# Patient Record
Sex: MALE | Race: WHITE | NOT HISPANIC OR LATINO | Employment: OTHER | URBAN - METROPOLITAN AREA
[De-identification: names, ages, dates, MRNs, and addresses within clinical notes are randomized per-mention and may not be internally consistent; named-entity substitution may affect disease eponyms.]

---

## 2017-04-17 DIAGNOSIS — E55.9 VITAMIN D DEFICIENCY: Primary | ICD-10-CM

## 2017-04-17 DIAGNOSIS — E11.69 TYPE 2 DIABETES MELLITUS WITH OTHER SPECIFIED COMPLICATION (HCC): ICD-10-CM

## 2017-04-17 DIAGNOSIS — E11.9 CONTROLLED TYPE 2 DIABETES MELLITUS WITHOUT COMPLICATION, UNSPECIFIED LONG TERM INSULIN USE STATUS: Primary | ICD-10-CM

## 2017-04-17 DIAGNOSIS — E78.5 DYSLIPIDEMIA: ICD-10-CM

## 2017-04-17 DIAGNOSIS — E79.0 HYPERURICEMIA: ICD-10-CM

## 2017-04-17 DIAGNOSIS — R79.89 LOW TESTOSTERONE: ICD-10-CM

## 2017-04-19 ENCOUNTER — LAB (OUTPATIENT)
Dept: ENDOCRINOLOGY | Age: 72
End: 2017-04-19

## 2017-04-19 DIAGNOSIS — E11.69 TYPE 2 DIABETES MELLITUS WITH OTHER SPECIFIED COMPLICATION (HCC): ICD-10-CM

## 2017-04-19 DIAGNOSIS — E78.5 DYSLIPIDEMIA: ICD-10-CM

## 2017-04-19 DIAGNOSIS — E55.9 VITAMIN D DEFICIENCY: ICD-10-CM

## 2017-04-19 DIAGNOSIS — E79.0 HYPERURICEMIA: ICD-10-CM

## 2017-04-19 DIAGNOSIS — E11.9 CONTROLLED TYPE 2 DIABETES MELLITUS WITHOUT COMPLICATION, UNSPECIFIED LONG TERM INSULIN USE STATUS: ICD-10-CM

## 2017-04-19 DIAGNOSIS — R79.89 LOW TESTOSTERONE: ICD-10-CM

## 2017-04-19 DIAGNOSIS — E55.9 VITAMIN D DEFICIENCY: Primary | ICD-10-CM

## 2017-04-20 DIAGNOSIS — R79.89 LOW TESTOSTERONE: Primary | ICD-10-CM

## 2017-04-20 LAB — 25(OH)D3+25(OH)D2 SERPL-MCNC: 71.8 NG/ML (ref 30–100)

## 2017-04-21 LAB
ALBUMIN SERPL-MCNC: 4.3 G/DL (ref 3.5–5.2)
ALBUMIN/GLOB SERPL: 1.5 G/DL
ALP SERPL-CCNC: 44 U/L (ref 39–117)
ALT SERPL-CCNC: 22 U/L (ref 1–41)
AST SERPL-CCNC: 23 U/L (ref 1–40)
BILIRUB SERPL-MCNC: 0.4 MG/DL (ref 0.1–1.2)
BUN SERPL-MCNC: 17 MG/DL (ref 8–23)
BUN/CREAT SERPL: 15.7 (ref 7–25)
C PEPTIDE SERPL-MCNC: 5.3 NG/ML (ref 1.1–4.4)
CALCIUM SERPL-MCNC: 9.7 MG/DL (ref 8.6–10.5)
CHLORIDE SERPL-SCNC: 97 MMOL/L (ref 98–107)
CHOLEST SERPL-MCNC: 108 MG/DL (ref 0–200)
CO2 SERPL-SCNC: 26.9 MMOL/L (ref 22–29)
CONV COMMENT: ABNORMAL
CREAT SERPL-MCNC: 1.08 MG/DL (ref 0.76–1.27)
ERYTHROCYTE [DISTWIDTH] IN BLOOD BY AUTOMATED COUNT: 15 % (ref 11.5–14.5)
GLOBULIN SER CALC-MCNC: 2.8 GM/DL
GLUCOSE SERPL-MCNC: 104 MG/DL (ref 65–99)
HBA1C MFR BLD: 5.85 % (ref 4.8–5.6)
HCT VFR BLD AUTO: 43.2 % (ref 40.4–52.2)
HCT VFR BLD AUTO: NORMAL %
HDLC SERPL-MCNC: 55 MG/DL (ref 40–60)
HGB BLD-MCNC: 13.9 G/DL (ref 13.7–17.6)
HGB BLD-MCNC: NORMAL G/DL
LDLC SERPL CALC-MCNC: 35 MG/DL (ref 0–100)
MCH RBC QN AUTO: 30 PG (ref 27–32.7)
MCHC RBC AUTO-ENTMCNC: 32.2 G/DL (ref 32.6–36.4)
MCV RBC AUTO: 93.3 FL (ref 79.8–96.2)
PLATELET # BLD AUTO: 186 10*3/MM3 (ref 140–500)
PLATELET # BLD AUTO: NORMAL 10*3/UL
POTASSIUM SERPL-SCNC: 4.8 MMOL/L (ref 3.5–5.2)
PROT SERPL-MCNC: 7.1 G/DL (ref 6–8.5)
RBC # BLD AUTO: 4.63 10*6/MM3 (ref 4.6–6)
RBC # BLD AUTO: NORMAL 10*6/UL
REQUEST PROBLEM: NORMAL
SHBG SERPL-SCNC: 27.6 NMOL/L (ref 19.3–76.4)
SODIUM SERPL-SCNC: 144 MMOL/L (ref 136–145)
T4 SERPL-MCNC: 5.22 MCG/DL (ref 4.5–11.7)
TESTOST FREE SERPL-MCNC: 4.3 PG/ML (ref 6.6–18.1)
TESTOST SERPL-MCNC: 101 NG/DL (ref 348–1197)
TRIGL SERPL-MCNC: 92 MG/DL (ref 0–150)
TSH SERPL DL<=0.005 MIU/L-ACNC: 1.68 MIU/ML (ref 0.27–4.2)
URATE SERPL-MCNC: 6.9 MG/DL (ref 3.4–7)
VLDLC SERPL CALC-MCNC: 18.4 MG/DL (ref 5–40)
WBC # BLD AUTO: 7.51 10*3/MM3 (ref 4.5–10.7)
WBC # BLD AUTO: NORMAL 10*3/MM3

## 2017-04-26 ENCOUNTER — OFFICE VISIT (OUTPATIENT)
Dept: ENDOCRINOLOGY | Age: 72
End: 2017-04-26

## 2017-04-26 VITALS
BODY MASS INDEX: 40.82 KG/M2 | WEIGHT: 308 LBS | RESPIRATION RATE: 16 BRPM | DIASTOLIC BLOOD PRESSURE: 84 MMHG | SYSTOLIC BLOOD PRESSURE: 138 MMHG | HEIGHT: 73 IN

## 2017-04-26 DIAGNOSIS — N52.9 IMPOTENCE OF ORGANIC ORIGIN: ICD-10-CM

## 2017-04-26 DIAGNOSIS — M10.9 GOUT, UNSPECIFIED CAUSE, UNSPECIFIED CHRONICITY, UNSPECIFIED SITE: ICD-10-CM

## 2017-04-26 DIAGNOSIS — E55.9 VITAMIN D DEFICIENCY: ICD-10-CM

## 2017-04-26 DIAGNOSIS — E79.0 HYPERURICEMIA: ICD-10-CM

## 2017-04-26 DIAGNOSIS — R79.89 LOW TESTOSTERONE: ICD-10-CM

## 2017-04-26 DIAGNOSIS — I15.9 SECONDARY HYPERTENSION: ICD-10-CM

## 2017-04-26 DIAGNOSIS — L83 ACANTHOSIS NIGRICANS: ICD-10-CM

## 2017-04-26 DIAGNOSIS — E78.5 DYSLIPIDEMIA: ICD-10-CM

## 2017-04-26 DIAGNOSIS — E11.9 CONTROLLED TYPE 2 DIABETES MELLITUS WITHOUT COMPLICATION, UNSPECIFIED LONG TERM INSULIN USE STATUS: Primary | ICD-10-CM

## 2017-04-26 DIAGNOSIS — N40.0 BENIGN NON-NODULAR PROSTATIC HYPERPLASIA WITHOUT LOWER URINARY TRACT SYMPTOMS: ICD-10-CM

## 2017-04-26 PROCEDURE — 99214 OFFICE O/P EST MOD 30 MIN: CPT | Performed by: INTERNAL MEDICINE

## 2017-04-26 RX ORDER — ERGOCALCIFEROL 1.25 MG/1
CAPSULE ORAL
Qty: 26 CAPSULE | Refills: 3 | Status: SHIPPED | OUTPATIENT
Start: 2017-04-26 | End: 2017-11-02 | Stop reason: SDUPTHER

## 2017-04-26 RX ORDER — ROSUVASTATIN CALCIUM 10 MG/1
10 TABLET, COATED ORAL DAILY
Qty: 90 TABLET | Refills: 3 | Status: SHIPPED | OUTPATIENT
Start: 2017-04-26 | End: 2017-11-02 | Stop reason: SDUPTHER

## 2017-04-26 RX ORDER — AMLODIPINE BESYLATE 10 MG/1
10 TABLET ORAL DAILY
Qty: 90 TABLET | Refills: 3 | Status: SHIPPED | OUTPATIENT
Start: 2017-04-26 | End: 2017-11-02 | Stop reason: SDUPTHER

## 2017-04-26 RX ORDER — METOPROLOL SUCCINATE 50 MG/1
50 TABLET, EXTENDED RELEASE ORAL DAILY
Qty: 90 TABLET | Refills: 3 | Status: SHIPPED | OUTPATIENT
Start: 2017-04-26 | End: 2017-11-02 | Stop reason: SDUPTHER

## 2017-04-26 RX ORDER — BENAZEPRIL HYDROCHLORIDE AND HYDROCHLOROTHIAZIDE 20; 12.5 MG/1; MG/1
1 TABLET ORAL 2 TIMES DAILY
Qty: 180 TABLET | Refills: 3 | Status: SHIPPED | OUTPATIENT
Start: 2017-04-26 | End: 2017-11-02 | Stop reason: SDUPTHER

## 2017-04-26 RX ORDER — ALLOPURINOL 300 MG/1
300 TABLET ORAL DAILY
Qty: 90 TABLET | Refills: 3 | Status: SHIPPED | OUTPATIENT
Start: 2017-04-26 | End: 2017-11-02

## 2017-04-26 NOTE — PROGRESS NOTES
"Subjective   Agustín Willett is a 71 y.o. male seen for follow up for DM2, HTN, hypogonadism, dyslipidemia, ED, vit d deficiency, lab review. Patient is not checking his BG. He would like to discuss weight.   History of Present Illness this is a 71-year-old gentleman known patient with type II diabetes hypertension and dyslipidemia as well as erectile dysfunction with hyperuricemia and with low testosterone and vitamin D deficiency.  Over the course of last 6 months he has had no significant health problems for which to go to the emergency room or hospital.  However he has been without Axiron for the past 3 months due to having no refills.  /84  Resp 16  Ht 72.5\" (184.2 cm)  Wt (!) 308 lb (140 kg)  BMI 41.2 kg/m2  No Known Allergies    Current Outpatient Prescriptions:   •  allopurinol (ZYLOPRIM) 300 MG tablet, Take 1 tablet by mouth Daily., Disp: 90 tablet, Rfl: 2  •  amLODIPine (NORVASC) 10 MG tablet, Take 1 tablet by mouth Daily., Disp: 90 tablet, Rfl: 1  •  AXIRON 30 MG/ACT solution, Apply 120 mg daily as directed, Disp: 180 mL, Rfl: 0  •  benazepril-hydrochlorthiazide (LOTENSIN HCT) 20-12.5 MG per tablet, Take 1 tablet by mouth 2 (Two) Times a Day., Disp: 180 tablet, Rfl: 2  •  colchicine 0.6 MG tablet, 1 po bid, Disp: 180 tablet, Rfl: 1  •  ergocalciferol (ERGOCALCIFEROL) 60044 UNITS capsule, Take 1 capsule by mouth 1 (One) Time Per Week., Disp: 12 capsule, Rfl: 1  •  metFORMIN (GLUCOPHAGE) 1000 MG tablet, Take 1 tablet by mouth 2 (Two) Times a Day., Disp: 180 tablet, Rfl: 2  •  metoprolol succinate XL (TOPROL-XL) 50 MG 24 hr tablet, Take 1 tablet by mouth Daily., Disp: 90 tablet, Rfl: 3  •  propranolol (INDERAL) 10 MG tablet, Take 1 tablet by mouth Daily., Disp: 90 tablet, Rfl: 1  •  rosuvastatin (CRESTOR) 10 MG tablet, Take 1 tablet by mouth Daily., Disp: 90 tablet, Rfl: 1  •  vitamin D (ERGOCALCIFEROL) 18692 UNITS capsule capsule, Take 1 capsule twice weekly, Disp: 26 capsule, Rfl: 3    The " following portions of the patient's history were reviewed and updated as appropriate: allergies, current medications, past family history, past medical history, past social history, past surgical history and problem list.    Review of Systems   Constitutional: Positive for unexpected weight change.   HENT: Negative.    Eyes: Negative.    Respiratory: Negative.    Cardiovascular: Negative.    Gastrointestinal: Negative.    Endocrine: Negative.    Genitourinary: Negative.    Musculoskeletal: Negative.    Skin: Negative.    Allergic/Immunologic: Negative.    Neurological: Negative.    Hematological: Negative.    Psychiatric/Behavioral: Negative.        Objective   Physical Exam   Constitutional: He is oriented to person, place, and time. He appears well-developed and well-nourished. No distress.   HENT:   Head: Normocephalic and atraumatic.   Right Ear: External ear normal.   Left Ear: External ear normal.   Nose: Nose normal.   Mouth/Throat: Oropharynx is clear and moist. No oropharyngeal exudate.   Eyes: Conjunctivae and EOM are normal. Pupils are equal, round, and reactive to light. Right eye exhibits no discharge. Left eye exhibits no discharge. No scleral icterus.   Neck: Normal range of motion. Neck supple. No JVD present. No tracheal deviation present. No thyromegaly present.   Cardiovascular: Normal rate, regular rhythm, normal heart sounds and intact distal pulses.  Exam reveals no gallop and no friction rub.    No murmur heard.  Pulmonary/Chest: Effort normal and breath sounds normal. No stridor. No respiratory distress. He has no wheezes. He has no rales. He exhibits no tenderness.   Abdominal: Soft. Bowel sounds are normal. He exhibits no distension and no mass. There is no tenderness. There is no rebound and no guarding. No hernia.   Musculoskeletal: Normal range of motion. He exhibits no edema, tenderness or deformity.   Lymphadenopathy:     He has no cervical adenopathy.   Neurological: He is alert and  oriented to person, place, and time. He has normal reflexes. He displays normal reflexes. No cranial nerve deficit. He exhibits normal muscle tone. Coordination normal.   Skin: Skin is warm and dry. No rash noted. He is not diaphoretic. No erythema. No pallor.   Psychiatric: He has a normal mood and affect. His behavior is normal. Judgment and thought content normal.   Nursing note and vitals reviewed.    Results for orders placed or performed in visit on 04/20/17   CBC (No Diff)   Result Value Ref Range    WBC 7.51 4.50 - 10.70 10*3/mm3    RBC 4.63 4.60 - 6.00 10*6/mm3    Hemoglobin 13.9 13.7 - 17.6 g/dL    Hematocrit 43.2 40.4 - 52.2 %    MCV 93.3 79.8 - 96.2 fL    MCH 30.0 27.0 - 32.7 pg    MCHC 32.2 (L) 32.6 - 36.4 g/dL    RDW 15.0 (H) 11.5 - 14.5 %    Platelets 186 140 - 500 10*3/mm3     Lab Results   Component Value Date    BUN 17 04/19/2017    CREATININE 1.08 04/19/2017    EGFRIFNONA 68 04/19/2017    EGFRIFAFRI 82 04/19/2017    BCR 15.7 04/19/2017    K 4.8 04/19/2017    CO2 26.9 04/19/2017    CALCIUM 9.7 04/19/2017    PROTENTOTREF 7.1 04/19/2017    ALBUMIN 4.30 04/19/2017    LABIL2 1.5 04/19/2017    AST 23 04/19/2017    ALT 22 04/19/2017     No results found for: CHOL  Lab Results   Component Value Date    TRIG 92 04/19/2017    TRIG 106 10/20/2016    TRIG 109 04/21/2016     Lab Results   Component Value Date    HDL 55 04/19/2017    HDL 41 10/20/2016    HDL 54 04/21/2016     No results found for: LDLCALC  Lab Results   Component Value Date    LDL 35 04/19/2017    LDL 51 10/20/2016    LDL 43 04/21/2016     No results found for: HDLLDLRATIO  No components found for: CHOLHDL  Lab Results   Component Value Date    TSH 1.680 04/19/2017     Lab Results   Component Value Date    HGBA1C 5.85 (H) 04/19/2017           Assessment/Plan   Diagnoses and all orders for this visit:    Controlled type 2 diabetes mellitus without complication, unspecified long term insulin use status  -     T4 & TSH ([facility]); Future  -      TestT+TestF+SHBG; Future  -     Uric Acid; Future  -     Vitamin D 25 Hydroxy; Future  -     Comprehensive Metabolic Panel; Future  -     C-Peptide; Future  -     Hemoglobin A1c; Future  -     Lipid Panel; Future  -     PSA; Future  -     MicroAlbumin, Urine, Random; Future  -     Hemoglobin & Hematocrit, Blood; Future    Secondary hypertension  -     T4 & TSH ([facility]); Future  -     TestT+TestF+SHBG; Future  -     Uric Acid; Future  -     Vitamin D 25 Hydroxy; Future  -     Comprehensive Metabolic Panel; Future  -     C-Peptide; Future  -     Hemoglobin A1c; Future  -     Lipid Panel; Future  -     PSA; Future  -     MicroAlbumin, Urine, Random; Future  -     Hemoglobin & Hematocrit, Blood; Future    Vitamin D deficiency  -     T4 & TSH ([facility]); Future  -     TestT+TestF+SHBG; Future  -     Uric Acid; Future  -     Vitamin D 25 Hydroxy; Future  -     Comprehensive Metabolic Panel; Future  -     C-Peptide; Future  -     Hemoglobin A1c; Future  -     Lipid Panel; Future  -     PSA; Future  -     MicroAlbumin, Urine, Random; Future  -     Hemoglobin & Hematocrit, Blood; Future    Low testosterone  -     T4 & TSH ([facility]); Future  -     TestT+TestF+SHBG; Future  -     Uric Acid; Future  -     Vitamin D 25 Hydroxy; Future  -     Comprehensive Metabolic Panel; Future  -     C-Peptide; Future  -     Hemoglobin A1c; Future  -     Lipid Panel; Future  -     PSA; Future  -     MicroAlbumin, Urine, Random; Future  -     Hemoglobin & Hematocrit, Blood; Future    Acanthosis nigricans  -     T4 & TSH ([facility]); Future  -     TestT+TestF+SHBG; Future  -     Uric Acid; Future  -     Vitamin D 25 Hydroxy; Future  -     Comprehensive Metabolic Panel; Future  -     C-Peptide; Future  -     Hemoglobin A1c; Future  -     Lipid Panel; Future  -     PSA; Future  -     MicroAlbumin, Urine, Random; Future  -     Hemoglobin & Hematocrit, Blood; Future    Impotence of organic origin  -     T4 & TSH ([facility]); Future  -      TestT+TestF+SHBG; Future  -     Uric Acid; Future  -     Vitamin D 25 Hydroxy; Future  -     Comprehensive Metabolic Panel; Future  -     C-Peptide; Future  -     Hemoglobin A1c; Future  -     Lipid Panel; Future  -     PSA; Future  -     MicroAlbumin, Urine, Random; Future  -     Hemoglobin & Hematocrit, Blood; Future    Hyperuricemia  -     T4 & TSH ([facility]); Future  -     TestT+TestF+SHBG; Future  -     Uric Acid; Future  -     Vitamin D 25 Hydroxy; Future  -     Comprehensive Metabolic Panel; Future  -     C-Peptide; Future  -     Hemoglobin A1c; Future  -     Lipid Panel; Future  -     PSA; Future  -     MicroAlbumin, Urine, Random; Future  -     Hemoglobin & Hematocrit, Blood; Future    Dyslipidemia  -     T4 & TSH ([facility]); Future  -     TestT+TestF+SHBG; Future  -     Uric Acid; Future  -     Vitamin D 25 Hydroxy; Future  -     Comprehensive Metabolic Panel; Future  -     C-Peptide; Future  -     Hemoglobin A1c; Future  -     Lipid Panel; Future  -     PSA; Future  -     MicroAlbumin, Urine, Random; Future  -     Hemoglobin & Hematocrit, Blood; Future    Gout, unspecified cause, unspecified chronicity, unspecified site  -     T4 & TSH ([facility]); Future  -     TestT+TestF+SHBG; Future  -     Uric Acid; Future  -     Vitamin D 25 Hydroxy; Future  -     Comprehensive Metabolic Panel; Future  -     C-Peptide; Future  -     Hemoglobin A1c; Future  -     Lipid Panel; Future  -     PSA; Future  -     MicroAlbumin, Urine, Random; Future  -     Hemoglobin & Hematocrit, Blood; Future    Benign non-nodular prostatic hyperplasia without lower urinary tract symptoms  -     T4 & TSH ([facility]); Future  -     TestT+TestF+SHBG; Future  -     Uric Acid; Future  -     Vitamin D 25 Hydroxy; Future  -     Comprehensive Metabolic Panel; Future  -     C-Peptide; Future  -     Hemoglobin A1c; Future  -     Lipid Panel; Future  -     PSA; Future  -     MicroAlbumin, Urine, Random; Future  -     Hemoglobin & Hematocrit, Blood;  Future    Other orders  -     allopurinol (ZYLOPRIM) 300 MG tablet; Take 1 tablet by mouth Daily.  -     amLODIPine (NORVASC) 10 MG tablet; Take 1 tablet by mouth Daily.  -     AXIRON 30 MG/ACT solution; Apply 120 mg daily as directed  -     benazepril-hydrochlorthiazide (LOTENSIN HCT) 20-12.5 MG per tablet; Take 1 tablet by mouth 2 (Two) Times a Day.  -     vitamin D (ERGOCALCIFEROL) 73570 UNITS capsule capsule; Take 1 capsule twice weekly  -     metFORMIN (GLUCOPHAGE) 1000 MG tablet; Take 1 tablet by mouth 2 (Two) Times a Day.  -     metoprolol succinate XL (TOPROL-XL) 50 MG 24 hr tablet; Take 1 tablet by mouth Daily.  -     rosuvastatin (CRESTOR) 10 MG tablet; Take 1 tablet by mouth Daily.             his summary I saw and examined this 71-year-old gentleman for above-mentioned problems.  I reviewed his laboratory evaluation of 04/19/2017 and provided him with a hard copy of it.  Overall he is clinically and metabolically stable and therefore we will go ahead and continue all his current prescriptions.  He will see Ms. Nancy Leone in 6 months or sooner if needed with laboratory evaluation prior to each office visit.

## 2017-05-19 ENCOUNTER — RESULTS ENCOUNTER (OUTPATIENT)
Dept: ENDOCRINOLOGY | Age: 72
End: 2017-05-19

## 2017-05-19 DIAGNOSIS — R79.89 LOW TESTOSTERONE: ICD-10-CM

## 2017-09-28 DIAGNOSIS — E78.5 DYSLIPIDEMIA: ICD-10-CM

## 2017-09-28 DIAGNOSIS — E55.9 VITAMIN D DEFICIENCY: ICD-10-CM

## 2017-09-28 DIAGNOSIS — E29.1 HYPOGONADISM IN MALE: ICD-10-CM

## 2017-09-28 DIAGNOSIS — E11.9 CONTROLLED TYPE 2 DIABETES MELLITUS WITHOUT COMPLICATION, UNSPECIFIED LONG TERM INSULIN USE STATUS: Primary | ICD-10-CM

## 2017-09-28 DIAGNOSIS — N40.0 BENIGN NON-NODULAR PROSTATIC HYPERPLASIA WITHOUT LOWER URINARY TRACT SYMPTOMS: ICD-10-CM

## 2017-09-28 DIAGNOSIS — E79.0 HYPERURICEMIA: ICD-10-CM

## 2017-10-12 RX ORDER — PROPRANOLOL HYDROCHLORIDE 10 MG/1
10 TABLET ORAL DAILY
Qty: 90 TABLET | Refills: 0 | Status: SHIPPED | OUTPATIENT
Start: 2017-10-12 | End: 2017-11-02 | Stop reason: SDUPTHER

## 2017-10-19 ENCOUNTER — LAB (OUTPATIENT)
Dept: ENDOCRINOLOGY | Age: 72
End: 2017-10-19

## 2017-10-19 ENCOUNTER — RESULTS ENCOUNTER (OUTPATIENT)
Dept: ENDOCRINOLOGY | Age: 72
End: 2017-10-19

## 2017-10-19 DIAGNOSIS — R79.89 LOW TESTOSTERONE: ICD-10-CM

## 2017-10-19 DIAGNOSIS — E29.1 HYPOGONADISM IN MALE: ICD-10-CM

## 2017-10-19 DIAGNOSIS — N40.0 BENIGN NON-NODULAR PROSTATIC HYPERPLASIA WITHOUT LOWER URINARY TRACT SYMPTOMS: ICD-10-CM

## 2017-10-19 DIAGNOSIS — M10.9 GOUT, UNSPECIFIED CAUSE, UNSPECIFIED CHRONICITY, UNSPECIFIED SITE: ICD-10-CM

## 2017-10-19 DIAGNOSIS — E78.5 DYSLIPIDEMIA: ICD-10-CM

## 2017-10-19 DIAGNOSIS — E11.9 CONTROLLED TYPE 2 DIABETES MELLITUS WITHOUT COMPLICATION, UNSPECIFIED LONG TERM INSULIN USE STATUS: ICD-10-CM

## 2017-10-19 DIAGNOSIS — E79.0 HYPERURICEMIA: ICD-10-CM

## 2017-10-19 DIAGNOSIS — I15.9 SECONDARY HYPERTENSION: ICD-10-CM

## 2017-10-19 DIAGNOSIS — N52.9 IMPOTENCE OF ORGANIC ORIGIN: ICD-10-CM

## 2017-10-19 DIAGNOSIS — E55.9 VITAMIN D DEFICIENCY: ICD-10-CM

## 2017-10-19 DIAGNOSIS — L83 ACANTHOSIS NIGRICANS: ICD-10-CM

## 2017-10-21 LAB
25(OH)D3+25(OH)D2 SERPL-MCNC: 53.8 NG/ML (ref 30–100)
ALBUMIN SERPL-MCNC: 4.4 G/DL (ref 3.5–5.2)
ALBUMIN/GLOB SERPL: 1.8 G/DL
ALP SERPL-CCNC: 39 U/L (ref 39–117)
ALT SERPL-CCNC: 19 U/L (ref 1–41)
AST SERPL-CCNC: 17 U/L (ref 1–40)
BILIRUB SERPL-MCNC: 0.5 MG/DL (ref 0.1–1.2)
BUN SERPL-MCNC: 13 MG/DL (ref 8–23)
BUN/CREAT SERPL: 10.8 (ref 7–25)
CALCIUM SERPL-MCNC: 9.9 MG/DL (ref 8.6–10.5)
CHLORIDE SERPL-SCNC: 100 MMOL/L (ref 98–107)
CHOLEST SERPL-MCNC: 90 MG/DL (ref 0–200)
CO2 SERPL-SCNC: 30.2 MMOL/L (ref 22–29)
CREAT SERPL-MCNC: 1.2 MG/DL (ref 0.76–1.27)
GFR SERPLBLD CREATININE-BSD FMLA CKD-EPI: 60 ML/MIN/1.73
GFR SERPLBLD CREATININE-BSD FMLA CKD-EPI: 73 ML/MIN/1.73
GLOBULIN SER CALC-MCNC: 2.5 GM/DL
GLUCOSE SERPL-MCNC: 109 MG/DL (ref 65–99)
HBA1C MFR BLD: 5.02 % (ref 4.8–5.6)
HCT VFR BLD AUTO: 48.1 % (ref 40.4–52.2)
HDLC SERPL-MCNC: 50 MG/DL (ref 40–60)
HGB BLD-MCNC: 14.9 G/DL (ref 13.7–17.6)
LDLC SERPL CALC-MCNC: 29 MG/DL (ref 0–100)
MICROALBUMIN UR-MCNC: 6 UG/ML
POTASSIUM SERPL-SCNC: 4.7 MMOL/L (ref 3.5–5.2)
PROT SERPL-MCNC: 6.9 G/DL (ref 6–8.5)
PSA SERPL-MCNC: 0.96 NG/ML (ref 0–4)
SHBG SERPL-SCNC: 26.1 NMOL/L (ref 19.3–76.4)
SODIUM SERPL-SCNC: 144 MMOL/L (ref 136–145)
TESTOST FREE SERPL-MCNC: 37.6 PG/ML (ref 6.6–18.1)
TESTOST SERPL-MCNC: >1500 NG/DL (ref 264–916)
TRIGL SERPL-MCNC: 53 MG/DL (ref 0–150)
URATE SERPL-MCNC: 8.1 MG/DL (ref 3.4–7)
VLDLC SERPL CALC-MCNC: 10.6 MG/DL (ref 5–40)

## 2017-11-02 ENCOUNTER — OFFICE VISIT (OUTPATIENT)
Dept: ENDOCRINOLOGY | Age: 72
End: 2017-11-02

## 2017-11-02 VITALS
SYSTOLIC BLOOD PRESSURE: 120 MMHG | DIASTOLIC BLOOD PRESSURE: 72 MMHG | HEIGHT: 73 IN | WEIGHT: 279.8 LBS | BODY MASS INDEX: 37.08 KG/M2

## 2017-11-02 DIAGNOSIS — R79.89 LOW TESTOSTERONE: ICD-10-CM

## 2017-11-02 DIAGNOSIS — E29.1 HYPOGONADISM IN MALE: Primary | ICD-10-CM

## 2017-11-02 DIAGNOSIS — L83 ACANTHOSIS NIGRICANS: ICD-10-CM

## 2017-11-02 DIAGNOSIS — E55.9 VITAMIN D DEFICIENCY: ICD-10-CM

## 2017-11-02 DIAGNOSIS — E79.0 HYPERURICEMIA: ICD-10-CM

## 2017-11-02 DIAGNOSIS — I15.9 SECONDARY HYPERTENSION: ICD-10-CM

## 2017-11-02 DIAGNOSIS — E66.01 MORBID OBESITY (HCC): ICD-10-CM

## 2017-11-02 DIAGNOSIS — E11.9 CONTROLLED TYPE 2 DIABETES MELLITUS WITHOUT COMPLICATION, UNSPECIFIED LONG TERM INSULIN USE STATUS: ICD-10-CM

## 2017-11-02 DIAGNOSIS — E78.5 DYSLIPIDEMIA: ICD-10-CM

## 2017-11-02 DIAGNOSIS — M10.9 GOUT, UNSPECIFIED CAUSE, UNSPECIFIED CHRONICITY, UNSPECIFIED SITE: ICD-10-CM

## 2017-11-02 PROCEDURE — 99214 OFFICE O/P EST MOD 30 MIN: CPT | Performed by: NURSE PRACTITIONER

## 2017-11-02 RX ORDER — ERGOCALCIFEROL 1.25 MG/1
1 CAPSULE ORAL WEEKLY
Qty: 12 CAPSULE | Refills: 1 | Status: SHIPPED | OUTPATIENT
Start: 2017-11-02 | End: 2018-01-08 | Stop reason: SDUPTHER

## 2017-11-02 RX ORDER — PROPRANOLOL HYDROCHLORIDE 10 MG/1
10 TABLET ORAL DAILY
Qty: 90 TABLET | Refills: 0 | Status: SHIPPED | OUTPATIENT
Start: 2017-11-02 | End: 2018-04-08 | Stop reason: SDUPTHER

## 2017-11-02 RX ORDER — FEBUXOSTAT 80 MG/1
80 TABLET ORAL DAILY
Qty: 90 TABLET | Refills: 1 | Status: SHIPPED | OUTPATIENT
Start: 2017-11-02 | End: 2017-11-02 | Stop reason: SDUPTHER

## 2017-11-02 RX ORDER — FEBUXOSTAT 80 MG/1
80 TABLET ORAL DAILY
Qty: 90 TABLET | Refills: 1 | Status: SHIPPED | OUTPATIENT
Start: 2017-11-02 | End: 2018-04-27

## 2017-11-02 RX ORDER — THYROID 60 MG
1 TABLET ORAL DAILY
Refills: 1 | COMMUNITY
Start: 2017-10-18 | End: 2017-11-02 | Stop reason: SDUPTHER

## 2017-11-02 RX ORDER — THYROID 60 MG
60 TABLET ORAL DAILY
Qty: 90 TABLET | Refills: 1 | Status: SHIPPED | OUTPATIENT
Start: 2017-11-02 | End: 2018-04-27

## 2017-11-02 RX ORDER — ROSUVASTATIN CALCIUM 10 MG/1
10 TABLET, COATED ORAL DAILY
Qty: 90 TABLET | Refills: 1 | Status: SHIPPED | OUTPATIENT
Start: 2017-11-02 | End: 2018-10-04 | Stop reason: SDUPTHER

## 2017-11-02 RX ORDER — AMLODIPINE BESYLATE 10 MG/1
10 TABLET ORAL DAILY
Qty: 90 TABLET | Refills: 1 | Status: SHIPPED | OUTPATIENT
Start: 2017-11-02 | End: 2018-05-04

## 2017-11-02 RX ORDER — BENAZEPRIL HYDROCHLORIDE AND HYDROCHLOROTHIAZIDE 20; 12.5 MG/1; MG/1
1 TABLET ORAL 2 TIMES DAILY
Qty: 180 TABLET | Refills: 1 | Status: SHIPPED | OUTPATIENT
Start: 2017-11-02 | End: 2018-05-04

## 2017-11-02 RX ORDER — METOPROLOL SUCCINATE 50 MG/1
50 TABLET, EXTENDED RELEASE ORAL DAILY
Qty: 90 TABLET | Refills: 3 | Status: SHIPPED | OUTPATIENT
Start: 2017-11-02 | End: 2018-11-26

## 2017-11-02 NOTE — PROGRESS NOTES
"Andria Willett is a 72 y.o. male is here today for follow-up.  Chief Complaint   Patient presents with   • Diabetes     recent labs, pt does not check BG   • Hyperlipidemia     pt is no longer taking axiron, colchicine, victoza   • Hypertension   • Vitamin D Deficiency   • low testosterone   • hyperuricemia   • Gout     /72  Ht 73\" (185.4 cm)  Wt 279 lb 12.8 oz (127 kg)  BMI 36.92 kg/m2  Current Outpatient Prescriptions on File Prior to Visit   Medication Sig   • allopurinol (ZYLOPRIM) 300 MG tablet Take 1 tablet by mouth Daily.   • amLODIPine (NORVASC) 10 MG tablet Take 1 tablet by mouth Daily.   • benazepril-hydrochlorthiazide (LOTENSIN HCT) 20-12.5 MG per tablet Take 1 tablet by mouth 2 (Two) Times a Day.   • ergocalciferol (ERGOCALCIFEROL) 27261 UNITS capsule Take 1 capsule by mouth 1 (One) Time Per Week.   • metFORMIN (GLUCOPHAGE) 1000 MG tablet Take 1 tablet by mouth 2 (Two) Times a Day. (Patient taking differently: Take 500 mg by mouth Daily.)   • metoprolol succinate XL (TOPROL-XL) 50 MG 24 hr tablet Take 1 tablet by mouth Daily.   • propranolol (INDERAL) 10 MG tablet Take 1 tablet by mouth Daily.   • rosuvastatin (CRESTOR) 10 MG tablet Take 1 tablet by mouth Daily.   • colchicine 0.6 MG tablet 1 po bid   • Liraglutide (VICTOZA) 18 MG/3ML solution pen-injector Inject 1.8 mg under the skin Daily.   • [DISCONTINUED] AXIRON 30 MG/ACT solution Apply 120 mg daily as directed   • [DISCONTINUED] vitamin D (ERGOCALCIFEROL) 23694 UNITS capsule capsule Take 1 capsule twice weekly     No current facility-administered medications on file prior to visit.      Family History   Problem Relation Age of Onset   • ALS Mother    • Hypertension Father      Social History   Substance Use Topics   • Smoking status: Former Smoker   • Smokeless tobacco: None   • Alcohol use Yes     No Known Allergies      History of Present Illness  Encounter Diagnoses   Name Primary?   • Hypogonadism in male Yes   • " Controlled type 2 diabetes mellitus without complication, unspecified long term insulin use status    • Secondary hypertension    • Morbid obesity    • Vitamin D deficiency    • Low testosterone    • Acanthosis nigricans    • Dyslipidemia    72-year-old male patient here today for routine follow-up visit.  He is being seen for the above mentioned problems.  He has had recent labs which were reviewed and he was provided a copy.  He states he is on a strict high protein diet and also taking hCG injections.  He is going to 25 again and getting his testosterone supplement.  He feels good with the weight loss.  Energy level is fine.  He is taking allopurinol as prescribed.  He is needing refills on his other medications.  He has had no recent gout attack.  He is wanting to come off metformin if possible he has not been taking it as prescribed.  He was encouraged to continue his medications to assist with his continued weight loss.      The following portions of the patient's history were reviewed and updated as appropriate: allergies, current medications, past family history, past medical history, past social history, past surgical history and problem list.    Review of Systems   Constitutional: Negative for fatigue.   HENT: Negative for trouble swallowing.    Eyes: Negative for visual disturbance.   Respiratory: Negative for shortness of breath.    Cardiovascular: Negative for leg swelling.   Gastrointestinal: Positive for abdominal pain ( believes it is caused from constipation) and constipation.   Endocrine: Negative for polyuria.   Skin: Negative for wound.   Neurological: Negative for numbness.       Objective   Physical Exam   Constitutional: He is oriented to person, place, and time. He appears well-developed and well-nourished. No distress.   HENT:   Head: Normocephalic and atraumatic.   Right Ear: External ear normal.   Left Ear: External ear normal.   Nose: Nose normal.   Mouth/Throat: Oropharynx is clear and  moist. No oropharyngeal exudate.   Eyes: Conjunctivae and EOM are normal. Pupils are equal, round, and reactive to light. Right eye exhibits no discharge. Left eye exhibits no discharge. No scleral icterus.   Neck: Normal range of motion. Neck supple. No JVD present. No tracheal deviation present. No thyromegaly present.   Cardiovascular: Normal rate, regular rhythm, normal heart sounds and intact distal pulses.  Exam reveals no gallop and no friction rub.    No murmur heard.  Pulmonary/Chest: Effort normal and breath sounds normal. No stridor. No respiratory distress. He has no wheezes. He has no rales. He exhibits no tenderness.   Abdominal: Soft. Bowel sounds are normal. He exhibits no distension and no mass. There is no tenderness. There is no rebound and no guarding. No hernia.   Musculoskeletal: Normal range of motion. He exhibits no edema, tenderness or deformity.   Lymphadenopathy:     He has no cervical adenopathy.   Neurological: He is alert and oriented to person, place, and time. He has normal reflexes. He displays normal reflexes. No cranial nerve deficit. He exhibits normal muscle tone. Coordination normal.   Skin: Skin is warm and dry. No rash noted. He is not diaphoretic. No erythema. No pallor.   Psychiatric: He has a normal mood and affect. His behavior is normal. Judgment and thought content normal.   Nursing note and vitals reviewed.      Results for orders placed or performed in visit on 10/19/17   Comprehensive Metabolic Panel   Result Value Ref Range    Glucose 109 (H) 65 - 99 mg/dL    BUN 13 8 - 23 mg/dL    Creatinine 1.20 0.76 - 1.27 mg/dL    eGFR Non African Am 60 (L) >60 mL/min/1.73    eGFR African Am 73 >60 mL/min/1.73    BUN/Creatinine Ratio 10.8 7.0 - 25.0    Sodium 144 136 - 145 mmol/L    Potassium 4.7 3.5 - 5.2 mmol/L    Chloride 100 98 - 107 mmol/L    Total CO2 30.2 (H) 22.0 - 29.0 mmol/L    Calcium 9.9 8.6 - 10.5 mg/dL    Total Protein 6.9 6.0 - 8.5 g/dL    Albumin 4.40 3.50 - 5.20  g/dL    Globulin 2.5 gm/dL    A/G Ratio 1.8 g/dL    Total Bilirubin 0.5 0.1 - 1.2 mg/dL    Alkaline Phosphatase 39 39 - 117 U/L    AST (SGOT) 17 1 - 40 U/L    ALT (SGPT) 19 1 - 41 U/L   Lipid Panel   Result Value Ref Range    Total Cholesterol 90 0 - 200 mg/dL    Triglycerides 53 0 - 150 mg/dL    HDL Cholesterol 50 40 - 60 mg/dL    VLDL Cholesterol 10.6 5 - 40 mg/dL    LDL Cholesterol  29 0 - 100 mg/dL   Vitamin D 25 Hydroxy   Result Value Ref Range    25 Hydroxy, Vitamin D 53.8 30.0 - 100.0 ng/mL   Hemoglobin A1c   Result Value Ref Range    Hemoglobin A1C 5.02 4.80 - 5.60 %   PSA   Result Value Ref Range    PSA 0.965 0.000 - 4.000 ng/mL   Hemoglobin & Hematocrit, Blood   Result Value Ref Range    Hemoglobin 14.9 13.7 - 17.6 g/dL    Hematocrit 48.1 40.4 - 52.2 %   TestT+TestF+SHBG   Result Value Ref Range    Testosterone, Total >1500 (H) 264 - 916 ng/dL    Testosterone, Free 37.6 (H) 6.6 - 18.1 pg/mL    Sex Hormone Binding Globulin 26.1 19.3 - 76.4 nmol/L   Uric Acid   Result Value Ref Range    Uric Acid 8.1 (H) 3.4 - 7.0 mg/dL   MicroAlbumin, Urine, Random   Result Value Ref Range    Microalbumin, Urine 6.0 Not Estab. ug/mL       Assessment/Plan   Problems Addressed this Visit        Cardiovascular and Mediastinum    Hypertension       Digestive    Vitamin D deficiency    Morbid obesity       Endocrine    Diabetes mellitus type 2, controlled, without complications    Low testosterone    Hypogonadism in male - Primary       Musculoskeletal and Integument    Acanthosis nigricans       Other    Dyslipidemia          In summary, patient was seen and examined.  His uric acid level is elevated.  He has been instructed to stop allopurinol and instructed take uloric 80 milligrams once daily.  A prescription was sent to his pharmacy.  His testosterone level is in good range and is currently not being prescribed from this office.  He is going to a weight loss Center which has helped him to lose weight and is prescribing the  testosterone.  His thyroid hormones are an satisfactory range.  He will follow-up with Dr. Dunn in 6 months.  Blood pressure is stable.  Metabolically his stable.

## 2018-01-08 RX ORDER — ERGOCALCIFEROL 1.25 MG/1
1 CAPSULE ORAL WEEKLY
Qty: 12 CAPSULE | Refills: 0 | Status: SHIPPED | OUTPATIENT
Start: 2018-01-08 | End: 2018-04-02 | Stop reason: SDUPTHER

## 2018-04-04 RX ORDER — ERGOCALCIFEROL 1.25 MG/1
CAPSULE ORAL
Qty: 12 CAPSULE | Refills: 0 | Status: SHIPPED | OUTPATIENT
Start: 2018-04-04 | End: 2018-04-27

## 2018-04-09 RX ORDER — PROPRANOLOL HYDROCHLORIDE 10 MG/1
10 TABLET ORAL DAILY
Qty: 90 TABLET | Refills: 0 | Status: SHIPPED | OUTPATIENT
Start: 2018-04-09 | End: 2018-05-10 | Stop reason: HOSPADM

## 2018-04-27 ENCOUNTER — APPOINTMENT (OUTPATIENT)
Dept: CARDIOLOGY | Facility: HOSPITAL | Age: 73
End: 2018-04-27

## 2018-04-27 ENCOUNTER — APPOINTMENT (OUTPATIENT)
Dept: GENERAL RADIOLOGY | Facility: HOSPITAL | Age: 73
End: 2018-04-27

## 2018-04-27 ENCOUNTER — APPOINTMENT (OUTPATIENT)
Dept: CT IMAGING | Facility: HOSPITAL | Age: 73
End: 2018-04-27

## 2018-04-27 ENCOUNTER — HOSPITAL ENCOUNTER (EMERGENCY)
Facility: HOSPITAL | Age: 73
Discharge: HOME | End: 2018-04-27
Attending: EMERGENCY MEDICINE | Admitting: EMERGENCY MEDICINE

## 2018-04-27 ENCOUNTER — TRANSCRIBE ORDERS (OUTPATIENT)
Dept: ADMINISTRATIVE | Facility: HOSPITAL | Age: 73
End: 2018-04-27

## 2018-04-27 VITALS
DIASTOLIC BLOOD PRESSURE: 103 MMHG | TEMPERATURE: 98.2 F | HEART RATE: 96 BPM | BODY MASS INDEX: 38.6 KG/M2 | WEIGHT: 285 LBS | RESPIRATION RATE: 16 BRPM | OXYGEN SATURATION: 94 % | SYSTOLIC BLOOD PRESSURE: 140 MMHG | HEIGHT: 72 IN

## 2018-04-27 DIAGNOSIS — R60.0 PEDAL EDEMA: Primary | ICD-10-CM

## 2018-04-27 DIAGNOSIS — R60.9 EDEMA, UNSPECIFIED TYPE: Primary | ICD-10-CM

## 2018-04-27 DIAGNOSIS — I48.91 ATRIAL FIBRILLATION, UNSPECIFIED TYPE (HCC): ICD-10-CM

## 2018-04-27 LAB
ALBUMIN SERPL-MCNC: 4.1 G/DL (ref 3.5–5.2)
ALBUMIN/GLOB SERPL: 1.3 G/DL
ALP SERPL-CCNC: 37 U/L (ref 39–117)
ALT SERPL W P-5'-P-CCNC: 17 U/L (ref 1–41)
ANION GAP SERPL CALCULATED.3IONS-SCNC: 10.1 MMOL/L
AST SERPL-CCNC: 19 U/L (ref 1–40)
BASOPHILS # BLD AUTO: 0.03 10*3/MM3 (ref 0–0.2)
BASOPHILS NFR BLD AUTO: 0.4 % (ref 0–1.5)
BH CV LOWER VASCULAR LEFT COMMON FEMORAL AUGMENT: NORMAL
BH CV LOWER VASCULAR LEFT COMMON FEMORAL COMPETENT: NORMAL
BH CV LOWER VASCULAR LEFT COMMON FEMORAL COMPRESS: NORMAL
BH CV LOWER VASCULAR LEFT COMMON FEMORAL PHASIC: NORMAL
BH CV LOWER VASCULAR LEFT COMMON FEMORAL SPONT: NORMAL
BH CV LOWER VASCULAR LEFT DISTAL FEMORAL COMPRESS: NORMAL
BH CV LOWER VASCULAR LEFT GASTRONEMIUS COMPRESS: NORMAL
BH CV LOWER VASCULAR LEFT GREATER SAPH AK COMPRESS: NORMAL
BH CV LOWER VASCULAR LEFT GREATER SAPH BK COMPRESS: NORMAL
BH CV LOWER VASCULAR LEFT LESSER SAPH COMPRESS: NORMAL
BH CV LOWER VASCULAR LEFT MID FEMORAL AUGMENT: NORMAL
BH CV LOWER VASCULAR LEFT MID FEMORAL COMPETENT: NORMAL
BH CV LOWER VASCULAR LEFT MID FEMORAL COMPRESS: NORMAL
BH CV LOWER VASCULAR LEFT MID FEMORAL PHASIC: NORMAL
BH CV LOWER VASCULAR LEFT MID FEMORAL SPONT: NORMAL
BH CV LOWER VASCULAR LEFT PERONEAL COMPRESS: NORMAL
BH CV LOWER VASCULAR LEFT POPLITEAL AUGMENT: NORMAL
BH CV LOWER VASCULAR LEFT POPLITEAL COMPETENT: NORMAL
BH CV LOWER VASCULAR LEFT POPLITEAL COMPRESS: NORMAL
BH CV LOWER VASCULAR LEFT POPLITEAL PHASIC: NORMAL
BH CV LOWER VASCULAR LEFT POPLITEAL SPONT: NORMAL
BH CV LOWER VASCULAR LEFT POSTERIOR TIBIAL COMPRESS: NORMAL
BH CV LOWER VASCULAR LEFT PROXIMAL FEMORAL COMPRESS: NORMAL
BH CV LOWER VASCULAR LEFT SAPHENOFEMORAL JUNCTION AUGMENT: NORMAL
BH CV LOWER VASCULAR LEFT SAPHENOFEMORAL JUNCTION COMPETENT: NORMAL
BH CV LOWER VASCULAR LEFT SAPHENOFEMORAL JUNCTION COMPRESS: NORMAL
BH CV LOWER VASCULAR LEFT SAPHENOFEMORAL JUNCTION PHASIC: NORMAL
BH CV LOWER VASCULAR LEFT SAPHENOFEMORAL JUNCTION SPONT: NORMAL
BH CV LOWER VASCULAR RIGHT COMMON FEMORAL AUGMENT: NORMAL
BH CV LOWER VASCULAR RIGHT COMMON FEMORAL COMPETENT: NORMAL
BH CV LOWER VASCULAR RIGHT COMMON FEMORAL COMPRESS: NORMAL
BH CV LOWER VASCULAR RIGHT COMMON FEMORAL PHASIC: NORMAL
BH CV LOWER VASCULAR RIGHT COMMON FEMORAL SPONT: NORMAL
BH CV LOWER VASCULAR RIGHT DISTAL FEMORAL COMPRESS: NORMAL
BH CV LOWER VASCULAR RIGHT GASTRONEMIUS COMPRESS: NORMAL
BH CV LOWER VASCULAR RIGHT GREATER SAPH AK COMPRESS: NORMAL
BH CV LOWER VASCULAR RIGHT GREATER SAPH BK COMPRESS: NORMAL
BH CV LOWER VASCULAR RIGHT LESSER SAPH COMPRESS: NORMAL
BH CV LOWER VASCULAR RIGHT MID FEMORAL AUGMENT: NORMAL
BH CV LOWER VASCULAR RIGHT MID FEMORAL COMPETENT: NORMAL
BH CV LOWER VASCULAR RIGHT MID FEMORAL COMPRESS: NORMAL
BH CV LOWER VASCULAR RIGHT MID FEMORAL PHASIC: NORMAL
BH CV LOWER VASCULAR RIGHT MID FEMORAL SPONT: NORMAL
BH CV LOWER VASCULAR RIGHT PERONEAL COMPRESS: NORMAL
BH CV LOWER VASCULAR RIGHT POPLITEAL AUGMENT: NORMAL
BH CV LOWER VASCULAR RIGHT POPLITEAL COMPETENT: NORMAL
BH CV LOWER VASCULAR RIGHT POPLITEAL COMPRESS: NORMAL
BH CV LOWER VASCULAR RIGHT POPLITEAL PHASIC: NORMAL
BH CV LOWER VASCULAR RIGHT POPLITEAL SPONT: NORMAL
BH CV LOWER VASCULAR RIGHT POSTERIOR TIBIAL COMPRESS: NORMAL
BH CV LOWER VASCULAR RIGHT PROXIMAL FEMORAL COMPRESS: NORMAL
BH CV LOWER VASCULAR RIGHT SAPHENOFEMORAL JUNCTION AUGMENT: NORMAL
BH CV LOWER VASCULAR RIGHT SAPHENOFEMORAL JUNCTION COMPETENT: NORMAL
BH CV LOWER VASCULAR RIGHT SAPHENOFEMORAL JUNCTION COMPRESS: NORMAL
BH CV LOWER VASCULAR RIGHT SAPHENOFEMORAL JUNCTION PHASIC: NORMAL
BH CV LOWER VASCULAR RIGHT SAPHENOFEMORAL JUNCTION SPONT: NORMAL
BILIRUB SERPL-MCNC: 0.7 MG/DL (ref 0.1–1.2)
BILIRUB UR QL STRIP: NEGATIVE
BUN BLD-MCNC: 27 MG/DL (ref 8–23)
BUN/CREAT SERPL: 19.6 (ref 7–25)
CALCIUM SPEC-SCNC: 9.6 MG/DL (ref 8.6–10.5)
CHLORIDE SERPL-SCNC: 97 MMOL/L (ref 98–107)
CLARITY UR: CLEAR
CO2 SERPL-SCNC: 32.9 MMOL/L (ref 22–29)
COLOR UR: YELLOW
CREAT BLD-MCNC: 1.38 MG/DL (ref 0.76–1.27)
DEPRECATED RDW RBC AUTO: 52.8 FL (ref 37–54)
EOSINOPHIL # BLD AUTO: 0.49 10*3/MM3 (ref 0–0.7)
EOSINOPHIL NFR BLD AUTO: 7 % (ref 0.3–6.2)
ERYTHROCYTE [DISTWIDTH] IN BLOOD BY AUTOMATED COUNT: 16 % (ref 11.5–14.5)
GFR SERPL CREATININE-BSD FRML MDRD: 51 ML/MIN/1.73
GLOBULIN UR ELPH-MCNC: 3.2 GM/DL
GLUCOSE BLD-MCNC: 99 MG/DL (ref 65–99)
GLUCOSE UR STRIP-MCNC: NEGATIVE MG/DL
HCT VFR BLD AUTO: 43 % (ref 40.4–52.2)
HGB BLD-MCNC: 13.5 G/DL (ref 13.7–17.6)
HGB UR QL STRIP.AUTO: NEGATIVE
IMM GRANULOCYTES # BLD: 0.01 10*3/MM3 (ref 0–0.03)
IMM GRANULOCYTES NFR BLD: 0.1 % (ref 0–0.5)
KETONES UR QL STRIP: NEGATIVE
LEUKOCYTE ESTERASE UR QL STRIP.AUTO: NEGATIVE
LYMPHOCYTES # BLD AUTO: 0.87 10*3/MM3 (ref 0.9–4.8)
LYMPHOCYTES NFR BLD AUTO: 12.4 % (ref 19.6–45.3)
MCH RBC QN AUTO: 28.5 PG (ref 27–32.7)
MCHC RBC AUTO-ENTMCNC: 31.4 G/DL (ref 32.6–36.4)
MCV RBC AUTO: 90.7 FL (ref 79.8–96.2)
MONOCYTES # BLD AUTO: 1.25 10*3/MM3 (ref 0.2–1.2)
MONOCYTES NFR BLD AUTO: 17.8 % (ref 5–12)
NEUTROPHILS # BLD AUTO: 4.4 10*3/MM3 (ref 1.9–8.1)
NEUTROPHILS NFR BLD AUTO: 62.4 % (ref 42.7–76)
NITRITE UR QL STRIP: NEGATIVE
NT-PROBNP SERPL-MCNC: 925 PG/ML (ref 0–900)
PH UR STRIP.AUTO: 7.5 [PH] (ref 5–8)
PLATELET # BLD AUTO: 177 10*3/MM3 (ref 140–500)
PMV BLD AUTO: 10 FL (ref 6–12)
POTASSIUM BLD-SCNC: 4.2 MMOL/L (ref 3.5–5.2)
PROT SERPL-MCNC: 7.3 G/DL (ref 6–8.5)
PROT UR QL STRIP: NEGATIVE
RBC # BLD AUTO: 4.74 10*6/MM3 (ref 4.6–6)
SODIUM BLD-SCNC: 140 MMOL/L (ref 136–145)
SP GR UR STRIP: 1.01 (ref 1–1.03)
TROPONIN T SERPL-MCNC: <0.01 NG/ML (ref 0–0.03)
UROBILINOGEN UR QL STRIP: NORMAL
WBC NRBC COR # BLD: 7.04 10*3/MM3 (ref 4.5–10.7)

## 2018-04-27 PROCEDURE — 80053 COMPREHEN METABOLIC PANEL: CPT | Performed by: PHYSICIAN ASSISTANT

## 2018-04-27 PROCEDURE — 74176 CT ABD & PELVIS W/O CONTRAST: CPT

## 2018-04-27 PROCEDURE — 93010 ELECTROCARDIOGRAM REPORT: CPT | Performed by: INTERNAL MEDICINE

## 2018-04-27 PROCEDURE — 81003 URINALYSIS AUTO W/O SCOPE: CPT | Performed by: PHYSICIAN ASSISTANT

## 2018-04-27 PROCEDURE — 93970 EXTREMITY STUDY: CPT

## 2018-04-27 PROCEDURE — 93005 ELECTROCARDIOGRAM TRACING: CPT | Performed by: PHYSICIAN ASSISTANT

## 2018-04-27 PROCEDURE — 99284 EMERGENCY DEPT VISIT MOD MDM: CPT

## 2018-04-27 PROCEDURE — 85025 COMPLETE CBC W/AUTO DIFF WBC: CPT | Performed by: PHYSICIAN ASSISTANT

## 2018-04-27 PROCEDURE — 71046 X-RAY EXAM CHEST 2 VIEWS: CPT

## 2018-04-27 PROCEDURE — 83880 ASSAY OF NATRIURETIC PEPTIDE: CPT | Performed by: PHYSICIAN ASSISTANT

## 2018-04-27 PROCEDURE — 84484 ASSAY OF TROPONIN QUANT: CPT | Performed by: PHYSICIAN ASSISTANT

## 2018-04-27 RX ORDER — FUROSEMIDE 40 MG/1
40 TABLET ORAL DAILY
Qty: 5 TABLET | Refills: 0 | Status: SHIPPED | OUTPATIENT
Start: 2018-04-27 | End: 2018-05-04 | Stop reason: SDUPTHER

## 2018-04-27 RX ORDER — SODIUM CHLORIDE 0.9 % (FLUSH) 0.9 %
10 SYRINGE (ML) INJECTION AS NEEDED
Status: DISCONTINUED | OUTPATIENT
Start: 2018-04-27 | End: 2018-04-27 | Stop reason: HOSPADM

## 2018-04-27 RX ADMIN — APIXABAN 5 MG: 5 TABLET, FILM COATED ORAL at 13:11

## 2018-05-03 ENCOUNTER — LAB (OUTPATIENT)
Dept: ENDOCRINOLOGY | Age: 73
End: 2018-05-03

## 2018-05-03 DIAGNOSIS — E79.0 HYPERURICEMIA: ICD-10-CM

## 2018-05-03 DIAGNOSIS — I15.9 SECONDARY HYPERTENSION: ICD-10-CM

## 2018-05-03 DIAGNOSIS — N52.9 IMPOTENCE OF ORGANIC ORIGIN: ICD-10-CM

## 2018-05-03 DIAGNOSIS — M10.9 GOUT, UNSPECIFIED CAUSE, UNSPECIFIED CHRONICITY, UNSPECIFIED SITE: ICD-10-CM

## 2018-05-03 DIAGNOSIS — L83 ACANTHOSIS NIGRICANS: ICD-10-CM

## 2018-05-03 DIAGNOSIS — E78.5 DYSLIPIDEMIA: ICD-10-CM

## 2018-05-03 DIAGNOSIS — N40.0 BENIGN NON-NODULAR PROSTATIC HYPERPLASIA WITHOUT LOWER URINARY TRACT SYMPTOMS: ICD-10-CM

## 2018-05-03 DIAGNOSIS — E11.9 CONTROLLED TYPE 2 DIABETES MELLITUS WITHOUT COMPLICATION, UNSPECIFIED LONG TERM INSULIN USE STATUS: ICD-10-CM

## 2018-05-03 DIAGNOSIS — E55.9 VITAMIN D DEFICIENCY: ICD-10-CM

## 2018-05-03 DIAGNOSIS — R79.89 LOW TESTOSTERONE: ICD-10-CM

## 2018-05-04 RX ORDER — FUROSEMIDE 40 MG/1
40 TABLET ORAL DAILY
Qty: 30 TABLET | Refills: 2 | Status: SHIPPED | OUTPATIENT
Start: 2018-05-04 | End: 2018-05-10 | Stop reason: HOSPADM

## 2018-05-04 RX ORDER — AZILSARTAN KAMEDOXOMIL 80 MG/1
80 TABLET ORAL DAILY
Qty: 30 TABLET | Refills: 5 | Status: SHIPPED | OUTPATIENT
Start: 2018-05-04 | End: 2018-05-10 | Stop reason: HOSPADM

## 2018-05-04 RX ORDER — CHLORTHALIDONE 25 MG/1
25 TABLET ORAL DAILY
Qty: 30 TABLET | Refills: 5 | Status: SHIPPED | OUTPATIENT
Start: 2018-05-04 | End: 2018-07-26 | Stop reason: ALTCHOICE

## 2018-05-05 LAB
25(OH)D3+25(OH)D2 SERPL-MCNC: 84.2 NG/ML (ref 30–100)
ALBUMIN SERPL-MCNC: 4 G/DL (ref 3.5–5.2)
ALBUMIN/GLOB SERPL: 1.4 G/DL
ALP SERPL-CCNC: 45 U/L (ref 39–117)
ALT SERPL-CCNC: 13 U/L (ref 1–41)
AST SERPL-CCNC: 15 U/L (ref 1–40)
BILIRUB SERPL-MCNC: 0.4 MG/DL (ref 0.1–1.2)
BUN SERPL-MCNC: 38 MG/DL (ref 8–23)
BUN/CREAT SERPL: 23.2 (ref 7–25)
C PEPTIDE SERPL-MCNC: 5.7 NG/ML (ref 1.1–4.4)
CALCIUM SERPL-MCNC: 8.9 MG/DL (ref 8.6–10.5)
CHLORIDE SERPL-SCNC: 98 MMOL/L (ref 98–107)
CHOLEST SERPL-MCNC: 90 MG/DL (ref 0–200)
CO2 SERPL-SCNC: 32 MMOL/L (ref 22–29)
CREAT SERPL-MCNC: 1.64 MG/DL (ref 0.76–1.27)
GFR SERPLBLD CREATININE-BSD FMLA CKD-EPI: 42 ML/MIN/1.73
GFR SERPLBLD CREATININE-BSD FMLA CKD-EPI: 51 ML/MIN/1.73
GLOBULIN SER CALC-MCNC: 2.9 GM/DL
GLUCOSE SERPL-MCNC: 90 MG/DL (ref 65–99)
HBA1C MFR BLD: 5.5 % (ref 4.8–5.6)
HCT VFR BLD AUTO: 44.1 % (ref 40.4–52.2)
HDLC SERPL-MCNC: 45 MG/DL (ref 40–60)
HGB BLD-MCNC: 13.4 G/DL (ref 13.7–17.6)
INTERPRETATION: NORMAL
LDLC SERPL CALC-MCNC: 32 MG/DL (ref 0–100)
Lab: NORMAL
MICROALBUMIN UR-MCNC: 37.6 UG/ML
POTASSIUM SERPL-SCNC: 4.6 MMOL/L (ref 3.5–5.2)
PROT SERPL-MCNC: 6.9 G/DL (ref 6–8.5)
PSA SERPL-MCNC: 0.97 NG/ML (ref 0–4)
SHBG SERPL-SCNC: 31.3 NMOL/L (ref 19.3–76.4)
SODIUM SERPL-SCNC: 143 MMOL/L (ref 136–145)
T4 SERPL-MCNC: 4.09 MCG/DL (ref 4.5–11.7)
TESTOST FREE SERPL-MCNC: 28.4 PG/ML (ref 6.6–18.1)
TESTOST SERPL-MCNC: 1478 NG/DL (ref 264–916)
TRIGL SERPL-MCNC: 63 MG/DL (ref 0–150)
TSH SERPL DL<=0.005 MIU/L-ACNC: 0.56 MIU/ML (ref 0.27–4.2)
URATE SERPL-MCNC: 7.9 MG/DL (ref 3.4–7)
VLDLC SERPL CALC-MCNC: 12.6 MG/DL (ref 5–40)

## 2018-05-08 ENCOUNTER — APPOINTMENT (OUTPATIENT)
Dept: CARDIOLOGY | Facility: HOSPITAL | Age: 73
End: 2018-05-08

## 2018-05-08 ENCOUNTER — HOSPITAL ENCOUNTER (INPATIENT)
Facility: HOSPITAL | Age: 73
LOS: 2 days | Discharge: HOME | End: 2018-05-10
Attending: EMERGENCY MEDICINE | Admitting: HOSPITALIST

## 2018-05-08 ENCOUNTER — APPOINTMENT (OUTPATIENT)
Dept: CT IMAGING | Facility: HOSPITAL | Age: 73
End: 2018-05-08

## 2018-05-08 DIAGNOSIS — K57.32 DIVERTICULITIS OF LARGE INTESTINE WITHOUT PERFORATION OR ABSCESS WITHOUT BLEEDING: Primary | ICD-10-CM

## 2018-05-08 PROBLEM — R10.9 ABDOMINAL PAIN: Status: ACTIVE | Noted: 2018-05-08

## 2018-05-08 PROBLEM — I48.91 A-FIB (HCC): Status: ACTIVE | Noted: 2018-05-08

## 2018-05-08 LAB
ALBUMIN SERPL-MCNC: 3.8 G/DL (ref 3.5–5.2)
ALBUMIN/GLOB SERPL: 1.1 G/DL
ALP SERPL-CCNC: 40 U/L (ref 39–117)
ALT SERPL W P-5'-P-CCNC: 10 U/L (ref 1–41)
ANION GAP SERPL CALCULATED.3IONS-SCNC: 13.5 MMOL/L
AORTIC DIMENSIONLESS INDEX: 0.3 (DI)
AST SERPL-CCNC: 13 U/L (ref 1–40)
BACTERIA UR QL AUTO: NORMAL /HPF
BASOPHILS # BLD AUTO: 0.03 10*3/MM3 (ref 0–0.2)
BASOPHILS NFR BLD AUTO: 0.3 % (ref 0–1.5)
BH CV ECHO MEAS - ACS: 1 CM
BH CV ECHO MEAS - AO MAX PG (FULL): 29.9 MMHG
BH CV ECHO MEAS - AO MAX PG: 33.6 MMHG
BH CV ECHO MEAS - AO MEAN PG (FULL): 17 MMHG
BH CV ECHO MEAS - AO MEAN PG: 19 MMHG
BH CV ECHO MEAS - AO ROOT AREA (BSA CORRECTED): 1.5
BH CV ECHO MEAS - AO ROOT AREA: 11.3 CM^2
BH CV ECHO MEAS - AO ROOT DIAM: 3.8 CM
BH CV ECHO MEAS - AO V2 MAX: 290 CM/SEC
BH CV ECHO MEAS - AO V2 MEAN: 202 CM/SEC
BH CV ECHO MEAS - AO V2 VTI: 57.3 CM
BH CV ECHO MEAS - AVA(I,A): 1.1 CM^2
BH CV ECHO MEAS - AVA(I,D): 1.1 CM^2
BH CV ECHO MEAS - AVA(V,A): 1.3 CM^2
BH CV ECHO MEAS - AVA(V,D): 1.3 CM^2
BH CV ECHO MEAS - BSA(HAYCOCK): 2.6 M^2
BH CV ECHO MEAS - BSA: 2.5 M^2
BH CV ECHO MEAS - BZI_BMI: 39.2 KILOGRAMS/M^2
BH CV ECHO MEAS - BZI_METRIC_HEIGHT: 182.9 CM
BH CV ECHO MEAS - BZI_METRIC_WEIGHT: 131.1 KG
BH CV ECHO MEAS - CONTRAST EF (2CH): 50.8 ML/M^2
BH CV ECHO MEAS - CONTRAST EF 4CH: 52 ML/M^2
BH CV ECHO MEAS - EDV(CUBED): 97.3 ML
BH CV ECHO MEAS - EDV(MOD-SP2): 183 ML
BH CV ECHO MEAS - EDV(MOD-SP4): 125 ML
BH CV ECHO MEAS - EDV(TEICH): 97.3 ML
BH CV ECHO MEAS - EF(CUBED): 52.1 %
BH CV ECHO MEAS - EF(MOD-BP): 51 %
BH CV ECHO MEAS - EF(MOD-SP2): 50.8 %
BH CV ECHO MEAS - EF(MOD-SP4): 52 %
BH CV ECHO MEAS - EF(TEICH): 44.1 %
BH CV ECHO MEAS - ESV(CUBED): 46.7 ML
BH CV ECHO MEAS - ESV(MOD-SP2): 90 ML
BH CV ECHO MEAS - ESV(MOD-SP4): 60 ML
BH CV ECHO MEAS - ESV(TEICH): 54.4 ML
BH CV ECHO MEAS - FS: 21.7 %
BH CV ECHO MEAS - IVS/LVPW: 1
BH CV ECHO MEAS - IVSD: 1.1 CM
BH CV ECHO MEAS - LAT PEAK E' VEL: 12 CM/SEC
BH CV ECHO MEAS - LV DIASTOLIC VOL/BSA (35-75): 50.2 ML/M^2
BH CV ECHO MEAS - LV MASS(C)D: 181.2 GRAMS
BH CV ECHO MEAS - LV MASS(C)DI: 72.7 GRAMS/M^2
BH CV ECHO MEAS - LV MAX PG: 3.8 MMHG
BH CV ECHO MEAS - LV MEAN PG: 2 MMHG
BH CV ECHO MEAS - LV SYSTOLIC VOL/BSA (12-30): 24.1 ML/M^2
BH CV ECHO MEAS - LV V1 MAX: 97 CM/SEC
BH CV ECHO MEAS - LV V1 MEAN: 61 CM/SEC
BH CV ECHO MEAS - LV V1 VTI: 17.3 CM
BH CV ECHO MEAS - LVIDD: 4.6 CM
BH CV ECHO MEAS - LVIDS: 3.6 CM
BH CV ECHO MEAS - LVLD AP2: 9.8 CM
BH CV ECHO MEAS - LVLD AP4: 9.8 CM
BH CV ECHO MEAS - LVLS AP2: 8.9 CM
BH CV ECHO MEAS - LVLS AP4: 8.6 CM
BH CV ECHO MEAS - LVOT AREA (M): 3.8 CM^2
BH CV ECHO MEAS - LVOT AREA: 3.8 CM^2
BH CV ECHO MEAS - LVOT DIAM: 2.2 CM
BH CV ECHO MEAS - LVPWD: 1.1 CM
BH CV ECHO MEAS - MED PEAK E' VEL: 11 CM/SEC
BH CV ECHO MEAS - MR MAX PG: 118.8 MMHG
BH CV ECHO MEAS - MR MAX VEL: 545 CM/SEC
BH CV ECHO MEAS - MR MEAN PG: 74 MMHG
BH CV ECHO MEAS - MR MEAN VEL: 410 CM/SEC
BH CV ECHO MEAS - MR VTI: 143 CM
BH CV ECHO MEAS - MV DEC SLOPE: 733 CM/SEC^2
BH CV ECHO MEAS - MV DEC TIME: 0.23 SEC
BH CV ECHO MEAS - MV E MAX VEL: 139 CM/SEC
BH CV ECHO MEAS - MV MAX PG: 11.2 MMHG
BH CV ECHO MEAS - MV MEAN PG: 3 MMHG
BH CV ECHO MEAS - MV P1/2T MAX VEL: 165 CM/SEC
BH CV ECHO MEAS - MV P1/2T: 65.9 MSEC
BH CV ECHO MEAS - MV V2 MAX: 167 CM/SEC
BH CV ECHO MEAS - MV V2 MEAN: 75.3 CM/SEC
BH CV ECHO MEAS - MV V2 VTI: 34.9 CM
BH CV ECHO MEAS - MVA P1/2T LCG: 1.3 CM^2
BH CV ECHO MEAS - MVA(P1/2T): 3.3 CM^2
BH CV ECHO MEAS - MVA(VTI): 1.9 CM^2
BH CV ECHO MEAS - PA ACC TIME: 0.1 SEC
BH CV ECHO MEAS - PA MAX PG (FULL): 2.9 MMHG
BH CV ECHO MEAS - PA MAX PG: 4.4 MMHG
BH CV ECHO MEAS - PA PR(ACCEL): 33.1 MMHG
BH CV ECHO MEAS - PA V2 MAX: 105 CM/SEC
BH CV ECHO MEAS - PULM DIAS VEL: 67.5 CM/SEC
BH CV ECHO MEAS - PULM S/D: 0.51
BH CV ECHO MEAS - PULM SYS VEL: 34.4 CM/SEC
BH CV ECHO MEAS - PVA(V,A): 3.4 CM^2
BH CV ECHO MEAS - PVA(V,D): 3.4 CM^2
BH CV ECHO MEAS - QP/QS: 0.94
BH CV ECHO MEAS - RAP SYSTOLE: 8 MMHG
BH CV ECHO MEAS - RV MAX PG: 1.5 MMHG
BH CV ECHO MEAS - RV MEAN PG: 1 MMHG
BH CV ECHO MEAS - RV V1 MAX: 62.1 CM/SEC
BH CV ECHO MEAS - RV V1 MEAN: 41.8 CM/SEC
BH CV ECHO MEAS - RV V1 VTI: 10.8 CM
BH CV ECHO MEAS - RVOT AREA: 5.7 CM^2
BH CV ECHO MEAS - RVOT DIAM: 2.7 CM
BH CV ECHO MEAS - RVSP: 44.2 MMHG
BH CV ECHO MEAS - SI(AO): 260.9 ML/M^2
BH CV ECHO MEAS - SI(CUBED): 20.3 ML/M^2
BH CV ECHO MEAS - SI(LVOT): 26.4 ML/M^2
BH CV ECHO MEAS - SI(MOD-SP2): 37.3 ML/M^2
BH CV ECHO MEAS - SI(MOD-SP4): 26.1 ML/M^2
BH CV ECHO MEAS - SI(TEICH): 17.2 ML/M^2
BH CV ECHO MEAS - SV(AO): 649.8 ML
BH CV ECHO MEAS - SV(CUBED): 50.7 ML
BH CV ECHO MEAS - SV(LVOT): 65.8 ML
BH CV ECHO MEAS - SV(MOD-SP2): 93 ML
BH CV ECHO MEAS - SV(MOD-SP4): 65 ML
BH CV ECHO MEAS - SV(RVOT): 61.8 ML
BH CV ECHO MEAS - SV(TEICH): 42.9 ML
BH CV ECHO MEAS - TAPSE (>1.6): 1.7 CM2
BH CV ECHO MEAS - TR MAX VEL: 301 CM/SEC
BH CV ECHO MEASUREMENTS AVERAGE E/E' RATIO: 12.09
BH CV VAS BP RIGHT ARM: NORMAL MMHG
BH CV XLRA - RV BASE: 3.9 CM
BH CV XLRA - TDI S': 12 CM/SEC
BILIRUB SERPL-MCNC: 0.4 MG/DL (ref 0.1–1.2)
BILIRUB UR QL STRIP: NEGATIVE
BUN BLD-MCNC: 33 MG/DL (ref 8–23)
BUN/CREAT SERPL: 21.4 (ref 7–25)
CALCIUM SPEC-SCNC: 9.3 MG/DL (ref 8.6–10.5)
CHLORIDE SERPL-SCNC: 95 MMOL/L (ref 98–107)
CLARITY UR: CLEAR
CO2 SERPL-SCNC: 30.5 MMOL/L (ref 22–29)
COLOR UR: YELLOW
CREAT BLD-MCNC: 1.54 MG/DL (ref 0.76–1.27)
DEPRECATED RDW RBC AUTO: 46 FL (ref 37–54)
EOSINOPHIL # BLD AUTO: 0.27 10*3/MM3 (ref 0–0.7)
EOSINOPHIL NFR BLD AUTO: 2.4 % (ref 0.3–6.2)
ERYTHROCYTE [DISTWIDTH] IN BLOOD BY AUTOMATED COUNT: 14.5 % (ref 11.5–14.5)
GFR SERPL CREATININE-BSD FRML MDRD: 45 ML/MIN/1.73
GLOBULIN UR ELPH-MCNC: 3.5 GM/DL
GLUCOSE BLD-MCNC: 98 MG/DL (ref 65–99)
GLUCOSE UR STRIP-MCNC: NEGATIVE MG/DL
HCT VFR BLD AUTO: 42 % (ref 40.4–52.2)
HGB BLD-MCNC: 13.5 G/DL (ref 13.7–17.6)
HGB UR QL STRIP.AUTO: NEGATIVE
HYALINE CASTS UR QL AUTO: NORMAL /LPF
IMM GRANULOCYTES # BLD: 0.02 10*3/MM3 (ref 0–0.03)
IMM GRANULOCYTES NFR BLD: 0.2 % (ref 0–0.5)
KETONES UR QL STRIP: NEGATIVE
LEFT ATRIUM VOLUME INDEX: 37 ML/M2
LEFT ATRIUM VOLUME: 83 CM3
LEUKOCYTE ESTERASE UR QL STRIP.AUTO: ABNORMAL
LIPASE SERPL-CCNC: 21 U/L (ref 13–60)
LYMPHOCYTES # BLD AUTO: 1.93 10*3/MM3 (ref 0.9–4.8)
LYMPHOCYTES NFR BLD AUTO: 17 % (ref 19.6–45.3)
MAXIMAL PREDICTED HEART RATE: 150 BPM
MCH RBC QN AUTO: 28.2 PG (ref 27–32.7)
MCHC RBC AUTO-ENTMCNC: 32.1 G/DL (ref 32.6–36.4)
MCV RBC AUTO: 87.7 FL (ref 79.8–96.2)
MONOCYTES # BLD AUTO: 0.56 10*3/MM3 (ref 0.2–1.2)
MONOCYTES NFR BLD AUTO: 4.9 % (ref 5–12)
NEUTROPHILS # BLD AUTO: 8.56 10*3/MM3 (ref 1.9–8.1)
NEUTROPHILS NFR BLD AUTO: 75.2 % (ref 42.7–76)
NITRITE UR QL STRIP: NEGATIVE
PH UR STRIP.AUTO: 6.5 [PH] (ref 5–8)
PLATELET # BLD AUTO: 196 10*3/MM3 (ref 140–500)
PMV BLD AUTO: 10 FL (ref 6–12)
POTASSIUM BLD-SCNC: 3.9 MMOL/L (ref 3.5–5.2)
PROT SERPL-MCNC: 7.3 G/DL (ref 6–8.5)
PROT UR QL STRIP: NEGATIVE
RBC # BLD AUTO: 4.79 10*6/MM3 (ref 4.6–6)
RBC # UR: NORMAL /HPF
REF LAB TEST METHOD: NORMAL
SODIUM BLD-SCNC: 139 MMOL/L (ref 136–145)
SP GR UR STRIP: 1.01 (ref 1–1.03)
SQUAMOUS #/AREA URNS HPF: NORMAL /HPF
STRESS TARGET HR: 128 BPM
TROPONIN T SERPL-MCNC: <0.01 NG/ML (ref 0–0.03)
TROPONIN T SERPL-MCNC: <0.01 NG/ML (ref 0–0.03)
UROBILINOGEN UR QL STRIP: ABNORMAL
WBC NRBC COR # BLD: 11.37 10*3/MM3 (ref 4.5–10.7)
WBC UR QL AUTO: NORMAL /HPF

## 2018-05-08 PROCEDURE — 99284 EMERGENCY DEPT VISIT MOD MDM: CPT

## 2018-05-08 PROCEDURE — 74176 CT ABD & PELVIS W/O CONTRAST: CPT

## 2018-05-08 PROCEDURE — 85025 COMPLETE CBC W/AUTO DIFF WBC: CPT | Performed by: EMERGENCY MEDICINE

## 2018-05-08 PROCEDURE — 84484 ASSAY OF TROPONIN QUANT: CPT | Performed by: NURSE PRACTITIONER

## 2018-05-08 PROCEDURE — 25010000002 FUROSEMIDE PER 20 MG: Performed by: NURSE PRACTITIONER

## 2018-05-08 PROCEDURE — 80053 COMPREHEN METABOLIC PANEL: CPT | Performed by: EMERGENCY MEDICINE

## 2018-05-08 PROCEDURE — 93010 ELECTROCARDIOGRAM REPORT: CPT | Performed by: INTERNAL MEDICINE

## 2018-05-08 PROCEDURE — 81001 URINALYSIS AUTO W/SCOPE: CPT | Performed by: EMERGENCY MEDICINE

## 2018-05-08 PROCEDURE — 93306 TTE W/DOPPLER COMPLETE: CPT

## 2018-05-08 PROCEDURE — 93306 TTE W/DOPPLER COMPLETE: CPT | Performed by: INTERNAL MEDICINE

## 2018-05-08 PROCEDURE — 83690 ASSAY OF LIPASE: CPT | Performed by: EMERGENCY MEDICINE

## 2018-05-08 PROCEDURE — 25010000002 PIPERACILLIN SOD-TAZOBACTAM PER 1 G: Performed by: EMERGENCY MEDICINE

## 2018-05-08 PROCEDURE — 25010000002 PIPERACILLIN SOD-TAZOBACTAM PER 1 G: Performed by: HOSPITALIST

## 2018-05-08 PROCEDURE — 93005 ELECTROCARDIOGRAM TRACING: CPT | Performed by: NURSE PRACTITIONER

## 2018-05-08 RX ORDER — POTASSIUM CHLORIDE 750 MG/1
40 CAPSULE, EXTENDED RELEASE ORAL AS NEEDED
Status: DISCONTINUED | OUTPATIENT
Start: 2018-05-08 | End: 2018-05-11 | Stop reason: HOSPADM

## 2018-05-08 RX ORDER — FUROSEMIDE 40 MG/1
40 TABLET ORAL DAILY
Status: DISCONTINUED | OUTPATIENT
Start: 2018-05-08 | End: 2018-05-08

## 2018-05-08 RX ORDER — FUROSEMIDE 10 MG/ML
40 INJECTION INTRAMUSCULAR; INTRAVENOUS EVERY 12 HOURS
Status: COMPLETED | OUTPATIENT
Start: 2018-05-08 | End: 2018-05-09

## 2018-05-08 RX ORDER — METOPROLOL SUCCINATE 50 MG/1
50 TABLET, EXTENDED RELEASE ORAL DAILY
Status: DISCONTINUED | OUTPATIENT
Start: 2018-05-08 | End: 2018-05-11 | Stop reason: HOSPADM

## 2018-05-08 RX ORDER — POTASSIUM CHLORIDE 7.45 MG/ML
10 INJECTION INTRAVENOUS
Status: DISCONTINUED | OUTPATIENT
Start: 2018-05-08 | End: 2018-05-11 | Stop reason: HOSPADM

## 2018-05-08 RX ORDER — ONDANSETRON 4 MG/1
4 TABLET, ORALLY DISINTEGRATING ORAL EVERY 6 HOURS PRN
Status: DISCONTINUED | OUTPATIENT
Start: 2018-05-08 | End: 2018-05-11 | Stop reason: HOSPADM

## 2018-05-08 RX ORDER — ASPIRIN 325 MG
325 TABLET ORAL DAILY
COMMUNITY
End: 2018-05-25

## 2018-05-08 RX ORDER — ROSUVASTATIN CALCIUM 10 MG/1
10 TABLET, COATED ORAL DAILY
Status: DISCONTINUED | OUTPATIENT
Start: 2018-05-08 | End: 2018-05-11 | Stop reason: HOSPADM

## 2018-05-08 RX ORDER — FUROSEMIDE 10 MG/ML
40 INJECTION INTRAMUSCULAR; INTRAVENOUS EVERY 12 HOURS
Status: DISCONTINUED | OUTPATIENT
Start: 2018-05-08 | End: 2018-05-08

## 2018-05-08 RX ORDER — METRONIDAZOLE 500 MG/1
500 TABLET ORAL 3 TIMES DAILY
COMMUNITY
End: 2018-07-26 | Stop reason: ALTCHOICE

## 2018-05-08 RX ORDER — TEMAZEPAM 15 MG/1
15 CAPSULE ORAL NIGHTLY PRN
Status: DISCONTINUED | OUTPATIENT
Start: 2018-05-08 | End: 2018-05-11 | Stop reason: HOSPADM

## 2018-05-08 RX ORDER — COLCHICINE 0.6 MG/1
0.6 TABLET ORAL DAILY
Status: DISCONTINUED | OUTPATIENT
Start: 2018-05-08 | End: 2018-05-11 | Stop reason: HOSPADM

## 2018-05-08 RX ORDER — ONDANSETRON 2 MG/ML
4 INJECTION INTRAMUSCULAR; INTRAVENOUS EVERY 6 HOURS PRN
Status: DISCONTINUED | OUTPATIENT
Start: 2018-05-08 | End: 2018-05-11 | Stop reason: HOSPADM

## 2018-05-08 RX ORDER — CHLORTHALIDONE 25 MG/1
25 TABLET ORAL DAILY
Status: DISCONTINUED | OUTPATIENT
Start: 2018-05-08 | End: 2018-05-08

## 2018-05-08 RX ORDER — SODIUM CHLORIDE 0.9 % (FLUSH) 0.9 %
10 SYRINGE (ML) INJECTION AS NEEDED
Status: DISCONTINUED | OUTPATIENT
Start: 2018-05-08 | End: 2018-05-11 | Stop reason: HOSPADM

## 2018-05-08 RX ORDER — SODIUM CHLORIDE 9 MG/ML
100 INJECTION, SOLUTION INTRAVENOUS CONTINUOUS
Status: DISCONTINUED | OUTPATIENT
Start: 2018-05-08 | End: 2018-05-09

## 2018-05-08 RX ORDER — SODIUM CHLORIDE 0.9 % (FLUSH) 0.9 %
1-10 SYRINGE (ML) INJECTION AS NEEDED
Status: DISCONTINUED | OUTPATIENT
Start: 2018-05-08 | End: 2018-05-11 | Stop reason: HOSPADM

## 2018-05-08 RX ORDER — ONDANSETRON 4 MG/1
4 TABLET, FILM COATED ORAL EVERY 6 HOURS PRN
Status: DISCONTINUED | OUTPATIENT
Start: 2018-05-08 | End: 2018-05-11 | Stop reason: HOSPADM

## 2018-05-08 RX ORDER — POTASSIUM CHLORIDE 1.5 G/1.77G
40 POWDER, FOR SOLUTION ORAL AS NEEDED
Status: DISCONTINUED | OUTPATIENT
Start: 2018-05-08 | End: 2018-05-11 | Stop reason: HOSPADM

## 2018-05-08 RX ORDER — ACETAMINOPHEN 325 MG/1
650 TABLET ORAL EVERY 4 HOURS PRN
Status: DISCONTINUED | OUTPATIENT
Start: 2018-05-08 | End: 2018-05-11 | Stop reason: HOSPADM

## 2018-05-08 RX ORDER — HYDROCODONE BITARTRATE AND ACETAMINOPHEN 7.5; 325 MG/1; MG/1
1 TABLET ORAL EVERY 6 HOURS PRN
Status: DISCONTINUED | OUTPATIENT
Start: 2018-05-08 | End: 2018-05-11 | Stop reason: HOSPADM

## 2018-05-08 RX ORDER — CIPROFLOXACIN 500 MG/1
500 TABLET, FILM COATED ORAL 2 TIMES DAILY
COMMUNITY
End: 2018-05-10 | Stop reason: HOSPADM

## 2018-05-08 RX ADMIN — APIXABAN 5 MG: 5 TABLET, FILM COATED ORAL at 21:14

## 2018-05-08 RX ADMIN — TAZOBACTAM SODIUM AND PIPERACILLIN SODIUM 3.38 G: 375; 3 INJECTION, SOLUTION INTRAVENOUS at 18:56

## 2018-05-08 RX ADMIN — SODIUM CHLORIDE 100 ML/HR: 9 INJECTION, SOLUTION INTRAVENOUS at 16:25

## 2018-05-08 RX ADMIN — TAZOBACTAM SODIUM AND PIPERACILLIN SODIUM 4.5 G: 500; 4 INJECTION, SOLUTION INTRAVENOUS at 11:28

## 2018-05-08 RX ADMIN — FUROSEMIDE 40 MG: 10 INJECTION, SOLUTION INTRAMUSCULAR; INTRAVENOUS at 18:59

## 2018-05-08 RX ADMIN — METFORMIN HYDROCHLORIDE 1000 MG: 1000 TABLET ORAL at 19:04

## 2018-05-08 NOTE — ED PROVIDER NOTES
" EMERGENCY DEPARTMENT ENCOUNTER    Room Number:  18/18  Date seen:  5/8/2018  Time seen: 8:29 AM  PCP: Dominic Dunn MD  Historian: Patient, Family      HPI:  Chief Complaint: Abdominal Pain  Context: Agustín Willett is a 72 y.o. male who presents to the ED c/o intermittent episodes of lower abdominal pain onset about a week ago. Pt reports that he was seen for this on 05/06/18 at the [facility]; while there, pt was suspected to have diverticulitis for which pt was prescribed with rx for Cipro and Flagyl. Pt states that he did not initiate the Cipro and Flagyl on 05/06/18. However, last night, pt's abdominal pain worsened due to which pt started taking the Cipro and Flagyl. Pt states that upon taking the Cipro and Flagyl, pt's abdominal pain transiently improved. Pt reports that after taking the Flagyl, pt drank a small amount of ETOH after which pt's abdominal pain worsened again. Pt reports that he has also had nausea, but denies vomiting, diarrhea, chest pain, dyspnea, and dysuria. Pt reports that he has h/o A-Fib for which pt is currently taking Eliquis. There are no other complaints at this time.     Pain Location: Lower abdomen   Radiation: None  Quality: \"aching\"  Intensity/Severity: Moderate  Duration: Onset about a week ago  Onset quality: Gradual   Timing: Intermittent  Progression: Waxing and waning   Aggravating Factors: Possibly drinking ETOH after taking Flagyl   Alleviating Factors: Cipro and Flagyl  Previous Episodes: None  Treatment before arrival: Cipro, Flagyl  Associated Symptoms: Nausea         MEDICAL RECORD REVIEW    Pt was seen at the [facility] on 05/06/18 secondary to abdominal pain. Pt's abdominal pain was felt to be due to diverticuitis for which pt was prescribed with rx for Cipro and Flagyl.             PAST MEDICAL HISTORY  Active Ambulatory Problems     Diagnosis Date Noted   • Benign non-nodular prostatic hyperplasia without lower urinary tract symptoms " 04/21/2016   • Diabetes mellitus type 2, controlled, without complications 04/21/2016   • Vitamin D deficiency 04/21/2016   • Hyperuricemia 04/21/2016   • Low testosterone 04/21/2016   • Dyslipidemia 04/21/2016   • Hypertension 04/21/2016   • Acanthosis nigricans 10/20/2016   • Gout 10/20/2016   • Diabetes mellitus 10/20/2016   • Impotence of organic origin 10/20/2016   • Hypogonadism in male 10/20/2016   • Morbid obesity 10/20/2016     Resolved Ambulatory Problems     Diagnosis Date Noted   • No Resolved Ambulatory Problems     Past Medical History:   Diagnosis Date   • Dyslipidemia    • Erectile dysfunction    • Hypertension    • Hypogonadism male    • ARIANA (obstructive sleep apnea)    • Type 2 diabetes mellitus    • Vitamin D deficiency          PAST SURGICAL HISTORY  Past Surgical History:   Procedure Laterality Date   • ADENOIDECTOMY     • TONSILLECTOMY     • TOTAL KNEE ARTHROPLASTY Left    • VASECTOMY           FAMILY HISTORY  Family History   Problem Relation Age of Onset   • ALS Mother    • Hypertension Father          SOCIAL HISTORY  Social History     Social History   • Marital status:      Spouse name: N/A   • Number of children: N/A   • Years of education: N/A     Occupational History   • Not on file.     Social History Main Topics   • Smoking status: Former Smoker   • Smokeless tobacco: Not on file   • Alcohol use Yes   • Drug use: Unknown   • Sexual activity: Not on file     Other Topics Concern   • Not on file     Social History Narrative   • No narrative on file         ALLERGIES  Review of patient's allergies indicates no known allergies.        REVIEW OF SYSTEMS  Review of Systems   Constitutional: Negative for chills.   HENT: Negative for congestion, rhinorrhea and sore throat.    Eyes: Negative for pain.   Respiratory: Negative for cough and shortness of breath.    Cardiovascular: Negative for chest pain and palpitations.   Gastrointestinal: Positive for abdominal pain and nausea. Negative  for diarrhea and vomiting.   Genitourinary: Negative for difficulty urinating, dysuria, flank pain and frequency.   Musculoskeletal: Negative for myalgias, neck pain and neck stiffness.   Skin: Negative for rash.   Neurological: Negative for dizziness, speech difficulty, weakness, light-headedness, numbness and headaches.   Psychiatric/Behavioral: Negative.    All other systems reviewed and are negative.           PHYSICAL EXAM  ED Triage Vitals   Temp Heart Rate Resp BP SpO2   05/08/18 0823 05/08/18 0823 05/08/18 0823 05/08/18 0834 05/08/18 0823   98.2 °F (36.8 °C) 102 18 105/89 91 %      Temp src Heart Rate Source Patient Position BP Location FiO2 (%)   05/08/18 0823 -- 05/08/18 0834 05/08/18 0834 --   Tympanic  Lying Left arm          Physical Exam   Constitutional: He is oriented to person, place, and time. No distress.   HENT:   Head: Normocephalic.   Mouth/Throat: Mucous membranes are normal.   Eyes: EOM are normal. Pupils are equal, round, and reactive to light.   Neck: Normal range of motion. Neck supple.   Cardiovascular: Normal heart sounds.  An irregular rhythm present. Tachycardia present.    Pulmonary/Chest: Effort normal and breath sounds normal. No respiratory distress. He has no decreased breath sounds. He has no wheezes. He has no rhonchi. He has no rales.   Abdominal: Soft. There is tenderness in the left lower quadrant. There is no rebound and no guarding.   Musculoskeletal: Normal range of motion. He exhibits edema (3+ BLE edema).   Neurological: He is alert and oriented to person, place, and time. He has normal sensation.   Skin: Skin is warm and dry.   Psychiatric: Mood and affect normal.   Nursing note and vitals reviewed.          LAB RESULTS  Recent Results (from the past 24 hour(s))   Comprehensive Metabolic Panel    Collection Time: 05/08/18  8:37 AM   Result Value Ref Range    Glucose 98 65 - 99 mg/dL    BUN 33 (H) 8 - 23 mg/dL    Creatinine 1.54 (H) 0.76 - 1.27 mg/dL    Sodium 139 136 -  145 mmol/L    Potassium 3.9 3.5 - 5.2 mmol/L    Chloride 95 (L) 98 - 107 mmol/L    CO2 30.5 (H) 22.0 - 29.0 mmol/L    Calcium 9.3 8.6 - 10.5 mg/dL    Total Protein 7.3 6.0 - 8.5 g/dL    Albumin 3.80 3.50 - 5.20 g/dL    ALT (SGPT) 10 1 - 41 U/L    AST (SGOT) 13 1 - 40 U/L    Alkaline Phosphatase 40 39 - 117 U/L    Total Bilirubin 0.4 0.1 - 1.2 mg/dL    eGFR Non African Amer 45 (L) >60 mL/min/1.73    Globulin 3.5 gm/dL    A/G Ratio 1.1 g/dL    BUN/Creatinine Ratio 21.4 7.0 - 25.0    Anion Gap 13.5 mmol/L   Lipase    Collection Time: 05/08/18  8:37 AM   Result Value Ref Range    Lipase 21 13 - 60 U/L   CBC Auto Differential    Collection Time: 05/08/18  8:37 AM   Result Value Ref Range    WBC 11.37 (H) 4.50 - 10.70 10*3/mm3    RBC 4.79 4.60 - 6.00 10*6/mm3    Hemoglobin 13.5 (L) 13.7 - 17.6 g/dL    Hematocrit 42.0 40.4 - 52.2 %    MCV 87.7 79.8 - 96.2 fL    MCH 28.2 27.0 - 32.7 pg    MCHC 32.1 (L) 32.6 - 36.4 g/dL    RDW 14.5 11.5 - 14.5 %    RDW-SD 46.0 37.0 - 54.0 fl    MPV 10.0 6.0 - 12.0 fL    Platelets 196 140 - 500 10*3/mm3    Neutrophil % 75.2 42.7 - 76.0 %    Lymphocyte % 17.0 (L) 19.6 - 45.3 %    Monocyte % 4.9 (L) 5.0 - 12.0 %    Eosinophil % 2.4 0.3 - 6.2 %    Basophil % 0.3 0.0 - 1.5 %    Immature Grans % 0.2 0.0 - 0.5 %    Neutrophils, Absolute 8.56 (H) 1.90 - 8.10 10*3/mm3    Lymphocytes, Absolute 1.93 0.90 - 4.80 10*3/mm3    Monocytes, Absolute 0.56 0.20 - 1.20 10*3/mm3    Eosinophils, Absolute 0.27 0.00 - 0.70 10*3/mm3    Basophils, Absolute 0.03 0.00 - 0.20 10*3/mm3    Immature Grans, Absolute 0.02 0.00 - 0.03 10*3/mm3   Urinalysis With / Microscopic If Indicated - Urine, Clean Catch    Collection Time: 05/08/18  8:38 AM   Result Value Ref Range    Color, UA Yellow Yellow, Straw    Appearance, UA Clear Clear    pH, UA 6.5 5.0 - 8.0    Specific Gravity, UA 1.011 1.005 - 1.030    Glucose, UA Negative Negative    Ketones, UA Negative Negative    Bilirubin, UA Negative Negative    Blood, UA Negative  Negative    Protein, UA Negative Negative    Leuk Esterase, UA Trace (A) Negative    Nitrite, UA Negative Negative    Urobilinogen, UA 0.2 E.U./dL 0.2 - 1.0 E.U./dL   Urinalysis, Microscopic Only - Urine, Clean Catch    Collection Time: 05/08/18  8:38 AM   Result Value Ref Range    RBC, UA 0-2 None Seen, 0-2 /HPF    WBC, UA 0-2 None Seen, 0-2 /HPF    Bacteria, UA None Seen None Seen /HPF    Squamous Epithelial Cells, UA 0-2 None Seen, 0-2 /HPF    Hyaline Casts, UA 0-2 None Seen /LPF    Methodology Automated Microscopy        Ordered the above labs and reviewed the results.        RADIOLOGY  CT Abdomen Pelvis Without Contrast   Preliminary Result   The findings on CT suggest distal left colon diverticulitis.   There is a highly inflamed diverticulum or small phlegmon adjacent to   the distal left colon. No extraluminal air or drainable fluid collection   is seen.       These findings were discussed with Dr. Reveles by telephone.       Radiation dose reduction techniques were utilized, including automated   exposure control and exposure modulation based on body size.                     Ordered the above noted radiological studies. Reviewed by me in PACS.  Spoke with Dr. Lee (radiologist) regarding CT Abd scan results.          PROCEDURES  Procedures          PROGRESS AND CONSULTS  ED Course   Comment By Time   11:19 AM  Patient with diverticulitis that has been on outpatient antibiotics.  May have developing phlegmon.  Will give zosyn.  Discussed with Dr. Sanchez who will admit. Albaro Reveles MD 05/08 2954     8:38 AM:  Blood work and UA ordered for further evaluation.     9:29 AM:  CT Abd ordered for further evaluation.     10:10 AM:  Rechecked pt. Informed pt that his CT Abd shows distal left colon diverticulitis. There is also an inflamed diverticulum or small phlegmon adjacent to the distal left colon. As pt has failed outpatient antibiotic treatment, need for pt's admission to the hospital for further  evaluation and management. Pt agrees with plan. Decision time to admit: Now.     10:13 AM:  Placed call to [facility] for admission.     11:07 AM:  Zosyn and Flagyl ordered to treat for diverticulitis.     11:12 AM:  Discussed case with Dr. Sanchez, hospitalist. He will admit pt to a med/surg bed. He would like the Flagyl held at this time.             MEDICAL DECISION MAKING      MDM  Number of Diagnoses or Management Options     Amount and/or Complexity of Data Reviewed  Clinical lab tests: ordered and reviewed (WBC is 11.37. )  Tests in the radiology section of CPT®: ordered and reviewed (CT Abd:  The findings on CT suggest distal left colon diverticulitis.  There is a highly inflamed diverticulum or small phlegmon adjacent to  the distal left colon. No extraluminal air or drainable fluid collection  is seen.)  Discussion of test results with the performing providers: yes (CT Abd results d/w radiologist.   )  Decide to obtain previous medical records or to obtain history from someone other than the patient: yes  Discuss the patient with other providers: yes (Case d/w Dr. Sanchez, hospitalist.)    Patient Progress  Patient progress: stable             DIAGNOSIS  Final diagnoses:   Diverticulitis of large intestine without perforation or abscess without bleeding         DISPOSITION  Pt admitted to med/surg.      ADMISSION    Discussed treatment plan and reason for admission with pt/family and admitting physician.  Pt/family voiced understanding of the plan for admission for further testing/treatment as needed.                 Latest Documented Vital Signs:  As of 11:23 AM  BP- 104/83 HR- 101 Temp- 98.2 °F (36.8 °C) (Tympanic) O2 sat- 94%        --  Documentation assistance provided by castro Moeller for Dr. Abdirizak MD.  Information recorded by the castro was done at my direction and has been verified and validated by me.               Janae Moeller  05/08/18 [id number]       Albaro Reveles MD  05/08/18 [id number]

## 2018-05-08 NOTE — PLAN OF CARE
Problem: Patient Care Overview  Goal: Plan of Care Review  Outcome: Ongoing (interventions implemented as appropriate)   05/08/18 8436   Coping/Psychosocial   Plan of Care Reviewed With patient;family   Plan of Care Review   Progress no change   OTHER   Outcome Summary Pt admitted from ER this shift. IVF initiated. VSS. Pt pain is tolerable, rated at a 2. Will continue to monitor per protocol.      Goal: Individualization and Mutuality  Outcome: Ongoing (interventions implemented as appropriate)    Goal: Discharge Needs Assessment  Outcome: Ongoing (interventions implemented as appropriate)    Goal: Interprofessional Rounds/Family Conf  Outcome: Ongoing (interventions implemented as appropriate)      Problem: Pain, Acute (Adult)  Goal: Identify Related Risk Factors and Signs and Symptoms  Outcome: Ongoing (interventions implemented as appropriate)    Goal: Acceptable Pain Control/Comfort Level  Outcome: Ongoing (interventions implemented as appropriate)

## 2018-05-08 NOTE — PROGRESS NOTES
"Pharmacokinetic Evaluation - Zosyn    Agustín Willett is a 72 y.o. male on Zosyn pharmacy to dose.  MRN: [id number]  : 1945    Day of Zosyn therapy: 1  Indication: Intra-abdominal infection  Consulted by: Dr. Sanchez    Current dose: s/p 4.5 gm x1 in ED at 1158  Other antimicrobials: none    Blood pressure 136/78, pulse 104, temperature 99.8 °F (37.7 °C), temperature source Oral, resp. rate 18, height 182.9 cm (72\"), weight 131 kg (288 lb 12.8 oz), SpO2 93 %.    Results from last 7 days  Lab Units 18  0837 18  1329   CREATININE mg/dL 1.54* 1.64*     Estimated Creatinine Clearance: 62.5 mL/min (by C-G formula based on SCr of 1.54 mg/dL (H)).    Results from last 7 days  Lab Units 18  0837 18  1329   WBC 10*3/mm3 11.37*  --    HEMOGLOBIN g/dL 13.5* 13.4*   HEMATOCRIT % 42.0 44.1   PLATELETS 10*3/mm3 196  --      Other relevant labs/chart info: Pt admitted with abdominal pain; previously prescribed Cipro and Flagyl for diverticulitis ~2 days PTA.     Radiology:   CT Abdomen : The findings on CT suggest distal left colon diverticulitis. There is a highly inflamed diverticulum or small phlegmon adjacent to the distal left colon. No extraluminal air or drainable fluid collection  is seen.    Cultures:   None    Assessment:  Pt with slightly elevated Scr, however CrCl still >20 mL/min. Will begin patient on 3.375 q8 Zosyn extended interval.     Plan:  1) Begin Zosyn 3.375 q8 extended interval; first dose to start at 1800.    Edith Cooper, Pharm.D., [facility]  Oncology Pharmacy Specialist  [phone]      "

## 2018-05-08 NOTE — CONSULTS
"[facility]  Cardiology Consult Note    Patient Identification:  Name: Agustín Willett  Age: 72 y.o.  Sex: male  :  1945  MRN: [id number]             Requesting Physician: Dr Sanchez    Reason for Consultation / Chief Complaint: atrial fib    History of Present Illness:     This patient is a 72-year-old white male new to our service.    Denies history of MI, previous ischemic workup, arrhythmia or congestive heart failure. He is followed by endocrinology for diabetes, hypertension and dyslipidemia. Pulmonology treats him for COPD and ARIANA for which he is on BiPAP with nocturnal O2.  Spouse reports history of \"TIA\" in the past      He presented to the emergency room on 2018 reporting a one month history of lower extremity edema, weight gain despite decreased appetite and abdominal distention. EKG (see below) showed atrial fibrillation of unknown duration with a ventricular rate of 100..  Venous Doppler ultrasound of lower extremities were negative for DVT.  He was given a prescription for Eliquis and advised to follow-up with cardiology in the next few weeks.  He states that he has been compliant with the Eliquis however really hasn't noticed any improvement in the swelling despite use of Lasix.    He presented back to the emergency room this morning from an [facility] care center with persistent lower abdominal pain and radiographic evidence of diverticulitis.  He is symptomatically improved with Cipro and Flagyl.  We have been asked to follow the patient for atrial fibrillation.    He denies any chest pain, tightness, pressure palpitations, dizziness, lightheadedness, near syncope/syncope, increase in his baseline shortness of breath/dyspnea on exertion or occasional nonproductive cough.  He denies any PND or orthopnea.    Admission EKG is pending.  Initial troponin negative at <0.010.  .  TSH normal, T4 decreased at 4.09.      Comorbid cardiac risk factors: Hypertension, diabetes, " dyslipidemia    Past Medical History:  Past Medical History:   Diagnosis Date   • Dyslipidemia    • Erectile dysfunction    • Hypertension    • Hypogonadism male    • ARIANA (obstructive sleep apnea)    • Type 2 diabetes mellitus    • Vitamin D deficiency      Past Surgical History:  Past Surgical History:   Procedure Laterality Date   • ADENOIDECTOMY     • TONSILLECTOMY     • TOTAL KNEE ARTHROPLASTY Left    • VASECTOMY        Allergies:  No Known Allergies  Home Meds:  Prescriptions Prior to Admission   Medication Sig Dispense Refill Last Dose   • apixaban (ELIQUIS) 5 MG tablet tablet Take 1 tablet by mouth 2 (Two) Times a Day. 60 tablet 0 5/7/2018 at Unknown time   • aspirin 325 MG tablet Take 325 mg by mouth Daily.   5/8/2018 at Unknown time   • chlorthalidone (HYGROTON) 25 MG tablet Take 1 tablet by mouth Daily. 30 tablet 5 5/8/2018 at Unknown time   • Cholecalciferol (VITAMIN D3) 5000 units capsule capsule Take 50,000 Units by mouth 2 (Two) Times a Week.   5/6/2018   • ciprofloxacin (CIPRO) 500 MG tablet Take 500 mg by mouth 2 (Two) Times a Day.   5/8/2018 at Unknown time   • colchicine 0.6 MG tablet 1 po bid 180 tablet 1 5/8/2018 at Unknown time   • furosemide (LASIX) 40 MG tablet Take 1 tablet by mouth Daily. 30 tablet 2 5/8/2018 at Unknown time   • metFORMIN (GLUCOPHAGE) 1000 MG tablet Take 1 tablet by mouth 2 (Two) Times a Day. 180 tablet 1 5/8/2018 at Unknown time   • metoprolol succinate XL (TOPROL-XL) 50 MG 24 hr tablet Take 1 tablet by mouth Daily. 90 tablet 3 5/8/2018 at Unknown time   • metroNIDAZOLE (FLAGYL) 500 MG tablet Take 500 mg by mouth 3 (Three) Times a Day.   5/8/2018 at Unknown time   • propranolol (INDERAL) 10 MG tablet TAKE 1 TABLET BY MOUTH DAILY. 90 tablet 0 5/8/2018 at Unknown time   • rosuvastatin (CRESTOR) 10 MG tablet Take 1 tablet by mouth Daily. 90 tablet 1 5/8/2018 at Unknown time   • EDARBI 80 MG tablet tablet Take 1 tablet by mouth Daily. 30 tablet 5      Current Meds:    [unfilled]  Social History:   Social History   Substance Use Topics   • Smoking status: Former Smoker   • Smokeless tobacco: Not on file   • Alcohol use Yes      Comment: patient states that he drinks 8-12 ounces a day of buorbon      Family History:  Family History   Problem Relation Age of Onset   • ALS Mother    • Hypertension Father         Review of Systems    Constitutional: No fever, rigors, chills   Eyes: No vision changes, eye pain   ENT/oropharynx: No difficulty swallowing, sore throat, epistaxis, changes in hearing   Cardiovascular: No chest pain, chest tightness, palpitations, paroxysmal nocturnal dyspnea, orthopnea, diaphoresis, dizziness / syncopal episode   Respiratory: No change in baselineshortness of breath, dyspnea on exertion, non productive cough. No hemoptysis   Gastrointestinal: +abdominal pain, nausea. No vomiting, diarrhea, bloody stools   Genitourinary: No hematuria, dysuria   Neurological: No headache, tremors, one-sided weakness    Musculoskeletal: No myalgias,  joint pain   Integument: No rash, + LE edema           Constitutional:  Temp:  [98.2 °F (36.8 °C)-99.8 °F (37.7 °C)] 99.8 °F (37.7 °C)  Heart Rate:  [] 104  Resp:  [18] 18  BP: ()/(66-89) 136/78    Physical Exam   General:  Well-developed, well-nourished white male resting quietly.  Appears in no acute distress  Eyes: PERTL,  HEENT:  No JVD sitting up in a chair. Thyroid not visibly enlarged. No mucosal pallor or cyanosis  Respiratory: Respirations regular and unlabored at rest. BBS with good air entry in all fields. No crackles, rubs or wheezes auscultated  Cardiovascular: S1S2 irregular rate and rhythm. No murmur, rub or gallop auscultated. No carotid bruits. PT pulses 2   2+ R>L L pretibial and pedal edema  Gastrointestinal: Abdomen soft, nondistended, non tender to palpation without guarding. Bowel sounds present. No ascites  Musculoskeletal: SHRESTHA x4. No abnormal movements  Extremities: No digital clubbing or  cyanosis  Skin: Color pink. Skin warm and dry to touch.   Neuro: AAO x3 CN II-XII grossly intact        Cardiographics  Admission ECG: pending    Comparison EKG 4-:      Telemetry: Nonmonitored bed      Imaging  Abdomen and pelvis CT IMPRESSION:  The findings on CT suggest distal left colon diverticulitis.  There is a highly inflamed diverticulum or small phlegmon adjacent to  the distal left colon. No extraluminal air or drainable fluid collection  is seen.    Lab Review     Results from last 7 days  Lab Units 05/08/18  0837   SODIUM mmol/L 139   POTASSIUM mmol/L 3.9   BUN mg/dL 33*   CREATININE mg/dL 1.54*   CALCIUM mg/dL 9.3       Results from last 7 days  Lab Units 05/08/18  0837 05/03/18  1329   WBC 10*3/mm3 11.37*  --    HEMOGLOBIN g/dL 13.5* 13.4*   HEMATOCRIT % 42.0 44.1   PLATELETS 10*3/mm3 196  --          Assessment:  - atrial fib ?duration.   - elevated [id number] - (Eliquis started 1 week ago)  - HTN  - Diverticulitis  - Abdominal pain   - acute on CKD  - DM  - Dyslipidemia  - ARIANA  - reported h/o TIA    Recommendations / Plan:   In summary, this is a 72-year-old white male with no known history of CAD but multiple cardiac risk factors currently being treated for diverticulitis.  He was diagnosed with atrial fibrillation one week ago at which time he was placed on Eliquis.  His ventricular rate has been running in the  range on Toprol 50 mg.  Will give him some IV Lasix this evening, check additional cardiac enzymes and 2-D echo.  Check BNP, BMP in a.m.  Recommend ischemic workup once medically stable, continued regular use of BiPAP, alcohol cessation, and adequate course of anticoagulation with Eliquis before cardioversion attempted.  [id number] = 5 and HAS BLED = 4. Further recommendations to treatment plan pending.  Thank you for allowing us to participate in his care    Labs/tests ordered: CE, medication adjustment,BNP,BMP and EKG in am      Rosemarie Murray MD  5/9/2018, 1:25  JOSEPH Padilla/Transcription:   Dictated utilizing Dragon dictation

## 2018-05-08 NOTE — H&P
HISTORY AND PHYSICAL   [facility]        Patient Identification:  Name: Agustín Willett  Age: 72 y.o.  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  He came to the emergency department the other day for some abdominal pain and was prescribed Cipro and Flagyl and sent home as he was treated for diverticulitis.  He came back as he notice increasing amounts of abdominal pain last night.  He states he got down about 2 days worth of antibiotics and did not have any issues with nausea or vomiting.  He states he has not been eating as much secondary to his abdominal discomfort.  Unfortunately he drank small amount on [place] last night which immediately made his abdominal pain worsened.  Currently he's not really complaining of abdominal pain and states that he feels much better at this juncture.  He was placed on IV Zosyn in the ER and was conveyed to me the patient was in mild RVR in the ER.  When I asked the patient if he has any known past history of atrial fibrillation he tells me he had no idea despite the fact that he is also taking Eliquis.  He seemed completely unaware of this diagnosis and states he's never seen a cardiologist in the past so consultation will be placed.  Patient denies any fever chills or night sweats bloody stool nausea vomiting or chest pain.  He does consume alcohol on a fairly regular basis but states he's had no problems with removal of the agent in regards to detox in the past and seems quite confident that he will not have issues.    Past Medical History:  Past Medical History:   Diagnosis Date   • Dyslipidemia    • Erectile dysfunction    • Hypertension    • Hypogonadism male    • ARIANA (obstructive sleep apnea)    • Type 2 diabetes mellitus     • Vitamin D deficiency      Past Surgical History:  Past Surgical History:   Procedure Laterality Date   • ADENOIDECTOMY     • TONSILLECTOMY     • TOTAL KNEE ARTHROPLASTY Left    • VASECTOMY        Home Meds:  Prescriptions Prior to Admission   Medication Sig Dispense Refill Last Dose   • apixaban (ELIQUIS) 5 MG tablet tablet Take 1 tablet by mouth 2 (Two) Times a Day. 60 tablet 0 5/7/2018 at Unknown time   • aspirin 325 MG tablet Take 325 mg by mouth Daily.   5/8/2018 at Unknown time   • chlorthalidone (HYGROTON) 25 MG tablet Take 1 tablet by mouth Daily. 30 tablet 5 5/8/2018 at Unknown time   • Cholecalciferol (VITAMIN D3) 5000 units capsule capsule Take 50,000 Units by mouth 2 (Two) Times a Week.   5/6/2018   • ciprofloxacin (CIPRO) 500 MG tablet Take 500 mg by mouth 2 (Two) Times a Day.   5/8/2018 at Unknown time   • colchicine 0.6 MG tablet 1 po bid 180 tablet 1 5/8/2018 at Unknown time   • furosemide (LASIX) 40 MG tablet Take 1 tablet by mouth Daily. 30 tablet 2 5/8/2018 at Unknown time   • metFORMIN (GLUCOPHAGE) 1000 MG tablet Take 1 tablet by mouth 2 (Two) Times a Day. 180 tablet 1 5/8/2018 at Unknown time   • metoprolol succinate XL (TOPROL-XL) 50 MG 24 hr tablet Take 1 tablet by mouth Daily. 90 tablet 3 5/8/2018 at Unknown time   • metroNIDAZOLE (FLAGYL) 500 MG tablet Take 500 mg by mouth 3 (Three) Times a Day.   5/8/2018 at Unknown time   • propranolol (INDERAL) 10 MG tablet TAKE 1 TABLET BY MOUTH DAILY. 90 tablet 0 5/8/2018 at Unknown time   • rosuvastatin (CRESTOR) 10 MG tablet Take 1 tablet by mouth Daily. 90 tablet 1 5/8/2018 at Unknown time   • EDARBI 80 MG tablet tablet Take 1 tablet by mouth Daily. 30 tablet 5        Allergies:  No Known Allergies  Immunizations:    There is no immunization history on file for this patient.  Social History:   Social History     Social History Narrative   • No narrative on file     Social History     Social History   • Marital status:      Spouse name:  "N/A   • Number of children: N/A   • Years of education: N/A     Occupational History   • Not on file.     Social History Main Topics   • Smoking status: Former Smoker   • Smokeless tobacco: Not on file   • Alcohol use Yes      Comment: patient states that he drinks 8-12 ounces a day of buorbon   • Drug use: Unknown   • Sexual activity: Not on file     Other Topics Concern   • Not on file     Social History Narrative   • No narrative on file       Family History:  Family History   Problem Relation Age of Onset   • ALS Mother    • Hypertension Father         Review of Systems  See history of present illness and past medical history.  Patient denies fever chills night sweats denies any headache dizziness or loss of consciousness.  Denies any changes to vision smell taste or sound.  Denies nausea vomiting chest pain palpitations cough shortness of breath.  Admits to abdominal pain made worse last night though currently is not feeling too bad.  Denies any bloody stool bruising bleeding or focal loss of function.  Remainder of ROS is negative.    Objective:  tMax 24 hrs: Temp (24hrs), Av °F (37.2 °C), Min:98.2 °F (36.8 °C), Max:99.8 °F (37.7 °C)    Vitals Ranges:   Temp:  [98.2 °F (36.8 °C)-99.8 °F (37.7 °C)] 99.8 °F (37.7 °C)  Heart Rate:  [] 104  Resp:  [18] 18  BP: ()/(66-89) 136/78      Exam:  /78 (BP Location: Right arm, Patient Position: Sitting)   Pulse 104   Temp 99.8 °F (37.7 °C) (Oral)   Resp 18   Ht 182.9 cm (72\")   Wt 131 kg (288 lb 12.8 oz)   SpO2 93%   BMI 39.17 kg/m²     General Appearance:    Alert, cooperative, no distress, AOx3, sitting in chair, not toxic, no fm    Head:    Normocephalic, without obvious abnormality, atraumatic   Eyes:    PERRL, conjunctiva/corneas clear, EOM's intact, both eyes   Ears:    Normal external ear canals, both ears   Nose:   Nares normal, septum midline, mucosa normal, no drainage    or sinus tenderness   Throat:   Lips, mucosa, and tongue normal " though dry    Neck:   Supple, no JVD       Lungs:     Clear to auscultation bilaterally, respirations unlabored   Chest Wall:    No tenderness or deformity    Heart:    Irregularly irregular rate and rhythm, S1 and S2 normal   Abdomen:     Soft, Very mild lower quadrant tenderness palpation without rebound or guarding, bowel sounds active all four quadrants,     no masses, no hepatomegaly, no splenomegaly   Extremities:   No cyanosis or edema   Pulses:   2+ and symmetric all extremities           Neurologic:   CNII-XII intact, normal strength, no focal deficit       .    Data Review:  Labs in chart were reviewed.      Results from last 7 days  Lab Units 05/03/18  1329   TSH mIU/mL 0.559       Results from last 7 days  Lab Units 05/03/18  1329   HEMOGLOBIN A1C % 5.50      Imaging Results (all)     Procedure Component Value Units Date/Time    CT Abdomen Pelvis Without Contrast [[id number]] Collected:  05/08/18 1013     Updated:  05/08/18 1013    Narrative:       CT ABDOMEN AND PELVIS WITHOUT CONTRAST     HISTORY: Left flank and lower abdominal pain.     TECHNIQUE: Axial CT images of the abdomen and pelvis were obtained  without administration of intravenous contrast. The patient was not  given oral contrast. Coronal and sagittal reformats were obtained.     COMPARISON: 04/27/2018.     FINDINGS: Diffuse circumferential wall thickening is seen involving the  distal left colon with extensive surrounding stranding. There is either  an inflamed diverticulum or small phlegmon seen just posterior to the  most distal left colon measuring approximately 2.7 cm. No drainable  fluid collection is seen, no extraluminal air is present. No evidence of  bowel obstruction.     Mild patchy infiltrate is again seen at the right lung base.     The liver, spleen, pancreas and the gallbladder is normal. Bilateral  adrenal glands are normal. Both kidneys are normal in size and  attenuation. There is a small cyst in the lower pole of the left  kidney.  The urinary bladder is partly distended and normal. Moderate  atherosclerotic calcified plaque is seen within the abdominal aorta and  its branches. Dilatation of the bilateral common iliac arteries  unchanged. Degenerative change is seen within the visualized  thoracolumbar spine.       Impression:       The findings on CT suggest distal left colon diverticulitis.  There is a highly inflamed diverticulum or small phlegmon adjacent to  the distal left colon. No extraluminal air or drainable fluid collection  is seen.     These findings were discussed with Dr. Reveles by telephone.     Radiation dose reduction techniques were utilized, including automated  exposure control and exposure modulation based on body size.                  Assessment:  Principal Problem:    Diverticulitis of large intestine without perforation or abscess without bleeding  Active Problems:    Hypertension    Diabetes mellitus    Abdominal pain    A-fib      Plan:  Tics - bowel rest and IV Zosyn    -med mgnt if continues to improve - not recurrent phx - surgical consult if declines    AP - prn pain meds     DM2 - PO and add ssi     Mild dehydration - gentle IVF overnight and will DC chlorthalidone in a.m. And hold Lasix    AFib though patient states he never knew despite Eliquis on med rec and claims never seen Cards so will place consult as very mild RVR    HTN - meds - controlled - holding chlorthalidone    Further recommendations to follow as clinical course unfolds    Anibal Sanchez MD  5/8/2018  3:03 PM

## 2018-05-08 NOTE — CONSULTS
[facility]  Cardiology Consult Note    Patient Identification:  Name: Agustín Willett  Age: 72 y.o.  Sex: male  :  1945  MRN: [id number]             Requesting Physician: Dr Sanchez    Reason for Consultation / Chief Complaint: atrial fib    History of Present Illness:     This patient is a 72-year-old white male new to our service.    Denies history of MI, previous ischemic workup, arrhythmia or congestive heart failure. He is followed by endocrinology for diabetes, hypertension and dyslipidemia. Pulmonology treats him for COPD and ARIANA for which he is on BiPAP with nocturnal O2.  He presented to the emergency room on 2018 reporting a one month history of lower extremity edema, weight gain despite decreased appetite and abdominal distention..  EKG (see below) showed atrial fibrillation of unknown duration with a ventricular rate of 100..  Venous Doppler ultrasound of lower extremities were negative for DVT.  He was given a prescription for Eliquis and advised to follow-up with cardiology in the next few weeks.  He states that he has been compliant with the Eliquis however really hasn't noticed any improvement in the swelling despite use of Lasix.    He presented back to the emergency room this morning from an [facility] center with persistent lower abdominal pain and radiographic evidence of diverticulitis.  He is symptomatically improved with Cipro and Flagyl.  We have been asked to follow the patient for atrial fibrillation.    He denies any chest pain, tightness, pressure palpitations, dizziness, lightheadedness, near syncope/syncope, increase in his baseline shortness of breath/dyspnea on exertion or occasional nonproductive cough.  He denies any PND or orthopnea.    Admission EKG is pending.  Initial troponin negative at <0.010.  .  TSH normal, T4 decreased at 4.09.      Comorbid cardiac risk factors: Hypertension, diabetes, dyslipidemia    Past Medical History:  Past  Medical History:   Diagnosis Date   • Dyslipidemia    • Erectile dysfunction    • Hypertension    • Hypogonadism male    • ARIANA (obstructive sleep apnea)    • Type 2 diabetes mellitus    • Vitamin D deficiency      Past Surgical History:  Past Surgical History:   Procedure Laterality Date   • ADENOIDECTOMY     • TONSILLECTOMY     • TOTAL KNEE ARTHROPLASTY Left    • VASECTOMY        Allergies:  No Known Allergies  Home Meds:  Prescriptions Prior to Admission   Medication Sig Dispense Refill Last Dose   • apixaban (ELIQUIS) 5 MG tablet tablet Take 1 tablet by mouth 2 (Two) Times a Day. 60 tablet 0 5/7/2018 at Unknown time   • aspirin 325 MG tablet Take 325 mg by mouth Daily.   5/8/2018 at Unknown time   • chlorthalidone (HYGROTON) 25 MG tablet Take 1 tablet by mouth Daily. 30 tablet 5 5/8/2018 at Unknown time   • Cholecalciferol (VITAMIN D3) 5000 units capsule capsule Take 50,000 Units by mouth 2 (Two) Times a Week.   5/6/2018   • ciprofloxacin (CIPRO) 500 MG tablet Take 500 mg by mouth 2 (Two) Times a Day.   5/8/2018 at Unknown time   • colchicine 0.6 MG tablet 1 po bid 180 tablet 1 5/8/2018 at Unknown time   • furosemide (LASIX) 40 MG tablet Take 1 tablet by mouth Daily. 30 tablet 2 5/8/2018 at Unknown time   • metFORMIN (GLUCOPHAGE) 1000 MG tablet Take 1 tablet by mouth 2 (Two) Times a Day. 180 tablet 1 5/8/2018 at Unknown time   • metoprolol succinate XL (TOPROL-XL) 50 MG 24 hr tablet Take 1 tablet by mouth Daily. 90 tablet 3 5/8/2018 at Unknown time   • metroNIDAZOLE (FLAGYL) 500 MG tablet Take 500 mg by mouth 3 (Three) Times a Day.   5/8/2018 at Unknown time   • propranolol (INDERAL) 10 MG tablet TAKE 1 TABLET BY MOUTH DAILY. 90 tablet 0 5/8/2018 at Unknown time   • rosuvastatin (CRESTOR) 10 MG tablet Take 1 tablet by mouth Daily. 90 tablet 1 5/8/2018 at Unknown time   • EDARBI 80 MG tablet tablet Take 1 tablet by mouth Daily. 30 tablet 5      Current Meds:   [unfilled]  Social History:   Social History   Substance  Use Topics   • Smoking status: Former Smoker   • Smokeless tobacco: Not on file   • Alcohol use Yes      Comment: patient states that he drinks 8-12 ounces a day of buorbon      Family History:  Family History   Problem Relation Age of Onset   • ALS Mother    • Hypertension Father         Review of Systems    Constitutional: No fever, rigors, chills   Eyes: No vision changes, eye pain   ENT/oropharynx: No difficulty swallowing, sore throat, epistaxis, changes in hearing   Cardiovascular: No chest pain, chest tightness, palpitations, paroxysmal nocturnal dyspnea, orthopnea, diaphoresis, dizziness / syncopal episode   Respiratory: No change in baselineshortness of breath, dyspnea on exertion, non productive cough. No hemoptysis   Gastrointestinal: +abdominal pain, nausea. No vomiting, diarrhea, bloody stools   Genitourinary: No hematuria, dysuria   Neurological: No headache, tremors, one-sided weakness    Musculoskeletal: No myalgias,  joint pain   Integument: No rash, + LE edema           Constitutional:  Temp:  [98.2 °F (36.8 °C)-99.8 °F (37.7 °C)] 99.8 °F (37.7 °C)  Heart Rate:  [] 104  Resp:  [18] 18  BP: ()/(66-89) 136/78    Physical Exam   General:  Well-developed, well-nourished white male resting quietly.  Appears in no acute distress  Eyes: PERTL,  HEENT:  No JVD sitting up in a chair. Thyroid not visibly enlarged. No mucosal pallor or cyanosis  Respiratory: Respirations regular and unlabored at rest. BBS with good air entry in all fields. No crackles, rubs or wheezes auscultated  Cardiovascular: S1S2 irregular rate and rhythm. No murmur, rub or gallop auscultated. No carotid bruits. PT pulses 2   2+ R>L L pretibial and pedal edema  Gastrointestinal: Abdomen soft, nondistended, non tender to palpation without guarding. Bowel sounds present. No ascites  Musculoskeletal: SHRESTHA x4. No abnormal movements  Extremities: No digital clubbing or cyanosis  Skin: Color pink. Skin warm and dry to touch.      Neuro: AAO x3 CN II-XII grossly intact        Cardiographics  Admission ECG: pending    Comparison EKG 4-:      Telemetry: Nonmonitored bed      Imaging  Abdomen and pelvis CT IMPRESSION:  The findings on CT suggest distal left colon diverticulitis.  There is a highly inflamed diverticulum or small phlegmon adjacent to  the distal left colon. No extraluminal air or drainable fluid collection  is seen.    Lab Review   Results from last 7 days  Lab Units 05/08/18  0837   SODIUM mmol/L 139   POTASSIUM mmol/L 3.9   BUN mg/dL 33*   CREATININE mg/dL 1.54*   CALCIUM mg/dL 9.3     Results from last 7 days  Lab Units 05/08/18  0837 05/03/18  1329   WBC 10*3/mm3 11.37*  --    HEMOGLOBIN g/dL 13.5* 13.4*   HEMATOCRIT % 42.0 44.1   PLATELETS 10*3/mm3 196  --          Assessment:  - atrial fib ?duration.   - elevated [id number] - (Eliquis started 1 week ago)  - HTN  - Diverticulitis  - Abdominal pain  - DM  - Dyslipidemia  - ARIANA    Recommendations / Plan:   In summary, this is a 72-year-old white male with no known history of CAD but multiple cardiac risk factors currently being treated for diverticulitis.  He was diagnosed with atrial fibrillation one week ago at which time he was placed on Eliquis.  His ventricular rate has been running in the  range on Toprol 50 mg.  Will give him some IV Lasix this evening, check additional cardiac enzymes and 2-D echo.  Check BNP, BMP in a.m.  Recommend ischemic workup once medically stable, continued regular use of BiPAP and adequate course of anticoagulation with Eliquis before cardioversion attempted.  Further recommendations to treatment plan pending.  Thank you for allowing us to participate in his care    Labs/tests ordered: CE, medication adjustment,BNP,BMP and EKG in am      GLEN Guardado  5/8/2018, 3:39 PM      EMR Dragon/Transcription:   Dictated utilizing Dragon dictation

## 2018-05-08 NOTE — ED NOTES
Pt walked to exam room 18. Patient asked to provide a urine specimen, and undress from the waist up into a hospital gown. Report given to Katerina Rodgers  05/08/18 5025

## 2018-05-09 LAB
ANION GAP SERPL CALCULATED.3IONS-SCNC: 10.6 MMOL/L
BUN BLD-MCNC: 28 MG/DL (ref 8–23)
BUN/CREAT SERPL: 17.6 (ref 7–25)
CALCIUM SPEC-SCNC: 9.2 MG/DL (ref 8.6–10.5)
CHLORIDE SERPL-SCNC: 96 MMOL/L (ref 98–107)
CO2 SERPL-SCNC: 34.4 MMOL/L (ref 22–29)
CREAT BLD-MCNC: 1.59 MG/DL (ref 0.76–1.27)
DEPRECATED RDW RBC AUTO: 47.6 FL (ref 37–54)
ERYTHROCYTE [DISTWIDTH] IN BLOOD BY AUTOMATED COUNT: 14.5 % (ref 11.5–14.5)
GFR SERPL CREATININE-BSD FRML MDRD: 43 ML/MIN/1.73
GLUCOSE BLD-MCNC: 95 MG/DL (ref 65–99)
HCT VFR BLD AUTO: 43.1 % (ref 40.4–52.2)
HGB BLD-MCNC: 13.3 G/DL (ref 13.7–17.6)
MAGNESIUM SERPL-MCNC: 1.9 MG/DL (ref 1.6–2.4)
MCH RBC QN AUTO: 27.6 PG (ref 27–32.7)
MCHC RBC AUTO-ENTMCNC: 30.9 G/DL (ref 32.6–36.4)
MCV RBC AUTO: 89.4 FL (ref 79.8–96.2)
NT-PROBNP SERPL-MCNC: 703.7 PG/ML (ref 5–900)
PLATELET # BLD AUTO: 211 10*3/MM3 (ref 140–500)
PMV BLD AUTO: 10.3 FL (ref 6–12)
POTASSIUM BLD-SCNC: 3.9 MMOL/L (ref 3.5–5.2)
RBC # BLD AUTO: 4.82 10*6/MM3 (ref 4.6–6)
SODIUM BLD-SCNC: 141 MMOL/L (ref 136–145)
WBC NRBC COR # BLD: 7.1 10*3/MM3 (ref 4.5–10.7)

## 2018-05-09 PROCEDURE — 93005 ELECTROCARDIOGRAM TRACING: CPT | Performed by: NURSE PRACTITIONER

## 2018-05-09 PROCEDURE — 93010 ELECTROCARDIOGRAM REPORT: CPT | Performed by: INTERNAL MEDICINE

## 2018-05-09 PROCEDURE — 83735 ASSAY OF MAGNESIUM: CPT | Performed by: HOSPITALIST

## 2018-05-09 PROCEDURE — 80048 BASIC METABOLIC PNL TOTAL CA: CPT | Performed by: HOSPITALIST

## 2018-05-09 PROCEDURE — 85027 COMPLETE CBC AUTOMATED: CPT | Performed by: HOSPITALIST

## 2018-05-09 PROCEDURE — 83880 ASSAY OF NATRIURETIC PEPTIDE: CPT | Performed by: NURSE PRACTITIONER

## 2018-05-09 PROCEDURE — 25010000002 FUROSEMIDE PER 20 MG: Performed by: NURSE PRACTITIONER

## 2018-05-09 PROCEDURE — 25010000002 PIPERACILLIN SOD-TAZOBACTAM PER 1 G: Performed by: HOSPITALIST

## 2018-05-09 PROCEDURE — 99221 1ST HOSP IP/OBS SF/LOW 40: CPT | Performed by: INTERNAL MEDICINE

## 2018-05-09 RX ADMIN — METFORMIN HYDROCHLORIDE 1000 MG: 1000 TABLET ORAL at 09:40

## 2018-05-09 RX ADMIN — COLCHICINE 0.6 MG: 0.6 TABLET, FILM COATED ORAL at 09:41

## 2018-05-09 RX ADMIN — ROSUVASTATIN CALCIUM 10 MG: 10 TABLET, FILM COATED ORAL at 09:41

## 2018-05-09 RX ADMIN — TAZOBACTAM SODIUM AND PIPERACILLIN SODIUM 3.38 G: 375; 3 INJECTION, SOLUTION INTRAVENOUS at 09:41

## 2018-05-09 RX ADMIN — TEMAZEPAM 15 MG: 15 CAPSULE ORAL at 21:08

## 2018-05-09 RX ADMIN — METOPROLOL SUCCINATE 50 MG: 50 TABLET, FILM COATED, EXTENDED RELEASE ORAL at 09:41

## 2018-05-09 RX ADMIN — APIXABAN 5 MG: 5 TABLET, FILM COATED ORAL at 21:04

## 2018-05-09 RX ADMIN — APIXABAN 5 MG: 5 TABLET, FILM COATED ORAL at 09:41

## 2018-05-09 RX ADMIN — TAZOBACTAM SODIUM AND PIPERACILLIN SODIUM 3.38 G: 375; 3 INJECTION, SOLUTION INTRAVENOUS at 02:47

## 2018-05-09 RX ADMIN — TAZOBACTAM SODIUM AND PIPERACILLIN SODIUM 3.38 G: 375; 3 INJECTION, SOLUTION INTRAVENOUS at 18:06

## 2018-05-09 RX ADMIN — SODIUM CHLORIDE 100 ML/HR: 9 INJECTION, SOLUTION INTRAVENOUS at 02:47

## 2018-05-09 RX ADMIN — METFORMIN HYDROCHLORIDE 1000 MG: 1000 TABLET ORAL at 18:06

## 2018-05-09 RX ADMIN — FUROSEMIDE 40 MG: 10 INJECTION, SOLUTION INTRAMUSCULAR; INTRAVENOUS at 06:04

## 2018-05-09 NOTE — PLAN OF CARE
Problem: Patient Care Overview  Goal: Plan of Care Review  Outcome: Ongoing (interventions implemented as appropriate)  Pt doing well denies pain or nausea. Tolerating IV antibiotics currently on clear liquid diet which he is also tolerating. Up ad shana in room, will continue to monitor and report any changes as needed.  Goal: Interprofessional Rounds/Family Conf  Outcome: Ongoing (interventions implemented as appropriate)      Problem: Pain, Acute (Adult)  Goal: Identify Related Risk Factors and Signs and Symptoms  Outcome: Ongoing (interventions implemented as appropriate)    Goal: Acceptable Pain Control/Comfort Level  Outcome: Ongoing (interventions implemented as appropriate)

## 2018-05-09 NOTE — PLAN OF CARE
Problem: Patient Care Overview  Goal: Plan of Care Review  Outcome: Ongoing (interventions implemented as appropriate)   05/09/18 4429   Coping/Psychosocial   Plan of Care Reviewed With patient   Plan of Care Review   Progress improving   OTHER   Outcome Summary Pt has denied pain today. EKG results pending. CT results suggest left colon diverticulitis. Pt is to undergo stress test in AM. Pt informed that he will be NPO after midnight and no caffeine after 2000 tonight. IVF d/c'd. Pt has tolerated clear and full liquid diets. No nausea. Up ad shana and encouraged to ambulate in faria. IV zoysn continued. Will continue to monitor.     Goal: Individualization and Mutuality  Outcome: Ongoing (interventions implemented as appropriate)    Goal: Discharge Needs Assessment  Outcome: Ongoing (interventions implemented as appropriate)      Problem: Pain, Acute (Adult)  Goal: Identify Related Risk Factors and Signs and Symptoms  Outcome: Ongoing (interventions implemented as appropriate)    Goal: Acceptable Pain Control/Comfort Level  Outcome: Ongoing (interventions implemented as appropriate)      Problem: Infection, Risk/Actual (Adult)  Goal: Identify Related Risk Factors and Signs and Symptoms  Outcome: Ongoing (interventions implemented as appropriate)    Goal: Infection Prevention/Resolution  Outcome: Ongoing (interventions implemented as appropriate)      Problem: Cardiac Output Decreased (Adult)  Goal: Identify Related Risk Factors and Signs and Symptoms  Outcome: Ongoing (interventions implemented as appropriate)    Goal: Effective Tissue Perfusion  Outcome: Ongoing (interventions implemented as appropriate)

## 2018-05-09 NOTE — PROGRESS NOTES
"[facility]  Cardiology Progress Note    Patient Identification:  Name: Agustín Willett  Age: 72 y.o.  Sex: male  :  1945  MRN: [id number]                 Follow Up / Chief Complaint: afib    Interval History:72-year-old white male new to our service with no prior history of MI, previous ischemic workup, arrhythmia or congestive heart failure. Hx of  diabetes, hypertension and dyslipidemia treated by Endo. . Pulmonology treats him for COPD and ARIANA for which he is on BiPAP with nocturnal O2.  Spouse reports history of \"TIA\" in the past.  Newly dx with afib 18 started on eliquis at that time. .  Neg Venous doppler for DVT.  HE was admitted for diverticulosis with improvement clinically with cipro and flagyl.       Subjective:      Objective:    Temp:  [97 °F (36.1 °C)-99 °F (37.2 °C)] 98.9 °F (37.2 °C)  Heart Rate:  [] 94  Resp:  [16-18] 16  BP: (118-131)/(77-81) 118/77  Afebrile  sp2 92-93%      Past Medical History:  Past Medical History:   Diagnosis Date   • Dyslipidemia    • Erectile dysfunction    • Hypertension    • Hypogonadism male    • ARIANA (obstructive sleep apnea)    • Type 2 diabetes mellitus    • Vitamin D deficiency      Past Surgical History:  Past Surgical History:   Procedure Laterality Date   • ADENOIDECTOMY     • TONSILLECTOMY     • TOTAL KNEE ARTHROPLASTY Left    • VASECTOMY          Social History:   Social History   Substance Use Topics   • Smoking status: Former Smoker   • Smokeless tobacco: Not on file   • Alcohol use Yes      Comment: patient states that he drinks 8-12 ounces a day of buorbon      Family History:  Family History   Problem Relation Age of Onset   • ALS Mother    • Hypertension Father           Allergies:  No Known Allergies     Scheduled Meds:    apixaban 5 mg BID   colchicine 0.6 mg Daily   metFORMIN 1,000 mg BID With Meals   metoprolol succinate XL 50 mg Daily   piperacillin-tazobactam 3.375 g Q8H   rosuvastatin 10 mg Daily       INTAKE AND " OUTPUT:    Intake/Output Summary (Last 24 hours) at 05/09/18 1336  Last data filed at 05/09/18 1215   Gross per 24 hour   Intake          3293.75 ml   Output                0 ml   Net          3293.75 ml       Constitutional:  Temp:  [97 °F (36.1 °C)-99 °F (37.2 °C)] 98.9 °F (37.2 °C)  Heart Rate:  [] 94  Resp:  [16-18] 16  BP: (118-131)/(77-81) 118/77    Physical Exam   General:  Well-developed, well-nourished white male resting quietly.  Appears in no acute distress  Eyes: PERTL,  HEENT:  No JVD sitting up in a chair. Thyroid not visibly enlarged. No mucosal pallor or cyanosis  Respiratory: Respirations regular and unlabored at rest. BBS with good air entry in all fields. No crackles, rubs or wheezes auscultated  Cardiovascular: S1S2 irregular rate and rhythm. No murmur, rub or gallop auscultated. No carotid bruits. PT pulses 2   2+ R>L L pretibial and pedal edema  Gastrointestinal: Abdomen soft, nondistended, non tender to palpation without guarding. Bowel sounds present. No ascites  Musculoskeletal: SHRESTHA x4. No abnormal movements  Extremities: No digital clubbing or cyanosis  Skin: Color pink. Skin warm and dry to touch.   Neuro: AAO x3 CN II-XII grossly intact         Cardiographics    Telemetry: Nonmonitored bed                     Comparison EKG 4-:       Telemetry: Nonmonitored bed        Imaging  Abdomen and pelvis CT IMPRESSION:  The findings on CT suggest distal left colon diverticulitis.  There is a highly inflamed diverticulum or small phlegmon adjacent to  the distal left colon. No extraluminal air or drainable fluid collection  is seen.    Echocardiogram:     Interpretation Summary 5/8/18    · Mild mitral valve regurgitation is present  · Left ventricular wall thickness is consistent with mild concentric hypertrophy.  · There is calcification of the aortic valve.  · Mild to moderate aortic valve stenosis is present.  · Mild to moderate tricuspid valve regurgitation is present.  · Left  atrial cavity size is mild-to-moderately dilated.  · Calculated EF = 51%.  · There is no evidence of pericardial effusion.         Lab Review     Results from last 7 days  Lab Units 05/08/18  1842 05/08/18  1706   TROPONIN T ng/mL <0.010 <0.010       Results from last 7 days  Lab Units 05/09/18  0649   MAGNESIUM mg/dL 1.9       Results from last 7 days  Lab Units 05/09/18  0649   SODIUM mmol/L 141   POTASSIUM mmol/L 3.9   BUN mg/dL 28*   CREATININE mg/dL 1.59*   CALCIUM mg/dL 9.2         Results from last 7 days  Lab Units 05/09/18  0649 05/08/18  0837 05/03/18  1329   WBC 10*3/mm3 7.10 11.37*  --    HEMOGLOBIN g/dL 13.3* 13.5* 13.4*   HEMATOCRIT % 43.1 42.0 44.1   PLATELETS 10*3/mm3 211 196  --              Intake/Output Summary (Last 24 hours) at 05/09/18 1342  Last data filed at 05/09/18 1215   Gross per 24 hour   Intake          3293.75 ml   Output                0 ml   Net          3293.75 ml           Assessment:  - atrial fib ?duration.   - elevated [id number] - (Eliquis started 1 week ago)  - HTN  - Diverticulitis  - Abdominal pain   - acute on CKD  - DM  - Dyslipidemia  - ARIANA  - reported h/o TIA  - EF 51% by echo 5/8.    - Mild AI 5/8  - Mild to Mod TR 5/8   - LVH mild concentric 5/8    Interpretation Summary     · Mild mitral valve regurgitation is present  · Left ventricular wall thickness is consistent with mild concentric hypertrophy.  · There is calcification of the aortic valve.  · Mild to moderate aortic valve stenosis is present.  · Mild to moderate tricuspid valve regurgitation is present.  · Left atrial cavity size is mild-to-moderately dilated.  · Calculated EF = 51%.  · There is no evidence of pericardial effusion.         Plan:  In summary, this is a 72-year-old white male with no known history of CAD but multiple cardiac risk factors currently being treated for diverticulitis.  He was diagnosed with atrial fibrillation one week ago at which time he was placed on Eliquis.  His ventricular rate  "has been running in the  range on Toprol 50 mg.  He was given IV Lasix this last night  With around 3000 out. Trops neg x 2.    .  continued regular use of BiPAP, alcohol cessation, and adequate course of anticoagulation with Eliquis before cardioversion attempted.  [id number] = 5 and HAS BLED = 4.     Echo reviewed with showing EF 51 % with mild AI, mild to mod TR and mild Concentric LVH.     We'll proceed with a stress test in the morning    Rosemarie Murray MD [facility] Randy/Transcription:   \"Dictated utilizing Dragon dictation\".   "

## 2018-05-09 NOTE — PROGRESS NOTES
"    DAILY PROGRESS NOTE  [facility]    Patient Identification:  Name: Agustín Willett  Age: 72 y.o.  Sex: male  :  1945  MRN: [id number]         Primary Care Physician: Dominic Dunn MD    Subjective:  Interval History: feeling much better w/out further need for pain meds for breakthrough - denies n/v/melena/bloody stools/abd pains    Objective:fm x1 at bs     Scheduled Meds:  apixaban 5 mg Oral BID   colchicine 0.6 mg Oral Daily   metFORMIN 1,000 mg Oral BID With Meals   metoprolol succinate XL 50 mg Oral Daily   piperacillin-tazobactam 3.375 g Intravenous Q8H   rosuvastatin 10 mg Oral Daily     Continuous Infusions:  Pharmacy to Dose Zosyn     sodium chloride 100 mL/hr Last Rate: 100 mL/hr (18 0247)       Vital signs in last 24 hours:  Temp:  [97 °F (36.1 °C)-99.8 °F (37.7 °C)] 98.9 °F (37.2 °C)  Heart Rate:  [] 94  Resp:  [16-18] 16  BP: (111-136)/(77-82) 118/77    Intake/Output:    Intake/Output Summary (Last 24 hours) at 18 1051  Last data filed at 18 0935   Gross per 24 hour   Intake             2347 ml   Output                0 ml   Net             2347 ml       Exam:  /77 (BP Location: Right arm, Patient Position: Lying)   Pulse 94   Temp 98.9 °F (37.2 °C) (Oral)   Resp 16   Ht 182.9 cm (72\")   Wt 131 kg (288 lb 12.8 oz)   SpO2 92%   BMI 39.17 kg/m²     General Appearance:    Alert, cooperative, no distress, AAOx3, clinically better                         Throat:   Lips, tongue, gums normal; oral mucosa pink and moist                           Neck:   Supple, symmetrical, trachea midline, no JVD                         Lungs:    Clear to auscultation bilaterally, respirations unlabored                          Heart:    Regular rate and rhythm, S1 and S2 normal                  Abdomen:     Soft, non-tender even w/ deep, bowel sounds active, no masses                  Extremities:   1+ edema                            Data Review:  Labs in chart " were reviewed.    Assessment:  Active Hospital Problems (** Indicates Principal Problem)    Diagnosis Date Noted   • **Diverticulitis of large intestine without perforation or abscess without bleeding [K57.32] 05/08/2018   • Abdominal pain [R10.9] 05/08/2018   • A-fib [I48.91] 05/08/2018   • Diabetes mellitus [E11.9] 10/20/2016   • Hypertension [I10] 04/21/2016      Resolved Hospital Problems    Diagnosis Date Noted Date Resolved   No resolved problems to display.       Plan:  Tics - bowel rest and IV Zosyn - amazing how quick has clinically improved               -med mgnt as continues to improve w/ nothing surgical about his abdomen    -Mild leukocytosis resolved   -advance to FLD     AP - has not required prn pain meds      DM2 - PO and add ssi      SLIVF - lasix IV per Cards but would hold further diuresis today given worsening metabolic alkalosis      AFib - RVR resolved - Cards consulted    -agree w/ need for alcohol cessation      HTN - meds - controlled - DC chlorthalidone since on Lasix     Dispo - next day or two     Anibal Sanchez MD  5/9/2018  10:51 AM

## 2018-05-09 NOTE — NURSING NOTE
Pt reports appetite was decreased at home prior to admission.  Pt states stomach was cramping, especially after he ate or had something to drink.  Pt states cramping decreased and appetite returned, however PTA he had a small amount of bourbon and caused abdominal cramping and pain again.  Pt came to hospital and appetite was once again decreased.  Pt has tolerated clear liquids today.  Advanced to full liquid this afternoon.

## 2018-05-10 ENCOUNTER — APPOINTMENT (OUTPATIENT)
Dept: NUCLEAR MEDICINE | Facility: HOSPITAL | Age: 73
End: 2018-05-10

## 2018-05-10 VITALS
TEMPERATURE: 97.6 F | BODY MASS INDEX: 39.12 KG/M2 | SYSTOLIC BLOOD PRESSURE: 129 MMHG | OXYGEN SATURATION: 94 % | WEIGHT: 288.8 LBS | DIASTOLIC BLOOD PRESSURE: 80 MMHG | RESPIRATION RATE: 16 BRPM | HEART RATE: 98 BPM | HEIGHT: 72 IN

## 2018-05-10 LAB
ANION GAP SERPL CALCULATED.3IONS-SCNC: 11.9 MMOL/L
BH CV STRESS BP STAGE 1: NORMAL
BH CV STRESS DURATION MIN STAGE 1: 3
BH CV STRESS DURATION SEC STAGE 1: 0
BH CV STRESS GRADE STAGE 1: 10
BH CV STRESS HR STAGE 1: 155
BH CV STRESS METS STAGE 1: 5
BH CV STRESS PROTOCOL 1: NORMAL
BH CV STRESS RECOVERY BP: NORMAL MMHG
BH CV STRESS RECOVERY HR: 123 BPM
BH CV STRESS SPEED STAGE 1: 1.7
BH CV STRESS STAGE 1: 1
BUN BLD-MCNC: 19 MG/DL (ref 8–23)
BUN/CREAT SERPL: 12.8 (ref 7–25)
CALCIUM SPEC-SCNC: 9.1 MG/DL (ref 8.6–10.5)
CHLORIDE SERPL-SCNC: 98 MMOL/L (ref 98–107)
CO2 SERPL-SCNC: 31.1 MMOL/L (ref 22–29)
CREAT BLD-MCNC: 1.49 MG/DL (ref 0.76–1.27)
GFR SERPL CREATININE-BSD FRML MDRD: 47 ML/MIN/1.73
GLUCOSE BLD-MCNC: 93 MG/DL (ref 65–99)
LV EF NUC BP: 54 %
MAXIMAL PREDICTED HEART RATE: 150 BPM
PERCENT MAX PREDICTED HR: 106 %
POTASSIUM BLD-SCNC: 3.7 MMOL/L (ref 3.5–5.2)
SODIUM BLD-SCNC: 141 MMOL/L (ref 136–145)
STRESS BASELINE BP: NORMAL MMHG
STRESS BASELINE HR: 106 BPM
STRESS PERCENT HR: 125 %
STRESS POST ESTIMATED WORKLOAD: 4.7 METS
STRESS POST EXERCISE DUR MIN: 3 MIN
STRESS POST EXERCISE DUR SEC: 0 SEC
STRESS POST PEAK BP: NORMAL MMHG
STRESS POST PEAK HR: 159 BPM
STRESS TARGET HR: 128 BPM

## 2018-05-10 PROCEDURE — 78452 HT MUSCLE IMAGE SPECT MULT: CPT | Performed by: INTERNAL MEDICINE

## 2018-05-10 PROCEDURE — A9500 TC99M SESTAMIBI: HCPCS | Performed by: INTERNAL MEDICINE

## 2018-05-10 PROCEDURE — 25010000002 PIPERACILLIN SOD-TAZOBACTAM PER 1 G: Performed by: HOSPITALIST

## 2018-05-10 PROCEDURE — 93017 CV STRESS TEST TRACING ONLY: CPT

## 2018-05-10 PROCEDURE — 93018 CV STRESS TEST I&R ONLY: CPT | Performed by: INTERNAL MEDICINE

## 2018-05-10 PROCEDURE — 99232 SBSQ HOSP IP/OBS MODERATE 35: CPT | Performed by: INTERNAL MEDICINE

## 2018-05-10 PROCEDURE — 78452 HT MUSCLE IMAGE SPECT MULT: CPT

## 2018-05-10 PROCEDURE — 0 TECHNETIUM SESTAMIBI: Performed by: INTERNAL MEDICINE

## 2018-05-10 PROCEDURE — 80048 BASIC METABOLIC PNL TOTAL CA: CPT | Performed by: HOSPITALIST

## 2018-05-10 RX ORDER — AMOXICILLIN AND CLAVULANATE POTASSIUM 875; 125 MG/1; MG/1
1 TABLET, FILM COATED ORAL EVERY 12 HOURS SCHEDULED
Qty: 28 TABLET | Refills: 0 | Status: SHIPPED | OUTPATIENT
Start: 2018-05-10 | End: 2018-05-25

## 2018-05-10 RX ADMIN — METFORMIN HYDROCHLORIDE 1000 MG: 1000 TABLET ORAL at 17:51

## 2018-05-10 RX ADMIN — TAZOBACTAM SODIUM AND PIPERACILLIN SODIUM 3.38 G: 375; 3 INJECTION, SOLUTION INTRAVENOUS at 10:49

## 2018-05-10 RX ADMIN — COLCHICINE 0.6 MG: 0.6 TABLET, FILM COATED ORAL at 08:04

## 2018-05-10 RX ADMIN — ROSUVASTATIN CALCIUM 10 MG: 10 TABLET, FILM COATED ORAL at 08:04

## 2018-05-10 RX ADMIN — METFORMIN HYDROCHLORIDE 1000 MG: 1000 TABLET ORAL at 08:04

## 2018-05-10 RX ADMIN — TECHNETIUM TC 99M SESTAMIBI 1 DOSE: 1 INJECTION INTRAVENOUS at 06:35

## 2018-05-10 RX ADMIN — APIXABAN 5 MG: 5 TABLET, FILM COATED ORAL at 08:04

## 2018-05-10 RX ADMIN — TECHNETIUM TC 99M SESTAMIBI 1 DOSE: 1 INJECTION INTRAVENOUS at 08:50

## 2018-05-10 RX ADMIN — TAZOBACTAM SODIUM AND PIPERACILLIN SODIUM 3.38 G: 375; 3 INJECTION, SOLUTION INTRAVENOUS at 17:51

## 2018-05-10 RX ADMIN — APIXABAN 5 MG: 5 TABLET, FILM COATED ORAL at 20:09

## 2018-05-10 RX ADMIN — METOPROLOL SUCCINATE 50 MG: 50 TABLET, FILM COATED, EXTENDED RELEASE ORAL at 08:04

## 2018-05-10 RX ADMIN — TAZOBACTAM SODIUM AND PIPERACILLIN SODIUM 3.38 G: 375; 3 INJECTION, SOLUTION INTRAVENOUS at 02:43

## 2018-05-10 NOTE — PROGRESS NOTES
Discharge Planning Assessment   [facility]     Patient Name: Agustín Willett  MRN: [id number]  Today's Date: 5/10/2018    Admit Date: 5/8/2018          Discharge Needs Assessment     Row Name 05/10/18 1558       Living Environment    Lives With alone    Current Living Arrangements home/apartment/condo       Discharge Needs Assessment    Equipment Currently Used at Home bipap/cpap;oxygen            Discharge Plan     Row Name 05/10/18 1558       Plan    Plan Home, daughter to transport. he anticipates no needs    Plan Comments IMM 5-9. Face sheet, pharmacy and PCP verified. Patient plans to go home today. Daughter to  transport. No HH. No rehab. He uses a c-pap, b-pap and oxygen from [employer]. he anticipates no needs. Follow up appointment noted with Dr. Dunn on 5-14        Destination     No service coordination in this encounter.      Durable Medical Equipment     No service coordination in this encounter.      Dialysis/Infusion     No service coordination in this encounter.      Home Medical Care     No service coordination in this encounter.      Social Care     No service coordination in this encounter.        Expected Discharge Date and Time     Expected Discharge Date Expected Discharge Time    May 10, 2018               Demographic Summary     Row Name 05/10/18 1557       General Information    Admission Type inpatient    Arrived From home    Required Notices Provided Important Message from Medicare    Reason for Consult discharge planning            Functional Status     Row Name 05/10/18 1558       Functional Status, IADL    Medications independent    Meal Preparation independent    Housekeeping independent    Laundry independent    Shopping independent            Psychosocial    No documentation.           Abuse/Neglect    No documentation.           Legal    No documentation.           Substance Abuse    No documentation.           Patient Forms    No documentation.         MATT Nixon

## 2018-05-10 NOTE — PROGRESS NOTES
"[facility]  Cardiology Progress Note    Patient Identification:  Name: Agustín Willett  Age: 72 y.o.  Sex: male  :  1945  MRN: [id number]                 Follow Up / Chief Complaint: afib    Interval History:72-year-old white male new to our service with no prior history of MI, previous ischemic workup, arrhythmia or congestive heart failure. Hx of  diabetes, hypertension and dyslipidemia treated by Endo. . Pulmonology treats him for COPD and ARIANA for which he is on BiPAP with nocturnal O2.  Spouse reports history of \"TIA\" in the past.  Newly dx with afib 18 started on eliquis at that time. .  Neg Venous doppler for DVT.  HE was admitted for diverticulosis with improvement clinically with cipro and flagyl.       Subjective:      Objective:    Pending nuc stress test from this am.     Temp:  [97.4 °F (36.3 °C)-98.2 °F (36.8 °C)] 97.4 °F (36.3 °C)  Heart Rate:  [] 92  Resp:  [16-18] 16  BP: (108-125)/(66-78) 125/76    5/10 Creat 1.49 (1.59)    Past Medical History:  Past Medical History:   Diagnosis Date   • Dyslipidemia    • Erectile dysfunction    • Hypertension    • Hypogonadism male    • ARIANA (obstructive sleep apnea)    • Type 2 diabetes mellitus    • Vitamin D deficiency      Past Surgical History:  Past Surgical History:   Procedure Laterality Date   • ADENOIDECTOMY     • TONSILLECTOMY     • TOTAL KNEE ARTHROPLASTY Left    • VASECTOMY          Social History:   Social History   Substance Use Topics   • Smoking status: Former Smoker   • Smokeless tobacco: Not on file   • Alcohol use Yes      Comment: patient states that he drinks 8-12 ounces a day of buorbon      Family History:  Family History   Problem Relation Age of Onset   • ALS Mother    • Hypertension Father           Allergies:  No Known Allergies     Scheduled Meds:    apixaban 5 mg BID   colchicine 0.6 mg Daily   metFORMIN 1,000 mg BID With Meals   metoprolol succinate XL 50 mg Daily   piperacillin-tazobactam 3.375 g Q8H "   rosuvastatin 10 mg Daily       INTAKE AND OUTPUT:    Intake/Output Summary (Last 24 hours) at 05/10/18 1342  Last data filed at 05/09/18 2050   Gross per 24 hour   Intake              560 ml   Output                0 ml   Net              560 ml       Constitutional:  Temp:  [97.4 °F (36.3 °C)-98.2 °F (36.8 °C)] 97.4 °F (36.3 °C)  Heart Rate:  [] 92  Resp:  [16-18] 16  BP: (108-125)/(66-78) 125/76    Physical Exam   General:  Well-developed, well-nourished white male resting quietly.  Appears in no acute distress  Eyes: PERTL,  HEENT:  No JVD sitting up in a chair. Thyroid not visibly enlarged. No mucosal pallor or cyanosis  Respiratory: Respirations regular and unlabored at rest. BBS with good air entry in all fields. No crackles, rubs or wheezes auscultated  Cardiovascular: S1S2 irregular rate and rhythm. No murmur, rub or gallop auscultated. No carotid bruits. PT pulses 2   2+ R>L L pretibial and pedal edema  Gastrointestinal: Abdomen soft, nondistended, non tender to palpation without guarding. Bowel sounds present. No ascites  Musculoskeletal: SHRESTHA x4. No abnormal movements  Extremities: No digital clubbing or cyanosis  Skin: Color pink. Skin warm and dry to touch.   Neuro: AAO x3 CN II-XII grossly intact         Cardiographics    Telemetry: Nonmonitored bed                     Comparison EKG 4-:       Telemetry: Nonmonitored bed        Imaging  Abdomen and pelvis CT IMPRESSION:  The findings on CT suggest distal left colon diverticulitis.  There is a highly inflamed diverticulum or small phlegmon adjacent to  the distal left colon. No extraluminal air or drainable fluid collection  is seen.    Echocardiogram:     Interpretation Summary 5/8/18    · Mild mitral valve regurgitation is present  · Left ventricular wall thickness is consistent with mild concentric hypertrophy.  · There is calcification of the aortic valve.  · Mild to moderate aortic valve stenosis is present.  · Mild to moderate  tricuspid valve regurgitation is present.  · Left atrial cavity size is mild-to-moderately dilated.  · Calculated EF = 51%.  · There is no evidence of pericardial effusion.         Lab Review     Results from last 7 days  Lab Units 05/08/18  1842 05/08/18  1706   TROPONIN T ng/mL <0.010 <0.010       Results from last 7 days  Lab Units 05/09/18  0649   MAGNESIUM mg/dL 1.9       Results from last 7 days  Lab Units 05/10/18  0544   SODIUM mmol/L 141   POTASSIUM mmol/L 3.7   BUN mg/dL 19   CREATININE mg/dL 1.49*   CALCIUM mg/dL 9.1         Results from last 7 days  Lab Units 05/09/18  0649 05/08/18  0837   WBC 10*3/mm3 7.10 11.37*   HEMOGLOBIN g/dL 13.3* 13.5*   HEMATOCRIT % 43.1 42.0   PLATELETS 10*3/mm3 211 196             Intake/Output Summary (Last 24 hours) at 05/10/18 1342  Last data filed at 05/09/18 2050   Gross per 24 hour   Intake              560 ml   Output                0 ml   Net              560 ml           Assessment:  - atrial fib ?duration.   - elevated [id number] - (Eliquis started 1 week ago)  - HTN  - Diverticulitis  - Abdominal pain   - acute on CKD  - DM  - Dyslipidemia - is on statin  - ARIANA  - reported h/o TIA  - EF 51% by echo 5/8.    - Mild AI 5/8  - Mild to Mod TR 5/8   - LVH mild concentric 5/8    Interpretation Summary     · Mild mitral valve regurgitation is present  · Left ventricular wall thickness is consistent with mild concentric hypertrophy.  · There is calcification of the aortic valve.  · Mild to moderate aortic valve stenosis is present.  · Mild to moderate tricuspid valve regurgitation is present.  · Left atrial cavity size is mild-to-moderately dilated.  · Calculated EF = 51%.  · There is no evidence of pericardial effusion.         Plan:  In summary, this is a 72-year-old white male with no known history of CAD but multiple cardiac risk factors currently being treated for diverticulitis.  He was diagnosed with atrial fibrillation one week ago at which time he was placed on  "Eliquis.  His ventricular rate has been running in the  range on Toprol 50 mg.  He was given IV Lasix 5/8. With good outpt.  Creat 5/10 1.49.  Trops neg x 2.    .  continued regular use of BiPAP, alcohol cessation, and adequate course of anticoagulation with Eliquis before cardioversion attempted.  [id number] = 5 and HAS BLED = 4.     Echo reviewed with showing EF 51 % with mild AI, mild to mod TR and mild Concentric LVH.     STRESS TEST NEG    WILL CONSIDER CV AFTER 1 MONTH OF ANTICOAGULATION      EMR Dragon/Transcription:   \"Dictated utilizing Dragon dictation\".   "

## 2018-05-10 NOTE — PLAN OF CARE
"Problem: Patient Care Overview  Goal: Plan of Care Review  Pt denies pain and nausea, treated this am with contrast for stress test, he is currently up in chair and comfortable-he states he is relieved to be \"getting this over with \". Will continue to monitor and report any changes as needed.  Goal: Individualization and Mutuality  Outcome: Ongoing (interventions implemented as appropriate)    Goal: Interprofessional Rounds/Family Conf  Outcome: Ongoing (interventions implemented as appropriate)      Problem: Pain, Acute (Adult)  Goal: Identify Related Risk Factors and Signs and Symptoms  Outcome: Ongoing (interventions implemented as appropriate)    Goal: Acceptable Pain Control/Comfort Level  Outcome: Ongoing (interventions implemented as appropriate)      Problem: Infection, Risk/Actual (Adult)  Goal: Identify Related Risk Factors and Signs and Symptoms  Outcome: Ongoing (interventions implemented as appropriate)    Goal: Infection Prevention/Resolution  Outcome: Ongoing (interventions implemented as appropriate)      Problem: Cardiac Output Decreased (Adult)  Goal: Identify Related Risk Factors and Signs and Symptoms  Outcome: Ongoing (interventions implemented as appropriate)    Goal: Effective Tissue Perfusion  Outcome: Ongoing (interventions implemented as appropriate)        "

## 2018-05-10 NOTE — DISCHARGE SUMMARY
[facility]    Date of Discharge:  5/10/2018    PCP: Dominic Dunn MD    Discharge Diagnosis:   Active Hospital Problems (** Indicates Principal Problem)    Diagnosis Date Noted   • **Diverticulitis of large intestine without perforation or abscess without bleeding [K57.32] 05/08/2018   • Abdominal pain [R10.9] 05/08/2018   • A-fib [I48.91] 05/08/2018   • Diabetes mellitus [E11.9] 10/20/2016   • Hypertension [I10] 04/21/2016      Resolved Hospital Problems    Diagnosis Date Noted Date Resolved   No resolved problems to display.     Procedures Performed       Consults     Date and Time Order Name Status Description    5/8/2018 1514 Inpatient Cardiology Consult Completed     5/8/2018 1014 [facility] (on-call MD unless specified) Completed     4/27/2018 1231 Cardiology (on-call MD unless specified) Completed         Hospital Course  Please see history and physical for details. Patient is a 72 y.o. male initially admitted for complaints of abdominal pain.  He had just been diagnosed with diverticulitis and placed on appropriate medications involving Cipro and Flagyl.  Unfortunately that evening patient elected to take a shot of bourbon which provoked abdominal pain compounded by some of his antibiotic regimen.  He presented with worsening pain that night and came to the ER requiring pain control.  We transitioned antibiotics over to IV Zosyn and his clinical improvement was dramatic.  So much so he has not required any additional medications for breakthrough pain.  At discharge until the patient is safe to resume his Flagyl though I will transition Cipro over to Augmentin.  His mild leukocytosis has resolved and has remained otherwise afebrile.  He has tolerated advancement of diet and have counseled patient in regards to slow progression of diet at this time.  At follow-up with his PCP who he states is Dr. Dunn he should then start to consider increasing high-fiber  diet intake at that juncture.  I do think some of his alcohol use could be compounding some of his comorbidities especially atrial fibrillation as he had some mild RVR presentation.  This apparently was a recent diagnosis and I consulted cards while here.  He has an echocardiogram and you can see the report for further clarification but he has mild to moderate valvular disease as well as dilatation of the left atrium with a low normal ejection fraction.  He still has some very mild lower extremity edematous changes and I think he can resume his chlorthalidone post discharge but I would avoid using both chlorthalidone and Lasix.  We did give him a couple doses of IV diuretic while here but I cautioned against further diuresis due to worsening metabolic alkalosis.  He can resume his metformin post disposition for his diabetes as we did keep on sliding scale here since he did receive IV contrast.  At this juncture and for the last 24-48 hours his abdominal exam has been completely benign even to deep palpation.  Vital signs are stable as well as afebrile for the patient is more than medically stable for disposition as long stress test ordered by cardiology is within reason and they are otherwise okay with his disposition.  All other questions answered to patient as well as daughter present at bedside.        Condition on Discharge: Improved.     Temp:  [97.4 °F (36.3 °C)-98.2 °F (36.8 °C)] 97.4 °F (36.3 °C)  Heart Rate:  [] 92  Resp:  [16-18] 16  BP: (108-125)/(66-78) 125/76  Body mass index is 39.17 kg/m².    Physical Exam   Constitutional: He is oriented to person, place, and time. He appears well-developed and well-nourished.   HENT:   Rhinophyma of nose    Neck: No JVD present.   Cardiovascular: Normal rate and regular rhythm.    Pulmonary/Chest: Effort normal and breath sounds normal. No respiratory distress.   Abdominal: Soft. Bowel sounds are normal. He exhibits no distension. There is no tenderness. There  is no rebound and no guarding.   Musculoskeletal:   Trace edema to lower extremity   Neurological: He is alert and oriented to person, place, and time. No cranial nerve deficit.   Psychiatric: He has a normal mood and affect. His behavior is normal. Judgment and thought content normal.     Discharge Medications   Agustín Willett   Home Medication Instructions IRWIN:[id number]    Printed on:05/10/18 8876   Medication Information                      amoxicillin-clavulanate (AUGMENTIN) 875-125 MG per tablet  Take 1 tablet by mouth Every 12 (Twelve) Hours.             apixaban (ELIQUIS) 5 MG tablet tablet  Take 1 tablet by mouth 2 (Two) Times a Day.             aspirin 325 MG tablet  Take 325 mg by mouth Daily.             chlorthalidone (HYGROTON) 25 MG tablet  Take 1 tablet by mouth Daily.             Cholecalciferol (VITAMIN D3) 5000 units capsule capsule  Take 50,000 Units by mouth 2 (Two) Times a Week.             colchicine 0.6 MG tablet  1 po bid             metFORMIN (GLUCOPHAGE) 1000 MG tablet  Take 1 tablet by mouth 2 (Two) Times a Day.             metoprolol succinate XL (TOPROL-XL) 50 MG 24 hr tablet  Take 1 tablet by mouth Daily.             metroNIDAZOLE (FLAGYL) 500 MG tablet  Take 500 mg by mouth 3 (Three) Times a Day.             rosuvastatin (CRESTOR) 10 MG tablet  Take 1 tablet by mouth Daily.                  Additional Instructions for the Follow-ups that You Need to Schedule     Discharge Follow-up with PCP    As directed      Follow Up Details:  pcp dr loaiza 2 weeks - cards per their recs           Follow-up Information     Dominic Loaiza MD .    Specialty:  Endocrinology  Why:  pcp dr loaiza 2 weeks - cards per their recs  Contact information:  [address] [employer] [address] [address] [phone]             Nancy Leone, APRN .    Specialty:  Endocrinology  Why:  pcp dr loaiza 2 weeks - cards per their recs  Contact information:  [address] [employer]  [address]  [phone]                 Test Results Pending at Discharge     Anibal Sanchez MD  05/10/18  2:14 PM    Discharge time spent greater than 30 minutes.

## 2018-05-10 NOTE — NURSING NOTE
Cardiologist paged to inform that d/c clearance is needed.  Discharge summary and AVS completed besides cardiology recommendations.  Pt updated on situation.

## 2018-05-11 NOTE — PROGRESS NOTES
Case Management Discharge Note    Final Note: Discharged to home on 5/10/18 by his daughter/auto. COMFORT Flores RN, CCP    Destination     No service coordination in this encounter.      Durable Medical Equipment     No service coordination in this encounter.      Dialysis/Infusion     No service coordination in this encounter.      Home Medical Care     No service coordination in this encounter.      Social Care     No service coordination in this encounter.        Other: Other (private auto)    Final Discharge Disposition Code: 01 - home or self-care

## 2018-05-17 ENCOUNTER — OFFICE VISIT (OUTPATIENT)
Dept: GASTROENTEROLOGY | Facility: CLINIC | Age: 73
End: 2018-05-17

## 2018-05-17 VITALS
TEMPERATURE: 98.3 F | SYSTOLIC BLOOD PRESSURE: 112 MMHG | BODY MASS INDEX: 36.73 KG/M2 | HEIGHT: 72 IN | WEIGHT: 271.2 LBS | DIASTOLIC BLOOD PRESSURE: 78 MMHG

## 2018-05-17 DIAGNOSIS — K57.32 DIVERTICULITIS OF LARGE INTESTINE WITHOUT PERFORATION OR ABSCESS WITHOUT BLEEDING: Primary | ICD-10-CM

## 2018-05-17 PROCEDURE — 99203 OFFICE O/P NEW LOW 30 MIN: CPT | Performed by: INTERNAL MEDICINE

## 2018-05-17 NOTE — PROGRESS NOTES
Chief Complaint   Patient presents with   • diverticulitis   • Abdominal Pain     Agustín Willett is a 72 y.o. male who presents with a History of acute diverticulitis   HPI     9-year-old male with history of hyperlipidemia, hypertension, obstructive sleep apnea and diabetes who presented to the hospital with acute diverticulitis.  Patient seen as an outpatient given Cipro and Flagyl but unfortunately drank some bourbon and caused an Antabuse reaction with severe abdominal pain.  Patient's meds were change in the hospital to Zosyn and did much better.  Patient discharged home instructed to follow-up.  Patient does have a history of colonoscopy but in 2014.  Patient now for further recommendations.    Past Medical History:   Diagnosis Date   • Dyslipidemia    • Erectile dysfunction    • Hypertension    • Hypogonadism male    • ARIANA (obstructive sleep apnea)    • Type 2 diabetes mellitus    • Vitamin D deficiency        Current Outpatient Prescriptions:   •  amoxicillin-clavulanate (AUGMENTIN) 875-125 MG per tablet, Take 1 tablet by mouth Every 12 (Twelve) Hours., Disp: 28 tablet, Rfl: 0  •  apixaban (ELIQUIS) 5 MG tablet tablet, Take 1 tablet by mouth 2 (Two) Times a Day., Disp: 60 tablet, Rfl: 0  •  aspirin 325 MG tablet, Take 325 mg by mouth Daily., Disp: , Rfl:   •  chlorthalidone (HYGROTON) 25 MG tablet, Take 1 tablet by mouth Daily., Disp: 30 tablet, Rfl: 5  •  Cholecalciferol (VITAMIN D3) 5000 units capsule capsule, Take 50,000 Units by mouth 2 (Two) Times a Week., Disp: , Rfl:   •  colchicine 0.6 MG tablet, 1 po bid, Disp: 180 tablet, Rfl: 1  •  metFORMIN (GLUCOPHAGE) 1000 MG tablet, Take 1 tablet by mouth 2 (Two) Times a Day., Disp: 180 tablet, Rfl: 1  •  metoprolol succinate XL (TOPROL-XL) 50 MG 24 hr tablet, Take 1 tablet by mouth Daily., Disp: 90 tablet, Rfl: 3  •  metroNIDAZOLE (FLAGYL) 500 MG tablet, Take 500 mg by mouth 3 (Three) Times a Day., Disp: , Rfl:   •  rosuvastatin (CRESTOR) 10 MG tablet, Take  1 tablet by mouth Daily., Disp: 90 tablet, Rfl: 1  No Known Allergies  Social History     Social History   • Marital status:      Spouse name: N/A   • Number of children: N/A   • Years of education: N/A     Occupational History   • Not on file.     Social History Main Topics   • Smoking status: Former Smoker   • Smokeless tobacco: Not on file   • Alcohol use Yes      Comment: patient states that he drinks 8-12 ounces a day of buorbon   • Drug use: Unknown   • Sexual activity: Not on file     Other Topics Concern   • Not on file     Social History Narrative   • No narrative on file     Family History   Problem Relation Age of Onset   • ALS Mother    • Hypertension Father      Review of Systems   Constitutional: Negative.    HENT: Negative.    Eyes: Negative.    Respiratory: Negative.    Cardiovascular: Negative.    Gastrointestinal: Positive for abdominal pain. Negative for abdominal distention, anal bleeding, blood in stool, constipation, diarrhea, nausea, rectal pain and vomiting.   Endocrine: Negative.    Musculoskeletal: Negative.    Allergic/Immunologic: Negative.    Hematological: Negative.      Vitals:    05/17/18 1424   BP: 112/78   Temp: 98.3 °F (36.8 °C)     Physical Exam   Constitutional: He is oriented to person, place, and time. He appears well-developed and well-nourished.   HENT:   Head: Normocephalic and atraumatic.   Eyes: Conjunctivae and EOM are normal. No scleral icterus.   Neck: Normal range of motion. No tracheal deviation present.   Cardiovascular: Normal rate and regular rhythm.  Exam reveals no gallop and no friction rub.    No murmur heard.  Pulmonary/Chest: Effort normal and breath sounds normal. No respiratory distress. He has no wheezes.   Abdominal: Soft. Bowel sounds are normal. He exhibits no distension and no mass. There is no tenderness. There is no rebound and no guarding.   Musculoskeletal: Normal range of motion. He exhibits edema.   Neurological: He is alert and oriented  to person, place, and time. No cranial nerve deficit.   Skin: Skin is warm and dry. No rash noted.   Psychiatric: He has a normal mood and affect. His behavior is normal.   Nursing note and vitals reviewed.    Diagnoses and all orders for this visit:    Diverticulitis of large intestine without perforation or abscess without bleeding  -     Case Request; Standing  -     Case Request    Other orders  -     Implement Anesthesia orders day of procedure.; Standing  -     Obtain informed consent; Standing    Patient 72-year-old male with history of obstructive sleep apnea, diabetes, hyperlipidemia, hypertension presenting with acute diverticulitis.  Patient in the hospital for several days between 5 8/5/10 for this.  Patient had been given Cipro and Flagyl as an outpatient but unfortunately was not informed that mixing Flagyl and bourbon can cause an Antabuse-like reaction and presented with acute abdominal pain.  Patient changed over to Zosyn with marked improvement and patient was discharged.  Patient was noted in A. fib and cardiology and endocrine consults were obtained but no GI or surgery evaluation was done in house.  Patient currently reports feeling better is staying away from alcohol.  Patient is now back on Augmentin with the Flagyl and instructed to avoid alcohol until at least the Flagyl is complete.  At this point would recommend patient have repeat colonoscopy in 8-12 weeks.  We'll follow-up clinically based on findings.

## 2018-05-25 ENCOUNTER — OFFICE VISIT (OUTPATIENT)
Dept: ENDOCRINOLOGY | Age: 73
End: 2018-05-25

## 2018-05-25 VITALS
DIASTOLIC BLOOD PRESSURE: 64 MMHG | HEIGHT: 72 IN | WEIGHT: 263.8 LBS | SYSTOLIC BLOOD PRESSURE: 92 MMHG | BODY MASS INDEX: 35.73 KG/M2

## 2018-05-25 DIAGNOSIS — N40.0 BENIGN NON-NODULAR PROSTATIC HYPERPLASIA WITHOUT LOWER URINARY TRACT SYMPTOMS: ICD-10-CM

## 2018-05-25 DIAGNOSIS — E79.0 HYPERURICEMIA: ICD-10-CM

## 2018-05-25 DIAGNOSIS — E55.9 VITAMIN D DEFICIENCY: ICD-10-CM

## 2018-05-25 DIAGNOSIS — E29.1 HYPOGONADISM IN MALE: ICD-10-CM

## 2018-05-25 DIAGNOSIS — E78.5 DYSLIPIDEMIA: ICD-10-CM

## 2018-05-25 DIAGNOSIS — I15.9 SECONDARY HYPERTENSION: ICD-10-CM

## 2018-05-25 DIAGNOSIS — E11.9 CONTROLLED TYPE 2 DIABETES MELLITUS WITHOUT COMPLICATION, UNSPECIFIED LONG TERM INSULIN USE STATUS: Primary | ICD-10-CM

## 2018-05-25 PROCEDURE — 99214 OFFICE O/P EST MOD 30 MIN: CPT | Performed by: INTERNAL MEDICINE

## 2018-05-25 RX ORDER — FUROSEMIDE 40 MG/1
40 TABLET ORAL DAILY
COMMUNITY
Start: 2018-05-04 | End: 2018-05-25

## 2018-05-25 RX ORDER — ASPIRIN 81 MG/1
81 TABLET ORAL DAILY
COMMUNITY
End: 2018-11-26

## 2018-05-25 NOTE — PROGRESS NOTES
"Andria Willett is a 72 y.o. male is here today for a follow up on DM2, hypogonadism, HTN, HLD, and hyperuricemia. Lab review. Pt was recently in the hospital for a lower GI infection and is concerned about his diet.   BP 92/64   Ht 182.9 cm (72\")   Wt 120 kg (263 lb 12.8 oz)   BMI 35.78 kg/m²   Blood Pressures during visit    05/25/18 1335   BP: 92/64       Current Outpatient Prescriptions:   •  apixaban (ELIQUIS) 5 MG tablet tablet, Take 1 tablet by mouth 2 (Two) Times a Day., Disp: 60 tablet, Rfl: 0  •  aspirin 81 MG EC tablet, Take 81 mg by mouth Daily., Disp: , Rfl:   •  chlorthalidone (HYGROTON) 25 MG tablet, Take 1 tablet by mouth Daily., Disp: 30 tablet, Rfl: 5  •  Cholecalciferol (VITAMIN D3) 5000 units capsule capsule, Take 50,000 Units by mouth 2 (Two) Times a Week., Disp: , Rfl:   •  colchicine 0.6 MG tablet, 1 po bid, Disp: 180 tablet, Rfl: 1  •  metFORMIN (GLUCOPHAGE) 1000 MG tablet, Take 1 tablet by mouth 2 (Two) Times a Day., Disp: 180 tablet, Rfl: 1  •  metoprolol succinate XL (TOPROL-XL) 50 MG 24 hr tablet, Take 1 tablet by mouth Daily., Disp: 90 tablet, Rfl: 3  •  metroNIDAZOLE (FLAGYL) 500 MG tablet, Take 500 mg by mouth 3 (Three) Times a Day., Disp: , Rfl:   •  rosuvastatin (CRESTOR) 10 MG tablet, Take 1 tablet by mouth Daily., Disp: 90 tablet, Rfl: 1      History of Present Illness this is a 72-year-old gentleman known patient with type II diabetes hypogonadism as well as hypertension and dyslipidemia and vitamin D deficiency as well as hyperuricemia.  In the past couple of weeks he was hospitalized for new onset atrial fibrillation as well as diverticulitis.    The following portions of the patient's history were reviewed and updated as appropriate: allergies, current medications, past family history, past medical history, past social history, past surgical history and problem list.    Review of Systems   Constitutional: Negative for fatigue.   HENT: Negative for trouble " swallowing.    Eyes: Negative for visual disturbance.   Respiratory: Negative for shortness of breath.    Cardiovascular: Negative for leg swelling.   Endocrine: Negative for polyuria.   Skin: Negative for wound.   Neurological: Negative for numbness.       Objective   Physical Exam   Constitutional: He is oriented to person, place, and time. He appears well-developed and well-nourished. No distress.   HENT:   Head: Normocephalic and atraumatic.   Right Ear: External ear normal.   Left Ear: External ear normal.   Nose: Nose normal.   Mouth/Throat: Oropharynx is clear and moist. No oropharyngeal exudate.   Eyes: Conjunctivae and EOM are normal. Pupils are equal, round, and reactive to light. Right eye exhibits no discharge. Left eye exhibits no discharge. No scleral icterus.   Neck: Normal range of motion. Neck supple. No JVD present. No tracheal deviation present. No thyromegaly present.   Cardiovascular: Normal rate, regular rhythm, normal heart sounds and intact distal pulses.  Exam reveals no gallop and no friction rub.    No murmur heard.  Pulmonary/Chest: Effort normal and breath sounds normal. No stridor. No respiratory distress. He has no wheezes. He has no rales. He exhibits no tenderness.   Abdominal: Soft. Bowel sounds are normal. He exhibits no distension and no mass. There is no tenderness. There is no rebound and no guarding. No hernia.   Musculoskeletal: Normal range of motion. He exhibits no edema, tenderness or deformity.   Lymphadenopathy:     He has no cervical adenopathy.   Neurological: He is alert and oriented to person, place, and time. He has normal reflexes. He displays normal reflexes. No cranial nerve deficit or sensory deficit. He exhibits normal muscle tone. Coordination normal.   Skin: Skin is warm and dry. No rash noted. He is not diaphoretic. No erythema. No pallor.   Psychiatric: He has a normal mood and affect. His behavior is normal. Judgment and thought content normal.   Nursing  note and vitals reviewed.        Assessment/Plan   Diagnoses and all orders for this visit:    Controlled type 2 diabetes mellitus without complication, unspecified long term insulin use status  -     T4 & TSH ([facility]); Future  -     TestT+TestF+SHBG; Future  -     Uric Acid; Future  -     Vitamin D 25 Hydroxy; Future  -     Comprehensive Metabolic Panel; Future  -     C-Peptide; Future  -     Hemoglobin A1c; Future  -     Lipid Panel; Future  -     MicroAlbumin, Urine, Random - Urine, Clean Catch; Future  -     PSA DIAGNOSTIC; Future  -     Hemoglobin & Hematocrit, Blood; Future  -     Ambulatory Referral to Diabetic Education    Hypogonadism in male  -     T4 & TSH ([facility]); Future  -     TestT+TestF+SHBG; Future  -     Uric Acid; Future  -     Vitamin D 25 Hydroxy; Future  -     Comprehensive Metabolic Panel; Future  -     C-Peptide; Future  -     Hemoglobin A1c; Future  -     Lipid Panel; Future  -     MicroAlbumin, Urine, Random - Urine, Clean Catch; Future  -     PSA DIAGNOSTIC; Future  -     Hemoglobin & Hematocrit, Blood; Future    Vitamin D deficiency  -     T4 & TSH ([facility]); Future  -     TestT+TestF+SHBG; Future  -     Uric Acid; Future  -     Vitamin D 25 Hydroxy; Future  -     Comprehensive Metabolic Panel; Future  -     C-Peptide; Future  -     Hemoglobin A1c; Future  -     Lipid Panel; Future  -     MicroAlbumin, Urine, Random - Urine, Clean Catch; Future  -     PSA DIAGNOSTIC; Future  -     Hemoglobin & Hematocrit, Blood; Future    Secondary hypertension  -     T4 & TSH ([facility]); Future  -     TestT+TestF+SHBG; Future  -     Uric Acid; Future  -     Vitamin D 25 Hydroxy; Future  -     Comprehensive Metabolic Panel; Future  -     C-Peptide; Future  -     Hemoglobin A1c; Future  -     Lipid Panel; Future  -     MicroAlbumin, Urine, Random - Urine, Clean Catch; Future  -     PSA DIAGNOSTIC; Future  -     Hemoglobin & Hematocrit, Blood; Future    Benign non-nodular prostatic hyperplasia without  lower urinary tract symptoms  -     T4 & TSH ([facility]); Future  -     TestT+TestF+SHBG; Future  -     Uric Acid; Future  -     Vitamin D 25 Hydroxy; Future  -     Comprehensive Metabolic Panel; Future  -     C-Peptide; Future  -     Hemoglobin A1c; Future  -     Lipid Panel; Future  -     MicroAlbumin, Urine, Random - Urine, Clean Catch; Future  -     PSA DIAGNOSTIC; Future  -     Hemoglobin & Hematocrit, Blood; Future    Hyperuricemia  -     T4 & TSH ([facility]); Future  -     TestT+TestF+SHBG; Future  -     Uric Acid; Future  -     Vitamin D 25 Hydroxy; Future  -     Comprehensive Metabolic Panel; Future  -     C-Peptide; Future  -     Hemoglobin A1c; Future  -     Lipid Panel; Future  -     MicroAlbumin, Urine, Random - Urine, Clean Catch; Future  -     PSA DIAGNOSTIC; Future  -     Hemoglobin & Hematocrit, Blood; Future    Dyslipidemia  -     T4 & TSH ([facility]); Future  -     TestT+TestF+SHBG; Future  -     Uric Acid; Future  -     Vitamin D 25 Hydroxy; Future  -     Comprehensive Metabolic Panel; Future  -     C-Peptide; Future  -     Hemoglobin A1c; Future  -     Lipid Panel; Future  -     MicroAlbumin, Urine, Random - Urine, Clean Catch; Future  -     PSA DIAGNOSTIC; Future  -     Hemoglobin & Hematocrit, Blood; Future               His summary I saw and examined this 72-year-old gentleman for above-mentioned problems.  I reviewed his laboratory evaluation of 05/03/2018 and provided him with a hard copy of it.  Overall he is clinically and metabolically stable and therefore we will go ahead and continue all his current prescriptions.  He will see Ms. Nancy Leone in 6 months or sooner if needed with laboratory evaluation prior to each office visit.

## 2018-05-31 ENCOUNTER — OFFICE VISIT (OUTPATIENT)
Dept: CARDIOLOGY | Facility: CLINIC | Age: 73
End: 2018-05-31

## 2018-05-31 VITALS
BODY MASS INDEX: 35.49 KG/M2 | DIASTOLIC BLOOD PRESSURE: 64 MMHG | HEART RATE: 79 BPM | SYSTOLIC BLOOD PRESSURE: 98 MMHG | HEIGHT: 72 IN | WEIGHT: 262 LBS

## 2018-05-31 DIAGNOSIS — I35.0 NONRHEUMATIC AORTIC VALVE STENOSIS: Primary | ICD-10-CM

## 2018-05-31 DIAGNOSIS — Z79.01 ANTICOAGULATED: ICD-10-CM

## 2018-05-31 DIAGNOSIS — I34.0 NON-RHEUMATIC MITRAL REGURGITATION: ICD-10-CM

## 2018-05-31 DIAGNOSIS — I48.20 CHRONIC ATRIAL FIBRILLATION (HCC): ICD-10-CM

## 2018-05-31 PROCEDURE — 93000 ELECTROCARDIOGRAM COMPLETE: CPT | Performed by: INTERNAL MEDICINE

## 2018-05-31 PROCEDURE — 99213 OFFICE O/P EST LOW 20 MIN: CPT | Performed by: INTERNAL MEDICINE

## 2018-05-31 NOTE — PROGRESS NOTES
Subjective:       Agustín Willett is a 72 y.o. male who here for follow up    CC  [facility] follow up for afib  HPI    72-year-old male here for the follow-up after the recent hospitalization with following diagnosis    Problem)     Diagnosis Date Noted   • **Diverticulitis of large intestine without perforation or abscess without bleeding [K57.32] 05/08/2018   • Abdominal pain [R10.9] 05/08/2018   • A-fib [I48.91] 05/08/2018   • Diabetes mellitus [E11.9] 10/20/2016   • Hypertension [I10]            Problem List Items Addressed This Visit     None        .Interpretation Summary     · Findings consistent with a normal ECG stress test.  · Left ventricular ejection fraction is normal (Calculated EF = 54%).  · Myocardial perfusion imaging indicates a normal myocardial perfusion study with no evidence of ischemia.  · Impressions are consistent with a low risk study.        Interpretation Summary     · Mild mitral valve regurgitation is present  · Left ventricular wall thickness is consistent with mild concentric hypertrophy.  · There is calcification of the aortic valve.  · Mild to moderate aortic valve stenosis is present.  · Mild to moderate tricuspid valve regurgitation is present.  · Left atrial cavity size is mild-to-moderately dilated.  · Calculated EF = 51%.  · There is no evidence of pericardial effusion.         The following portions of the patient's history were reviewed and updated as appropriate: allergies, current medications, past family history, past medical history, past social history, past surgical history and problem list.    Past Medical History:   Diagnosis Date   • Dyslipidemia    • Erectile dysfunction    • Hypertension    • Hypogonadism male    • ARIANA (obstructive sleep apnea)    • Type 2 diabetes mellitus    • Vitamin D deficiency     reports that he has quit smoking. He does not have any smokeless tobacco history on file. He reports that he drinks alcohol.  Family History   Problem Relation Age of  "Onset   • ALS Mother    • Hypertension Father        Review of Systems  Constitutional: No wt loss, fever, fatigue  Gastrointestinal: No nausea, abdominal pain  Behavioral/Psych: No insomnia or anxiety   Cardiovascular No chest pains or tightness in chest  Objective:       Physical Exam           Physical Exam  BP 98/64 (BP Location: Left arm, Patient Position: Sitting)   Pulse 79   Ht 182.9 cm (72\")   Wt 119 kg (262 lb)   BMI 35.53 kg/m²     General appearance: NAD, conversant   Eyes: anicteric sclerae, moist conjunctivae; no lid-lag; PERRLA   HENT: Atraumatic; oropharynx clear with moist mucous membranes and no mucosal ulcerations;  normal hard and soft palate   Neck: Trachea midline; FROM, supple, no thyromegaly or lymphadenopathy   Lungs: CTA, with normal respiratory effort and no intercostal retractions   CV: S1-S2 regular, no murmurs, no rub, no gallop   Abdomen: Soft, non-tender; no masses or HSM   Extremities: No peripheral edema or extremity lymphadenopathy  Skin: Normal temperature, turgor and texture; no rash, ulcers or subcutaneous nodules   Psych: Appropriate affect, alert and oriented to person, place and time           Cardiographics  @  ECG 12 Lead  Date/Time: 5/31/2018 10:11 AM  Performed by: ROGER COX  Authorized by: ROGER COX   Comparison: compared with previous ECG   Similar to previous ECG  Rhythm: atrial fibrillation  ST Flattening: all  Clinical impression: abnormal ECG            Echocardiogram:        Current Outpatient Prescriptions:   •  apixaban (ELIQUIS) 5 MG tablet tablet, Take 1 tablet by mouth 2 (Two) Times a Day., Disp: 60 tablet, Rfl: 0  •  aspirin 81 MG EC tablet, Take 81 mg by mouth Daily., Disp: , Rfl:   •  chlorthalidone (HYGROTON) 25 MG tablet, Take 1 tablet by mouth Daily., Disp: 30 tablet, Rfl: 5  •  Cholecalciferol (VITAMIN D3) 5000 units capsule capsule, Take 50,000 Units by mouth 2 (Two) Times a Week., Disp: , Rfl:   •  colchicine 0.6 MG tablet, " 1 po bid, Disp: 180 tablet, Rfl: 1  •  metFORMIN (GLUCOPHAGE) 1000 MG tablet, Take 1 tablet by mouth 2 (Two) Times a Day., Disp: 180 tablet, Rfl: 1  •  metoprolol succinate XL (TOPROL-XL) 50 MG 24 hr tablet, Take 1 tablet by mouth Daily., Disp: 90 tablet, Rfl: 3  •  metroNIDAZOLE (FLAGYL) 500 MG tablet, Take 500 mg by mouth 3 (Three) Times a Day., Disp: , Rfl:   •  rosuvastatin (CRESTOR) 10 MG tablet, Take 1 tablet by mouth Daily., Disp: 90 tablet, Rfl: 1   Assessment:        Patient Active Problem List   Diagnosis   • Benign non-nodular prostatic hyperplasia without lower urinary tract symptoms   • Diabetes mellitus type 2, controlled, without complications   • Vitamin D deficiency   • Hyperuricemia   • Low testosterone   • Dyslipidemia   • Hypertension   • Acanthosis nigricans   • Gout   • Diabetes mellitus   • Impotence of organic origin   • Hypogonadism in male   • Morbid obesity   • Diverticulitis of large intestine without perforation or abscess without bleeding   • Abdominal pain   • A-fib               Plan:            ICD-10-CM ICD-9-CM   1. Nonrheumatic aortic valve stenosis I35.0 424.1   2. Non-rheumatic mitral regurgitation I34.0 424.0   3. Anticoagulated Z79.01 V58.61   4. Chronic atrial fibrillation I48.2 427.31     1. Nonrheumatic aortic valve stenosis  Being treated conservatively  - ECG 12 Lead    2. Non-rheumatic mitral regurgitation  Moderate    3. Anticoagulated  Pros and cons as well as indication of the anticoagulation has been explained to the patient in detail    There are no obvious complications at this stage    Risk of  the bleedings has been explained    Need for the regular blood workup and adjust the dose has been explained    Need for proper follow-up on anticoagulation also has been explained    4.  Atrial fIB   Controlled rate    See in 2 months to decide anticoagulations, pt wants to be off the anticoagulation    Patient will be  suggested for the watchman  procedure  COUNSELING:    Agustín Restrepo was given to patient for the following topics: diagnostic results, risk factor reductions, impressions, risks and benefits of treatment options and importance of treatment compliance .       SMOKING COUNSELING:    Counseling given: Yes      EMR Dragon/Transcription disclaimer:   Much of this encounter note is an electronic transcription/translation of spoken language to printed text. The electronic translation of spoken language may permit erroneous, or at times, nonsensical words or phrases to be inadvertently transcribed; Although I have reviewed the note for such errors, some may still exist.

## 2018-06-05 RX ORDER — ERGOCALCIFEROL 1.25 MG/1
CAPSULE ORAL
Qty: 12 CAPSULE | Refills: 0 | Status: SHIPPED | OUTPATIENT
Start: 2018-06-05 | End: 2018-08-30 | Stop reason: SDUPTHER

## 2018-07-02 ENCOUNTER — ANESTHESIA (OUTPATIENT)
Dept: GASTROENTEROLOGY | Facility: HOSPITAL | Age: 73
End: 2018-07-02

## 2018-07-02 ENCOUNTER — ANESTHESIA EVENT (OUTPATIENT)
Dept: GASTROENTEROLOGY | Facility: HOSPITAL | Age: 73
End: 2018-07-02

## 2018-07-02 ENCOUNTER — HOSPITAL ENCOUNTER (OUTPATIENT)
Facility: HOSPITAL | Age: 73
Setting detail: HOSPITAL OUTPATIENT SURGERY
Discharge: HOME | End: 2018-07-02
Attending: INTERNAL MEDICINE | Admitting: INTERNAL MEDICINE

## 2018-07-02 VITALS
RESPIRATION RATE: 16 BRPM | DIASTOLIC BLOOD PRESSURE: 81 MMHG | BODY MASS INDEX: 35.03 KG/M2 | HEART RATE: 94 BPM | OXYGEN SATURATION: 93 % | WEIGHT: 258.6 LBS | HEIGHT: 72 IN | TEMPERATURE: 97.9 F | SYSTOLIC BLOOD PRESSURE: 96 MMHG

## 2018-07-02 DIAGNOSIS — K57.32 DIVERTICULITIS OF LARGE INTESTINE WITHOUT PERFORATION OR ABSCESS WITHOUT BLEEDING: ICD-10-CM

## 2018-07-02 LAB — GLUCOSE BLDC GLUCOMTR-MCNC: 80 MG/DL (ref 70–130)

## 2018-07-02 PROCEDURE — 88305 TISSUE EXAM BY PATHOLOGIST: CPT | Performed by: INTERNAL MEDICINE

## 2018-07-02 PROCEDURE — 25010000002 PHENYLEPHRINE PER 1 ML: Performed by: NURSE ANESTHETIST, CERTIFIED REGISTERED

## 2018-07-02 PROCEDURE — 82962 GLUCOSE BLOOD TEST: CPT

## 2018-07-02 PROCEDURE — 45380 COLONOSCOPY AND BIOPSY: CPT | Performed by: INTERNAL MEDICINE

## 2018-07-02 PROCEDURE — 25010000002 PROPOFOL 10 MG/ML EMULSION: Performed by: NURSE ANESTHETIST, CERTIFIED REGISTERED

## 2018-07-02 PROCEDURE — S0260 H&P FOR SURGERY: HCPCS | Performed by: INTERNAL MEDICINE

## 2018-07-02 RX ORDER — LIDOCAINE HYDROCHLORIDE 20 MG/ML
INJECTION, SOLUTION INFILTRATION; PERINEURAL AS NEEDED
Status: DISCONTINUED | OUTPATIENT
Start: 2018-07-02 | End: 2018-07-02 | Stop reason: SURG

## 2018-07-02 RX ORDER — PROPOFOL 10 MG/ML
VIAL (ML) INTRAVENOUS CONTINUOUS PRN
Status: DISCONTINUED | OUTPATIENT
Start: 2018-07-02 | End: 2018-07-02 | Stop reason: SURG

## 2018-07-02 RX ORDER — PROPOFOL 10 MG/ML
VIAL (ML) INTRAVENOUS AS NEEDED
Status: DISCONTINUED | OUTPATIENT
Start: 2018-07-02 | End: 2018-07-02 | Stop reason: SURG

## 2018-07-02 RX ORDER — SODIUM CHLORIDE, SODIUM LACTATE, POTASSIUM CHLORIDE, CALCIUM CHLORIDE 600; 310; 30; 20 MG/100ML; MG/100ML; MG/100ML; MG/100ML
1000 INJECTION, SOLUTION INTRAVENOUS CONTINUOUS
Status: DISCONTINUED | OUTPATIENT
Start: 2018-07-02 | End: 2018-07-02 | Stop reason: HOSPADM

## 2018-07-02 RX ADMIN — SODIUM CHLORIDE, POTASSIUM CHLORIDE, SODIUM LACTATE AND CALCIUM CHLORIDE 1000 ML: 600; 310; 30; 20 INJECTION, SOLUTION INTRAVENOUS at 07:35

## 2018-07-02 RX ADMIN — PHENYLEPHRINE HYDROCHLORIDE 100 MCG: 10 INJECTION INTRAVENOUS at 08:08

## 2018-07-02 RX ADMIN — PROPOFOL 180 MCG/KG/MIN: 10 INJECTION, EMULSION INTRAVENOUS at 07:58

## 2018-07-02 RX ADMIN — LIDOCAINE HYDROCHLORIDE 60 MG: 20 INJECTION, SOLUTION INFILTRATION; PERINEURAL at 07:58

## 2018-07-02 RX ADMIN — PROPOFOL 100 MG: 10 INJECTION, EMULSION INTRAVENOUS at 07:58

## 2018-07-02 NOTE — BRIEF OP NOTE
COLONOSCOPY  Progress Note    Agustín Willett  7/2/2018    Pre-op Diagnosis:   Diverticulitis of large intestine without perforation or abscess without bleeding [K57.32]       Post-Op Diagnosis Codes:     * Diverticulitis of large intestine without perforation or abscess without bleeding [K57.32]     * Internal hemorrhoids without complication [K64.8]     * Colon polyp [K63.5]    Procedure/CPT® Codes:      Procedure(s):  COLONOSCOPY TO CECUM WITH COLD BIOPSY POLYPECTOMY    Surgeon(s):  Dave Elizabeth MD    Anesthesia: Monitor Anesthesia Care    Staff:   Endo Technician: Nancy Eubanks  Endo Nurse: Blanche Navarro RN    Estimated Blood Loss: minimal    Urine Voided: * No values recorded between 7/2/2018  7:55 AM and 7/2/2018  8:24 AM *    Specimens:                ID Type Source Tests Collected by Time   A : TRANSVERSE COLON POLYP Polyp Large Intestine, Transverse Colon TISSUE PATHOLOGY EXAM Dave Elizabeth MD 7/2/2018 0815         Drains:      Findings: Transverse polyp removed cold biopsy  Sigmoid diverticulosis with healing diverticulitis  Internal hemorrhoids    Complications: None      Dave Elizabeth MD     Date: 7/2/2018  Time: 8:35 AM

## 2018-07-02 NOTE — ANESTHESIA PREPROCEDURE EVALUATION
Anesthesia Evaluation     history of anesthetic complications: PONV               Airway   Mallampati: III  Dental      Pulmonary - normal exam   (+) a smoker Former, sleep apnea,   Cardiovascular - normal exam    (+) hypertension, valvular problems/murmurs MR, dysrhythmias Atrial Fib, GOMEZ, hyperlipidemia,       Neuro/Psych  GI/Hepatic/Renal/Endo    (+) obesity,   renal disease CRI, diabetes mellitus type 2,     Musculoskeletal     Abdominal    Substance History      OB/GYN          Other                        Anesthesia Plan    ASA 3     MAC     Anesthetic plan and risks discussed with patient.

## 2018-07-02 NOTE — H&P
[facility]  Pre Procedure History & Physical    Chief Complaint:   History of diverticulitis    Subjective     HPI:   Patient 73-year-old male with history of recent onset A. fib, dyslipidemia, hypertension and obstructive sleep apnea as well as type 2 diabetes presenting post admission for diverticulitis.  Patient has had colonoscopy in 2014 that was reportedly negative.  Patient presented in early May with acute abdominal pain, diverticulitis now here for colonoscopy.    Past Medical History:   Past Medical History:   Diagnosis Date   • Atrial fibrillation    • Dyslipidemia    • Erectile dysfunction    • Hyperlipidemia    • Hypertension    • Hypogonadism male    • ARINAA (obstructive sleep apnea)    • PONV (postoperative nausea and vomiting)    • Type 2 diabetes mellitus    • Vitamin D deficiency        Past Surgical History:  Past Surgical History:   Procedure Laterality Date   • ADENOIDECTOMY     • COLONOSCOPY  2014    normal per pt   • TONSILLECTOMY     • TOTAL KNEE ARTHROPLASTY Left    • VASECTOMY         Family History:  Family History   Problem Relation Age of Onset   • ALS Mother    • Hypertension Father        Social History:   reports that he quit smoking about 48 years ago. His smoking use included Cigarettes. He has a 30.00 pack-year smoking history. He has never used smokeless tobacco. He reports that he drinks alcohol. He reports that he does not use drugs.    Medications:   Prescriptions Prior to Admission   Medication Sig Dispense Refill Last Dose   • AMLODIPINE BESYLATE PO Take 1 tablet by mouth Daily.   7/1/2018 at Unknown time   • metoprolol succinate XL (TOPROL-XL) 50 MG 24 hr tablet Take 1 tablet by mouth Daily. 90 tablet 3 7/2/2018 at Unknown time   • rosuvastatin (CRESTOR) 10 MG tablet Take 1 tablet by mouth Daily. 90 tablet 1 7/1/2018 at Unknown time   • vitamin D (ERGOCALCIFEROL) 83096 units capsule capsule TAKE ONE CAPSULE BY MOUTH ONCE WEEKLY 12 capsule 0 7/1/2018 at  "Unknown time   • apixaban (ELIQUIS) 5 MG tablet tablet Take 1 tablet by mouth 2 (Two) Times a Day. 180 tablet 3 6/29/2018 at 1600   • aspirin 81 MG EC tablet Take 81 mg by mouth Daily.   6/29/2018   • chlorthalidone (HYGROTON) 25 MG tablet Take 1 tablet by mouth Daily. 30 tablet 5 Unknown at Unknown time   • colchicine 0.6 MG tablet 1 po bid 180 tablet 1 Taking   • metFORMIN (GLUCOPHAGE) 1000 MG tablet Take 1 tablet by mouth 2 (Two) Times a Day. 180 tablet 1 6/30/2018   • metroNIDAZOLE (FLAGYL) 500 MG tablet Take 500 mg by mouth 3 (Three) Times a Day.   Taking       Allergies:  Patient has no known allergies.    ROS:    Pertinent items are noted in HPI     Objective     Blood pressure 92/73, pulse 96, temperature 98.3 °F (36.8 °C), temperature source Oral, resp. rate 16, height 182.9 cm (72\"), weight 117 kg (258 lb 9.6 oz), SpO2 94 %.    Physical Exam   Constitutional: Pt is oriented to person, place, and time and well-developed, well-nourished, and in no distress.   Mouth/Throat: Oropharynx is clear and moist.   Neck: Normal range of motion.   Cardiovascular: Normal rate, regular rhythm and normal heart sounds.    Pulmonary/Chest: Effort normal and breath sounds normal.   Abdominal: Soft. Nontender  Skin: Skin is warm and dry.   Psychiatric: Mood, memory, affect and judgment normal.     Assessment/Plan     Diagnosis:  History of diverticulitis    Anticipated Surgical Procedure:  Colonoscopy    The risks, benefits, and alternatives of this procedure have been discussed with the patient or the responsible party- the patient understands and agrees to proceed.                                                          "

## 2018-07-02 NOTE — ANESTHESIA POSTPROCEDURE EVALUATION
"Patient: Agustín Willett    Procedure Summary     Date:  07/02/18 Room / Location:   [facility]    Anesthesia Start:  0754 Anesthesia Stop:  0830    Procedure:  COLONOSCOPY TO CECUM WITH COLD BIOPSY POLYPECTOMY (N/A ) Diagnosis:       Diverticulitis of large intestine without perforation or abscess without bleeding      Internal hemorrhoids without complication      Colon polyp      (Diverticulitis of large intestine without perforation or abscess without bleeding [K57.32])    Surgeon:  Dave Elizabeth MD Provider:  Addison Mann MD    Anesthesia Type:  MAC ASA Status:  3          Anesthesia Type: MAC  Last vitals  BP   99/78 (07/02/18 0836)   Temp   36.6 °C (97.9 °F) (07/02/18 0836)   Pulse   75 (07/02/18 0836)   Resp   16 (07/02/18 0715)     SpO2   93 % (07/02/18 0836)     Post Anesthesia Care and Evaluation    Patient location during evaluation: bedside  Patient participation: complete - patient participated  Level of consciousness: awake  Pain management: adequate  Airway patency: patent  Anesthetic complications: No anesthetic complications    Cardiovascular status: acceptable  Respiratory status: acceptable  Hydration status: acceptable    Comments: BP 99/78 (BP Location: Left arm, Patient Position: Lying)   Pulse 75   Temp 36.6 °C (97.9 °F) (Oral)   Resp 16   Ht 182.9 cm (72\")   Wt 117 kg (258 lb 9.6 oz)   SpO2 93%   BMI 35.07 kg/m²         "

## 2018-07-03 LAB
CYTO UR: NORMAL
LAB AP CASE REPORT: NORMAL
PATH REPORT.FINAL DX SPEC: NORMAL
PATH REPORT.GROSS SPEC: NORMAL

## 2018-07-09 RX ORDER — ALLOPURINOL 300 MG/1
TABLET ORAL
Qty: 90 TABLET | Refills: 0 | Status: SHIPPED | OUTPATIENT
Start: 2018-07-09 | End: 2018-10-03 | Stop reason: SDUPTHER

## 2018-07-10 RX ORDER — SODIUM, POTASSIUM,MAG SULFATES 17.5-3.13G
SOLUTION, RECONSTITUTED, ORAL ORAL
Qty: 354 ML | Refills: 0 | OUTPATIENT
Start: 2018-07-10

## 2018-07-24 RX ORDER — PROPRANOLOL HYDROCHLORIDE 10 MG/1
10 TABLET ORAL DAILY
Qty: 90 TABLET | Refills: 0 | OUTPATIENT
Start: 2018-07-24

## 2018-07-26 ENCOUNTER — OFFICE VISIT (OUTPATIENT)
Dept: CARDIOLOGY | Facility: CLINIC | Age: 73
End: 2018-07-26

## 2018-07-26 VITALS
DIASTOLIC BLOOD PRESSURE: 69 MMHG | SYSTOLIC BLOOD PRESSURE: 103 MMHG | HEIGHT: 72 IN | WEIGHT: 271 LBS | BODY MASS INDEX: 36.7 KG/M2 | HEART RATE: 88 BPM

## 2018-07-26 DIAGNOSIS — I10 ESSENTIAL HYPERTENSION: Primary | ICD-10-CM

## 2018-07-26 DIAGNOSIS — Z79.01 ANTICOAGULATED: ICD-10-CM

## 2018-07-26 DIAGNOSIS — I35.0 NONRHEUMATIC AORTIC VALVE STENOSIS: ICD-10-CM

## 2018-07-26 DIAGNOSIS — I34.0 NON-RHEUMATIC MITRAL REGURGITATION: ICD-10-CM

## 2018-07-26 PROCEDURE — 99214 OFFICE O/P EST MOD 30 MIN: CPT | Performed by: INTERNAL MEDICINE

## 2018-07-26 PROCEDURE — 93000 ELECTROCARDIOGRAM COMPLETE: CPT | Performed by: INTERNAL MEDICINE

## 2018-07-26 RX ORDER — AMLODIPINE BESYLATE 10 MG/1
10 TABLET ORAL DAILY
COMMUNITY
Start: 2018-07-17 | End: 2018-08-29 | Stop reason: SDUPTHER

## 2018-07-26 RX ORDER — BENAZEPRIL HYDROCHLORIDE AND HYDROCHLOROTHIAZIDE 20; 12.5 MG/1; MG/1
1 TABLET ORAL DAILY
COMMUNITY
Start: 2018-07-13 | End: 2018-08-29 | Stop reason: SDUPTHER

## 2018-07-26 NOTE — PROGRESS NOTES
Subjective:       Agustín Willett is a 73 y.o. male who here for follow up    CC  The follow-up of the aortic stenosis mitral regurgitation and hypertension  HPI  73-year-old male with known history of the benign essential arterial hypertension, aortic stenosis and severe mitral regurgitation here for the follow-up has a blood pressure running low     Problem List Items Addressed This Visit        Cardiovascular and Mediastinum    Hypertension - Primary    Relevant Medications    benazepril-hydrochlorthiazide (LOTENSIN HCT) 20-12.5 MG per tablet    amLODIPine (NORVASC) 10 MG tablet    Other Relevant Orders    ECG 12 Lead    Nonrheumatic aortic valve stenosis    Relevant Medications    amLODIPine (NORVASC) 10 MG tablet    Non-rheumatic mitral regurgitation    Relevant Medications    amLODIPine (NORVASC) 10 MG tablet       Hematopoietic and Hemostatic    Anticoagulated        .    The following portions of the patient's history were reviewed and updated as appropriate: allergies, current medications, past family history, past medical history, past social history, past surgical history and problem list.    Past Medical History:   Diagnosis Date   • Atrial fibrillation (CMS/HCC)    • Dyslipidemia    • Erectile dysfunction    • Hyperlipidemia    • Hypertension    • Hypogonadism male    • ARIANA (obstructive sleep apnea)    • PONV (postoperative nausea and vomiting)    • Type 2 diabetes mellitus (CMS/HCC)    • Vitamin D deficiency     reports that he quit smoking about 48 years ago. His smoking use included Cigarettes. He has a 30.00 pack-year smoking history. He has never used smokeless tobacco. He reports that he drinks alcohol. He reports that he does not use drugs.  Family History   Problem Relation Age of Onset   • ALS Mother    • Hypertension Father        Review of Systems  Constitutional: No wt loss, fever, fatigue  Gastrointestinal: No nausea, abdominal pain  Behavioral/Psych: No insomnia or anxiety    "Cardiovascular No chest pains or tightness in chest  Objective:                 Physical Exam  /69 (BP Location: Left arm, Patient Position: Sitting)   Pulse 88   Ht 182.9 cm (72\")   Wt 123 kg (271 lb)   BMI 36.75 kg/m²     General appearance: NAD, conversant   Eyes: anicteric sclerae, moist conjunctivae; no lid-lag; PERRLA   HENT: Atraumatic; oropharynx clear with moist mucous membranes and no mucosal ulcerations;  normal hard and soft palate   Neck: Trachea midline; FROM, supple, no thyromegaly or lymphadenopathy   Lungs: CTA, with normal respiratory effort and no intercostal retractions   CV: S1-S2 regular, no murmurs, no rub, no gallop   Abdomen: Soft, non-tender; no masses or HSM   Extremities: No peripheral edema or extremity lymphadenopathy  Skin: Normal temperature, turgor and texture; no rash, ulcers or subcutaneous nodules   Psych: Appropriate affect, alert and oriented to person, place and time           Cardiographics  @  ECG 12 Lead  Date/Time: 7/26/2018 10:37 AM  Performed by: ROGER COX  Authorized by: ROGER COX   Rhythm: atrial fibrillation  ST Flattening: all  Clinical impression: abnormal ECG            Echocardiogram:        Current Outpatient Prescriptions:   •  allopurinol (ZYLOPRIM) 300 MG tablet, TAKE 1 TABLET EVERY DAY, Disp: 90 tablet, Rfl: 0  •  amLODIPine (NORVASC) 10 MG tablet, , Disp: , Rfl:   •  apixaban (ELIQUIS) 5 MG tablet tablet, Take 1 tablet by mouth 2 (Two) Times a Day., Disp: 180 tablet, Rfl: 3  •  aspirin 81 MG EC tablet, Take 81 mg by mouth Daily., Disp: , Rfl:   •  benazepril-hydrochlorthiazide (LOTENSIN HCT) 20-12.5 MG per tablet, , Disp: , Rfl:   •  metFORMIN (GLUCOPHAGE) 1000 MG tablet, Take 1 tablet by mouth 2 (Two) Times a Day., Disp: 180 tablet, Rfl: 1  •  metoprolol succinate XL (TOPROL-XL) 50 MG 24 hr tablet, Take 1 tablet by mouth Daily., Disp: 90 tablet, Rfl: 3  •  rosuvastatin (CRESTOR) 10 MG tablet, Take 1 tablet by mouth Daily., " Disp: 90 tablet, Rfl: 1  •  vitamin D (ERGOCALCIFEROL) 41094 units capsule capsule, TAKE ONE CAPSULE BY MOUTH ONCE WEEKLY, Disp: 12 capsule, Rfl: 0   Assessment:        Patient Active Problem List   Diagnosis   • Benign non-nodular prostatic hyperplasia without lower urinary tract symptoms   • Diabetes mellitus type 2, controlled, without complications (CMS/[facility])   • Vitamin D deficiency   • Hyperuricemia   • Low testosterone   • Dyslipidemia   • Hypertension   • Acanthosis nigricans   • Gout   • Diabetes mellitus (CMS/[facility])   • Impotence of organic origin   • Hypogonadism in male   • Morbid obesity (CMS/[facility])   • Diverticulitis of large intestine without perforation or abscess without bleeding   • Abdominal pain   • A-fib (CMS/[facility])   • Nonrheumatic aortic valve stenosis   • Non-rheumatic mitral regurgitation   • Anticoagulated               Plan:            ICD-10-CM ICD-9-CM   1. Essential hypertension I10 401.9   2. Nonrheumatic aortic valve stenosis I35.0 424.1   3. Non-rheumatic mitral regurgitation I34.0 424.0   4. Anticoagulated Z79.01 V58.61     1. Essential hypertension  Blood pressure running low    We'll reduce the medicine  - ECG 12 Lead    2. Nonrheumatic aortic valve stenosis  Being treated conservatively    3. Non-rheumatic mitral regurgitation  Being treated conservatively    4. Anticoagulated  Pros and cons as well as indication of the anticoagulation has been explained to the patient in detail    There are no obvious complications at this stage    Risk of  the bleedings has been explained    Need for the regular blood workup and adjust the dose has been explained    Need for proper follow-up on anticoagulation also has been explained         Reduce NORVASC to 5mg due to low bp  Pros and cons of this new medication / change medication has been explained to  the patient    Possible side effects has been explained    Associated need of the blood  Work has been explained    Need for the compliance of the  medication has been explained      See in 6 months  COUNSELING:    Agustín Restrepo was given to patient for the following topics: diagnostic results, risk factor reductions, impressions, risks and benefits of treatment options and importance of treatment compliance .       SMOKING COUNSELING:    Counseling given: Yes      EMR Dragon/Transcription disclaimer:   Much of this encounter note is an electronic transcription/translation of spoken language to printed text. The electronic translation of spoken language may permit erroneous, or at times, nonsensical words or phrases to be inadvertently transcribed; Although I have reviewed the note for such errors, some may still exist.

## 2018-08-09 ENCOUNTER — TELEPHONE (OUTPATIENT)
Dept: GASTROENTEROLOGY | Facility: CLINIC | Age: 73
End: 2018-08-09

## 2018-08-09 NOTE — TELEPHONE ENCOUNTER
Patient called, advised as per Dr. Elizabeth's note. He verb understanding and is agreeable to the plan. Patient's health maintenance record has been updated to reflect the need to repeat colonoscopy in 5 years.

## 2018-08-09 NOTE — TELEPHONE ENCOUNTER
----- Message from Dave Elizabeth MD sent at 7/11/2018  9:42 AM EDT -----  Benign polyp, repeat colonoscopy in 5-6 years as discussed

## 2018-08-29 ENCOUNTER — OFFICE VISIT (OUTPATIENT)
Dept: FAMILY MEDICINE CLINIC | Facility: CLINIC | Age: 73
End: 2018-08-29

## 2018-08-29 VITALS
TEMPERATURE: 98 F | HEIGHT: 72 IN | SYSTOLIC BLOOD PRESSURE: 122 MMHG | HEART RATE: 92 BPM | RESPIRATION RATE: 18 BRPM | WEIGHT: 274 LBS | BODY MASS INDEX: 37.11 KG/M2 | DIASTOLIC BLOOD PRESSURE: 73 MMHG

## 2018-08-29 DIAGNOSIS — Z00.00 MEDICARE ANNUAL WELLNESS VISIT, SUBSEQUENT: Primary | ICD-10-CM

## 2018-08-29 DIAGNOSIS — I10 ESSENTIAL HYPERTENSION: ICD-10-CM

## 2018-08-29 PROCEDURE — G0439 PPPS, SUBSEQ VISIT: HCPCS | Performed by: FAMILY MEDICINE

## 2018-08-29 RX ORDER — AMLODIPINE BESYLATE 10 MG/1
10 TABLET ORAL DAILY
Qty: 90 TABLET | Refills: 3 | Status: SHIPPED | OUTPATIENT
Start: 2018-08-29 | End: 2019-11-07 | Stop reason: SDUPTHER

## 2018-08-29 RX ORDER — CHLORTHALIDONE 25 MG/1
25 TABLET ORAL DAILY
COMMUNITY
End: 2018-10-29 | Stop reason: SDUPTHER

## 2018-08-29 RX ORDER — CHLORTHALIDONE 25 MG/1
TABLET ORAL
COMMUNITY
Start: 2018-08-07 | End: 2018-08-29

## 2018-08-29 RX ORDER — BENAZEPRIL HYDROCHLORIDE AND HYDROCHLOROTHIAZIDE 20; 12.5 MG/1; MG/1
1 TABLET ORAL 2 TIMES DAILY
Qty: 180 TABLET | Refills: 3 | Status: SHIPPED | OUTPATIENT
Start: 2018-08-29 | End: 2019-11-07

## 2018-08-29 NOTE — PATIENT INSTRUCTIONS
Medicare Wellness  Personal Prevention Plan of Service     Date of Office Visit:  2018  Encounter Provider:  Stuart Shah MD  Place of Service:  [facility]  Patient Name: Agustín Willett  :  1945    As part of the Medicare Wellness portion of your visit today, we are providing you with this personalized preventive plan of services (PPPS). This plan is based upon recommendations of the [place] Preventive Services Task Force (USPSTF) and the Advisory Committee on Immunization Practices (ACIP).    This lists the preventive care services that should be considered, and provides dates of when you are due. Items listed as completed are up-to-date and do not require any further intervention.    Health Maintenance   Topic Date Due   • INFLUENZA VACCINE  2018   • ZOSTER VACCINE (1 of 2) 2018 (Originally 1997)   • DIABETIC EYE EXAM  2019 (Originally 10/18/2017)   • PNEUMOCOCCAL VACCINES (65+ LOW/MEDIUM RISK) (2 of 2 - PPSV23) 2019 (Originally 2016)   • TDAP/TD VACCINES (1 - Tdap) 2019 (Originally 1966)   • HEMOGLOBIN A1C  2018   • LIPID PANEL  2019   • URINE MICROALBUMIN  2019   • MEDICARE ANNUAL WELLNESS  2019   • DIABETIC FOOT EXAM  2019   • COLONOSCOPY  2023   • AAA SCREEN (ONE-TIME)  Completed   • HEPATITIS C SCREENING  Excluded       No orders of the defined types were placed in this encounter.      Return in about 1 year (around 2019) for Annual physical.

## 2018-08-29 NOTE — PROGRESS NOTES
QUICK REFERENCE INFORMATION:  The ABCs of the Annual Wellness Visit    Subsequent Medicare Wellness Visit    HEALTH RISK ASSESSMENT    1945    Recent Hospitalizations:  No hospitalization(s) within the last year..        Current Medical Providers:  Patient Care Team:  Stuart Shah MD as PCP - General (Family Medicine)  Dave Elizabeth MD as PCP - Claims Attributed  Nancy Leone APRN as Nurse Practitioner (Endocrinology)  Merlyn Hernández MD as Consulting Physician (Gastroenterology)  Rosemarie Murray MD as Consulting Physician (Cardiology)        Smoking Status:  History   Smoking Status   • Former Smoker   • Packs/day: 2.00   • Years: 15.00   • Types: Cigarettes   • Quit date: 1970   Smokeless Tobacco   • Never Used       Alcohol Consumption:  History   Alcohol Use   • Yes     Comment: 1 drink weekly       Depression Screen:   PHQ-2/PHQ-9 Depression Screening 8/29/2018   Little interest or pleasure in doing things 0   Feeling down, depressed, or hopeless 0   Total Score 0       Health Habits and Functional and Cognitive Screening:  Functional & Cognitive Status 8/29/2018   Do you have difficulty preparing food and eating? No   Do you have difficulty bathing yourself, getting dressed or grooming yourself? No   Do you have difficulty using the toilet? No   Do you have difficulty moving around from place to place? No   Do you have trouble with steps or getting out of a bed or a chair? No   In the past year have you fallen or experienced a near fall? No   Current Diet Well Balanced Diet   Dental Exam Up to date   Eye Exam Not up to date   Exercise (times per week) 0 times per week   Current Exercise Activities Include None   Do you need help using the phone?  No   Are you deaf or do you have serious difficulty hearing?  No   Do you need help with transportation? No   Do you need help shopping? No   Do you need help preparing meals?  No   Do you need help with housework?  No   Do you need help  with laundry? No   Do you need help taking your medications? No   Do you need help managing money? No   Do you ever drive or ride in a car without wearing a seat belt? No   Have you felt unusual stress, anger or loneliness in the last month? No   Who do you live with? Alone   If you need help, do you have trouble finding someone available to you? Yes   Have you been bothered in the last four weeks by sexual problems? No   Do you have difficulty concentrating, remembering or making decisions? No           Does the patient have evidence of cognitive impairment? No    Aspirin use counseling: Taking ASA appropriately as indicated      Recent Lab Results:  CMP:  Lab Results   Component Value Date    GLU 90 05/03/2018    BUN 19 05/10/2018    CREATININE 1.49 (H) 05/10/2018    EGFRIFNONA 47 (L) 05/10/2018    EGFRIFAFRI 51 (L) 05/03/2018    BCR 12.8 05/10/2018     05/10/2018    K 3.7 05/10/2018    CO2 31.1 (H) 05/10/2018    CALCIUM 9.1 05/10/2018    PROTENTOTREF 6.9 05/03/2018    ALBUMIN 3.80 05/08/2018    LABGLOBREF 2.9 05/03/2018    LABIL2 1.4 05/03/2018    BILITOT 0.4 05/08/2018    ALKPHOS 40 05/08/2018    AST 13 05/08/2018    ALT 10 05/08/2018     Lipid Panel:  Lab Results   Component Value Date    TRIG 63 05/03/2018    HDL 45 05/03/2018    VLDL 12.6 05/03/2018     HbA1c:  Lab Results   Component Value Date    HGBA1C 5.50 05/03/2018       Visual Acuity:  No exam data present    Age-appropriate Screening Schedule:  Refer to the list below for future screening recommendations based on patient's age, sex and/or medical conditions. Orders for these recommended tests are listed in the plan section. The patient has been provided with a written plan.    Health Maintenance   Topic Date Due   • INFLUENZA VACCINE  08/01/2018   • ZOSTER VACCINE (1 of 2) 12/13/2018 (Originally 6/9/1997)   • DIABETIC EYE EXAM  02/20/2019 (Originally 10/18/2017)   • PNEUMOCOCCAL VACCINES (65+ LOW/MEDIUM RISK) (2 of 2 - PPSV23) 02/27/2019  (Originally 9/22/2016)   • TDAP/TD VACCINES (1 - Tdap) 05/01/2019 (Originally 6/9/1966)   • HEMOGLOBIN A1C  11/08/2018   • LIPID PANEL  05/03/2019   • URINE MICROALBUMIN  05/03/2019   • DIABETIC FOOT EXAM  08/29/2019   • COLONOSCOPY  07/02/2023        Subjective   History of Present Illness    Agustín Willett is a 73 y.o. male who presents for an Subsequent Wellness Visit.    The following portions of the patient's history were reviewed and updated as appropriate: allergies, current medications, past family history, past medical history, past social history, past surgical history and problem list.    Outpatient Medications Prior to Visit   Medication Sig Dispense Refill   • allopurinol (ZYLOPRIM) 300 MG tablet TAKE 1 TABLET EVERY DAY 90 tablet 0   • apixaban (ELIQUIS) 5 MG tablet tablet Take 1 tablet by mouth 2 (Two) Times a Day. 180 tablet 3   • aspirin 81 MG EC tablet Take 81 mg by mouth Daily.     • metFORMIN (GLUCOPHAGE) 1000 MG tablet Take 1 tablet by mouth 2 (Two) Times a Day. 180 tablet 1   • metoprolol succinate XL (TOPROL-XL) 50 MG 24 hr tablet Take 1 tablet by mouth Daily. 90 tablet 3   • rosuvastatin (CRESTOR) 10 MG tablet Take 1 tablet by mouth Daily. 90 tablet 1   • vitamin D (ERGOCALCIFEROL) 33342 units capsule capsule TAKE ONE CAPSULE BY MOUTH ONCE WEEKLY 12 capsule 0   • amLODIPine (NORVASC) 10 MG tablet Take 10 mg by mouth Daily.     • benazepril-hydrochlorthiazide (LOTENSIN HCT) 20-12.5 MG per tablet Take 1 tablet by mouth Daily.       No facility-administered medications prior to visit.        Patient Active Problem List   Diagnosis   • Benign non-nodular prostatic hyperplasia without lower urinary tract symptoms   • Diabetes mellitus type 2, controlled, without complications (CMS/HCC)   • Vitamin D deficiency   • Hyperuricemia   • Low testosterone   • Dyslipidemia   • Hypertension   • Acanthosis nigricans   • Gout   • Diabetes mellitus (CMS/HCC)   • Impotence of organic origin   • Hypogonadism in  "male   • Morbid obesity (CMS/HCC)   • Diverticulitis of large intestine without perforation or abscess without bleeding   • Abdominal pain   • A-fib (CMS/HCC)   • Nonrheumatic aortic valve stenosis   • Non-rheumatic mitral regurgitation   • Anticoagulated       Advance Care Planning:  has an advance directive - a copy HAS NOT been provided    Identification of Risk Factors:  Risk factors include: cardiovascular risk.    Review of Systems    Compared to one year ago, the patient feels his physical health is the same.  Compared to one year ago, the patient feels his mental health is the same.    Objective     Physical Exam    Vitals:    08/29/18 0940   BP: 122/73   Pulse: 92   Resp: 18   Temp: 98 °F (36.7 °C)   TempSrc: Oral   Weight: 124 kg (274 lb)   Height: 182.9 cm (72\")   PainSc: 0-No pain       Patient's Body mass index is 37.16 kg/m². BMI is above normal parameters. Recommendations include: exercise counseling and nutrition counseling.      Assessment/Plan   Patient Self-Management and Personalized Health Advice  The patient has been provided with information about: diet, exercise and weight management and preventive services including:   · Exercise counseling provided, Fall Risk assessment done, Nutrition counseling provided.    Visit Diagnoses:    ICD-10-CM ICD-9-CM   1. Medicare annual wellness visit, subsequent Z00.00 V70.0   2. Essential hypertension I10 401.9       No orders of the defined types were placed in this encounter.      Outpatient Encounter Prescriptions as of 8/29/2018   Medication Sig Dispense Refill   • allopurinol (ZYLOPRIM) 300 MG tablet TAKE 1 TABLET EVERY DAY 90 tablet 0   • amLODIPine (NORVASC) 10 MG tablet Take 1 tablet by mouth Daily. 90 tablet 3   • apixaban (ELIQUIS) 5 MG tablet tablet Take 1 tablet by mouth 2 (Two) Times a Day. 180 tablet 3   • aspirin 81 MG EC tablet Take 81 mg by mouth Daily.     • benazepril-hydrochlorthiazide (LOTENSIN HCT) 20-12.5 MG per tablet Take 1 tablet " by mouth 2 (Two) Times a Day. 180 tablet 3   • chlorthalidone (HYGROTON) 25 MG tablet Take 25 mg by mouth Daily.     • metFORMIN (GLUCOPHAGE) 1000 MG tablet Take 1 tablet by mouth 2 (Two) Times a Day. 180 tablet 1   • metoprolol succinate XL (TOPROL-XL) 50 MG 24 hr tablet Take 1 tablet by mouth Daily. 90 tablet 3   • rosuvastatin (CRESTOR) 10 MG tablet Take 1 tablet by mouth Daily. 90 tablet 1   • vitamin D (ERGOCALCIFEROL) 62367 units capsule capsule TAKE ONE CAPSULE BY MOUTH ONCE WEEKLY 12 capsule 0   • [DISCONTINUED] amLODIPine (NORVASC) 10 MG tablet Take 10 mg by mouth Daily.     • [DISCONTINUED] benazepril-hydrochlorthiazide (LOTENSIN HCT) 20-12.5 MG per tablet Take 1 tablet by mouth Daily.     • [DISCONTINUED] chlorthalidone (HYGROTON) 25 MG tablet        No facility-administered encounter medications on file as of 8/29/2018.        Reviewed use of high risk medication in the elderly: not applicable  Reviewed for potential of harmful drug interactions in the elderly: not applicable    Follow Up:  Return in about 1 year (around 8/29/2019) for Annual physical.     An After Visit Summary and PPPS with all of these plans were given to the patient.

## 2018-08-30 RX ORDER — ERGOCALCIFEROL 1.25 MG/1
CAPSULE ORAL
Qty: 12 CAPSULE | Refills: 0 | Status: SHIPPED | OUTPATIENT
Start: 2018-08-30 | End: 2018-11-26 | Stop reason: ALTCHOICE

## 2018-09-17 RX ORDER — PROPRANOLOL HYDROCHLORIDE 10 MG/1
10 TABLET ORAL DAILY
Qty: 90 TABLET | Refills: 0 | Status: SHIPPED | OUTPATIENT
Start: 2018-09-17 | End: 2018-11-27 | Stop reason: SDUPTHER

## 2018-10-03 RX ORDER — ALLOPURINOL 300 MG/1
TABLET ORAL
Qty: 90 TABLET | Refills: 0 | Status: SHIPPED | OUTPATIENT
Start: 2018-10-03 | End: 2018-11-27 | Stop reason: SDUPTHER

## 2018-10-05 RX ORDER — ROSUVASTATIN CALCIUM 10 MG/1
10 TABLET, COATED ORAL DAILY
Qty: 90 TABLET | Refills: 1 | Status: SHIPPED | OUTPATIENT
Start: 2018-10-05 | End: 2018-11-27 | Stop reason: SDUPTHER

## 2018-10-15 RX ORDER — BENAZEPRIL HYDROCHLORIDE AND HYDROCHLOROTHIAZIDE 20; 12.5 MG/1; MG/1
TABLET ORAL
Qty: 180 TABLET | Refills: 1 | Status: SHIPPED | OUTPATIENT
Start: 2018-10-15 | End: 2018-11-26 | Stop reason: SDUPTHER

## 2018-10-29 RX ORDER — CHLORTHALIDONE 25 MG/1
25 TABLET ORAL DAILY
Qty: 90 TABLET | Refills: 0 | Status: SHIPPED | OUTPATIENT
Start: 2018-10-29 | End: 2018-11-27 | Stop reason: SDUPTHER

## 2018-11-09 DIAGNOSIS — E55.9 VITAMIN D DEFICIENCY: Primary | ICD-10-CM

## 2018-11-09 DIAGNOSIS — N40.0 BPH WITHOUT OBSTRUCTION/LOWER URINARY TRACT SYMPTOMS: ICD-10-CM

## 2018-11-09 DIAGNOSIS — E11.9 CONTROLLED TYPE 2 DIABETES MELLITUS WITHOUT COMPLICATION, UNSPECIFIED WHETHER LONG TERM INSULIN USE (HCC): ICD-10-CM

## 2018-11-09 DIAGNOSIS — E79.0 HYPERURICEMIA: ICD-10-CM

## 2018-11-09 DIAGNOSIS — E29.1 HYPOGONADISM IN MALE: ICD-10-CM

## 2018-11-12 ENCOUNTER — LAB (OUTPATIENT)
Dept: ENDOCRINOLOGY | Age: 73
End: 2018-11-12

## 2018-11-12 DIAGNOSIS — E29.1 HYPOGONADISM IN MALE: ICD-10-CM

## 2018-11-12 DIAGNOSIS — N40.0 BPH WITHOUT OBSTRUCTION/LOWER URINARY TRACT SYMPTOMS: ICD-10-CM

## 2018-11-12 DIAGNOSIS — E55.9 VITAMIN D DEFICIENCY: ICD-10-CM

## 2018-11-12 DIAGNOSIS — E11.9 CONTROLLED TYPE 2 DIABETES MELLITUS WITHOUT COMPLICATION, UNSPECIFIED WHETHER LONG TERM INSULIN USE (HCC): ICD-10-CM

## 2018-11-12 DIAGNOSIS — E79.0 HYPERURICEMIA: ICD-10-CM

## 2018-11-14 LAB
25(OH)D3+25(OH)D2 SERPL-MCNC: 48.1 NG/ML (ref 30–100)
ALBUMIN SERPL-MCNC: 4.4 G/DL (ref 3.5–5.2)
ALBUMIN/GLOB SERPL: 1.5 G/DL
ALP SERPL-CCNC: 50 U/L (ref 39–117)
ALT SERPL-CCNC: 18 U/L (ref 1–41)
AST SERPL-CCNC: 15 U/L (ref 1–40)
BILIRUB SERPL-MCNC: 0.5 MG/DL (ref 0.1–1.2)
BUN SERPL-MCNC: 29 MG/DL (ref 8–23)
BUN/CREAT SERPL: 21.5 (ref 7–25)
C PEPTIDE SERPL-MCNC: 2.2 NG/ML (ref 1.1–4.4)
CALCIUM SERPL-MCNC: 9.8 MG/DL (ref 8.6–10.5)
CHLORIDE SERPL-SCNC: 105 MMOL/L (ref 98–107)
CHOLEST SERPL-MCNC: 118 MG/DL (ref 0–200)
CO2 SERPL-SCNC: 27.6 MMOL/L (ref 22–29)
CREAT SERPL-MCNC: 1.35 MG/DL (ref 0.76–1.27)
GLOBULIN SER CALC-MCNC: 2.9 GM/DL
GLUCOSE SERPL-MCNC: 90 MG/DL (ref 65–99)
HBA1C MFR BLD: 5.7 % (ref 4.8–5.6)
HCT VFR BLD AUTO: 43.3 % (ref 40.4–52.2)
HDLC SERPL-MCNC: 50 MG/DL (ref 40–60)
HGB BLD-MCNC: 13.1 G/DL (ref 13.7–17.6)
INTERPRETATION: NORMAL
LDLC SERPL CALC-MCNC: 53 MG/DL (ref 0–100)
Lab: NORMAL
MICROALBUMIN UR-MCNC: 15 UG/ML
POTASSIUM SERPL-SCNC: 5 MMOL/L (ref 3.5–5.2)
PROT SERPL-MCNC: 7.3 G/DL (ref 6–8.5)
PSA SERPL-MCNC: 0.8 NG/ML (ref 0–4)
SHBG SERPL-SCNC: 35.2 NMOL/L (ref 19.3–76.4)
SODIUM SERPL-SCNC: 144 MMOL/L (ref 136–145)
TESTOST FREE SERPL-MCNC: 5 PG/ML (ref 6.6–18.1)
TESTOST SERPL-MCNC: 162 NG/DL (ref 264–916)
TRIGL SERPL-MCNC: 73 MG/DL (ref 0–150)
URATE SERPL-MCNC: 6.6 MG/DL (ref 3.4–7)
VLDLC SERPL CALC-MCNC: 14.6 MG/DL (ref 5–40)

## 2018-11-16 ENCOUNTER — RESULTS ENCOUNTER (OUTPATIENT)
Dept: ENDOCRINOLOGY | Age: 73
End: 2018-11-16

## 2018-11-16 DIAGNOSIS — E29.1 HYPOGONADISM IN MALE: ICD-10-CM

## 2018-11-16 DIAGNOSIS — E55.9 VITAMIN D DEFICIENCY: ICD-10-CM

## 2018-11-16 DIAGNOSIS — I15.9 SECONDARY HYPERTENSION: ICD-10-CM

## 2018-11-16 DIAGNOSIS — E11.9 CONTROLLED TYPE 2 DIABETES MELLITUS WITHOUT COMPLICATION (HCC): ICD-10-CM

## 2018-11-16 DIAGNOSIS — N40.0 BENIGN NON-NODULAR PROSTATIC HYPERPLASIA WITHOUT LOWER URINARY TRACT SYMPTOMS: ICD-10-CM

## 2018-11-16 DIAGNOSIS — E78.5 DYSLIPIDEMIA: ICD-10-CM

## 2018-11-16 DIAGNOSIS — E79.0 HYPERURICEMIA: ICD-10-CM

## 2018-11-26 ENCOUNTER — OFFICE VISIT (OUTPATIENT)
Dept: ENDOCRINOLOGY | Age: 73
End: 2018-11-26

## 2018-11-26 VITALS
SYSTOLIC BLOOD PRESSURE: 126 MMHG | WEIGHT: 291 LBS | DIASTOLIC BLOOD PRESSURE: 74 MMHG | BODY MASS INDEX: 39.42 KG/M2 | HEIGHT: 72 IN

## 2018-11-26 DIAGNOSIS — R79.89 LOW TESTOSTERONE: ICD-10-CM

## 2018-11-26 DIAGNOSIS — E29.1 HYPOGONADISM IN MALE: ICD-10-CM

## 2018-11-26 DIAGNOSIS — E11.9 CONTROLLED TYPE 2 DIABETES MELLITUS WITHOUT COMPLICATION, UNSPECIFIED WHETHER LONG TERM INSULIN USE (HCC): Primary | ICD-10-CM

## 2018-11-26 DIAGNOSIS — I10 ESSENTIAL HYPERTENSION: ICD-10-CM

## 2018-11-26 DIAGNOSIS — E78.5 DYSLIPIDEMIA: ICD-10-CM

## 2018-11-26 DIAGNOSIS — E66.01 MORBID OBESITY (HCC): ICD-10-CM

## 2018-11-26 DIAGNOSIS — E55.9 VITAMIN D DEFICIENCY: ICD-10-CM

## 2018-11-26 DIAGNOSIS — E79.0 HYPERURICEMIA: ICD-10-CM

## 2018-11-26 PROCEDURE — 99214 OFFICE O/P EST MOD 30 MIN: CPT | Performed by: NURSE PRACTITIONER

## 2018-11-26 NOTE — PROGRESS NOTES
"Andria Willett is a 73 y.o. male is here today for follow-up.  Chief Complaint   Patient presents with   • Diabetes     Recent labs, pt doesnt test BG    • Hyperlipidemia   • Hypertension   • Vitamin D Deficiency   • Hypogonadism     /74   Ht 182.9 cm (72\")   Wt 132 kg (291 lb)   BMI 39.47 kg/m²   Current Outpatient Medications on File Prior to Visit   Medication Sig   • allopurinol (ZYLOPRIM) 300 MG tablet TAKE 1 TABLET BY MOUTH EVERY DAY   • amLODIPine (NORVASC) 10 MG tablet Take 1 tablet by mouth Daily.   • apixaban (ELIQUIS) 5 MG tablet tablet Take 1 tablet by mouth 2 (Two) Times a Day.   • benazepril-hydrochlorthiazide (LOTENSIN HCT) 20-12.5 MG per tablet Take 1 tablet by mouth 2 (Two) Times a Day.   • chlorthalidone (HYGROTON) 25 MG tablet Take 1 tablet by mouth Daily.   • Cholecalciferol (VITAMIN D PO) Take 1 tablet by mouth Daily.   • metFORMIN (GLUCOPHAGE) 1000 MG tablet Take 1 tablet by mouth 2 (Two) Times a Day.   • metoprolol succinate XL (TOPROL-XL) 50 MG 24 hr tablet Take 1 tablet by mouth Daily.   • propranolol (INDERAL) 10 MG tablet TAKE 1 TABLET BY MOUTH DAILY.   • rosuvastatin (CRESTOR) 10 MG tablet TAKE 1 TABLET BY MOUTH DAILY.   • [DISCONTINUED] aspirin 81 MG EC tablet Take 81 mg by mouth Daily.   • [DISCONTINUED] benazepril-hydrochlorthiazide (LOTENSIN HCT) 20-12.5 MG per tablet TAKE 1 TABLET BY MOUTH TWICE A DAY   • [DISCONTINUED] vitamin D (ERGOCALCIFEROL) 77434 units capsule capsule TAKE ONE CAPSULE BY MOUTH ONCE WEEKLY     No current facility-administered medications on file prior to visit.          History of Present Illness   Encounter Diagnoses   Name Primary?   • Essential hypertension    • Morbid obesity (CMS/HCC)    • Vitamin D deficiency    • Controlled type 2 diabetes mellitus without complication, unspecified whether long term insulin use (CMS/HCC) Yes   • Hypogonadism in male    • Hyperuricemia    • Low testosterone    • Dyslipidemia      73-year-old male " patient here today for routine follow-up visit.  He is being seen for the above-mentioned problems.  He is taking his medications as prescribed with the exception of testosterone.  He states he doesn't really want to take it.  He had labs done which were reviewed and he was provided a copy.  He has no complaints at today's visit.  His medication list was reviewed and updated.  He is requesting 90 day refills on his medications    The following portions of the patient's history were reviewed and updated as appropriate: allergies, current medications, past family history, past medical history, past social history, past surgical history and problem list.    Review of Systems   Constitutional: Negative for fatigue.   HENT: Negative for trouble swallowing.    Eyes: Negative for visual disturbance.   Respiratory: Negative for shortness of breath.    Cardiovascular: Negative for leg swelling.   Endocrine: Negative for polyuria.   Skin: Negative for wound.   Neurological: Negative for numbness.       Objective   Physical Exam   Constitutional: He is oriented to person, place, and time. He appears well-developed and well-nourished. No distress.   HENT:   Head: Normocephalic and atraumatic.   Right Ear: External ear normal.   Left Ear: External ear normal.   Nose: Nose normal.   Mouth/Throat: Oropharynx is clear and moist. No oropharyngeal exudate.   Eyes: Conjunctivae and EOM are normal. Pupils are equal, round, and reactive to light. Right eye exhibits no discharge. Left eye exhibits no discharge. No scleral icterus.   Neck: Normal range of motion. Neck supple. No JVD present. No tracheal deviation present. No thyromegaly present.   Cardiovascular: Normal rate, regular rhythm, normal heart sounds and intact distal pulses. Exam reveals no gallop and no friction rub.   No murmur heard.  Pulmonary/Chest: Effort normal and breath sounds normal. No stridor. No respiratory distress. He has no wheezes. He has no rales. He exhibits  no tenderness.   Abdominal: Soft. Bowel sounds are normal. He exhibits no distension and no mass. There is no tenderness. There is no rebound and no guarding. No hernia.   Musculoskeletal: Normal range of motion. He exhibits no edema, tenderness or deformity.   Lymphadenopathy:     He has no cervical adenopathy.   Neurological: He is alert and oriented to person, place, and time. He has normal reflexes. He displays normal reflexes. No cranial nerve deficit. He exhibits normal muscle tone. Coordination normal.   Skin: Skin is warm and dry. No rash noted. He is not diaphoretic. No erythema. No pallor.   Psychiatric: He has a normal mood and affect. His behavior is normal. Judgment and thought content normal.   Nursing note and vitals reviewed.    Results for orders placed or performed in visit on 11/12/18   Uric Acid   Result Value Ref Range    Uric Acid 6.6 3.4 - 7.0 mg/dL   TestT+TestF+SHBG   Result Value Ref Range    Testosterone, Total 162 (L) 264 - 916 ng/dL    Testosterone, Free 5.0 (L) 6.6 - 18.1 pg/mL    Sex Hormone Binding Globulin 35.2 19.3 - 76.4 nmol/L   Comprehensive Metabolic Panel   Result Value Ref Range    Glucose 90 65 - 99 mg/dL    BUN 29 (H) 8 - 23 mg/dL    Creatinine 1.35 (H) 0.76 - 1.27 mg/dL    eGFR Non African Am 52 (L) >60 mL/min/1.73    eGFR African Am 63 >60 mL/min/1.73    BUN/Creatinine Ratio 21.5 7.0 - 25.0    Sodium 144 136 - 145 mmol/L    Potassium 5.0 3.5 - 5.2 mmol/L    Chloride 105 98 - 107 mmol/L    Total CO2 27.6 22.0 - 29.0 mmol/L    Calcium 9.8 8.6 - 10.5 mg/dL    Total Protein 7.3 6.0 - 8.5 g/dL    Albumin 4.40 3.50 - 5.20 g/dL    Globulin 2.9 gm/dL    A/G Ratio 1.5 g/dL    Total Bilirubin 0.5 0.1 - 1.2 mg/dL    Alkaline Phosphatase 50 39 - 117 U/L    AST (SGOT) 15 1 - 40 U/L    ALT (SGPT) 18 1 - 41 U/L   Lipid Panel   Result Value Ref Range    Total Cholesterol 118 0 - 200 mg/dL    Triglycerides 73 0 - 150 mg/dL    HDL Cholesterol 50 40 - 60 mg/dL    VLDL Cholesterol 14.6 5 - 40  mg/dL    LDL Cholesterol  53 0 - 100 mg/dL   Hemoglobin & Hematocrit, Blood   Result Value Ref Range    Hemoglobin 13.1 (L) 13.7 - 17.6 g/dL    Hematocrit 43.3 40.4 - 52.2 %   Hemoglobin A1c   Result Value Ref Range    Hemoglobin A1C 5.70 (H) 4.80 - 5.60 %   C-Peptide   Result Value Ref Range    C-Peptide 2.2 1.1 - 4.4 ng/mL   PSA DIAGNOSTIC   Result Value Ref Range    PSA 0.800 0.000 - 4.000 ng/mL   Vitamin D 25 Hydroxy   Result Value Ref Range    25 Hydroxy, Vitamin D 48.1 30.0 - 100.0 ng/ml   MicroAlbumin, Urine, Random -   Result Value Ref Range    Microalbumin, Urine 15.0 Not Estab. ug/mL   Cardiovascular Risk Assessment   Result Value Ref Range    Interpretation Note    Diabetes Patient Education   Result Value Ref Range    PDF Image Not applicable          Assessment/Plan   Problems Addressed this Visit        Cardiovascular and Mediastinum    Hypertension       Digestive    Vitamin D deficiency    Morbid obesity (CMS/[facility])       Endocrine    Diabetes mellitus type 2, controlled, without complications (CMS/[facility]) - Primary    Hypogonadism in male       Other    Hyperuricemia    Low testosterone    Dyslipidemia        Summary, patient was seen and examined.  Metabolically he is stable.  His hemoglobin A1c reflects good glycemic control.  He does have low testosterone however he chooses not to treat his hypogonadism.  His cholesterol is well-controlled.  He does have renal insufficiency and he has been advised to make sure he is drinking enough liquids.  His creatinine is actually improved since last visit.  He currently does not check blood sugars.  He will follow-up with Dr. Dunn in 6 months.  I've encouraged him to call the office should any questions or concerns prior to then

## 2018-11-27 RX ORDER — ROSUVASTATIN CALCIUM 10 MG/1
10 TABLET, COATED ORAL DAILY
Qty: 90 TABLET | Refills: 3 | Status: SHIPPED | OUTPATIENT
Start: 2018-11-27 | End: 2019-01-02

## 2018-11-27 RX ORDER — CHLORTHALIDONE 25 MG/1
25 TABLET ORAL DAILY
Qty: 90 TABLET | Refills: 3 | Status: SHIPPED | OUTPATIENT
Start: 2018-11-27 | End: 2019-11-07 | Stop reason: SDUPTHER

## 2018-11-27 RX ORDER — ALLOPURINOL 300 MG/1
300 TABLET ORAL DAILY
Qty: 90 TABLET | Refills: 3 | Status: SHIPPED | OUTPATIENT
Start: 2018-11-27 | End: 2019-11-07 | Stop reason: SDUPTHER

## 2018-11-27 RX ORDER — PROPRANOLOL HYDROCHLORIDE 10 MG/1
10 TABLET ORAL DAILY
Qty: 90 TABLET | Refills: 3 | Status: SHIPPED | OUTPATIENT
Start: 2018-11-27 | End: 2019-01-03 | Stop reason: ALTCHOICE

## 2018-12-18 RX ORDER — METOPROLOL SUCCINATE 50 MG/1
TABLET, EXTENDED RELEASE ORAL
Qty: 90 TABLET | Refills: 1 | Status: SHIPPED | OUTPATIENT
Start: 2018-12-18 | End: 2019-11-07 | Stop reason: SDUPTHER

## 2019-01-02 RX ORDER — ATORVASTATIN CALCIUM 20 MG/1
20 TABLET, FILM COATED ORAL DAILY
Qty: 90 TABLET | Refills: 3 | Status: SHIPPED | OUTPATIENT
Start: 2019-01-02 | End: 2019-01-03 | Stop reason: ALTCHOICE

## 2019-01-03 ENCOUNTER — OFFICE VISIT (OUTPATIENT)
Dept: CARDIOLOGY | Facility: CLINIC | Age: 74
End: 2019-01-03

## 2019-01-03 VITALS
HEART RATE: 102 BPM | SYSTOLIC BLOOD PRESSURE: 109 MMHG | BODY MASS INDEX: 41.04 KG/M2 | WEIGHT: 303 LBS | HEIGHT: 72 IN | DIASTOLIC BLOOD PRESSURE: 68 MMHG

## 2019-01-03 DIAGNOSIS — Z79.01 ANTICOAGULATED: ICD-10-CM

## 2019-01-03 DIAGNOSIS — F10.10 ALCOHOL ABUSE: ICD-10-CM

## 2019-01-03 DIAGNOSIS — I34.0 NON-RHEUMATIC MITRAL REGURGITATION: Primary | ICD-10-CM

## 2019-01-03 DIAGNOSIS — E66.09 CLASS 1 OBESITY DUE TO EXCESS CALORIES WITHOUT SERIOUS COMORBIDITY WITH BODY MASS INDEX (BMI) OF 30.0 TO 30.9 IN ADULT: ICD-10-CM

## 2019-01-03 DIAGNOSIS — I10 ESSENTIAL HYPERTENSION: ICD-10-CM

## 2019-01-03 DIAGNOSIS — I35.0 NONRHEUMATIC AORTIC VALVE STENOSIS: ICD-10-CM

## 2019-01-03 DIAGNOSIS — I48.20 CHRONIC ATRIAL FIBRILLATION (HCC): ICD-10-CM

## 2019-01-03 PROCEDURE — 93000 ELECTROCARDIOGRAM COMPLETE: CPT | Performed by: INTERNAL MEDICINE

## 2019-01-03 PROCEDURE — 99213 OFFICE O/P EST LOW 20 MIN: CPT | Performed by: INTERNAL MEDICINE

## 2019-01-03 RX ORDER — ROSUVASTATIN CALCIUM 10 MG/1
10 TABLET, COATED ORAL DAILY
COMMUNITY
End: 2019-11-07

## 2019-01-03 NOTE — PROGRESS NOTES
" Subjective:        Agustín Willett is a 73 y.o. male who here for follow up    CC  The follow-up for the hypertension atrial fibrillation aortic stenosis  HPI  73-year-old male with known history of the paroxysmal atrial fibrillation, aortic stenosis, mitral regurgitation    Patient denies any chest pains or tightness in chest no heaviness with a pressure sensation     Problem List Items Addressed This Visit        Cardiovascular and Mediastinum    Hypertension    A-fib (CMS/HCC) - Primary    Nonrheumatic aortic valve stenosis    Non-rheumatic mitral regurgitation       Other    Anticoagulated            The following portions of the patient's history were reviewed and updated as appropriate: allergies, current medications, past family history, past medical history, past social history, past surgical history and problem list.    Past Medical History:   Diagnosis Date   • Atrial fibrillation (CMS/HCC)    • Dyslipidemia    • Erectile dysfunction    • Hx of complete eye exam 2016    UNKNOWN PER PT    • Hyperlipidemia    • Hypertension    • Hypogonadism male    • ARIANA (obstructive sleep apnea)    • PONV (postoperative nausea and vomiting)    • Type 2 diabetes mellitus (CMS/HCC)    • Vitamin D deficiency      reports that he quit smoking about 49 years ago. His smoking use included cigarettes. He has a 30.00 pack-year smoking history. he has never used smokeless tobacco. He reports that he drinks alcohol. He reports that he does not use drugs.   Family History   Problem Relation Age of Onset   • ALS Mother    • Hypertension Father        Review of Systems  Constitutional: No wt loss, fever, fatigue  Gastrointestinal: No nausea, abdominal pain  Behavioral/Psych: No insomnia or anxiety   Cardiovascular no chest pains or tightness in chest  Objective:       Physical Exam           Physical Exam  /68   Pulse 102   Ht 182.9 cm (72\")   Wt (!) 137 kg (303 lb)   BMI 41.09 kg/m²     General appearance: NAD, conversant "   Eyes: anicteric sclerae, moist conjunctivae; no lid-lag; PERRLA   HENT: Atraumatic; oropharynx clear with moist mucous membranes and no mucosal ulcerations;  normal hard and soft palate   Neck: Trachea midline; FROM, supple, no thyromegaly or lymphadenopathy   Lungs: CTA, with normal respiratory effort and no intercostal retractions   CV: S1-S2 regular, sys murmurs, no rub, no gallop   Abdomen: Soft, non-tender; no masses or HSM   Extremities: No peripheral edema or extremity lymphadenopathy  Skin: Normal temperature, turgor and texture; no rash, ulcers or subcutaneous nodules   Psych: Appropriate affect, alert and oriented to person, place and time             ECG 12 Lead  Date/Time: 1/3/2019 9:38 AM  Performed by: Rosemarie Murray MD  Authorized by: Rosemarie Murray MD   Comparison: compared with previous ECG   Similar to previous ECG  Rhythm: atrial fibrillation  ST Flattening: all  Clinical impression: abnormal ECG              Echocardiogram:        Current Outpatient Medications:   •  allopurinol (ZYLOPRIM) 300 MG tablet, Take 1 tablet by mouth Daily., Disp: 90 tablet, Rfl: 3  •  amLODIPine (NORVASC) 10 MG tablet, Take 1 tablet by mouth Daily., Disp: 90 tablet, Rfl: 3  •  apixaban (ELIQUIS) 5 MG tablet tablet, Take 1 tablet by mouth 2 (Two) Times a Day., Disp: 180 tablet, Rfl: 3  •  benazepril-hydrochlorthiazide (LOTENSIN HCT) 20-12.5 MG per tablet, Take 1 tablet by mouth 2 (Two) Times a Day., Disp: 180 tablet, Rfl: 3  •  chlorthalidone (HYGROTON) 25 MG tablet, Take 1 tablet by mouth Daily., Disp: 90 tablet, Rfl: 3  •  Cholecalciferol (VITAMIN D PO), Take 1 tablet by mouth Daily., Disp: , Rfl:   •  metFORMIN (GLUCOPHAGE) 1000 MG tablet, Take 1 tablet by mouth 2 (Two) Times a Day., Disp: 180 tablet, Rfl: 3  •  metoprolol succinate XL (TOPROL-XL) 50 MG 24 hr tablet, TAKE 1 TABLET BY MOUTH EVERY DAY, Disp: 90 tablet, Rfl: 1  •  rosuvastatin (CRESTOR) 10 MG tablet, Take 10 mg by mouth Daily., Disp: ,  Rfl:    Assessment:        Patient Active Problem List   Diagnosis   • Benign non-nodular prostatic hyperplasia without lower urinary tract symptoms   • Diabetes mellitus type 2, controlled, without complications (CMS/HCC)   • Vitamin D deficiency   • Hyperuricemia   • Low testosterone   • Dyslipidemia   • Hypertension   • Acanthosis nigricans   • Gout   • Diabetes mellitus (CMS/HCC)   • Impotence of organic origin   • Hypogonadism in male   • Morbid obesity (CMS/HCC)   • Diverticulitis of large intestine without perforation or abscess without bleeding   • Abdominal pain   • A-fib (CMS/[facility])   • Nonrheumatic aortic valve stenosis   • Non-rheumatic mitral regurgitation   • Anticoagulated               Plan:            ICD-10-CM ICD-9-CM   1. Chronic atrial fibrillation (CMS/HCC) I48.2 427.31   2. Essential hypertension I10 401.9   3. Nonrheumatic aortic valve stenosis I35.0 424.1   4. Non-rheumatic mitral regurgitation I34.0 424.0   5. Anticoagulated Z79.01 V58.61   6. Class 1 obesity due to excess calories without serious comorbidity with body mass index (BMI) of 30.0 to 30.9 in adult E66.09 278.00    Z68.30 V85.30   7. Alcohol abuse F10.10 305.00     1. Chronic atrial fibrillation (CMS/[facility])  Atrial fibrillation rate controlled    2. Essential hypertension  Blood pressure controlled    3. Nonrheumatic aortic valve stenosis  Moderate to be treated medically    4. Non-rheumatic mitral regurgitation  Moderate to be treated medically    5. Anticoagulated  Counseling has been done    6. Class 1 obesity due to excess calories without serious comorbidity with body mass index (BMI) of 30.0 to 30.9 in adult  No chest pain    7. Alcohol abuse      Total Cholesterol 0 - 200 mg/dL 118    Triglycerides 0 - 150 mg/dL 73    HDL Cholesterol 40 - 60 mg/dL 50    VLDL Cholesterol 5 - 40 mg/dL 14.6    LDL Cholesterol  0 - 100 mg/dL 53      See in 1 yr  COUNSELING:    Agustín Restrepo was given to patient for the following  topics: diagnostic results, risk factor reductions, impressions, risks and benefits of treatment options and importance of treatment compliance .       SMOKING COUNSELING:    Counseling given: Not Answered      Dictated using Dragon dictation

## 2019-07-02 RX ORDER — APIXABAN 5 MG/1
TABLET, FILM COATED ORAL
Qty: 180 TABLET | Refills: 3 | Status: SHIPPED | OUTPATIENT
Start: 2019-07-02 | End: 2019-11-28

## 2019-10-01 DIAGNOSIS — I10 ESSENTIAL HYPERTENSION: ICD-10-CM

## 2019-10-01 RX ORDER — AMLODIPINE BESYLATE 10 MG/1
TABLET ORAL
Qty: 90 TABLET | Refills: 3 | OUTPATIENT
Start: 2019-10-01

## 2019-10-17 DIAGNOSIS — I10 ESSENTIAL HYPERTENSION: ICD-10-CM

## 2019-10-17 RX ORDER — AMLODIPINE BESYLATE 10 MG/1
TABLET ORAL
Qty: 90 TABLET | Refills: 3 | OUTPATIENT
Start: 2019-10-17

## 2019-11-07 ENCOUNTER — OFFICE VISIT (OUTPATIENT)
Dept: ENDOCRINOLOGY | Age: 74
End: 2019-11-07

## 2019-11-07 VITALS
BODY MASS INDEX: 38.74 KG/M2 | DIASTOLIC BLOOD PRESSURE: 70 MMHG | HEIGHT: 72 IN | WEIGHT: 286 LBS | SYSTOLIC BLOOD PRESSURE: 122 MMHG

## 2019-11-07 DIAGNOSIS — R06.2 WHEEZES: ICD-10-CM

## 2019-11-07 DIAGNOSIS — R79.89 LOW TESTOSTERONE: ICD-10-CM

## 2019-11-07 DIAGNOSIS — E79.0 HYPERURICEMIA: ICD-10-CM

## 2019-11-07 DIAGNOSIS — I10 ESSENTIAL HYPERTENSION: ICD-10-CM

## 2019-11-07 DIAGNOSIS — E29.1 HYPOGONADISM IN MALE: ICD-10-CM

## 2019-11-07 DIAGNOSIS — E55.9 VITAMIN D DEFICIENCY: ICD-10-CM

## 2019-11-07 DIAGNOSIS — T78.3XXA ANGIOEDEMA, INITIAL ENCOUNTER: ICD-10-CM

## 2019-11-07 DIAGNOSIS — E11.9 CONTROLLED TYPE 2 DIABETES MELLITUS WITHOUT COMPLICATION, UNSPECIFIED WHETHER LONG TERM INSULIN USE (HCC): Primary | ICD-10-CM

## 2019-11-07 DIAGNOSIS — E78.5 DYSLIPIDEMIA: ICD-10-CM

## 2019-11-07 PROCEDURE — 99214 OFFICE O/P EST MOD 30 MIN: CPT | Performed by: NURSE PRACTITIONER

## 2019-11-07 RX ORDER — AMLODIPINE BESYLATE 10 MG/1
10 TABLET ORAL DAILY
Qty: 90 TABLET | Refills: 1 | Status: SHIPPED | OUTPATIENT
Start: 2019-11-07 | End: 2020-05-07 | Stop reason: ALTCHOICE

## 2019-11-07 RX ORDER — ALBUTEROL SULFATE 90 UG/1
2 AEROSOL, METERED RESPIRATORY (INHALATION) EVERY 4 HOURS PRN
Qty: 8 G | Refills: 1 | Status: SHIPPED | OUTPATIENT
Start: 2019-11-07 | End: 2019-12-23

## 2019-11-07 RX ORDER — ATORVASTATIN CALCIUM 20 MG/1
20 TABLET, FILM COATED ORAL DAILY
Qty: 90 TABLET | Refills: 1 | Status: SHIPPED | OUTPATIENT
Start: 2019-11-07 | End: 2020-01-06 | Stop reason: SDUPTHER

## 2019-11-07 RX ORDER — ATORVASTATIN CALCIUM 20 MG/1
20 TABLET, FILM COATED ORAL DAILY
COMMUNITY
End: 2019-11-07 | Stop reason: SDUPTHER

## 2019-11-07 RX ORDER — CHLORTHALIDONE 25 MG/1
25 TABLET ORAL DAILY
Qty: 90 TABLET | Refills: 1 | Status: SHIPPED | OUTPATIENT
Start: 2019-11-07 | End: 2019-12-23

## 2019-11-07 RX ORDER — METHYLPREDNISOLONE 4 MG/1
TABLET ORAL
Qty: 21 TABLET | Refills: 0 | Status: SHIPPED | OUTPATIENT
Start: 2019-11-07 | End: 2019-11-28

## 2019-11-07 RX ORDER — PROPRANOLOL HYDROCHLORIDE 10 MG/1
10 TABLET ORAL 2 TIMES DAILY
COMMUNITY
End: 2019-11-07

## 2019-11-07 RX ORDER — METOPROLOL SUCCINATE 50 MG/1
50 TABLET, EXTENDED RELEASE ORAL DAILY
Qty: 90 TABLET | Refills: 1 | Status: ON HOLD | OUTPATIENT
Start: 2019-11-07 | End: 2019-11-28

## 2019-11-07 RX ORDER — ALLOPURINOL 300 MG/1
300 TABLET ORAL DAILY
Qty: 90 TABLET | Refills: 3 | Status: SHIPPED | OUTPATIENT
Start: 2019-11-07 | End: 2020-01-06 | Stop reason: SDUPTHER

## 2019-11-07 NOTE — PATIENT INSTRUCTIONS
Stop benazepril due to lip swelling  Stop propranolol as this is duplicate therapy  Take steroid tabs as directed  proventil inhaler 2 puffs every 4-6 hours for wheezes  mucinex otc   Monitor bp  Sign up for mychart

## 2019-11-07 NOTE — PROGRESS NOTES
"Andria Willett is a 74 y.o. male is here today for follow-up.  Chief Complaint   Patient presents with   • Diabetes     no recent labs, pt is not testing   • Hyperlipidemia     swollen lips, itching rash on ankles    • Hypertension   • Hypogonadism   • Vitamin D Deficiency     /70   Ht 182.9 cm (72.01\")   Wt 130 kg (286 lb)   BMI 38.78 kg/m²   Current Outpatient Medications on File Prior to Visit   Medication Sig   • Cholecalciferol (VITAMIN D PO) Take 1 tablet by mouth Daily.   • ELIQUIS 5 MG tablet tablet TAKE 1 TABLET BY MOUTH TWICE A DAY   • [DISCONTINUED] allopurinol (ZYLOPRIM) 300 MG tablet Take 1 tablet by mouth Daily.   • [DISCONTINUED] amLODIPine (NORVASC) 10 MG tablet Take 1 tablet by mouth Daily.   • [DISCONTINUED] atorvastatin (LIPITOR) 20 MG tablet Take 20 mg by mouth Daily.   • [DISCONTINUED] benazepril-hydrochlorthiazide (LOTENSIN HCT) 20-12.5 MG per tablet Take 1 tablet by mouth 2 (Two) Times a Day.   • [DISCONTINUED] chlorthalidone (HYGROTON) 25 MG tablet Take 1 tablet by mouth Daily.   • [DISCONTINUED] metFORMIN (GLUCOPHAGE) 1000 MG tablet Take 1 tablet by mouth 2 (Two) Times a Day.   • [DISCONTINUED] metoprolol succinate XL (TOPROL-XL) 50 MG 24 hr tablet TAKE 1 TABLET BY MOUTH EVERY DAY   • [DISCONTINUED] propranolol (INDERAL) 10 MG tablet Take 10 mg by mouth 2 (Two) Times a Day.   • [DISCONTINUED] rosuvastatin (CRESTOR) 10 MG tablet Take 10 mg by mouth Daily.     No current facility-administered medications on file prior to visit.      Family History   Problem Relation Age of Onset   • ALS Mother    • Hypertension Father      Social History     Tobacco Use   • Smoking status: Former Smoker     Packs/day: 2.00     Years: 15.00     Pack years: 30.00     Types: Cigarettes     Last attempt to quit: 1970     Years since quittin.8   • Smokeless tobacco: Never Used   Substance Use Topics   • Alcohol use: Yes     Comment: 1 drink weekly   • Drug use: No     Allergies "   Allergen Reactions   • Ace Inhibitors Angioedema         History of Present Illness   Encounter Diagnoses   Name Primary?   • Controlled type 2 diabetes mellitus without complication, unspecified whether long term insulin use (CMS/[facility]) Yes   • Essential hypertension    • Hyperuricemia    • Hypogonadism in male    • Low testosterone    • Vitamin D deficiency    • Wheezes    • Angioedema, initial encounter    • Dyslipidemia      74-year-old male patient here today for a acute/routine follow-up visit.  She presents today with angioedema and upper and bottom lip.  He states this started 3 days ago.  He also is wheezing.  He denies any smoking history.  He has swelling in his lower extremities.  He has had a dry cough.  He has not had recent labs and will have extensive labs on today's visit.  He does not have a primary care doctor that he routine follows up with but states he has seen Dr. Shah in the past but was unable to get in.  He is been advised that he needs to establish a primary care provider.  We discussed stopping the benazepril due to angioedema.  He will be treated for his wheezes and shortness of breath at today's visit.  Patient is not testing his blood sugars.  His medication list was reviewed.  He is on 2 different beta-blockers.  He is been advised to determine which medication he needs to be on.  He thinks that he was supposed to stop the propranolol.  He states he is currently taking the metoprolol he thinks.  He is a poor historian regarding his medications.    The following portions of the patient's history were reviewed and updated as appropriate: allergies, current medications, past family history, past medical history, past social history, past surgical history and problem list.    Review of Systems   Constitutional: Negative.    HENT: Negative.    Eyes: Negative.    Respiratory: Negative.    Cardiovascular: Negative.    Gastrointestinal: Negative.    Endocrine: Negative.    Genitourinary:  Negative.    Musculoskeletal: Negative.    Skin: Negative.    Allergic/Immunologic: Negative.    Neurological: Negative.    Hematological: Negative.    Psychiatric/Behavioral: Negative.        Objective   Physical Exam   Constitutional: He is oriented to person, place, and time. He appears well-developed and well-nourished. No distress.   HENT:   Head: Normocephalic and atraumatic.   Right Ear: External ear normal.   Left Ear: External ear normal.   Nose: Nose normal.   Mouth/Throat: Oropharynx is clear and moist. No oropharyngeal exudate.   Eyes: Conjunctivae and EOM are normal. Pupils are equal, round, and reactive to light. Right eye exhibits no discharge. Left eye exhibits no discharge. No scleral icterus.   Neck: Normal range of motion. Neck supple. No JVD present. No tracheal deviation present. No thyromegaly present.   Cardiovascular: Normal rate, regular rhythm, normal heart sounds and intact distal pulses. Exam reveals no gallop and no friction rub.   No murmur heard.  Pulmonary/Chest: No stridor. He is in respiratory distress. He has wheezes. He has no rales. He exhibits no tenderness.   sob   Abdominal: Soft. Bowel sounds are normal. He exhibits no distension and no mass. There is no tenderness. There is no rebound and no guarding. No hernia.   Musculoskeletal: Normal range of motion. He exhibits no edema, tenderness or deformity.   Lymphadenopathy:     He has no cervical adenopathy.   Neurological: He is alert and oriented to person, place, and time. He has normal reflexes. He displays normal reflexes. No cranial nerve deficit. He exhibits normal muscle tone. Coordination normal.   Skin: Skin is warm and dry. No rash noted. He is not diaphoretic. No erythema. No pallor.   Psychiatric: He has a normal mood and affect. His behavior is normal. Judgment and thought content normal.   Nursing note and vitals reviewed.    Results for orders placed or performed in visit on 11/12/18   Uric Acid   Result Value Ref  Range    Uric Acid 6.6 3.4 - 7.0 mg/dL   TestT+TestF+SHBG   Result Value Ref Range    Testosterone, Total 162 (L) 264 - 916 ng/dL    Testosterone, Free 5.0 (L) 6.6 - 18.1 pg/mL    Sex Hormone Binding Globulin 35.2 19.3 - 76.4 nmol/L   Comprehensive Metabolic Panel   Result Value Ref Range    Glucose 90 65 - 99 mg/dL    BUN 29 (H) 8 - 23 mg/dL    Creatinine 1.35 (H) 0.76 - 1.27 mg/dL    eGFR Non African Am 52 (L) >60 mL/min/1.73    eGFR African Am 63 >60 mL/min/1.73    BUN/Creatinine Ratio 21.5 7.0 - 25.0    Sodium 144 136 - 145 mmol/L    Potassium 5.0 3.5 - 5.2 mmol/L    Chloride 105 98 - 107 mmol/L    Total CO2 27.6 22.0 - 29.0 mmol/L    Calcium 9.8 8.6 - 10.5 mg/dL    Total Protein 7.3 6.0 - 8.5 g/dL    Albumin 4.40 3.50 - 5.20 g/dL    Globulin 2.9 gm/dL    A/G Ratio 1.5 g/dL    Total Bilirubin 0.5 0.1 - 1.2 mg/dL    Alkaline Phosphatase 50 39 - 117 U/L    AST (SGOT) 15 1 - 40 U/L    ALT (SGPT) 18 1 - 41 U/L   Lipid Panel   Result Value Ref Range    Total Cholesterol 118 0 - 200 mg/dL    Triglycerides 73 0 - 150 mg/dL    HDL Cholesterol 50 40 - 60 mg/dL    VLDL Cholesterol 14.6 5 - 40 mg/dL    LDL Cholesterol  53 0 - 100 mg/dL   Hemoglobin & Hematocrit, Blood   Result Value Ref Range    Hemoglobin 13.1 (L) 13.7 - 17.6 g/dL    Hematocrit 43.3 40.4 - 52.2 %   Hemoglobin A1c   Result Value Ref Range    Hemoglobin A1C 5.70 (H) 4.80 - 5.60 %   C-Peptide   Result Value Ref Range    C-Peptide 2.2 1.1 - 4.4 ng/mL   PSA DIAGNOSTIC   Result Value Ref Range    PSA 0.800 0.000 - 4.000 ng/mL   Vitamin D 25 Hydroxy   Result Value Ref Range    25 Hydroxy, Vitamin D 48.1 30.0 - 100.0 ng/ml   MicroAlbumin, Urine, Random -   Result Value Ref Range    Microalbumin, Urine 15.0 Not Estab. ug/mL   Cardiovascular Risk Assessment   Result Value Ref Range    Interpretation Note    Diabetes Patient Education   Result Value Ref Range    PDF Image Not applicable          Assessment/Plan   Problems Addressed this Visit        Cardiovascular  and Mediastinum    Hypertension    Relevant Medications    amLODIPine (NORVASC) 10 MG tablet    chlorthalidone (HYGROTON) 25 MG tablet    metoprolol succinate XL (TOPROL-XL) 50 MG 24 hr tablet    Other Relevant Orders    Comprehensive Metabolic Panel    CBC (No Diff)    C-Peptide    Hemoglobin A1c    Lipid Panel    MicroAlbumin, Urine, Random - Urine, Clean Catch    Vitamin D 25 Hydroxy    Testosterone (Free & Total), LC / MS       Respiratory    Wheezes       Digestive    Vitamin D deficiency    Relevant Orders    Comprehensive Metabolic Panel    CBC (No Diff)    C-Peptide    Hemoglobin A1c    Lipid Panel    MicroAlbumin, Urine, Random - Urine, Clean Catch    Vitamin D 25 Hydroxy    Testosterone (Free & Total), LC / MS       Endocrine    Diabetes mellitus type 2, controlled, without complications (CMS/HCC) - Primary    Relevant Medications    metFORMIN (GLUCOPHAGE) 1000 MG tablet    Other Relevant Orders    Comprehensive Metabolic Panel    CBC (No Diff)    C-Peptide    Hemoglobin A1c    Lipid Panel    MicroAlbumin, Urine, Random - Urine, Clean Catch    Vitamin D 25 Hydroxy    Testosterone (Free & Total), LC / MS    Hypogonadism in male    Relevant Orders    Comprehensive Metabolic Panel    CBC (No Diff)    C-Peptide    Hemoglobin A1c    Lipid Panel    MicroAlbumin, Urine, Random - Urine, Clean Catch    Vitamin D 25 Hydroxy    Testosterone (Free & Total), LC / MS       Other    Hyperuricemia    Relevant Orders    Comprehensive Metabolic Panel    CBC (No Diff)    C-Peptide    Hemoglobin A1c    Lipid Panel    MicroAlbumin, Urine, Random - Urine, Clean Catch    Vitamin D 25 Hydroxy    Testosterone (Free & Total), LC / MS    Low testosterone    Relevant Orders    Comprehensive Metabolic Panel    CBC (No Diff)    C-Peptide    Hemoglobin A1c    Lipid Panel    MicroAlbumin, Urine, Random - Urine, Clean Catch    Vitamin D 25 Hydroxy    Testosterone (Free & Total), LC / MS    Dyslipidemia    Angioedema        Patient was  seen and examined.  He has wheezes, cough, angioedema.  He will be stopped on the benazepril due to the angioedema.  He will be prescribed a steroid Dosepak as well as albuterol inhaler for his wheezes.  He is to follow-up with the primary care provider however he does not want us to refer him to one.  He has seen Dr. Shah in the past.  He has been advised to get some Mucinex over-the-counter for any congestion.  He will have extensive labs on today's visit and will be notified of the results.  He has been advised to sign up for my chart to communicate.  If his symptoms fail to improve he will need to be referred to an allergist.  His last hemoglobin A1c was in satisfactory range.  He will follow-up in 6 months with dr loaiza or myself with labs prior.  Stop benazepril due to lip swelling  Stop propranolol as this is duplicate therapy  Take steroid tabs as directed  proventil inhaler 2 puffs every 4-6 hours for wheezes  mucinex otc   Sign up for [facility]

## 2019-11-09 LAB
25(OH)D3+25(OH)D2 SERPL-MCNC: 90.1 NG/ML (ref 30–100)
ALBUMIN SERPL-MCNC: 4.1 G/DL (ref 3.5–4.8)
ALBUMIN/GLOB SERPL: 1.5 {RATIO} (ref 1.2–2.2)
ALP SERPL-CCNC: 65 IU/L (ref 39–117)
ALT SERPL-CCNC: 18 IU/L (ref 0–44)
AST SERPL-CCNC: 20 IU/L (ref 0–40)
BILIRUB SERPL-MCNC: 0.4 MG/DL (ref 0–1.2)
BUN SERPL-MCNC: 19 MG/DL (ref 8–27)
BUN/CREAT SERPL: 16 (ref 10–24)
C PEPTIDE SERPL-MCNC: 3.7 NG/ML (ref 1.1–4.4)
CALCIUM SERPL-MCNC: 9.6 MG/DL (ref 8.6–10.2)
CHLORIDE SERPL-SCNC: 98 MMOL/L (ref 96–106)
CHOLEST SERPL-MCNC: 104 MG/DL (ref 100–199)
CO2 SERPL-SCNC: 28 MMOL/L (ref 20–29)
CREAT SERPL-MCNC: 1.21 MG/DL (ref 0.76–1.27)
ERYTHROCYTE [DISTWIDTH] IN BLOOD BY AUTOMATED COUNT: 15.3 % (ref 12.3–15.4)
GLOBULIN SER CALC-MCNC: 2.7 G/DL (ref 1.5–4.5)
GLUCOSE SERPL-MCNC: 103 MG/DL (ref 65–99)
HBA1C MFR BLD: 5.8 % (ref 4.8–5.6)
HCT VFR BLD AUTO: 39.4 % (ref 37.5–51)
HDLC SERPL-MCNC: 28 MG/DL
HGB BLD-MCNC: 13.2 G/DL (ref 13–17.7)
INTERPRETATION: NORMAL
INTERPRETATION: NORMAL
LDLC SERPL CALC-MCNC: 47 MG/DL (ref 0–99)
Lab: NORMAL
Lab: NORMAL
MCH RBC QN AUTO: 29.9 PG (ref 26.6–33)
MCHC RBC AUTO-ENTMCNC: 33.5 G/DL (ref 31.5–35.7)
MCV RBC AUTO: 89 FL (ref 79–97)
MICROALBUMIN UR-MCNC: 115.1 UG/ML
PLATELET # BLD AUTO: 197 X10E3/UL (ref 150–450)
POTASSIUM SERPL-SCNC: 4.5 MMOL/L (ref 3.5–5.2)
PROT SERPL-MCNC: 6.8 G/DL (ref 6–8.5)
RBC # BLD AUTO: 4.42 X10E6/UL (ref 4.14–5.8)
SODIUM SERPL-SCNC: 141 MMOL/L (ref 134–144)
TESTOST FREE SERPL-MCNC: 5.3 PG/ML (ref 6.6–18.1)
TESTOST SERPL-MCNC: 200.1 NG/DL (ref 264–916)
TRIGL SERPL-MCNC: 143 MG/DL (ref 0–149)
VLDLC SERPL CALC-MCNC: 29 MG/DL (ref 5–40)
WBC # BLD AUTO: 11.5 X10E3/UL (ref 3.4–10.8)

## 2019-11-11 ENCOUNTER — TELEPHONE (OUTPATIENT)
Dept: ENDOCRINOLOGY | Age: 74
End: 2019-11-11

## 2019-11-11 RX ORDER — AMOXICILLIN 500 MG/1
500 CAPSULE ORAL 2 TIMES DAILY
Qty: 20 CAPSULE | Refills: 0 | Status: SHIPPED | OUTPATIENT
Start: 2019-11-11 | End: 2019-11-28

## 2019-11-11 NOTE — TELEPHONE ENCOUNTER
Patient notified.     ----- Message from GLEN Ruiz sent at 11/11/2019  8:47 AM EST -----  Let pt know I sent in an antibiotic to his pharmacy if he is still having URI symptoms

## 2019-11-28 ENCOUNTER — HOSPITAL ENCOUNTER (INPATIENT)
Facility: HOSPITAL | Age: 74
LOS: 5 days | Discharge: HOME | End: 2019-12-03
Attending: EMERGENCY MEDICINE | Admitting: INTERNAL MEDICINE

## 2019-11-28 ENCOUNTER — APPOINTMENT (OUTPATIENT)
Dept: GENERAL RADIOLOGY | Facility: HOSPITAL | Age: 74
End: 2019-11-28

## 2019-11-28 DIAGNOSIS — R19.7 DIARRHEA, UNSPECIFIED TYPE: ICD-10-CM

## 2019-11-28 DIAGNOSIS — A41.9 SEVERE SEPSIS (HCC): Primary | ICD-10-CM

## 2019-11-28 DIAGNOSIS — I95.9 HYPOTENSION, UNSPECIFIED HYPOTENSION TYPE: ICD-10-CM

## 2019-11-28 DIAGNOSIS — R65.20 SEVERE SEPSIS (HCC): Primary | ICD-10-CM

## 2019-11-28 DIAGNOSIS — N17.9 ACUTE RENAL FAILURE, UNSPECIFIED ACUTE RENAL FAILURE TYPE (HCC): ICD-10-CM

## 2019-11-28 PROBLEM — R57.1 HYPOVOLEMIC SHOCK: Status: ACTIVE | Noted: 2019-11-28

## 2019-11-28 PROBLEM — E86.0 SEVERE DEHYDRATION: Status: ACTIVE | Noted: 2019-11-28

## 2019-11-28 LAB
ALBUMIN SERPL-MCNC: 3 G/DL (ref 3.5–5.2)
ALBUMIN/GLOB SERPL: 0.7 G/DL
ALP SERPL-CCNC: 60 U/L (ref 39–117)
ALT SERPL W P-5'-P-CCNC: 8 U/L (ref 1–41)
ANION GAP SERPL CALCULATED.3IONS-SCNC: 15 MMOL/L (ref 5–15)
ANION GAP SERPL CALCULATED.3IONS-SCNC: 15 MMOL/L (ref 5–15)
APTT PPP: 47.9 SECONDS (ref 24–31)
ARTERIAL PATENCY WRIST A: POSITIVE
AST SERPL-CCNC: 11 U/L (ref 1–40)
ATMOSPHERIC PRESS: ABNORMAL MM[HG]
BACTERIA UR QL AUTO: ABNORMAL /HPF
BASE EXCESS BLDA CALC-SCNC: -3.1 MMOL/L (ref 0–3)
BASOPHILS # BLD AUTO: 0 10*3/MM3 (ref 0–0.2)
BASOPHILS NFR BLD AUTO: 0.1 % (ref 0–1.5)
BDY SITE: ABNORMAL
BILIRUB SERPL-MCNC: 0.3 MG/DL (ref 0.2–1.2)
BILIRUB UR QL STRIP: ABNORMAL
BUN BLD-MCNC: 50 MG/DL (ref 8–23)
BUN BLD-MCNC: 50 MG/DL (ref 8–23)
BUN/CREAT SERPL: 10.4 (ref 7–25)
BUN/CREAT SERPL: 9.8 (ref 7–25)
CA-I SERPL ISE-MCNC: 1.18 MMOL/L (ref 1.2–1.3)
CALCIUM SPEC-SCNC: 8.3 MG/DL (ref 8.6–10.5)
CALCIUM SPEC-SCNC: 9.4 MG/DL (ref 8.6–10.5)
CHLORIDE SERPL-SCNC: 91 MMOL/L (ref 98–107)
CHLORIDE SERPL-SCNC: 96 MMOL/L (ref 98–107)
CLARITY UR: ABNORMAL
CO2 BLDA-SCNC: 23.4 MMOL/L (ref 22–29)
CO2 SERPL-SCNC: 20 MMOL/L (ref 22–29)
CO2 SERPL-SCNC: 24 MMOL/L (ref 22–29)
COLOR UR: ABNORMAL
CREAT BLD-MCNC: 4.83 MG/DL (ref 0.76–1.27)
CREAT BLD-MCNC: 5.09 MG/DL (ref 0.76–1.27)
CRP SERPL-MCNC: 22.44 MG/DL (ref 0–0.5)
D-LACTATE SERPL-SCNC: 0.8 MMOL/L (ref 0.5–2)
D-LACTATE SERPL-SCNC: 1 MMOL/L (ref 0.5–2)
D-LACTATE SERPL-SCNC: 1 MMOL/L (ref 0.5–2)
DEPRECATED RDW RBC AUTO: 47.3 FL (ref 37–54)
EOSINOPHIL # BLD AUTO: 3 10*3/MM3 (ref 0–0.4)
EOSINOPHIL NFR BLD AUTO: 23.9 % (ref 0.3–6.2)
ERYTHROCYTE [DISTWIDTH] IN BLOOD BY AUTOMATED COUNT: 14.7 % (ref 12.3–15.4)
GFR SERPL CREATININE-BSD FRML MDRD: 11 ML/MIN/1.73
GFR SERPL CREATININE-BSD FRML MDRD: 12 ML/MIN/1.73
GFR SERPL CREATININE-BSD FRML MDRD: ABNORMAL ML/MIN/{1.73_M2}
GFR SERPL CREATININE-BSD FRML MDRD: ABNORMAL ML/MIN/{1.73_M2}
GLOBULIN UR ELPH-MCNC: 4.1 GM/DL
GLUCOSE BLD-MCNC: 108 MG/DL (ref 65–99)
GLUCOSE BLD-MCNC: 122 MG/DL (ref 65–99)
GLUCOSE UR STRIP-MCNC: NEGATIVE MG/DL
HCO3 BLDA-SCNC: 22.2 MMOL/L (ref 21–28)
HCT VFR BLD AUTO: 39.8 % (ref 37.5–51)
HEMODILUTION: NO
HGB BLD-MCNC: 13 G/DL (ref 13–17.7)
HGB UR QL STRIP.AUTO: NEGATIVE
HOLD SPECIMEN: NORMAL
HOROWITZ INDEX BLD+IHG-RTO: 24 %
HYALINE CASTS UR QL AUTO: ABNORMAL /LPF
INR PPP: 1.37 (ref 0.9–1.1)
KETONES UR QL STRIP: ABNORMAL
LEUKOCYTE ESTERASE UR QL STRIP.AUTO: ABNORMAL
LYMPHOCYTES # BLD AUTO: 0.4 10*3/MM3 (ref 0.7–3.1)
LYMPHOCYTES NFR BLD AUTO: 3.4 % (ref 19.6–45.3)
MAGNESIUM SERPL-MCNC: 1.6 MG/DL (ref 1.6–2.4)
MCH RBC QN AUTO: 29.9 PG (ref 26.6–33)
MCHC RBC AUTO-ENTMCNC: 32.6 G/DL (ref 31.5–35.7)
MCV RBC AUTO: 91.9 FL (ref 79–97)
MODALITY: ABNORMAL
MONOCYTES # BLD AUTO: 1.1 10*3/MM3 (ref 0.1–0.9)
MONOCYTES NFR BLD AUTO: 8.5 % (ref 5–12)
NEUTROPHILS # BLD AUTO: 8 10*3/MM3 (ref 1.7–7)
NEUTROPHILS NFR BLD AUTO: 64.1 % (ref 42.7–76)
NITRITE UR QL STRIP: NEGATIVE
NRBC BLD AUTO-RTO: 0 /100 WBC (ref 0–0.2)
PCO2 BLDA: 39.5 MM HG (ref 35–48)
PH BLDA: 7.36 PH UNITS (ref 7.35–7.45)
PH UR STRIP.AUTO: <=5 [PH] (ref 5–8)
PHOSPHATE SERPL-MCNC: 6.1 MG/DL (ref 2.5–4.5)
PLATELET # BLD AUTO: 243 10*3/MM3 (ref 140–450)
PMV BLD AUTO: 8.3 FL (ref 6–12)
PO2 BLDA: 76 MM HG (ref 83–108)
POTASSIUM BLD-SCNC: 4.2 MMOL/L (ref 3.5–5.2)
POTASSIUM BLD-SCNC: 4.4 MMOL/L (ref 3.5–5.2)
PROT SERPL-MCNC: 7.1 G/DL (ref 6–8.5)
PROT UR QL STRIP: ABNORMAL
PROTHROMBIN TIME: 13.7 SECONDS (ref 9.6–11.7)
RBC # BLD AUTO: 4.33 10*6/MM3 (ref 4.14–5.8)
RBC # UR: ABNORMAL /HPF
REF LAB TEST METHOD: ABNORMAL
SAO2 % BLDCOA: 94.5 % (ref 94–98)
SODIUM BLD-SCNC: 130 MMOL/L (ref 136–145)
SODIUM BLD-SCNC: 131 MMOL/L (ref 136–145)
SP GR UR STRIP: >=1.03 (ref 1–1.03)
SQUAMOUS #/AREA URNS HPF: ABNORMAL /HPF
UROBILINOGEN UR QL STRIP: ABNORMAL
WBC NRBC COR # BLD: 12.5 10*3/MM3 (ref 3.4–10.8)
WBC UR QL AUTO: ABNORMAL /HPF
WHOLE BLOOD HOLD SPECIMEN: NORMAL
WHOLE BLOOD HOLD SPECIMEN: NORMAL
YEAST URNS QL MICRO: ABNORMAL /HPF

## 2019-11-28 PROCEDURE — 87081 CULTURE SCREEN ONLY: CPT | Performed by: NURSE PRACTITIONER

## 2019-11-28 PROCEDURE — 82803 BLOOD GASES ANY COMBINATION: CPT

## 2019-11-28 PROCEDURE — 81001 URINALYSIS AUTO W/SCOPE: CPT | Performed by: EMERGENCY MEDICINE

## 2019-11-28 PROCEDURE — 99285 EMERGENCY DEPT VISIT HI MDM: CPT

## 2019-11-28 PROCEDURE — 25010000002 PIPERACILLIN SOD-TAZOBACTAM PER 1 G: Performed by: EMERGENCY MEDICINE

## 2019-11-28 PROCEDURE — 83735 ASSAY OF MAGNESIUM: CPT | Performed by: EMERGENCY MEDICINE

## 2019-11-28 PROCEDURE — 82330 ASSAY OF CALCIUM: CPT | Performed by: EMERGENCY MEDICINE

## 2019-11-28 PROCEDURE — 83605 ASSAY OF LACTIC ACID: CPT

## 2019-11-28 PROCEDURE — 80053 COMPREHEN METABOLIC PANEL: CPT | Performed by: EMERGENCY MEDICINE

## 2019-11-28 PROCEDURE — 84100 ASSAY OF PHOSPHORUS: CPT | Performed by: EMERGENCY MEDICINE

## 2019-11-28 PROCEDURE — 87640 STAPH A DNA AMP PROBE: CPT | Performed by: EMERGENCY MEDICINE

## 2019-11-28 PROCEDURE — 87641 MR-STAPH DNA AMP PROBE: CPT | Performed by: EMERGENCY MEDICINE

## 2019-11-28 PROCEDURE — 87147 CULTURE TYPE IMMUNOLOGIC: CPT | Performed by: EMERGENCY MEDICINE

## 2019-11-28 PROCEDURE — 87040 BLOOD CULTURE FOR BACTERIA: CPT | Performed by: EMERGENCY MEDICINE

## 2019-11-28 PROCEDURE — 74018 RADEX ABDOMEN 1 VIEW: CPT

## 2019-11-28 PROCEDURE — 25010000002 MAGNESIUM SULFATE 2 GM/50ML SOLUTION: Performed by: NURSE PRACTITIONER

## 2019-11-28 PROCEDURE — 25010000002 CALCIUM GLUCONATE-NACL 1-0.675 GM/50ML-% SOLUTION: Performed by: NURSE PRACTITIONER

## 2019-11-28 PROCEDURE — 86140 C-REACTIVE PROTEIN: CPT | Performed by: EMERGENCY MEDICINE

## 2019-11-28 PROCEDURE — 93005 ELECTROCARDIOGRAM TRACING: CPT | Performed by: EMERGENCY MEDICINE

## 2019-11-28 PROCEDURE — 85025 COMPLETE CBC W/AUTO DIFF WBC: CPT | Performed by: EMERGENCY MEDICINE

## 2019-11-28 PROCEDURE — 83605 ASSAY OF LACTIC ACID: CPT | Performed by: NURSE PRACTITIONER

## 2019-11-28 PROCEDURE — 87449 NOS EACH ORGANISM AG IA: CPT | Performed by: EMERGENCY MEDICINE

## 2019-11-28 PROCEDURE — 87324 CLOSTRIDIUM AG IA: CPT | Performed by: EMERGENCY MEDICINE

## 2019-11-28 PROCEDURE — 36600 WITHDRAWAL OF ARTERIAL BLOOD: CPT

## 2019-11-28 PROCEDURE — P9612 CATHETERIZE FOR URINE SPEC: HCPCS

## 2019-11-28 PROCEDURE — 71045 X-RAY EXAM CHEST 1 VIEW: CPT

## 2019-11-28 PROCEDURE — 85730 THROMBOPLASTIN TIME PARTIAL: CPT | Performed by: EMERGENCY MEDICINE

## 2019-11-28 PROCEDURE — 85610 PROTHROMBIN TIME: CPT | Performed by: EMERGENCY MEDICINE

## 2019-11-28 RX ORDER — PROPRANOLOL HYDROCHLORIDE 10 MG/1
10 TABLET ORAL DAILY
COMMUNITY
End: 2020-01-06 | Stop reason: SDUPTHER

## 2019-11-28 RX ORDER — FLUCONAZOLE 100 MG/1
100 TABLET ORAL DAILY
COMMUNITY
End: 2019-12-23

## 2019-11-28 RX ORDER — HEPARIN SODIUM 5000 [USP'U]/ML
5000 INJECTION, SOLUTION INTRAVENOUS; SUBCUTANEOUS EVERY 8 HOURS SCHEDULED
Status: DISCONTINUED | OUTPATIENT
Start: 2019-11-28 | End: 2019-11-28

## 2019-11-28 RX ORDER — ONDANSETRON 2 MG/ML
4 INJECTION INTRAMUSCULAR; INTRAVENOUS EVERY 6 HOURS PRN
Status: DISCONTINUED | OUTPATIENT
Start: 2019-11-28 | End: 2019-12-03 | Stop reason: HOSPADM

## 2019-11-28 RX ORDER — IPRATROPIUM BROMIDE AND ALBUTEROL SULFATE 2.5; .5 MG/3ML; MG/3ML
3 SOLUTION RESPIRATORY (INHALATION) EVERY 6 HOURS PRN
Status: DISCONTINUED | OUTPATIENT
Start: 2019-11-28 | End: 2019-12-03 | Stop reason: HOSPADM

## 2019-11-28 RX ORDER — SODIUM CHLORIDE 0.9 % (FLUSH) 0.9 %
10 SYRINGE (ML) INJECTION AS NEEDED
Status: DISCONTINUED | OUTPATIENT
Start: 2019-11-28 | End: 2019-12-03 | Stop reason: HOSPADM

## 2019-11-28 RX ORDER — METHYLPREDNISOLONE SODIUM SUCCINATE 40 MG/ML
40 INJECTION, POWDER, LYOPHILIZED, FOR SOLUTION INTRAMUSCULAR; INTRAVENOUS ONCE
Status: COMPLETED | OUTPATIENT
Start: 2019-11-29 | End: 2019-11-29

## 2019-11-28 RX ORDER — SODIUM CHLORIDE 0.9 % (FLUSH) 0.9 %
10 SYRINGE (ML) INJECTION AS NEEDED
Status: CANCELLED | OUTPATIENT
Start: 2019-11-28

## 2019-11-28 RX ORDER — SODIUM CHLORIDE 0.9 % (FLUSH) 0.9 %
20 SYRINGE (ML) INJECTION AS NEEDED
Status: CANCELLED | OUTPATIENT
Start: 2019-11-28

## 2019-11-28 RX ORDER — DIPHENHYDRAMINE HYDROCHLORIDE 50 MG/ML
12.5 INJECTION INTRAMUSCULAR; INTRAVENOUS ONCE
Status: COMPLETED | OUTPATIENT
Start: 2019-11-29 | End: 2019-11-29

## 2019-11-28 RX ORDER — SODIUM CHLORIDE 0.9 % (FLUSH) 0.9 %
10 SYRINGE (ML) INJECTION EVERY 12 HOURS SCHEDULED
Status: CANCELLED | OUTPATIENT
Start: 2019-11-28

## 2019-11-28 RX ORDER — BENAZEPRIL HYDROCHLORIDE AND HYDROCHLOROTHIAZIDE 20; 12.5 MG/1; MG/1
1 TABLET ORAL 2 TIMES DAILY
COMMUNITY
End: 2019-12-23

## 2019-11-28 RX ORDER — MAGNESIUM SULFATE HEPTAHYDRATE 40 MG/ML
2 INJECTION, SOLUTION INTRAVENOUS ONCE
Status: COMPLETED | OUTPATIENT
Start: 2019-11-28 | End: 2019-11-28

## 2019-11-28 RX ORDER — SODIUM CHLORIDE 0.9 % (FLUSH) 0.9 %
10 SYRINGE (ML) INJECTION EVERY 12 HOURS SCHEDULED
Status: DISCONTINUED | OUTPATIENT
Start: 2019-11-28 | End: 2019-12-03 | Stop reason: HOSPADM

## 2019-11-28 RX ORDER — CALCIUM GLUCONATE 20 MG/ML
1 INJECTION, SOLUTION INTRAVENOUS ONCE
Status: COMPLETED | OUTPATIENT
Start: 2019-11-29 | End: 2019-11-29

## 2019-11-28 RX ORDER — FAMOTIDINE 10 MG/ML
20 INJECTION, SOLUTION INTRAVENOUS EVERY 12 HOURS SCHEDULED
Status: DISCONTINUED | OUTPATIENT
Start: 2019-11-28 | End: 2019-11-29

## 2019-11-28 RX ORDER — NOREPINEPHRINE BIT/0.9 % NACL 8 MG/250ML
.02-.3 INFUSION BOTTLE (ML) INTRAVENOUS
Status: DISCONTINUED | OUTPATIENT
Start: 2019-11-28 | End: 2019-12-02

## 2019-11-28 RX ORDER — SODIUM CHLORIDE 9 MG/ML
125 INJECTION, SOLUTION INTRAVENOUS CONTINUOUS
Status: DISCONTINUED | OUTPATIENT
Start: 2019-11-28 | End: 2019-11-30

## 2019-11-28 RX ORDER — CALCIUM GLUCONATE 20 MG/ML
1 INJECTION, SOLUTION INTRAVENOUS ONCE
Status: COMPLETED | OUTPATIENT
Start: 2019-11-28 | End: 2019-11-28

## 2019-11-28 RX ADMIN — SODIUM CHLORIDE 1000 ML: 0.9 INJECTION, SOLUTION INTRAVENOUS at 14:19

## 2019-11-28 RX ADMIN — PIPERACILLIN AND TAZOBACTAM 3.38 G: 3; .375 INJECTION, POWDER, LYOPHILIZED, FOR SOLUTION INTRAVENOUS at 14:05

## 2019-11-28 RX ADMIN — SODIUM CHLORIDE 2328 ML: 900 INJECTION, SOLUTION INTRAVENOUS at 12:56

## 2019-11-28 RX ADMIN — CALCIUM GLUCONATE 1 G: 20 INJECTION, SOLUTION INTRAVENOUS at 14:18

## 2019-11-28 RX ADMIN — MAGNESIUM SULFATE HEPTAHYDRATE 2 G: 40 INJECTION, SOLUTION INTRAVENOUS at 15:46

## 2019-11-28 RX ADMIN — Medication 10 ML: at 21:47

## 2019-11-28 RX ADMIN — NOREPINEPHRINE BITARTRATE 0.18 MCG/KG/MIN: 1 INJECTION, SOLUTION, CONCENTRATE INTRAVENOUS at 21:24

## 2019-11-28 RX ADMIN — FAMOTIDINE 20 MG: 10 INJECTION, SOLUTION INTRAVENOUS at 21:23

## 2019-11-28 RX ADMIN — NOREPINEPHRINE BITARTRATE 0.2 MCG/KG/MIN: 1 INJECTION, SOLUTION, CONCENTRATE INTRAVENOUS at 14:13

## 2019-11-28 RX ADMIN — Medication 10 ML: at 15:46

## 2019-11-28 RX ADMIN — SODIUM CHLORIDE 125 ML/HR: 900 INJECTION, SOLUTION INTRAVENOUS at 15:42

## 2019-11-29 ENCOUNTER — APPOINTMENT (OUTPATIENT)
Dept: ULTRASOUND IMAGING | Facility: HOSPITAL | Age: 74
End: 2019-11-29

## 2019-11-29 ENCOUNTER — APPOINTMENT (OUTPATIENT)
Dept: GENERAL RADIOLOGY | Facility: HOSPITAL | Age: 74
End: 2019-11-29

## 2019-11-29 ENCOUNTER — HOSPITAL ENCOUNTER (INPATIENT)
Dept: CARDIOLOGY | Facility: HOSPITAL | Age: 74
Discharge: HOME | End: 2019-11-29

## 2019-11-29 VITALS
HEART RATE: 98 BPM | WEIGHT: 238.1 LBS | DIASTOLIC BLOOD PRESSURE: 67 MMHG | BODY MASS INDEX: 32.25 KG/M2 | SYSTOLIC BLOOD PRESSURE: 118 MMHG | HEIGHT: 72 IN

## 2019-11-29 LAB
ANION GAP SERPL CALCULATED.3IONS-SCNC: 16 MMOL/L (ref 5–15)
BASOPHILS # BLD AUTO: 0 10*3/MM3 (ref 0–0.2)
BASOPHILS NFR BLD AUTO: 0.2 % (ref 0–1.5)
BH CV ECHO MEAS - ACS: 1.1 CM
BH CV ECHO MEAS - AO MAX PG (FULL): 42.3 MMHG
BH CV ECHO MEAS - AO MAX PG: 49.1 MMHG
BH CV ECHO MEAS - AO MEAN PG (FULL): 28.5 MMHG
BH CV ECHO MEAS - AO MEAN PG: 33.2 MMHG
BH CV ECHO MEAS - AO ROOT AREA (BSA CORRECTED): 1.6
BH CV ECHO MEAS - AO ROOT AREA: 10.3 CM^2
BH CV ECHO MEAS - AO ROOT DIAM: 3.6 CM
BH CV ECHO MEAS - AO V2 MAX: 350.3 CM/SEC
BH CV ECHO MEAS - AO V2 MEAN: 275.7 CM/SEC
BH CV ECHO MEAS - AO V2 VTI: 62.9 CM
BH CV ECHO MEAS - AORTIC HR: 86.6 BPM
BH CV ECHO MEAS - AORTIC R-R: 0.69 SEC
BH CV ECHO MEAS - ASC AORTA: 3.7 CM
BH CV ECHO MEAS - AVA(I,A): 1.9 CM^2
BH CV ECHO MEAS - AVA(I,D): 1.9 CM^2
BH CV ECHO MEAS - AVA(V,A): 1.6 CM^2
BH CV ECHO MEAS - AVA(V,D): 1.6 CM^2
BH CV ECHO MEAS - BSA(HAYCOCK): 2.4 M^2
BH CV ECHO MEAS - BSA: 2.3 M^2
BH CV ECHO MEAS - BZI_BMI: 32.3 KILOGRAMS/M^2
BH CV ECHO MEAS - BZI_METRIC_HEIGHT: 182.9 CM
BH CV ECHO MEAS - BZI_METRIC_WEIGHT: 108 KG
BH CV ECHO MEAS - CI(AO): 24.5 L/MIN/M^2
BH CV ECHO MEAS - CI(LVOT): 4.6 L/MIN/M^2
BH CV ECHO MEAS - CO(AO): 56.1 L/MIN
BH CV ECHO MEAS - CO(LVOT): 10.6 L/MIN
BH CV ECHO MEAS - EDV(CUBED): 110.3 ML
BH CV ECHO MEAS - EDV(MOD-SP2): 98.7 ML
BH CV ECHO MEAS - EDV(MOD-SP4): 106.8 ML
BH CV ECHO MEAS - EDV(TEICH): 107.3 ML
BH CV ECHO MEAS - EF(CUBED): 75.4 %
BH CV ECHO MEAS - EF(MOD-BP): 63 %
BH CV ECHO MEAS - EF(MOD-SP2): 57.5 %
BH CV ECHO MEAS - EF(MOD-SP4): 66.1 %
BH CV ECHO MEAS - EF(TEICH): 67.3 %
BH CV ECHO MEAS - ESV(CUBED): 27.1 ML
BH CV ECHO MEAS - ESV(MOD-SP2): 41.9 ML
BH CV ECHO MEAS - ESV(MOD-SP4): 36.2 ML
BH CV ECHO MEAS - ESV(TEICH): 35.1 ML
BH CV ECHO MEAS - FS: 37.3 %
BH CV ECHO MEAS - IVS/LVPW: 1.2
BH CV ECHO MEAS - IVSD: 1.3 CM
BH CV ECHO MEAS - LA DIMENSION(2D): 3.6 CM
BH CV ECHO MEAS - LV DIASTOLIC VOL/BSA (35-75): 46.6 ML/M^2
BH CV ECHO MEAS - LV MASS(C)D: 212.4 GRAMS
BH CV ECHO MEAS - LV MASS(C)DI: 92.6 GRAMS/M^2
BH CV ECHO MEAS - LV MAX PG: 6.7 MMHG
BH CV ECHO MEAS - LV MEAN PG: 4.7 MMHG
BH CV ECHO MEAS - LV SYSTOLIC VOL/BSA (12-30): 15.8 ML/M^2
BH CV ECHO MEAS - LV V1 MAX: 129.8 CM/SEC
BH CV ECHO MEAS - LV V1 MEAN: 103.7 CM/SEC
BH CV ECHO MEAS - LV V1 VTI: 27.6 CM
BH CV ECHO MEAS - LVIDD: 4.8 CM
BH CV ECHO MEAS - LVIDS: 3 CM
BH CV ECHO MEAS - LVOT AREA: 4.4 CM^2
BH CV ECHO MEAS - LVOT DIAM: 2.4 CM
BH CV ECHO MEAS - LVPWD: 1.1 CM
BH CV ECHO MEAS - MV MAX PG: 9.8 MMHG
BH CV ECHO MEAS - MV MEAN PG: 3 MMHG
BH CV ECHO MEAS - MV V2 MAX: 156.6 CM/SEC
BH CV ECHO MEAS - MV V2 MEAN: 71.6 CM/SEC
BH CV ECHO MEAS - MV V2 VTI: 38.1 CM
BH CV ECHO MEAS - MVA(VTI): 3.2 CM^2
BH CV ECHO MEAS - PA ACC TIME: 0.11 SEC
BH CV ECHO MEAS - PA MAX PG (FULL): 2.3 MMHG
BH CV ECHO MEAS - PA MAX PG: 4.3 MMHG
BH CV ECHO MEAS - PA MEAN PG (FULL): 1.2 MMHG
BH CV ECHO MEAS - PA MEAN PG: 2.7 MMHG
BH CV ECHO MEAS - PA PR(ACCEL): 27.6 MMHG
BH CV ECHO MEAS - PA V2 MAX: 103.4 CM/SEC
BH CV ECHO MEAS - PA V2 MEAN: 76.7 CM/SEC
BH CV ECHO MEAS - PA V2 VTI: 17.2 CM
BH CV ECHO MEAS - RAP SYSTOLE: 8 MMHG
BH CV ECHO MEAS - RV MAX PG: 2 MMHG
BH CV ECHO MEAS - RV MEAN PG: 1.5 MMHG
BH CV ECHO MEAS - RV V1 MAX: 70.9 CM/SEC
BH CV ECHO MEAS - RV V1 MEAN: 58.1 CM/SEC
BH CV ECHO MEAS - RV V1 VTI: 14.4 CM
BH CV ECHO MEAS - RVDD: 4.2 CM
BH CV ECHO MEAS - RVSP: 42.6 MMHG
BH CV ECHO MEAS - SI(AO): 282.3 ML/M^2
BH CV ECHO MEAS - SI(CUBED): 36.2 ML/M^2
BH CV ECHO MEAS - SI(LVOT): 53.2 ML/M^2
BH CV ECHO MEAS - SI(MOD-SP2): 24.7 ML/M^2
BH CV ECHO MEAS - SI(MOD-SP4): 30.8 ML/M^2
BH CV ECHO MEAS - SI(TEICH): 31.5 ML/M^2
BH CV ECHO MEAS - SV(AO): 647.5 ML
BH CV ECHO MEAS - SV(CUBED): 83.1 ML
BH CV ECHO MEAS - SV(LVOT): 122 ML
BH CV ECHO MEAS - SV(MOD-SP2): 56.8 ML
BH CV ECHO MEAS - SV(MOD-SP4): 70.6 ML
BH CV ECHO MEAS - SV(TEICH): 72.1 ML
BH CV ECHO MEAS - TR MAX VEL: 289.2 CM/SEC
BUN BLD-MCNC: 47 MG/DL (ref 8–23)
BUN/CREAT SERPL: 13.8 (ref 7–25)
C DIFF GDH STL QL: NEGATIVE
CALCIUM SPEC-SCNC: 8.5 MG/DL (ref 8.6–10.5)
CHLORIDE SERPL-SCNC: 101 MMOL/L (ref 98–107)
CO2 SERPL-SCNC: 17 MMOL/L (ref 22–29)
CREAT BLD-MCNC: 3.41 MG/DL (ref 0.76–1.27)
DEPRECATED RDW RBC AUTO: 48.1 FL (ref 37–54)
EOSINOPHIL # BLD AUTO: 1.4 10*3/MM3 (ref 0–0.4)
EOSINOPHIL NFR BLD AUTO: 15.8 % (ref 0.3–6.2)
ERYTHROCYTE [DISTWIDTH] IN BLOOD BY AUTOMATED COUNT: 14.8 % (ref 12.3–15.4)
GFR SERPL CREATININE-BSD FRML MDRD: 18 ML/MIN/1.73
GLUCOSE BLD-MCNC: 113 MG/DL (ref 65–99)
GLUCOSE BLDC GLUCOMTR-MCNC: 160 MG/DL (ref 70–105)
GLUCOSE BLDC GLUCOMTR-MCNC: 205 MG/DL (ref 70–105)
GLUCOSE BLDC GLUCOMTR-MCNC: 223 MG/DL (ref 70–105)
HCT VFR BLD AUTO: 35.8 % (ref 37.5–51)
HGB BLD-MCNC: 11.6 G/DL (ref 13–17.7)
LV EF 2D ECHO EST: 65 %
LYMPHOCYTES # BLD AUTO: 0.4 10*3/MM3 (ref 0.7–3.1)
LYMPHOCYTES NFR BLD AUTO: 4.5 % (ref 19.6–45.3)
MAGNESIUM SERPL-MCNC: 1.8 MG/DL (ref 1.6–2.4)
MCH RBC QN AUTO: 29.9 PG (ref 26.6–33)
MCHC RBC AUTO-ENTMCNC: 32.4 G/DL (ref 31.5–35.7)
MCV RBC AUTO: 92.4 FL (ref 79–97)
MONOCYTES # BLD AUTO: 0.4 10*3/MM3 (ref 0.1–0.9)
MONOCYTES NFR BLD AUTO: 4.5 % (ref 5–12)
MRSA SPEC QL CULT: NORMAL
NEUTROPHILS # BLD AUTO: 6.7 10*3/MM3 (ref 1.7–7)
NEUTROPHILS NFR BLD AUTO: 75 % (ref 42.7–76)
NRBC BLD AUTO-RTO: 0 /100 WBC (ref 0–0.2)
PHOSPHATE SERPL-MCNC: 5.1 MG/DL (ref 2.5–4.5)
PLATELET # BLD AUTO: 271 10*3/MM3 (ref 140–450)
PMV BLD AUTO: 7.8 FL (ref 6–12)
POTASSIUM BLD-SCNC: 4.2 MMOL/L (ref 3.5–5.2)
RBC # BLD AUTO: 3.88 10*6/MM3 (ref 4.14–5.8)
SODIUM BLD-SCNC: 134 MMOL/L (ref 136–145)
TSH SERPL DL<=0.05 MIU/L-ACNC: 0.58 UIU/ML (ref 0.27–4.2)
WBC NRBC COR # BLD: 9 10*3/MM3 (ref 3.4–10.8)

## 2019-11-29 PROCEDURE — 93306 TTE W/DOPPLER COMPLETE: CPT | Performed by: INTERNAL MEDICINE

## 2019-11-29 PROCEDURE — 82962 GLUCOSE BLOOD TEST: CPT

## 2019-11-29 PROCEDURE — 80048 BASIC METABOLIC PNL TOTAL CA: CPT | Performed by: NURSE PRACTITIONER

## 2019-11-29 PROCEDURE — 84100 ASSAY OF PHOSPHORUS: CPT | Performed by: NURSE PRACTITIONER

## 2019-11-29 PROCEDURE — 83735 ASSAY OF MAGNESIUM: CPT | Performed by: NURSE PRACTITIONER

## 2019-11-29 PROCEDURE — 93005 ELECTROCARDIOGRAM TRACING: CPT | Performed by: INTERNAL MEDICINE

## 2019-11-29 PROCEDURE — 05HM33Z INSERTION OF INFUSION DEVICE INTO RIGHT INTERNAL JUGULAR VEIN, PERCUTANEOUS APPROACH: ICD-10-PCS | Performed by: INTERNAL MEDICINE

## 2019-11-29 PROCEDURE — 25010000002 VANCOMYCIN 10 G RECONSTITUTED SOLUTION: Performed by: NURSE PRACTITIONER

## 2019-11-29 PROCEDURE — 85025 COMPLETE CBC W/AUTO DIFF WBC: CPT | Performed by: NURSE PRACTITIONER

## 2019-11-29 PROCEDURE — 76775 US EXAM ABDO BACK WALL LIM: CPT

## 2019-11-29 PROCEDURE — B543ZZA ULTRASONOGRAPHY OF RIGHT JUGULAR VEINS, GUIDANCE: ICD-10-PCS | Performed by: INTERNAL MEDICINE

## 2019-11-29 PROCEDURE — 25010000002 CEFTRIAXONE PER 250 MG: Performed by: NURSE PRACTITIONER

## 2019-11-29 PROCEDURE — 25010000003 LIDOCAINE 1 % SOLUTION

## 2019-11-29 PROCEDURE — 93306 TTE W/DOPPLER COMPLETE: CPT

## 2019-11-29 PROCEDURE — 84443 ASSAY THYROID STIM HORMONE: CPT | Performed by: INTERNAL MEDICINE

## 2019-11-29 PROCEDURE — 25010000002 METHYLPREDNISOLONE PER 40 MG: Performed by: NURSE PRACTITIONER

## 2019-11-29 PROCEDURE — 25010000002 CALCIUM GLUCONATE-NACL 1-0.675 GM/50ML-% SOLUTION: Performed by: NURSE PRACTITIONER

## 2019-11-29 PROCEDURE — 25010000002 DIPHENHYDRAMINE PER 50 MG: Performed by: NURSE PRACTITIONER

## 2019-11-29 PROCEDURE — 71045 X-RAY EXAM CHEST 1 VIEW: CPT

## 2019-11-29 PROCEDURE — 63710000001 DIPHENHYDRAMINE PER 50 MG: Performed by: NURSE PRACTITIONER

## 2019-11-29 PROCEDURE — 63710000001 INSULIN LISPRO (HUMAN) PER 5 UNITS: Performed by: NURSE PRACTITIONER

## 2019-11-29 RX ORDER — METHYLPREDNISOLONE SODIUM SUCCINATE 40 MG/ML
40 INJECTION, POWDER, LYOPHILIZED, FOR SOLUTION INTRAMUSCULAR; INTRAVENOUS EVERY 12 HOURS
Status: COMPLETED | OUTPATIENT
Start: 2019-11-29 | End: 2019-12-01

## 2019-11-29 RX ORDER — NICOTINE POLACRILEX 4 MG
15 LOZENGE BUCCAL
Status: DISCONTINUED | OUTPATIENT
Start: 2019-11-29 | End: 2019-12-03 | Stop reason: HOSPADM

## 2019-11-29 RX ORDER — PREDNISONE 1 MG/1
5 TABLET ORAL
Status: DISCONTINUED | OUTPATIENT
Start: 2019-12-02 | End: 2019-12-03 | Stop reason: HOSPADM

## 2019-11-29 RX ORDER — DIPHENHYDRAMINE HCL 25 MG
25 TABLET ORAL EVERY 6 HOURS
Status: DISCONTINUED | OUTPATIENT
Start: 2019-11-30 | End: 2019-11-30

## 2019-11-29 RX ORDER — SODIUM CHLORIDE 0.9 % (FLUSH) 0.9 %
10 SYRINGE (ML) INJECTION AS NEEDED
Status: DISCONTINUED | OUTPATIENT
Start: 2019-11-29 | End: 2019-12-03 | Stop reason: ALTCHOICE

## 2019-11-29 RX ORDER — HYDROXYZINE HYDROCHLORIDE 25 MG/1
25 TABLET, FILM COATED ORAL 3 TIMES DAILY PRN
Status: DISCONTINUED | OUTPATIENT
Start: 2019-11-29 | End: 2019-12-03 | Stop reason: HOSPADM

## 2019-11-29 RX ORDER — SODIUM CHLORIDE 0.9 % (FLUSH) 0.9 %
10 SYRINGE (ML) INJECTION EVERY 12 HOURS SCHEDULED
Status: DISCONTINUED | OUTPATIENT
Start: 2019-11-29 | End: 2019-12-03 | Stop reason: ALTCHOICE

## 2019-11-29 RX ORDER — DEXTROSE MONOHYDRATE 25 G/50ML
25 INJECTION, SOLUTION INTRAVENOUS
Status: DISCONTINUED | OUTPATIENT
Start: 2019-11-29 | End: 2019-12-03 | Stop reason: HOSPADM

## 2019-11-29 RX ORDER — SODIUM CHLORIDE 0.9 % (FLUSH) 0.9 %
10 SYRINGE (ML) INJECTION EVERY 12 HOURS SCHEDULED
Status: DISCONTINUED | OUTPATIENT
Start: 2019-11-29 | End: 2019-12-03

## 2019-11-29 RX ORDER — SODIUM CHLORIDE 0.9 % (FLUSH) 0.9 %
20 SYRINGE (ML) INJECTION AS NEEDED
Status: DISCONTINUED | OUTPATIENT
Start: 2019-11-29 | End: 2019-12-03 | Stop reason: ALTCHOICE

## 2019-11-29 RX ORDER — LIDOCAINE HYDROCHLORIDE 10 MG/ML
INJECTION, SOLUTION INFILTRATION; PERINEURAL
Status: COMPLETED
Start: 2019-11-29 | End: 2019-11-29

## 2019-11-29 RX ORDER — DIPHENHYDRAMINE HCL 25 MG
25 TABLET ORAL EVERY 6 HOURS PRN
Status: DISCONTINUED | OUTPATIENT
Start: 2019-11-29 | End: 2019-11-29

## 2019-11-29 RX ORDER — DIAPER,BRIEF,INFANT-TODD,DISP
EACH MISCELLANEOUS 2 TIMES DAILY PRN
Status: DISCONTINUED | OUTPATIENT
Start: 2019-11-29 | End: 2019-12-03 | Stop reason: HOSPADM

## 2019-11-29 RX ADMIN — Medication 10 ML: at 21:52

## 2019-11-29 RX ADMIN — DIPHENHYDRAMINE HCL 25 MG: 25 TABLET ORAL at 21:31

## 2019-11-29 RX ADMIN — Medication 10 ML: at 01:54

## 2019-11-29 RX ADMIN — METHYLPREDNISOLONE SODIUM SUCCINATE 40 MG: 40 INJECTION, POWDER, FOR SOLUTION INTRAMUSCULAR; INTRAVENOUS at 21:57

## 2019-11-29 RX ADMIN — VANCOMYCIN HYDROCHLORIDE 125 MG: KIT at 06:40

## 2019-11-29 RX ADMIN — METHYLPREDNISOLONE SODIUM SUCCINATE 40 MG: 40 INJECTION, POWDER, FOR SOLUTION INTRAMUSCULAR; INTRAVENOUS at 00:06

## 2019-11-29 RX ADMIN — CALCIUM GLUCONATE 1 G: 20 INJECTION, SOLUTION INTRAVENOUS at 00:06

## 2019-11-29 RX ADMIN — VANCOMYCIN HYDROCHLORIDE 125 MG: KIT at 23:20

## 2019-11-29 RX ADMIN — FAMOTIDINE 20 MG: 10 INJECTION, SOLUTION INTRAVENOUS at 09:45

## 2019-11-29 RX ADMIN — VANCOMYCIN HYDROCHLORIDE 125 MG: KIT at 13:09

## 2019-11-29 RX ADMIN — HYDROCORTISONE 1 APPLICATION: 1 CREAM TOPICAL at 23:20

## 2019-11-29 RX ADMIN — VANCOMYCIN HYDROCHLORIDE 125 MG: KIT at 17:59

## 2019-11-29 RX ADMIN — Medication 10 ML: at 09:45

## 2019-11-29 RX ADMIN — NOREPINEPHRINE BITARTRATE 0.2 MCG/KG/MIN: 1 INJECTION, SOLUTION, CONCENTRATE INTRAVENOUS at 03:54

## 2019-11-29 RX ADMIN — Medication 10 ML: at 01:55

## 2019-11-29 RX ADMIN — LIDOCAINE HYDROCHLORIDE: 10 INJECTION, SOLUTION INFILTRATION; PERINEURAL at 01:55

## 2019-11-29 RX ADMIN — Medication 10 ML: at 21:53

## 2019-11-29 RX ADMIN — CEFTRIAXONE SODIUM 1 G: 1 INJECTION, POWDER, FOR SOLUTION INTRAMUSCULAR; INTRAVENOUS at 03:54

## 2019-11-29 RX ADMIN — NOREPINEPHRINE BITARTRATE 0.35 MCG/KG/MIN: 1 INJECTION, SOLUTION, CONCENTRATE INTRAVENOUS at 13:55

## 2019-11-29 RX ADMIN — NOREPINEPHRINE BITARTRATE 0.4 MCG/KG/MIN: 1 INJECTION, SOLUTION, CONCENTRATE INTRAVENOUS at 06:39

## 2019-11-29 RX ADMIN — DIPHENHYDRAMINE HYDROCHLORIDE 12.5 MG: 50 INJECTION, SOLUTION INTRAMUSCULAR; INTRAVENOUS at 00:06

## 2019-11-29 RX ADMIN — NOREPINEPHRINE BITARTRATE 0.4 MCG/KG/MIN: 1 INJECTION, SOLUTION, CONCENTRATE INTRAVENOUS at 10:37

## 2019-11-29 RX ADMIN — DIPHENHYDRAMINE HCL 25 MG: 25 TABLET ORAL at 13:55

## 2019-11-29 RX ADMIN — Medication 10 ML: at 21:51

## 2019-11-29 RX ADMIN — VANCOMYCIN HYDROCHLORIDE 125 MG: KIT at 00:26

## 2019-11-29 RX ADMIN — INSULIN LISPRO 4 UNITS: 100 INJECTION, SOLUTION INTRAVENOUS; SUBCUTANEOUS at 21:31

## 2019-11-30 ENCOUNTER — APPOINTMENT (OUTPATIENT)
Dept: GENERAL RADIOLOGY | Facility: HOSPITAL | Age: 74
End: 2019-11-30

## 2019-11-30 LAB
ANION GAP SERPL CALCULATED.3IONS-SCNC: 9 MMOL/L (ref 5–15)
BACTERIA BLD CULT: ABNORMAL
BASOPHILS # BLD AUTO: 0 10*3/MM3 (ref 0–0.2)
BASOPHILS NFR BLD AUTO: 0.3 % (ref 0–1.5)
BUN BLD-MCNC: 36 MG/DL (ref 8–23)
BUN/CREAT SERPL: 20.5 (ref 7–25)
CALCIUM SPEC-SCNC: 8.5 MG/DL (ref 8.6–10.5)
CHLORIDE SERPL-SCNC: 108 MMOL/L (ref 98–107)
CO2 SERPL-SCNC: 22 MMOL/L (ref 22–29)
CREAT BLD-MCNC: 1.76 MG/DL (ref 0.76–1.27)
DEPRECATED RDW RBC AUTO: 47.7 FL (ref 37–54)
EOSINOPHIL # BLD AUTO: 0.1 10*3/MM3 (ref 0–0.4)
EOSINOPHIL NFR BLD AUTO: 0.7 % (ref 0.3–6.2)
ERYTHROCYTE [DISTWIDTH] IN BLOOD BY AUTOMATED COUNT: 15.1 % (ref 12.3–15.4)
GFR SERPL CREATININE-BSD FRML MDRD: 38 ML/MIN/1.73
GLUCOSE BLD-MCNC: 206 MG/DL (ref 65–99)
GLUCOSE BLDC GLUCOMTR-MCNC: 150 MG/DL (ref 70–105)
GLUCOSE BLDC GLUCOMTR-MCNC: 157 MG/DL (ref 70–105)
GLUCOSE BLDC GLUCOMTR-MCNC: 177 MG/DL (ref 70–105)
GLUCOSE BLDC GLUCOMTR-MCNC: 273 MG/DL (ref 70–105)
GLUCOSE BLDC GLUCOMTR-MCNC: 287 MG/DL (ref 70–105)
HCT VFR BLD AUTO: 30.4 % (ref 37.5–51)
HGB BLD-MCNC: 10.1 G/DL (ref 13–17.7)
LYMPHOCYTES # BLD AUTO: 0.5 10*3/MM3 (ref 0.7–3.1)
LYMPHOCYTES NFR BLD AUTO: 6.5 % (ref 19.6–45.3)
MAGNESIUM SERPL-MCNC: 1.8 MG/DL (ref 1.6–2.4)
MCH RBC QN AUTO: 30.2 PG (ref 26.6–33)
MCHC RBC AUTO-ENTMCNC: 33.3 G/DL (ref 31.5–35.7)
MCV RBC AUTO: 90.8 FL (ref 79–97)
MONOCYTES # BLD AUTO: 0.5 10*3/MM3 (ref 0.1–0.9)
MONOCYTES NFR BLD AUTO: 6.8 % (ref 5–12)
NEUTROPHILS # BLD AUTO: 6.5 10*3/MM3 (ref 1.7–7)
NEUTROPHILS NFR BLD AUTO: 85.7 % (ref 42.7–76)
NRBC BLD AUTO-RTO: 0.1 /100 WBC (ref 0–0.2)
PHOSPHATE SERPL-MCNC: 2.6 MG/DL (ref 2.5–4.5)
PLATELET # BLD AUTO: 207 10*3/MM3 (ref 140–450)
PMV BLD AUTO: 7.1 FL (ref 6–12)
POTASSIUM BLD-SCNC: 4.4 MMOL/L (ref 3.5–5.2)
RBC # BLD AUTO: 3.35 10*6/MM3 (ref 4.14–5.8)
SODIUM BLD-SCNC: 139 MMOL/L (ref 136–145)
WBC NRBC COR # BLD: 7.6 10*3/MM3 (ref 3.4–10.8)

## 2019-11-30 PROCEDURE — 25010000002 CEFTRIAXONE PER 250 MG: Performed by: NURSE PRACTITIONER

## 2019-11-30 PROCEDURE — 80048 BASIC METABOLIC PNL TOTAL CA: CPT | Performed by: NURSE PRACTITIONER

## 2019-11-30 PROCEDURE — 94760 N-INVAS EAR/PLS OXIMETRY 1: CPT

## 2019-11-30 PROCEDURE — 82962 GLUCOSE BLOOD TEST: CPT

## 2019-11-30 PROCEDURE — 94799 UNLISTED PULMONARY SVC/PX: CPT

## 2019-11-30 PROCEDURE — 63710000001 INSULIN LISPRO (HUMAN) PER 5 UNITS: Performed by: NURSE PRACTITIONER

## 2019-11-30 PROCEDURE — 85025 COMPLETE CBC W/AUTO DIFF WBC: CPT | Performed by: NURSE PRACTITIONER

## 2019-11-30 PROCEDURE — 87040 BLOOD CULTURE FOR BACTERIA: CPT | Performed by: INTERNAL MEDICINE

## 2019-11-30 PROCEDURE — 83735 ASSAY OF MAGNESIUM: CPT | Performed by: NURSE PRACTITIONER

## 2019-11-30 PROCEDURE — 25010000002 METHYLPREDNISOLONE PER 40 MG: Performed by: NURSE PRACTITIONER

## 2019-11-30 PROCEDURE — 94660 CPAP INITIATION&MGMT: CPT

## 2019-11-30 PROCEDURE — 71045 X-RAY EXAM CHEST 1 VIEW: CPT

## 2019-11-30 PROCEDURE — 84100 ASSAY OF PHOSPHORUS: CPT | Performed by: NURSE PRACTITIONER

## 2019-11-30 PROCEDURE — 25010000002 VANCOMYCIN 10 G RECONSTITUTED SOLUTION: Performed by: NURSE PRACTITIONER

## 2019-11-30 PROCEDURE — 63710000001 DIPHENHYDRAMINE PER 50 MG: Performed by: NURSE PRACTITIONER

## 2019-11-30 RX ORDER — DIPHENHYDRAMINE HCL 25 MG
25 TABLET ORAL EVERY 6 HOURS PRN
Status: DISCONTINUED | OUTPATIENT
Start: 2019-11-30 | End: 2019-12-03 | Stop reason: HOSPADM

## 2019-11-30 RX ADMIN — Medication 10 ML: at 20:33

## 2019-11-30 RX ADMIN — HYDROCORTISONE 1 APPLICATION: 1 CREAM TOPICAL at 07:33

## 2019-11-30 RX ADMIN — VANCOMYCIN HYDROCHLORIDE 125 MG: KIT at 06:34

## 2019-11-30 RX ADMIN — Medication 10 ML: at 09:21

## 2019-11-30 RX ADMIN — INSULIN LISPRO 4 UNITS: 100 INJECTION, SOLUTION INTRAVENOUS; SUBCUTANEOUS at 20:31

## 2019-11-30 RX ADMIN — Medication 10 ML: at 09:22

## 2019-11-30 RX ADMIN — DIPHENHYDRAMINE HCL 25 MG: 25 TABLET ORAL at 03:01

## 2019-11-30 RX ADMIN — CEFTRIAXONE SODIUM 1 G: 1 INJECTION, POWDER, FOR SOLUTION INTRAMUSCULAR; INTRAVENOUS at 02:08

## 2019-11-30 RX ADMIN — NOREPINEPHRINE BITARTRATE 0.02 MCG/KG/MIN: 1 INJECTION, SOLUTION, CONCENTRATE INTRAVENOUS at 02:09

## 2019-11-30 RX ADMIN — INSULIN LISPRO 12 UNITS: 100 INJECTION, SOLUTION INTRAVENOUS; SUBCUTANEOUS at 12:25

## 2019-11-30 RX ADMIN — SODIUM CHLORIDE 125 ML/HR: 900 INJECTION, SOLUTION INTRAVENOUS at 01:47

## 2019-11-30 RX ADMIN — METHYLPREDNISOLONE SODIUM SUCCINATE 40 MG: 40 INJECTION, POWDER, FOR SOLUTION INTRAMUSCULAR; INTRAVENOUS at 22:33

## 2019-11-30 RX ADMIN — CEFTRIAXONE SODIUM 1 G: 1 INJECTION, POWDER, FOR SOLUTION INTRAMUSCULAR; INTRAVENOUS at 12:24

## 2019-11-30 RX ADMIN — INSULIN LISPRO 4 UNITS: 100 INJECTION, SOLUTION INTRAVENOUS; SUBCUTANEOUS at 17:34

## 2019-11-30 RX ADMIN — METHYLPREDNISOLONE SODIUM SUCCINATE 40 MG: 40 INJECTION, POWDER, FOR SOLUTION INTRAMUSCULAR; INTRAVENOUS at 09:17

## 2019-11-30 RX ADMIN — Medication 10 ML: at 09:20

## 2019-11-30 RX ADMIN — INSULIN LISPRO 2 UNITS: 100 INJECTION, SOLUTION INTRAVENOUS; SUBCUTANEOUS at 08:36

## 2019-12-01 LAB
ALBUMIN SERPL-MCNC: 2.9 G/DL (ref 3.5–5.2)
ALBUMIN/GLOB SERPL: 0.9 G/DL
ALP SERPL-CCNC: 56 U/L (ref 39–117)
ALT SERPL W P-5'-P-CCNC: 10 U/L (ref 1–41)
ANION GAP SERPL CALCULATED.3IONS-SCNC: 10 MMOL/L (ref 5–15)
AST SERPL-CCNC: 10 U/L (ref 1–40)
BACTERIA SPEC AEROBE CULT: ABNORMAL
BASOPHILS # BLD AUTO: 0.1 10*3/MM3 (ref 0–0.2)
BASOPHILS NFR BLD AUTO: 0.5 % (ref 0–1.5)
BILIRUB SERPL-MCNC: <0.2 MG/DL (ref 0.2–1.2)
BUN BLD-MCNC: 30 MG/DL (ref 8–23)
BUN/CREAT SERPL: 24.8 (ref 7–25)
CALCIUM SPEC-SCNC: 9.1 MG/DL (ref 8.6–10.5)
CHLORIDE SERPL-SCNC: 105 MMOL/L (ref 98–107)
CO2 SERPL-SCNC: 25 MMOL/L (ref 22–29)
CREAT BLD-MCNC: 1.21 MG/DL (ref 0.76–1.27)
DEPRECATED RDW RBC AUTO: 50.3 FL (ref 37–54)
EOSINOPHIL # BLD AUTO: 0 10*3/MM3 (ref 0–0.4)
EOSINOPHIL NFR BLD AUTO: 0.1 % (ref 0.3–6.2)
ERYTHROCYTE [DISTWIDTH] IN BLOOD BY AUTOMATED COUNT: 15.8 % (ref 12.3–15.4)
GFR SERPL CREATININE-BSD FRML MDRD: 59 ML/MIN/1.73
GLOBULIN UR ELPH-MCNC: 3.4 GM/DL
GLUCOSE BLD-MCNC: 179 MG/DL (ref 65–99)
GLUCOSE BLDC GLUCOMTR-MCNC: 158 MG/DL (ref 70–105)
GLUCOSE BLDC GLUCOMTR-MCNC: 158 MG/DL (ref 70–105)
GLUCOSE BLDC GLUCOMTR-MCNC: 174 MG/DL (ref 70–105)
GRAM STN SPEC: ABNORMAL
HCT VFR BLD AUTO: 32.7 % (ref 37.5–51)
HGB BLD-MCNC: 10.6 G/DL (ref 13–17.7)
ISOLATED FROM: ABNORMAL
LYMPHOCYTES # BLD AUTO: 0.3 10*3/MM3 (ref 0.7–3.1)
LYMPHOCYTES NFR BLD AUTO: 2.9 % (ref 19.6–45.3)
MAGNESIUM SERPL-MCNC: 1.8 MG/DL (ref 1.6–2.4)
MCH RBC QN AUTO: 29.7 PG (ref 26.6–33)
MCHC RBC AUTO-ENTMCNC: 32.5 G/DL (ref 31.5–35.7)
MCV RBC AUTO: 91.4 FL (ref 79–97)
MONOCYTES # BLD AUTO: 0.5 10*3/MM3 (ref 0.1–0.9)
MONOCYTES NFR BLD AUTO: 4.4 % (ref 5–12)
NEUTROPHILS # BLD AUTO: 10.9 10*3/MM3 (ref 1.7–7)
NEUTROPHILS NFR BLD AUTO: 92.1 % (ref 42.7–76)
NRBC BLD AUTO-RTO: 0.1 /100 WBC (ref 0–0.2)
PHOSPHATE SERPL-MCNC: 1.9 MG/DL (ref 2.5–4.5)
PLATELET # BLD AUTO: 239 10*3/MM3 (ref 140–450)
PMV BLD AUTO: 7.3 FL (ref 6–12)
POTASSIUM BLD-SCNC: 4.4 MMOL/L (ref 3.5–5.2)
PROT SERPL-MCNC: 6.3 G/DL (ref 6–8.5)
RBC # BLD AUTO: 3.58 10*6/MM3 (ref 4.14–5.8)
SODIUM BLD-SCNC: 140 MMOL/L (ref 136–145)
WBC NRBC COR # BLD: 11.8 10*3/MM3 (ref 3.4–10.8)

## 2019-12-01 PROCEDURE — 82962 GLUCOSE BLOOD TEST: CPT

## 2019-12-01 PROCEDURE — 83735 ASSAY OF MAGNESIUM: CPT | Performed by: NURSE PRACTITIONER

## 2019-12-01 PROCEDURE — 94760 N-INVAS EAR/PLS OXIMETRY 1: CPT

## 2019-12-01 PROCEDURE — 63710000001 DIPHENHYDRAMINE PER 50 MG: Performed by: NURSE PRACTITIONER

## 2019-12-01 PROCEDURE — 94799 UNLISTED PULMONARY SVC/PX: CPT

## 2019-12-01 PROCEDURE — 63710000001 INSULIN LISPRO (HUMAN) PER 5 UNITS: Performed by: NURSE PRACTITIONER

## 2019-12-01 PROCEDURE — 94640 AIRWAY INHALATION TREATMENT: CPT

## 2019-12-01 PROCEDURE — 84100 ASSAY OF PHOSPHORUS: CPT | Performed by: NURSE PRACTITIONER

## 2019-12-01 PROCEDURE — 94660 CPAP INITIATION&MGMT: CPT

## 2019-12-01 PROCEDURE — 80053 COMPREHEN METABOLIC PANEL: CPT | Performed by: NURSE PRACTITIONER

## 2019-12-01 PROCEDURE — 25010000002 METHYLPREDNISOLONE PER 40 MG: Performed by: NURSE PRACTITIONER

## 2019-12-01 PROCEDURE — 85025 COMPLETE CBC W/AUTO DIFF WBC: CPT | Performed by: NURSE PRACTITIONER

## 2019-12-01 RX ORDER — MIDODRINE HYDROCHLORIDE 5 MG/1
5 TABLET ORAL
Status: DISCONTINUED | OUTPATIENT
Start: 2019-12-01 | End: 2019-12-01

## 2019-12-01 RX ORDER — MIDODRINE HYDROCHLORIDE 5 MG/1
5 TABLET ORAL
Status: DISCONTINUED | OUTPATIENT
Start: 2019-12-01 | End: 2019-12-02

## 2019-12-01 RX ADMIN — IPRATROPIUM BROMIDE AND ALBUTEROL SULFATE 3 ML: .5; 3 SOLUTION RESPIRATORY (INHALATION) at 05:42

## 2019-12-01 RX ADMIN — HYDROCORTISONE: 1 CREAM TOPICAL at 04:12

## 2019-12-01 RX ADMIN — INSULIN LISPRO 4 UNITS: 100 INJECTION, SOLUTION INTRAVENOUS; SUBCUTANEOUS at 21:21

## 2019-12-01 RX ADMIN — MIDODRINE HYDROCHLORIDE 5 MG: 5 TABLET ORAL at 09:26

## 2019-12-01 RX ADMIN — HYDROCORTISONE 1 APPLICATION: 1 CREAM TOPICAL at 07:31

## 2019-12-01 RX ADMIN — DIPHENHYDRAMINE HCL 25 MG: 25 TABLET ORAL at 04:42

## 2019-12-01 RX ADMIN — Medication 10 ML: at 20:13

## 2019-12-01 RX ADMIN — APIXABAN 5 MG: 5 TABLET, FILM COATED ORAL at 09:25

## 2019-12-01 RX ADMIN — HYDROXYZINE HYDROCHLORIDE 25 MG: 25 TABLET ORAL at 20:11

## 2019-12-01 RX ADMIN — Medication 10 ML: at 08:18

## 2019-12-01 RX ADMIN — POTASSIUM & SODIUM PHOSPHATES POWDER PACK 280-160-250 MG 2 PACKET: 280-160-250 PACK at 09:25

## 2019-12-01 RX ADMIN — Medication 10 ML: at 20:11

## 2019-12-01 RX ADMIN — INSULIN LISPRO 4 UNITS: 100 INJECTION, SOLUTION INTRAVENOUS; SUBCUTANEOUS at 08:17

## 2019-12-01 RX ADMIN — MIDODRINE HYDROCHLORIDE 5 MG: 5 TABLET ORAL at 16:43

## 2019-12-01 RX ADMIN — HYDROXYZINE HYDROCHLORIDE 25 MG: 25 TABLET ORAL at 09:32

## 2019-12-01 RX ADMIN — Medication 10 ML: at 20:12

## 2019-12-01 RX ADMIN — INSULIN LISPRO 4 UNITS: 100 INJECTION, SOLUTION INTRAVENOUS; SUBCUTANEOUS at 16:42

## 2019-12-01 RX ADMIN — INSULIN LISPRO 4 UNITS: 100 INJECTION, SOLUTION INTRAVENOUS; SUBCUTANEOUS at 11:11

## 2019-12-01 RX ADMIN — IPRATROPIUM BROMIDE AND ALBUTEROL SULFATE 3 ML: .5; 3 SOLUTION RESPIRATORY (INHALATION) at 20:08

## 2019-12-01 RX ADMIN — Medication 10 ML: at 08:19

## 2019-12-01 RX ADMIN — INSULIN LISPRO 4 UNITS: 100 INJECTION, SOLUTION INTRAVENOUS; SUBCUTANEOUS at 16:22

## 2019-12-01 RX ADMIN — APIXABAN 5 MG: 5 TABLET, FILM COATED ORAL at 20:11

## 2019-12-01 RX ADMIN — METHYLPREDNISOLONE SODIUM SUCCINATE 40 MG: 40 INJECTION, POWDER, FOR SOLUTION INTRAMUSCULAR; INTRAVENOUS at 09:25

## 2019-12-01 RX ADMIN — NOREPINEPHRINE BITARTRATE 0.03 MCG/KG/MIN: 1 INJECTION, SOLUTION, CONCENTRATE INTRAVENOUS at 03:52

## 2019-12-01 NOTE — PLAN OF CARE
Problem: Skin Injury Risk (Adult)  Goal: Identify Related Risk Factors and Signs and Symptoms  Outcome: Ongoing (interventions implemented as appropriate)   12/01/19 0741   Skin Injury Risk (Adult)   Related Risk Factors (Skin Injury Risk) advanced age;critical care admission;medication;tissue perfusion altered     Goal: Skin Health and Integrity  Outcome: Ongoing (interventions implemented as appropriate)   12/01/19 0741   Skin Injury Risk (Adult)   Skin Health and Integrity making progress toward outcome       Problem: Fall Risk (Adult)  Goal: Identify Related Risk Factors and Signs and Symptoms  Outcome: Ongoing (interventions implemented as appropriate)   12/01/19 0741   Fall Risk (Adult)   Related Risk Factors (Fall Risk) environment unfamiliar   Signs and Symptoms (Fall Risk) presence of risk factors     Goal: Absence of Fall  Outcome: Ongoing (interventions implemented as appropriate)   12/01/19 0741   Fall Risk (Adult)   Absence of Fall making progress toward outcome       Problem: NPPV/CPAP (Adult)  Goal: Signs and Symptoms of Listed Potential Problems Will be Absent, Minimized or Managed (NPPV/CPAP)  Outcome: Ongoing (interventions implemented as appropriate)   12/01/19 0741   Goal/Outcome Evaluation   Problems Assessed (NPPV/CPAP) all   Problems Present (NPPV/CPAP) situational response

## 2019-12-01 NOTE — PLAN OF CARE
Problem: Patient Care Overview  Goal: Plan of Care Review  Outcome: Ongoing (interventions implemented as appropriate)    Goal: Individualization and Mutuality  Outcome: Ongoing (interventions implemented as appropriate)      Problem: Skin Injury Risk (Adult)  Goal: Identify Related Risk Factors and Signs and Symptoms  Outcome: Ongoing (interventions implemented as appropriate)    Goal: Skin Health and Integrity  Outcome: Ongoing (interventions implemented as appropriate)      Problem: Fall Risk (Adult)  Goal: Absence of Fall  Outcome: Ongoing (interventions implemented as appropriate)    Goal: Identify Related Risk Factors and Signs and Symptoms  Outcome: Ongoing (interventions implemented as appropriate)    Goal: Absence of Fall  Outcome: Ongoing (interventions implemented as appropriate)      Problem: NPPV/CPAP (Adult)  Goal: Signs and Symptoms of Listed Potential Problems Will be Absent, Minimized or Managed (NPPV/CPAP)  Outcome: Ongoing (interventions implemented as appropriate)

## 2019-12-01 NOTE — PROGRESS NOTES
"KPA/PULM/CC PROGRESS NOTE       PATIENT IDENTIFICATION    Name: Agustín Willett Age: 74 y.o. Sex: male :  1945 MRN: [id number]  Agustín Willett is a 74 y.o. male with a PMH sig for ARIANA, obesity, HTN, and tobacco abuse (quit 35 years ago) presented to the ED with complaints of diarrhea for the past two weeks.  He recently had an oral procedure and required antibiotics afterwards of Amoxicillin, Clindamycin, and Diflucan.  The patient also has a generalized pruritic rash that has been present for approximately one week.  He has associated weakness.  He is unable to keep any food or liquid down due to severe diarrhea.  He feels dehydrated.  No fevers.  The sepsis protocol was initiated in the ED and he was given a fluid bolus of 30ml/kg and a dose of Zosyn.  The patient is hypotensive with a systolic in the 80's that is responsive to IV fluids.  He will be admitted to the ICU for additional care  SUBJECTIVE    : Awake.  Currently on room air.  Remains on IV fluids and pressors.  Positive urine output overnight.  No complaints of cough or productive phlegm.  No chest pains.  No nausea or vomiting.  No bowel movement since midnight.  No abdominal pain   placed on levophed overnight, on ivf as well, also had hypoxemia and was placed on bipap. CXR with left elevated diaphragm ? Atelectasis ? Effusion  - feels better today, off levophed, on room air  OBJECTIVE    /75 (BP Location: Right arm, Patient Position: Sitting)   Pulse 104   Temp 97.4 °F (36.3 °C)   Resp 16   Ht 182.9 cm (72.01\")   Wt 127 kg (280 lb 3.3 oz)   SpO2 97%   BMI 37.99 kg/m²   Intake/Output last 3 shifts:  I/O last 3 completed shifts:  In: 2738 [P.O.:480; I.V.:2253; IV Piggyback:5]  Out: 4100 [Urine:4100]  Intake/Output this shift:  I/O this shift:  In: 658 [P.O.:620; I.V.:38]  Out: 550 [Urine:550]    General Appearance:  Alert, cooperative, no distress, appears stated age, on bipap  Head:  Normocephalic, without " obvious abnormality, atraumatic  Eyes:  PERRL, conjunctiva/corneas clear, EOM's intact     Neck:  Supple,  no adenopathy;      Lungs:   Clear to auscultation bilaterally, respirations unlabored  Chest wall:  No tenderness  Heart:  irregullar rhythm, S1 and S2 normal, 2/6 systolic murmur, rub   or gallop  Abdomen:  Soft, non-tender, bowel sounds active all four quadrants,  no masses, no hepatomegaly, no splenomegaly  Extremities:  Extremities normal, no cyanosis or edema  Pulses: 2+ and symmetric all extremities  Skin:  Diffuse errythematous rash on trunk, better today  Neurologic:   Alert and oriented, no focal deficits    Scheduled Meds:    apixaban 5 mg Oral Q12H   insulin lispro 0-24 Units Subcutaneous 4x Daily With Meals & Nightly   midodrine 5 mg Oral TID AC   [START ON 12/2/2019] predniSONE 5 mg Oral Daily With Breakfast   sodium chloride 10 mL Intravenous Q12H   sodium chloride 10 mL Intravenous Q12H   sodium chloride 10 mL Intravenous Q12H   sodium chloride 10 mL Intravenous Q12H       Continuous Infusions:    norepinephrine 0.02-0.3 mcg/kg/min Last Rate: 0.01 mcg/kg/min (12/01/19 0743)       PRN Meds:dextrose  •  dextrose  •  diphenhydrAMINE  •  glucagon (human recombinant)  •  hydrocortisone  •  hydrOXYzine  •  insulin lispro **AND** insulin lispro  •  ipratropium-albuterol  •  ondansetron  •  sodium chloride  •  sodium chloride  •  sodium chloride  •  sodium chloride     Data Review:  Lab Results (last 24 hours)     Procedure Component Value Units Date/Time    POC Glucose Once [[id number]]  (Abnormal) Collected:  12/01/19 0726    Specimen:  Blood Updated:  12/01/19 0728     Glucose 158 mg/dL      Comment: Serial Number: [id number]:  [id number]       Blood Culture - Blood, Arm, Right [[id number]]  (Abnormal) Collected:  11/28/19 1254    Specimen:  Blood from Arm, Right Updated:  12/01/19 0657     Blood Culture Staphylococcus, coagulase negative     Comment: Probable contaminant requires clinical  correlation, susceptibility not performed unless requested by physician.          Isolated from Aerobic Bottle     Gram Stain Aerobic Bottle Gram positive cocci in clusters    Phosphorus [[id number]]  (Abnormal) Collected:  12/01/19 0354    Specimen:  Blood Updated:  12/01/19 0436     Phosphorus 1.9 mg/dL     Magnesium [[id number]]  (Normal) Collected:  12/01/19 0354    Specimen:  Blood Updated:  12/01/19 0433     Magnesium 1.8 mg/dL     Comprehensive Metabolic Panel [[id number]]  (Abnormal) Collected:  12/01/19 0354    Specimen:  Blood Updated:  12/01/19 0433     Glucose 179 mg/dL      BUN 30 mg/dL      Creatinine 1.21 mg/dL      Sodium 140 mmol/L      Potassium 4.4 mmol/L      Chloride 105 mmol/L      CO2 25.0 mmol/L      Calcium 9.1 mg/dL      Total Protein 6.3 g/dL      Albumin 2.90 g/dL      ALT (SGPT) 10 U/L      AST (SGOT) 10 U/L      Alkaline Phosphatase 56 U/L      Total Bilirubin <0.2 mg/dL      eGFR Non African Amer 59 mL/min/1.73      Globulin 3.4 gm/dL      A/G Ratio 0.9 g/dL      BUN/Creatinine Ratio 24.8     Anion Gap 10.0 mmol/L     Narrative:       GFR Normal >60  Chronic Kidney Disease <60  Kidney Failure <15    CBC & Differential [[id number]] Collected:  12/01/19 0354    Specimen:  Blood Updated:  12/01/19 0408    Narrative:       The following orders were created for panel order CBC & Differential.  Procedure                               Abnormality         Status                     ---------                               -----------         ------                     CBC Auto Differential[[id number]]        Abnormal            Final result                 Please view results for these tests on the individual orders.    CBC Auto Differential [[id number]]  (Abnormal) Collected:  12/01/19 0354    Specimen:  Blood Updated:  12/01/19 0408     WBC 11.80 10*3/mm3      RBC 3.58 10*6/mm3      Hemoglobin 10.6 g/dL      Hematocrit 32.7 %      MCV 91.4 fL      MCH 29.7 pg      MCHC 32.5 g/dL      RDW 15.8 %       RDW-SD 50.3 fl      MPV 7.3 fL      Platelets 239 10*3/mm3      Neutrophil % 92.1 %      Lymphocyte % 2.9 %      Monocyte % 4.4 %      Eosinophil % 0.1 %      Basophil % 0.5 %      Neutrophils, Absolute 10.90 10*3/mm3      Lymphocytes, Absolute 0.30 10*3/mm3      Monocytes, Absolute 0.50 10*3/mm3      Eosinophils, Absolute 0.00 10*3/mm3      Basophils, Absolute 0.10 10*3/mm3      nRBC 0.1 /100 WBC     POC Glucose Once [[id number]]  (Abnormal) Collected:  11/30/19 2015    Specimen:  Blood Updated:  11/30/19 2016     Glucose 157 mg/dL      Comment: Serial Number: [id number]:  [id number]       POC Glucose Once [[id number]]  (Abnormal) Collected:  11/30/19 1612    Specimen:  Blood Updated:  11/30/19 1613     Glucose 150 mg/dL      Comment: Serial Number: [id number]:  [id number]       Blood Culture - Blood, Blood, Central Line [[id number]] Collected:  11/30/19 1428    Specimen:  Blood, Central Line Updated:  11/30/19 1433    Blood Culture ID, PCR (Staphylococcus only) - Blood, Arm, Right [[id number]]  (Abnormal) Collected:  11/28/19 1254    Specimen:  Blood from Arm, Right Updated:  11/30/19 1413     BCID, PCR Staphylococcus spp, not aureus. Identification by BCID PCR.    Blood Culture - Blood, Arm, Left [[id number]] Collected:  11/28/19 1254    Specimen:  Blood from Arm, Left Updated:  11/30/19 1315     Blood Culture No growth at 2 days             Imaging:  Imaging Results (Last 72 Hours)     Procedure Component Value Units Date/Time    XR Chest 1 View [[id number]] Collected:  11/30/19 0956     Updated:  11/30/19 0959    Narrative:       EXAMINATION: XR CHEST 1 VW-     DATE OF EXAM: 11/30/2019 9:30 AM     INDICATION: cough; A41.9-Sepsis, unspecified organism; R65.20-Severe  sepsis without septic shock; N17.9-Acute kidney failure, unspecified;  I95.9-Hypotension, unspecified; R19.7-Diarrhea, unspecified.     COMPARISON: Chest radiograph dated 11/29/2019     TECHNIQUE: Portable AP view of the chest was obtained.      FINDINGS:  There is a right IJ central line with tip terminating over the mid SVC.  There is cardiomegaly with central vascular congestion and mild  interstitial prominence. There is elevation left hemidiaphragm with left  basilar atelectasis and likely small left effusion. The right lung is  clear. There is no evidence of pneumothorax.       Impression:       1. Right IJ central line with tip terminating over the mid SVC.  2.Cardiomegaly with low lung volumes and increased interstitial  prominence.  3. Left basilar airspace opacity and likely trace left pleural effusion.     Electronically Signed By-Nic Rooney On:11/30/2019 9:57 AM  This report was finalized on [id number] by  Nic Rooney, .    US Renal Bilateral [[id number]] Collected:  11/29/19 1343     Updated:  11/29/19 1356    Narrative:       Examination: US RENAL BILATERAL-     Date of Exam: 11/29/2019 11:08 AM     Indication: laverne; A41.9-Sepsis, unspecified organism; R65.20-Severe  sepsis without septic shock; N17.9-Acute kidney failure, unspecified;  I95.9-Hypotension, unspecified; R19.7-Diarrhea, unspecified.     Comparison: None available.     Technique: Grayscale and color Doppler ultrasound evaluation of the  kidneys and urinary bladder was performed     Findings:  The right kidney measures 11.8 cm and the left kidney measures 13.0 cm.  Kidney echogenicity and vascularity appear within normal limits. There  is no solid kidney mass.  No echogenic shadowing stone.  No  hydronephrosis. Tiny simple cysts are seen arising from the left kidney.        Limited visualization of the urinary bladder is unremarkable.       Impression:       No acute sonographic abnormality. No evidence of hydronephrosis.     Electronically Signed By-Deepika Welch On:11/29/2019 1:43 PM  This report was finalized on [id number] by  Deepika Welch, .    XR Chest 1 View [[id number]] Collected:  11/29/19 0752     Updated:  11/29/19 0756    Narrative:       XR CHEST 1 VW-      Date of Exam: 11/29/2019 1:22 AM     Indication: line placement; A41.9-Sepsis, unspecified organism;  R65.20-Severe sepsis without septic shock; N17.9-Acute kidney failure,  unspecified; I95.9-Hypotension, unspecified; R19.7-Diarrhea,  unspecified.     Comparison: 11/28/2019     Technique: A single view of the chest was obtained.     FINDINGS:      There is been interval placement of right internal jugular central  venous catheter with the tip projecting over the superior vena cava.   Cardiomegaly is stable.  There is mild pulmonary vascular congestion.   There is linear atelectasis within the left lung base.  There is chronic  elevation of the left hemidiaphragm.  Right lung is clear.  No pleural  effusions identified.  No evidence of pneumothorax.             Impression:             1.  Interval placement of right internal jugular central venous catheter  which is in good position with the tip projecting over the superior vena  cava.  2.  No evidence pneumothorax.  3.  Left basilar atelectasis        Electronically Signed By-Guillermo Osuna On:11/29/2019 7:54 AM  This report was finalized on [id number] by  Guillermo Osuna, .    XR Abdomen KUB [[id number]] Collected:  11/28/19 1509     Updated:  11/28/19 1512    Narrative:       DATE OF EXAM:  11/28/2019 2:57 PM     PROCEDURE:  XR ABDOMEN KUB-     INDICATIONS:  diarrhea; A41.9-Sepsis, unspecified organism; R65.20-Severe sepsis  without septic shock; N17.9-Acute kidney failure, unspecified;  I95.9-Hypotension, unspecified; R19.7-Diarrhea, unspecified     COMPARISON:  No Comparisons Available     TECHNIQUE:   Single radiographic view of the abdomen was obtained.     FINDINGS:  The left upper quadrant is not included within the field-of-view. There  are no abnormally dilated loops of small bowel to suggest small bowel  obstruction. There is gaseous distention of the hepatic flexure  measuring up to 10 cm in size. The splenic flexure and descending colon  appear  normal in caliber. There are multiple calcified pelvic  phleboliths. There is calcification within the splenic artery.  Multilevel degenerative changes of the lumbar spine. Osteoarthritis of  the bilateral hips.       Impression:       1. Nonobstructive small bowel gas pattern.  2. Gaseous distention of the hepatic flexure with the remaining portions  of the colon normal in caliber.     Electronically Signed ByLeonela Rooney On:11/28/2019 3:10 PM  This report was finalized on [id number] by  Nic Rooney, .    XR Chest 1 View [[id number]] Collected:  11/28/19 1325     Updated:  11/28/19 1328    Narrative:       EXAMINATION: XR CHEST 1 VW-     DATE OF EXAM: 11/28/2019 1:07 PM     INDICATION: Severe Sepsis triage protocol.  Hypertension, cough      COMPARISON: None available.     TECHNIQUE: Portable AP view of the chest was obtained.     FINDINGS:  The cardiac silhouette is at the upper limit of normal likely  accentuated by AP technique. There is aortic arch atherosclerotic  calcification. Pulmonary vascularity is unremarkable. There is no focal  airspace consolidation, pleural effusion, or pneumothorax. There are no  acute osseous findings.       Impression:       No acute cardiopulmonary abnormality.     Electronically Signed ByLeonela Rooney On:11/28/2019 1:26 PM  This report was finalized on [id number] by  Nic Rooney, .            ASSESSMENT/PLAN:     Severe sepsis (CMS/HCC)    Diarrhea of presumed infectious origin    Severe dehydration    JOSEFA (acute kidney injury) (CMS/HCC)    Hypovolemic shock (CMS/HCC)  Hypovolemic shock (CMS/HCC)   Septic shock    Diarrhea of presumed infectious origin    Severe dehydration    JOSEFA (acute kidney injury) (CMS/HCC)  ARIANA  Rash  - related to abx likely     PLAN    -30ml/kg of IVFs given in ED, hypoxia improved, on room air  -off  Levophed.  Wean as appropriate to keep mean arterial pressure of 65.  -Zosyn x 1 given in ED.  Start PO vancomycin empirically for  C-DIFF, Rocephin for possible gastroenteritis  -will d/c abx as I suspect he was dehydrated from gastroenteritis, no other source of infection   Hypoxic respiratory failure - likely due to fluid overload, acute on chronic, will wean as able. D/c ivf, echo reviewed with preserved EF but mod to severe AS  - patient has ARIANA and chronic respiratory failure, possible COPD as he is former smoker, also with history of diaphragm paralysis  - will do IS, aggressive pulm toilet, OOB  -C-DIFF toxin negative  -stool culture ordered  -follow cultures of blood  -KUB with nonobstructive gas pattern  - creatinine is improving  -cont supplemental oxygen as needed for sats 92%  -creatinine 5.09, slowly improving.  Renal ultrasound pending.  Continue steroids, benadryl for rash- improving  DVT ppy:  -SCDs  -heparin sub Q     PPI ppy:  -pepcid.  We will consider deseeding soon once taking p.o.    Start p.o. diet.  Discussed with patient's family at bedside in detail.     Full Code   PCU in am if stays stable

## 2019-12-02 PROBLEM — R65.20 SEVERE SEPSIS (HCC): Status: RESOLVED | Noted: 2019-11-28 | Resolved: 2019-12-02

## 2019-12-02 PROBLEM — E86.0 SEVERE DEHYDRATION: Status: RESOLVED | Noted: 2019-11-28 | Resolved: 2019-12-02

## 2019-12-02 PROBLEM — R57.1 HYPOVOLEMIC SHOCK (HCC): Status: RESOLVED | Noted: 2019-11-28 | Resolved: 2019-12-02

## 2019-12-02 PROBLEM — N17.9 AKI (ACUTE KIDNEY INJURY) (HCC): Status: RESOLVED | Noted: 2019-11-28 | Resolved: 2019-12-02

## 2019-12-02 PROBLEM — A41.9 SEVERE SEPSIS (HCC): Status: RESOLVED | Noted: 2019-11-28 | Resolved: 2019-12-02

## 2019-12-02 LAB
ANION GAP SERPL CALCULATED.3IONS-SCNC: 9 MMOL/L (ref 5–15)
BUN BLD-MCNC: 35 MG/DL (ref 8–23)
BUN/CREAT SERPL: 31 (ref 7–25)
CALCIUM SPEC-SCNC: 9 MG/DL (ref 8.6–10.5)
CHLORIDE SERPL-SCNC: 104 MMOL/L (ref 98–107)
CO2 SERPL-SCNC: 27 MMOL/L (ref 22–29)
CREAT BLD-MCNC: 1.13 MG/DL (ref 0.76–1.27)
DEPRECATED RDW RBC AUTO: 49.4 FL (ref 37–54)
ERYTHROCYTE [DISTWIDTH] IN BLOOD BY AUTOMATED COUNT: 15.6 % (ref 12.3–15.4)
GFR SERPL CREATININE-BSD FRML MDRD: 64 ML/MIN/1.73
GLUCOSE BLD-MCNC: 135 MG/DL (ref 65–99)
GLUCOSE BLDC GLUCOMTR-MCNC: 144 MG/DL (ref 70–105)
GLUCOSE BLDC GLUCOMTR-MCNC: 161 MG/DL (ref 70–105)
GLUCOSE BLDC GLUCOMTR-MCNC: 88 MG/DL (ref 70–105)
GLUCOSE BLDC GLUCOMTR-MCNC: 96 MG/DL (ref 70–105)
HCT VFR BLD AUTO: 30 % (ref 37.5–51)
HGB BLD-MCNC: 10.3 G/DL (ref 13–17.7)
LYMPHOCYTES # BLD MANUAL: 0.77 10*3/MM3 (ref 0.7–3.1)
LYMPHOCYTES NFR BLD MANUAL: 2 % (ref 5–12)
LYMPHOCYTES NFR BLD MANUAL: 9 % (ref 19.6–45.3)
MAGNESIUM SERPL-MCNC: 1.7 MG/DL (ref 1.6–2.4)
MCH RBC QN AUTO: 30.9 PG (ref 26.6–33)
MCHC RBC AUTO-ENTMCNC: 34.3 G/DL (ref 31.5–35.7)
MCV RBC AUTO: 90.1 FL (ref 79–97)
MONOCYTES # BLD AUTO: 0.17 10*3/MM3 (ref 0.1–0.9)
NEUTROPHILS # BLD AUTO: 7.65 10*3/MM3 (ref 1.7–7)
NEUTROPHILS NFR BLD MANUAL: 89 % (ref 42.7–76)
PHOSPHATE SERPL-MCNC: 2.8 MG/DL (ref 2.5–4.5)
PLAT MORPH BLD: NORMAL
PLATELET # BLD AUTO: 219 10*3/MM3 (ref 140–450)
PMV BLD AUTO: 7.1 FL (ref 6–12)
POTASSIUM BLD-SCNC: 3.9 MMOL/L (ref 3.5–5.2)
RBC # BLD AUTO: 3.33 10*6/MM3 (ref 4.14–5.8)
RBC MORPH BLD: NORMAL
SCAN SLIDE: NORMAL
SODIUM BLD-SCNC: 140 MMOL/L (ref 136–145)
WBC MORPH BLD: NORMAL
WBC NRBC COR # BLD: 8.6 10*3/MM3 (ref 3.4–10.8)

## 2019-12-02 PROCEDURE — 94799 UNLISTED PULMONARY SVC/PX: CPT

## 2019-12-02 PROCEDURE — 83735 ASSAY OF MAGNESIUM: CPT | Performed by: NURSE PRACTITIONER

## 2019-12-02 PROCEDURE — 63710000001 INSULIN LISPRO (HUMAN) PER 5 UNITS: Performed by: NURSE PRACTITIONER

## 2019-12-02 PROCEDURE — 82962 GLUCOSE BLOOD TEST: CPT

## 2019-12-02 PROCEDURE — 84100 ASSAY OF PHOSPHORUS: CPT | Performed by: NURSE PRACTITIONER

## 2019-12-02 PROCEDURE — 99222 1ST HOSP IP/OBS MODERATE 55: CPT | Performed by: HOSPITALIST

## 2019-12-02 PROCEDURE — 94760 N-INVAS EAR/PLS OXIMETRY 1: CPT

## 2019-12-02 PROCEDURE — 85007 BL SMEAR W/DIFF WBC COUNT: CPT | Performed by: NURSE PRACTITIONER

## 2019-12-02 PROCEDURE — 63710000001 PREDNISONE PER 5 MG: Performed by: NURSE PRACTITIONER

## 2019-12-02 PROCEDURE — 85025 COMPLETE CBC W/AUTO DIFF WBC: CPT | Performed by: NURSE PRACTITIONER

## 2019-12-02 PROCEDURE — 94660 CPAP INITIATION&MGMT: CPT

## 2019-12-02 PROCEDURE — 25010000002 MAGNESIUM SULFATE 2 GM/50ML SOLUTION: Performed by: INTERNAL MEDICINE

## 2019-12-02 PROCEDURE — 80048 BASIC METABOLIC PNL TOTAL CA: CPT | Performed by: NURSE PRACTITIONER

## 2019-12-02 RX ORDER — MIDODRINE HYDROCHLORIDE 5 MG/1
5 TABLET ORAL 3 TIMES DAILY PRN
Status: DISCONTINUED | OUTPATIENT
Start: 2019-12-02 | End: 2019-12-03 | Stop reason: HOSPADM

## 2019-12-02 RX ORDER — MAGNESIUM SULFATE HEPTAHYDRATE 40 MG/ML
2 INJECTION, SOLUTION INTRAVENOUS ONCE
Status: COMPLETED | OUTPATIENT
Start: 2019-12-02 | End: 2019-12-02

## 2019-12-02 RX ADMIN — HYDROXYZINE HYDROCHLORIDE 25 MG: 25 TABLET ORAL at 21:32

## 2019-12-02 RX ADMIN — INSULIN LISPRO 4 UNITS: 100 INJECTION, SOLUTION INTRAVENOUS; SUBCUTANEOUS at 12:17

## 2019-12-02 RX ADMIN — MIDODRINE HYDROCHLORIDE 5 MG: 5 TABLET ORAL at 12:18

## 2019-12-02 RX ADMIN — IPRATROPIUM BROMIDE AND ALBUTEROL SULFATE 3 ML: .5; 3 SOLUTION RESPIRATORY (INHALATION) at 21:03

## 2019-12-02 RX ADMIN — APIXABAN 5 MG: 5 TABLET, FILM COATED ORAL at 08:08

## 2019-12-02 RX ADMIN — MAGNESIUM SULFATE HEPTAHYDRATE 2 G: 40 INJECTION, SOLUTION INTRAVENOUS at 14:02

## 2019-12-02 RX ADMIN — PREDNISONE 5 MG: 5 TABLET ORAL at 08:08

## 2019-12-02 RX ADMIN — Medication 10 ML: at 21:30

## 2019-12-02 RX ADMIN — HYDROXYZINE HYDROCHLORIDE 25 MG: 25 TABLET ORAL at 02:14

## 2019-12-02 RX ADMIN — HYDROXYZINE HYDROCHLORIDE 25 MG: 25 TABLET ORAL at 17:54

## 2019-12-02 RX ADMIN — HYDROCORTISONE: 1 CREAM TOPICAL at 21:32

## 2019-12-02 RX ADMIN — Medication 10 ML: at 08:08

## 2019-12-02 RX ADMIN — MIDODRINE HYDROCHLORIDE 5 MG: 5 TABLET ORAL at 08:08

## 2019-12-02 RX ADMIN — APIXABAN 5 MG: 5 TABLET, FILM COATED ORAL at 21:31

## 2019-12-02 NOTE — PROGRESS NOTES
Continued Stay Note  FRANKY Lopez     Patient Name: Agustín Willett  MRN: [id number]  Today's Date: 12/2/2019    Admit Date: 11/28/2019    Discharge Plan     Row Name 12/02/19 1422       Plan    Plan  Anticipate routine home.         Pt still works and drives.          Expected Discharge Date and Time     Expected Discharge Date Expected Discharge Time    Dec 4, 2019             Debora Ponce RN

## 2019-12-02 NOTE — PROGRESS NOTES
"KPA/PULM/CC PROGRESS NOTE       PATIENT IDENTIFICATION    Name: Agustín Willett Age: 74 y.o. Sex: male :  1945 MRN: [id number]  Agustín Willett is a 74 y.o. male with a PMH sig for ARIANA, obesity, HTN, and tobacco abuse (quit 35 years ago) presented to the ED with complaints of diarrhea for the past two weeks.  He recently had an oral procedure and required antibiotics afterwards of Amoxicillin, Clindamycin, and Diflucan.  The patient also has a generalized pruritic rash that has been present for approximately one week.  He has associated weakness.  He is unable to keep any food or liquid down due to severe diarrhea.  He feels dehydrated.  No fevers.  The sepsis protocol was initiated in the ED and he was given a fluid bolus of 30ml/kg and a dose of Zosyn.  The patient is hypotensive with a systolic in the 80's that is responsive to IV fluids.  He will be admitted to the ICU for additional care  SUBJECTIVE    : Awake.  Currently on room air.  Remains on IV fluids and pressors.  Positive urine output overnight.  No complaints of cough or productive phlegm.  No chest pains.  No nausea or vomiting.  No bowel movement since midnight.  No abdominal pain   placed on levophed overnight, on ivf as well, also had hypoxemia and was placed on bipap. CXR with left elevated diaphragm ? Atelectasis ? Effusion  - feels better today, off levophed, on room air  : Awake. Currently on RA. Off pressors since yesterday. No CP, No N/V. No fever or chills. Off IV fluids  OBJECTIVE    /84   Pulse 80   Temp 97.5 °F (36.4 °C) (Oral)   Resp 16   Ht 182.9 cm (72.01\")   Wt 127 kg (279 lb 15.8 oz)   SpO2 100%   BMI 37.96 kg/m²   Intake/Output last 3 shifts:  I/O last 3 completed shifts:  In:  [P.O.:; I.V.:81]  Out: 3275 [Urine:3275]  Intake/Output this shift:  No intake/output data recorded.    General Appearance:  Alert, cooperative, no distress, appears stated age  Head:  Normocephalic, without " obvious abnormality, atraumatic  Eyes:  PERRL, conjunctiva/corneas clear, EOM's intact     Neck:  Supple,  no adenopathy;      Lungs:   Clear to auscultation bilaterally, respirations unlabored  Chest wall:  No tenderness  Heart:  irregullar rhythm, S1 and S2 normal, 2/6 systolic murmur, rub   or gallop  Abdomen:  Soft, non-tender, bowel sounds active all four quadrants,  no masses, no hepatomegaly, no splenomegaly  Extremities:  Extremities normal, no cyanosis or edema  Pulses: 2+ and symmetric all extremities  Skin:  Diffuse errythematous rash on trunk, better today  Neurologic:   Alert and oriented, no focal deficits    Scheduled Meds:    apixaban 5 mg Oral Q12H   insulin lispro 0-24 Units Subcutaneous 4x Daily With Meals & Nightly   midodrine 5 mg Oral TID AC   predniSONE 5 mg Oral Daily With Breakfast   sodium chloride 10 mL Intravenous Q12H   sodium chloride 10 mL Intravenous Q12H   sodium chloride 10 mL Intravenous Q12H   sodium chloride 10 mL Intravenous Q12H       Continuous Infusions:    norepinephrine 0.02-0.3 mcg/kg/min Last Rate: 0.01 mcg/kg/min (12/01/19 0743)       PRN Meds:dextrose  •  dextrose  •  diphenhydrAMINE  •  glucagon (human recombinant)  •  hydrocortisone  •  hydrOXYzine  •  insulin lispro **AND** insulin lispro  •  ipratropium-albuterol  •  ondansetron  •  sodium chloride  •  sodium chloride  •  sodium chloride  •  sodium chloride     Data Review:  Lab Results (last 24 hours)     Procedure Component Value Units Date/Time    Scan Slide [[id number]] Collected:  12/02/19 0506    Specimen:  Blood Updated:  12/02/19 0633     Scan Slide --     Comment: See Manual Differential Results       Manual Differential [[id number]]  (Abnormal) Collected:  12/02/19 0506    Specimen:  Blood Updated:  12/02/19 0633     Neutrophil % 89.0 %      Lymphocyte % 9.0 %      Monocyte % 2.0 %      Neutrophils Absolute 7.65 10*3/mm3      Lymphocytes Absolute 0.77 10*3/mm3      Monocytes Absolute 0.17 10*3/mm3      RBC  Morphology Normal     WBC Morphology Normal     Platelet Morphology Normal    CBC & Differential [[id number]] Collected:  12/02/19 0506    Specimen:  Blood Updated:  12/02/19 0633    Narrative:       The following orders were created for panel order CBC & Differential.  Procedure                               Abnormality         Status                     ---------                               -----------         ------                     CBC Auto Differential[[id number]]        Abnormal            Final result                 Please view results for these tests on the individual orders.    CBC Auto Differential [[id number]]  (Abnormal) Collected:  12/02/19 0506    Specimen:  Blood Updated:  12/02/19 0633     WBC 8.60 10*3/mm3      RBC 3.33 10*6/mm3      Hemoglobin 10.3 g/dL      Hematocrit 30.0 %      MCV 90.1 fL      MCH 30.9 pg      MCHC 34.3 g/dL      RDW 15.6 %      RDW-SD 49.4 fl      MPV 7.1 fL      Platelets 219 10*3/mm3     Phosphorus [[id number]]  (Normal) Collected:  12/02/19 0506    Specimen:  Blood Updated:  12/02/19 0549     Phosphorus 2.8 mg/dL     Basic Metabolic Panel [[id number]]  (Abnormal) Collected:  12/02/19 0506    Specimen:  Blood Updated:  12/02/19 0538     Glucose 135 mg/dL      BUN 35 mg/dL      Creatinine 1.13 mg/dL      Sodium 140 mmol/L      Potassium 3.9 mmol/L      Chloride 104 mmol/L      CO2 27.0 mmol/L      Calcium 9.0 mg/dL      eGFR Non African Amer 64 mL/min/1.73      BUN/Creatinine Ratio 31.0     Anion Gap 9.0 mmol/L     Narrative:       GFR Normal >60  Chronic Kidney Disease <60  Kidney Failure <15    Magnesium [[id number]]  (Normal) Collected:  12/02/19 0506    Specimen:  Blood Updated:  12/02/19 0538     Magnesium 1.7 mg/dL     POC Glucose Once [[id number]]  (Abnormal) Collected:  12/01/19 2026    Specimen:  Blood Updated:  12/01/19 2028     Glucose 158 mg/dL      Comment: Serial Number: [id number]:  [id number]       POC Glucose Once [[id number]]  (Abnormal) Collected:   12/01/19 1622    Specimen:  Blood Updated:  12/01/19 1624     Glucose 174 mg/dL      Comment: Serial Number: [id number]:  [id number]       Blood Culture - Blood, Blood, Central Line [[id number]] Collected:  11/30/19 1428    Specimen:  Blood, Central Line Updated:  12/01/19 1445     Blood Culture No growth at 24 hours    Blood Culture - Blood, Arm, Left [[id number]] Collected:  11/28/19 1254    Specimen:  Blood from Arm, Left Updated:  12/01/19 1315     Blood Culture No growth at 3 days    POC Glucose Once [[id number]]  (Abnormal) Collected:  12/01/19 0726    Specimen:  Blood Updated:  12/01/19 0728     Glucose 158 mg/dL      Comment: Serial Number: [id number]:  [id number]                Imaging:  Imaging Results (Last 72 Hours)     Procedure Component Value Units Date/Time    XR Chest 1 View [[id number]] Collected:  11/30/19 0956     Updated:  11/30/19 0959    Narrative:       EXAMINATION: XR CHEST 1 VW-     DATE OF EXAM: 11/30/2019 9:30 AM     INDICATION: cough; A41.9-Sepsis, unspecified organism; R65.20-Severe  sepsis without septic shock; N17.9-Acute kidney failure, unspecified;  I95.9-Hypotension, unspecified; R19.7-Diarrhea, unspecified.     COMPARISON: Chest radiograph dated 11/29/2019     TECHNIQUE: Portable AP view of the chest was obtained.     FINDINGS:  There is a right IJ central line with tip terminating over the mid SVC.  There is cardiomegaly with central vascular congestion and mild  interstitial prominence. There is elevation left hemidiaphragm with left  basilar atelectasis and likely small left effusion. The right lung is  clear. There is no evidence of pneumothorax.       Impression:       1. Right IJ central line with tip terminating over the mid SVC.  2.Cardiomegaly with low lung volumes and increased interstitial  prominence.  3. Left basilar airspace opacity and likely trace left pleural effusion.     Electronically Signed ByLeonela Rooney On:11/30/2019 9:57 AM  This report was  finalized on [id number] by  Nic Rooney, .    US Renal Bilateral [[id number]] Collected:  11/29/19 1343     Updated:  11/29/19 1356    Narrative:       Examination: US RENAL BILATERAL-     Date of Exam: 11/29/2019 11:08 AM     Indication: laverne; A41.9-Sepsis, unspecified organism; R65.20-Severe  sepsis without septic shock; N17.9-Acute kidney failure, unspecified;  I95.9-Hypotension, unspecified; R19.7-Diarrhea, unspecified.     Comparison: None available.     Technique: Grayscale and color Doppler ultrasound evaluation of the  kidneys and urinary bladder was performed     Findings:  The right kidney measures 11.8 cm and the left kidney measures 13.0 cm.  Kidney echogenicity and vascularity appear within normal limits. There  is no solid kidney mass.  No echogenic shadowing stone.  No  hydronephrosis. Tiny simple cysts are seen arising from the left kidney.        Limited visualization of the urinary bladder is unremarkable.       Impression:       No acute sonographic abnormality. No evidence of hydronephrosis.     Electronically Signed By-Deepika Welch On:11/29/2019 1:43 PM  This report was finalized on [id number] by  Deepika Welch, .    XR Chest 1 View [[id number]] Collected:  11/29/19 0752     Updated:  11/29/19 0756    Narrative:       XR CHEST 1 VW-     Date of Exam: 11/29/2019 1:22 AM     Indication: line placement; A41.9-Sepsis, unspecified organism;  R65.20-Severe sepsis without septic shock; N17.9-Acute kidney failure,  unspecified; I95.9-Hypotension, unspecified; R19.7-Diarrhea,  unspecified.     Comparison: 11/28/2019     Technique: A single view of the chest was obtained.     FINDINGS:      There is been interval placement of right internal jugular central  venous catheter with the tip projecting over the superior vena cava.   Cardiomegaly is stable.  There is mild pulmonary vascular congestion.   There is linear atelectasis within the left lung base.  There is chronic  elevation of the left  hemidiaphragm.  Right lung is clear.  No pleural  effusions identified.  No evidence of pneumothorax.             Impression:             1.  Interval placement of right internal jugular central venous catheter  which is in good position with the tip projecting over the superior vena  cava.  2.  No evidence pneumothorax.  3.  Left basilar atelectasis        Electronically Signed By-Guillermo Osuna On:11/29/2019 7:54 AM  This report was finalized on [id number] by  Guillermo Osuna, .            ASSESSMENT/PLAN:     Severe sepsis (CMS/HCC)    Diarrhea of presumed infectious origin    Severe dehydration    JOSEFA (acute kidney injury) (CMS/HCC)    Hypovolemic shock (CMS/HCC)  Hypovolemic shock (CMS/HCC)   Septic shock   Diarrhea of presumed infectious origin   Severe dehydration   JOSEFA (acute kidney injury) (CMS/HCC)  ARIANA  Rash     PLAN    -30ml/kg of IVFs given in ED, hypoxia improved, on room air  -off  Levophed. Midodrine started. Can change to PRN  -Zosyn x 1 given in ED.  Started PO vancomycin empirically for C-DIFF and Rocephin for possible gastroenteritis - Off abx since 11/30. no other source of infection. Gastroenteritis improving  -Hypoxic respiratory failure - likely due to fluid overload, acute on chronic. Resolved. Now on RA  -Echo reviewed with preserved EF but mod to severe AS  - patient has ARIANA and chronic respiratory failure, possible COPD as he is former smoker, also with history of diaphragm paralysis  - IS, aggressive pulm toilet, OOB  -C-DIFF toxin negative  -KUB with nonobstructive gas pattern  - creatinine is improving 1.13 (12/2) creatinine 5.09 on admission. Renal ultrasound negative.  Continue steroids, benadryl for rash- improving  DVT ppy:  -SCDs  -Eliquis     PPI ppy:  -pepcid was D/C. Tolerating PO diet    Discussed with patient's family at bedside in detail.     Full Code   D/C RIJ central line  Replace Mg  Appears stable to transfer out of ICU

## 2019-12-02 NOTE — PLAN OF CARE
Problem: Patient Care Overview  Goal: Plan of Care Review  Outcome: Ongoing (interventions implemented as appropriate)   12/02/19 7298   Coping/Psychosocial   Plan of Care Reviewed With patient;daughter   Plan of Care Review   Progress improving   OTHER   Outcome Summary Pt remains off levophed, BP remains stable. Is ambulating in room without difficulty. Wearing bipap while asleep. Possible PCU downgrade today.       Problem: Skin Injury Risk (Adult)  Goal: Identify Related Risk Factors and Signs and Symptoms  Outcome: Ongoing (interventions implemented as appropriate)    Goal: Skin Health and Integrity  Outcome: Ongoing (interventions implemented as appropriate)      Problem: Fall Risk (Adult)  Goal: Identify Related Risk Factors and Signs and Symptoms  Outcome: Ongoing (interventions implemented as appropriate)    Goal: Absence of Fall  Outcome: Ongoing (interventions implemented as appropriate)    Goal: Identify Related Risk Factors and Signs and Symptoms  Outcome: Ongoing (interventions implemented as appropriate)    Goal: Absence of Fall  Outcome: Ongoing (interventions implemented as appropriate)      Problem: NPPV/CPAP (Adult)  Goal: Signs and Symptoms of Listed Potential Problems Will be Absent, Minimized or Managed (NPPV/CPAP)  Outcome: Ongoing (interventions implemented as appropriate)

## 2019-12-02 NOTE — PROGRESS NOTES
Nutrition Services    Patient Name:  Agustín Willett  YOB: 1945  MRN: [id number]  Admit Date:  11/28/2019    Comments: R/t wt loss continue boost Plus BID     Reason for Assessment     Row Name           Reason for Assessment    Reason For Assessment Follow up protocol    MST score 4, Nursing Assessment Screen on 11/29     Diagnosis H&P: ARIANA, HTN, Afib, DLD, DM2    Pt presented to the ED with complaints of diarrhea for the past two weeks.  He recently had an oral procedure and required antibiotics afterwards of Amoxicillin, Clindamycin, and Diflucan.    Current Problems/Procedures:     Severe sepsis     Diarrhea of presumed infectious origin- Cdiff negative. Gastroenteritis - improving.    Severe dehydration    JOSEFA    Hypovolemic shock - off pressors.         Nutrition/Diet History     Row Name           Nutrition/Diet History    Typical Food/Fluid Intake 12/2: Unable to interview, at time of visit current drawn & clinician in room    11/29: Unknown-pt receiving ultrasound at time of visit      Functional Status Unknown baseline        Food Allergies  NKFA        Factors Affecting Nutritional Intake  Diarrhea          Anthropometrics             Anthropometrics    Height 182.9 cm (72 inches)      Weight 127 kg (279 lb)    12/02/19 - Noted significant wt increase, likely r/t IVF & rehydration       Admit Weight    Admit Weight 108 kg (238 lb 1.6 oz)    11/28/19        Ideal Body Weight (IBW)    Ideal Body Weight (IBW) (kg) 80.9 kg (178# )    % Ideal Body Weight 156%        Usual Body Weight (UBW)    Usual Body Weight Unable to determine     % Usual Body Weight Unable to determine     Weight Hx  303## (1/3/19)  291# (11/26/18)  258# (7/2/18)  279# (11/2/17)    Down 21.5% x 10 months - unclear if this is accurate as admit wt pt was dehydrated?       Body Mass Index (BMI)    BMI (kg/m2) 37.9           Labs/Tests/Procedures/Meds                Labs    Pertinent Lab Results Comments Na+ 140 WNL; K+ 3.9 WNL;  Glucose 144 H/135 H; BUN 35 H; Crt 1.13 WNL; Ca 9 WNL; Phos 2.8 WNL; Mg 1.7 L; Hgb 10.3 L; Hct 30 L        Medications    Pertinent Medications Comments Humalog; prednisone; atarax         Physical Findings                Physical Findings    Overall Physical Appearance 12/2: Unable to observe at time of visit    11/29: Unable to visualize pt at visit      Gastrointestinal +BM on 12/2 (today) - diarrhea/stool volume appears to be decreasing     Tubes None at this time      Oral/Mouth Cavity Unknown if chewing or swallowing issues      Skin Skin intact         Estimated/Assessed Needs              Calculation Measurements    Weight Used For Calculations       Height          KCAL/KG    KCAL/KG                 Protein Requirements    Weight Used For Protein Calculations       Est Protein Requirement Amount (gms/kg)       Estimated Protein Requirements (gms/day)          Fluid Requirements    Estimated Fluid Requirements (mL/day)           Nutrition Prescription Ordered                Nutrition Prescription PO    Current PO Diet  Regular      Supplement -        Nutrition Prescription EN    Enteral Route -     Product -     TF Delivery Method -     Continuous TF Goal Rate (mL/hr) -     Water flush (mL)  -     Water Flush Frequency -        Nutrition Prescription PN    PN Route -     PN Goal Volume (mL) -     Dextrose (Kcal) -     Amino Acid (gm) -     Lipid Concentration (%) -     Lipid Volume (mL) -     Lipid Frequency -         Evaluation of Received Nutrient/Fluid Intake                PO Evaluation    % PO Intake 12/2: 100% x10 meals documented - ordering full meals    11/29: No meals recorded r/t previously NPO         EN Evaluation    TF Changes -     TF Residual -     TF Tolerance -        PN Evaluation    Number of Days PN Evaluated -           Problem/Interventions:  Problem 1                Nutrition Diagnoses Problem 1    Problem 1 Further information needed to clarify if nutrition diagnosis - unclear hx of  intakes & interpretation of weight loss.                   Intervention Goal                Intervention Goal    General Continue good po intake (75%-100% of meals)         Nutrition Intervention                Nutrition Intervention    RD/Tech Action Can continue current ONS at this time given possible weight loss. Continue regular diet         Nutrition Prescription     Row Name           Nutrition Prescription PO     Regular Diet  Boost Plus BID        Nutrition Prescription EN    Enteral Prescription -        Nutrition Prescription PN    Parenteral Prescription -         Education/Evaluation                Monitor/Evaluation    Monitor  Weight, intakes, labs, BM and skin integrity.           Electronically signed by:  Marianna Guo RD  12/02/19 10:30 AM

## 2019-12-03 VITALS
DIASTOLIC BLOOD PRESSURE: 80 MMHG | TEMPERATURE: 97.5 F | RESPIRATION RATE: 20 BRPM | SYSTOLIC BLOOD PRESSURE: 121 MMHG | OXYGEN SATURATION: 97 % | HEIGHT: 72 IN | BODY MASS INDEX: 37.92 KG/M2 | WEIGHT: 279.98 LBS | HEART RATE: 102 BPM

## 2019-12-03 LAB
ANION GAP SERPL CALCULATED.3IONS-SCNC: 10 MMOL/L (ref 5–15)
BACTERIA SPEC AEROBE CULT: NORMAL
BUN BLD-MCNC: 33 MG/DL (ref 8–23)
BUN/CREAT SERPL: 32.7 (ref 7–25)
CALCIUM SPEC-SCNC: 9.2 MG/DL (ref 8.6–10.5)
CHLORIDE SERPL-SCNC: 102 MMOL/L (ref 98–107)
CO2 SERPL-SCNC: 29 MMOL/L (ref 22–29)
CREAT BLD-MCNC: 1.01 MG/DL (ref 0.76–1.27)
DEPRECATED RDW RBC AUTO: 49 FL (ref 37–54)
EOSINOPHIL # BLD MANUAL: 0.08 10*3/MM3 (ref 0–0.4)
EOSINOPHIL NFR BLD MANUAL: 1 % (ref 0.3–6.2)
ERYTHROCYTE [DISTWIDTH] IN BLOOD BY AUTOMATED COUNT: 15.5 % (ref 12.3–15.4)
GFR SERPL CREATININE-BSD FRML MDRD: 73 ML/MIN/1.73
GLUCOSE BLD-MCNC: 93 MG/DL (ref 65–99)
GLUCOSE BLDC GLUCOMTR-MCNC: 136 MG/DL (ref 70–105)
GLUCOSE BLDC GLUCOMTR-MCNC: 166 MG/DL (ref 70–105)
GLUCOSE BLDC GLUCOMTR-MCNC: 178 MG/DL (ref 70–105)
GLUCOSE BLDC GLUCOMTR-MCNC: 84 MG/DL (ref 70–105)
HCT VFR BLD AUTO: 34.5 % (ref 37.5–51)
HGB BLD-MCNC: 11.4 G/DL (ref 13–17.7)
LYMPHOCYTES # BLD MANUAL: 1.33 10*3/MM3 (ref 0.7–3.1)
LYMPHOCYTES NFR BLD MANUAL: 16 % (ref 19.6–45.3)
LYMPHOCYTES NFR BLD MANUAL: 7 % (ref 5–12)
MAGNESIUM SERPL-MCNC: 1.8 MG/DL (ref 1.6–2.4)
MCH RBC QN AUTO: 29.8 PG (ref 26.6–33)
MCHC RBC AUTO-ENTMCNC: 33.1 G/DL (ref 31.5–35.7)
MCV RBC AUTO: 90 FL (ref 79–97)
METAMYELOCYTES NFR BLD MANUAL: 1 % (ref 0–0)
MONOCYTES # BLD AUTO: 0.58 10*3/MM3 (ref 0.1–0.9)
NEUTROPHILS # BLD AUTO: 6.23 10*3/MM3 (ref 1.7–7)
NEUTROPHILS NFR BLD MANUAL: 72 % (ref 42.7–76)
NEUTS BAND NFR BLD MANUAL: 3 % (ref 0–5)
PHOSPHATE SERPL-MCNC: 3 MG/DL (ref 2.5–4.5)
PLAT MORPH BLD: NORMAL
PLATELET # BLD AUTO: 233 10*3/MM3 (ref 140–450)
PMV BLD AUTO: 7.8 FL (ref 6–12)
POTASSIUM BLD-SCNC: 3.7 MMOL/L (ref 3.5–5.2)
RBC # BLD AUTO: 3.83 10*6/MM3 (ref 4.14–5.8)
RBC MORPH BLD: NORMAL
SCAN SLIDE: NORMAL
SODIUM BLD-SCNC: 141 MMOL/L (ref 136–145)
WBC MORPH BLD: NORMAL
WBC NRBC COR # BLD: 8.3 10*3/MM3 (ref 3.4–10.8)

## 2019-12-03 PROCEDURE — 94799 UNLISTED PULMONARY SVC/PX: CPT

## 2019-12-03 PROCEDURE — 85025 COMPLETE CBC W/AUTO DIFF WBC: CPT | Performed by: NURSE PRACTITIONER

## 2019-12-03 PROCEDURE — 83735 ASSAY OF MAGNESIUM: CPT | Performed by: NURSE PRACTITIONER

## 2019-12-03 PROCEDURE — 63710000001 PREDNISONE PER 5 MG: Performed by: NURSE PRACTITIONER

## 2019-12-03 PROCEDURE — 85007 BL SMEAR W/DIFF WBC COUNT: CPT | Performed by: NURSE PRACTITIONER

## 2019-12-03 PROCEDURE — 84100 ASSAY OF PHOSPHORUS: CPT | Performed by: NURSE PRACTITIONER

## 2019-12-03 PROCEDURE — 99239 HOSP IP/OBS DSCHRG MGMT >30: CPT | Performed by: HOSPITALIST

## 2019-12-03 PROCEDURE — 80048 BASIC METABOLIC PNL TOTAL CA: CPT | Performed by: NURSE PRACTITIONER

## 2019-12-03 PROCEDURE — 82962 GLUCOSE BLOOD TEST: CPT

## 2019-12-03 RX ORDER — PREDNISONE 1 MG/1
5 TABLET ORAL
Qty: 1 TABLET | Refills: 0 | Status: SHIPPED | OUTPATIENT
Start: 2019-12-04 | End: 2019-12-05

## 2019-12-03 RX ORDER — MIDODRINE HYDROCHLORIDE 5 MG/1
5 TABLET ORAL 3 TIMES DAILY PRN
Qty: 90 TABLET | Refills: 0 | Status: SHIPPED | OUTPATIENT
Start: 2019-12-03 | End: 2019-12-23

## 2019-12-03 RX ORDER — METHYLPREDNISOLONE 4 MG/1
TABLET ORAL
Refills: 0 | OUTPATIENT
Start: 2019-12-03

## 2019-12-03 RX ORDER — PROPRANOLOL HYDROCHLORIDE 10 MG/1
TABLET ORAL
Qty: 90 TABLET | Refills: 3 | OUTPATIENT
Start: 2019-12-03

## 2019-12-03 RX ORDER — AMOXICILLIN 500 MG/1
CAPSULE ORAL
Qty: 20 CAPSULE | Refills: 0 | OUTPATIENT
Start: 2019-12-03

## 2019-12-03 RX ADMIN — HYDROXYZINE HYDROCHLORIDE 25 MG: 25 TABLET ORAL at 08:32

## 2019-12-03 RX ADMIN — IPRATROPIUM BROMIDE AND ALBUTEROL SULFATE 3 ML: .5; 3 SOLUTION RESPIRATORY (INHALATION) at 07:29

## 2019-12-03 RX ADMIN — Medication 10 ML: at 08:32

## 2019-12-03 RX ADMIN — Medication 10 ML: at 08:33

## 2019-12-03 RX ADMIN — PREDNISONE 5 MG: 5 TABLET ORAL at 08:32

## 2019-12-03 RX ADMIN — APIXABAN 5 MG: 5 TABLET, FILM COATED ORAL at 08:32

## 2019-12-03 NOTE — PLAN OF CARE
Problem: Patient Care Overview  Goal: Plan of Care Review  Outcome: Ongoing (interventions implemented as appropriate)   12/02/19 0443 12/02/19 1955 12/03/19 0358   Coping/Psychosocial   Plan of Care Reviewed With --  patient;daughter --    Plan of Care Review   Progress improving --  --    OTHER   Outcome Summary --  --  pt feel better , stool form not loose        Problem: Skin Injury Risk (Adult)  Goal: Identify Related Risk Factors and Signs and Symptoms  Outcome: Ongoing (interventions implemented as appropriate)   12/01/19 1456   Skin Injury Risk (Adult)   Related Risk Factors (Skin Injury Risk) critical care admission  (poss risk)     Goal: Skin Health and Integrity  Outcome: Ongoing (interventions implemented as appropriate)   12/02/19 0443   Skin Injury Risk (Adult)   Skin Health and Integrity making progress toward outcome       Problem: Fall Risk (Adult)  Goal: Identify Related Risk Factors and Signs and Symptoms  Outcome: Ongoing (interventions implemented as appropriate)   12/01/19 1456 12/03/19 0358   Fall Risk (Adult)   Related Risk Factors (Fall Risk) history of falls;environment unfamiliar --    Signs and Symptoms (Fall Risk) --  presence of risk factors     Goal: Absence of Fall  Outcome: Ongoing (interventions implemented as appropriate)   12/03/19 0358   Fall Risk (Adult)   Absence of Fall making progress toward outcome     Goal: Identify Related Risk Factors and Signs and Symptoms  Outcome: Ongoing (interventions implemented as appropriate)    Goal: Absence of Fall  Outcome: Ongoing (interventions implemented as appropriate)   12/01/19 1456   Fall Risk (Adult)   Absence of Fall making progress toward outcome       Problem: NPPV/CPAP (Adult)  Goal: Signs and Symptoms of Listed Potential Problems Will be Absent, Minimized or Managed (NPPV/CPAP)  Outcome: Ongoing (interventions implemented as appropriate)   12/01/19 1456   Goal/Outcome Evaluation   Problems Assessed (NPPV/CPAP) situational response    Problems Present (NPPV/CPAP) situational response

## 2019-12-03 NOTE — SIGNIFICANT NOTE
12/03/19 1246   Discharge of Care   Discharge Mode wheel chair   Discharge Destination home   Discharged Accompanied by family member/friend   Discharge Contact Information if Applicable [facility] [address] [phone]   Discharge Teaching Done  Yes   Learning Method Explanation;Written Materials

## 2019-12-03 NOTE — H&P
[facility] Medicine Services      Patient Name: Agustín Willett  : 1945  MRN: [id number]  Primary Care Physician: Stuart Shah MD  Date of admission: 2019    Patient Care Team:  Stuart Shah MD as PCP - General (Family Medicine)  Roesmarie Murray MD as PCP - Claims Attributed  Nancy Leone APRN as Nurse Practitioner (Endocrinology)  Merlyn Hernández MD as Consulting Physician (Gastroenterology)  Rosemarie Murray MD as Consulting Physician (Cardiology)          Subjective   History Present Illness     Chief Complaint:   Chief Complaint   Patient presents with   • Vomiting     reports nausea/vomiting/diarrhea; with a possible near-syncopal episode   Medical management of chronic medical issues including sepsis, dehydration etc.     HPI.    Mr. Willett is a 74 y.o.  presents to [facility] admitted to ICU for septic and hypovolemic shock, requiring fluid and pressors initially, treated empirically initially with po vancomycin and iv rocephin for possible C.difficile and gastroenteritis. Pt has improved significantly now. Off the pressors, off the antibiotics. Acute kidney injury has improved. Tolerating diet, denies for any nausea or vomiting now, denies for any diarrhea. Labs and other data from chart reviewed.          History of Present Illness    Review of Systems   All other systems reviewed and are negative.          Personal History     Past Medical History:   Past Medical History:   Diagnosis Date   • Atrial fibrillation (CMS/HCC)    • Dyslipidemia    • Erectile dysfunction    • Hx of complete eye exam 2016    UNKNOWN PER PT    • Hyperlipidemia    • Hypertension    • Hypogonadism male    • ARIANA (obstructive sleep apnea)    • PONV (postoperative nausea and vomiting)    • Type 2 diabetes mellitus (CMS/HCC)    • Vitamin D deficiency        Surgical History:      Past Surgical History:   Procedure Laterality Date   • ADENOIDECTOMY     • COLONOSCOPY  N/A 7/2/2018    Procedure: COLONOSCOPY TO CECUM WITH COLD BIOPSY POLYPECTOMY;  Surgeon: Dave Elizabeth MD;  [facility]: [facility] ENDOSCOPY;  Service: Gastroenterology   • TONSILLECTOMY     • TOTAL KNEE ARTHROPLASTY Left    • VASECTOMY             Family History: family history includes ALS in his mother; Hypertension in his father. Otherwise pertinent FHx was reviewed and unremarkable.     Social History:  reports that he quit smoking about 49 years ago. His smoking use included cigarettes. He has a 30.00 pack-year smoking history. He has never used smokeless tobacco. He reports that he drinks alcohol. He reports that he does not use drugs.      Medications:  Prior to Admission medications    Medication Sig Start Date End Date Taking? Authorizing Provider   albuterol sulfate HFA (VENTOLIN HFA) 108 (90 Base) MCG/ACT inhaler Inhale 2 puffs Every 4 (Four) Hours As Needed for Wheezing. 11/7/19  Yes Nancy Leone APRN   allopurinol (ZYLOPRIM) 300 MG tablet Take 1 tablet by mouth Daily. 11/7/19  Yes Nancy Leone APRN   amLODIPine (NORVASC) 10 MG tablet Take 1 tablet by mouth Daily. 11/7/19  Yes Nancy Leone APRN   apixaban (ELIQUIS) 5 MG tablet tablet Take 5 mg by mouth 2 (Two) Times a Day.   Yes Vincent Bermudez MD   atorvastatin (LIPITOR) 20 MG tablet Take 1 tablet by mouth Daily. 11/7/19  Yes Nancy Leone APRN   benazepril-hydrochlorthiazide (LOTENSIN HCT) 20-12.5 MG per tablet Take 1 tablet by mouth 2 (Two) Times a Day.   Yes Vincent Bermudez MD   chlorthalidone (HYGROTON) 25 MG tablet Take 1 tablet by mouth Daily. 11/7/19  Yes Nancy Leone APRN   Cholecalciferol (VITAMIN D PO) Take 1 tablet by mouth Daily.   Yes Vincent Bermudez MD   fluconazole (DIFLUCAN) 100 MG tablet Take 100 mg by mouth Daily. Started on 11/20/19 for 14 days   Yes Vincent Bermudez MD   metFORMIN (GLUCOPHAGE) 1000 MG tablet Take 1 tablet by mouth 2 (Two) Times a Day. 11/7/19  Yes Nancy Leone APRN    propranolol (INDERAL) 10 MG tablet Take 10 mg by mouth Daily.   Yes Provider, Vincent, MD       Allergies:    Allergies   Allergen Reactions   • Ace Inhibitors Angioedema       Objective   Objective     Vital Signs  Temp:  [96.3 °F (35.7 °C)-98.3 °F (36.8 °C)] 98.3 °F (36.8 °C)  Heart Rate:  [] 104  Resp:  [18] 18  BP: ()/() 121/85  SpO2:  [91 %-100 %] 95 %  on   ;   Device (Oxygen Therapy): room air  Body mass index is 37.96 kg/m².    Physical Exam   Constitutional: He is oriented to person, place, and time. He appears well-developed and well-nourished. No distress.   HENT:   Head: Normocephalic and atraumatic.   Right Ear: External ear normal.   Left Ear: External ear normal.   Nose: Nose normal.   Mouth/Throat: Oropharynx is clear and moist. No oropharyngeal exudate.   Eyes: Conjunctivae and EOM are normal. Pupils are equal, round, and reactive to light. Right eye exhibits no discharge. Left eye exhibits no discharge. No scleral icterus.   Neck: Normal range of motion. No JVD present. No tracheal deviation present. No thyromegaly present.   Cardiovascular: Normal rate, regular rhythm, normal heart sounds and intact distal pulses. Exam reveals no gallop and no friction rub.   No murmur heard.  Pulmonary/Chest: Effort normal and breath sounds normal. No stridor. No respiratory distress. He has no wheezes. He has no rales. He exhibits no tenderness.   Abdominal: Soft. Bowel sounds are normal. He exhibits no distension and no mass. There is no tenderness. There is no rebound and no guarding. No hernia.   Musculoskeletal: Normal range of motion. He exhibits no edema, tenderness or deformity.   Lymphadenopathy:     He has no cervical adenopathy.   Neurological: He is alert and oriented to person, place, and time. No cranial nerve deficit or sensory deficit. He exhibits normal muscle tone. Coordination normal.   Skin: Skin is warm and dry. No rash noted. He is not diaphoretic. No erythema.    Psychiatric: He has a normal mood and affect. His behavior is normal.   Nursing note and vitals reviewed.      Results Review:  I have personally reviewed most recent lab results and agree with findings, most notably: .    Results from last 7 days   Lab Units 12/02/19  0506  11/28/19  1254   WBC 10*3/mm3 8.60   < > 12.50*   HEMOGLOBIN g/dL 10.3*   < > 13.0   HEMATOCRIT % 30.0*   < > 39.8   PLATELETS 10*3/mm3 219   < > 243   INR   --   --  1.37*    < > = values in this interval not displayed.     Results from last 7 days   Lab Units 12/02/19  0506 12/01/19  0354  11/28/19  2132   SODIUM mmol/L 140 140   < >  --    POTASSIUM mmol/L 3.9 4.4   < >  --    CHLORIDE mmol/L 104 105   < >  --    CO2 mmol/L 27.0 25.0   < >  --    BUN mg/dL 35* 30*   < >  --    CREATININE mg/dL 1.13 1.21   < >  --    GLUCOSE mg/dL 135* 179*   < >  --    CALCIUM mg/dL 9.0 9.1   < >  --    ALT (SGPT) U/L  --  10  --   --    AST (SGOT) U/L  --  10  --   --    LACTATE mmol/L  --   --   --  0.8    < > = values in this interval not displayed.     Estimated Creatinine Clearance: 81.4 mL/min (by C-G formula based on SCr of 1.13 mg/dL).  Brief Urine Lab Results  (Last result in the past 365 days)      Color   Clarity   Blood   Leuk Est   Nitrite   Protein   CREAT   Urine HCG        11/28/19 1350 Dark Yellow Cloudy  Comment:  Result checked  Negative Trace Negative 30 mg/dL (1+)               Microbiology Results (last 10 days)     Procedure Component Value - Date/Time    Blood Culture - Blood, Blood, Central Line [[id number]] Collected:  11/30/19 1428    Lab Status:  Preliminary result Specimen:  Blood, Central Line Updated:  12/02/19 1445     Blood Culture No growth at 2 days    MRSA Screen Culture - Swab, Nares [[id number]]  (Normal) Collected:  11/28/19 1724    Lab Status:  Final result Specimen:  Swab from Nares Updated:  11/29/19 2042     MRSA SCREEN CX No Methicillin Resistant Staphylococcus aureus isolated    Clostridium Difficile Toxin - Stool,  Per Rectum [[id number]] Collected:  11/28/19 1347    Lab Status:  Final result Specimen:  Stool from Per Rectum Updated:  11/29/19 0825    Narrative:       The following orders were created for panel order Clostridium Difficile Toxin - Stool, Per Rectum.  Procedure                               Abnormality         Status                     ---------                               -----------         ------                     Clostridium Difficile EI...[[id number]]  Normal              Final result                 Please view results for these tests on the individual orders.    Clostridium Difficile EIA - Stool, Per Rectum [[id number]]  (Normal) Collected:  11/28/19 1347    Lab Status:  Final result Specimen:  Stool from Per Rectum Updated:  11/29/19 0825     C Diff GDH / Toxin Negative    Blood Culture - Blood, Arm, Left [[id number]] Collected:  11/28/19 1254    Lab Status:  Preliminary result Specimen:  Blood from Arm, Left Updated:  12/02/19 1315     Blood Culture No growth at 4 days    Blood Culture - Blood, Arm, Right [[id number]]  (Abnormal) Collected:  11/28/19 1254    Lab Status:  Final result Specimen:  Blood from Arm, Right Updated:  12/01/19 0657     Blood Culture Staphylococcus, coagulase negative     Comment: Probable contaminant requires clinical correlation, susceptibility not performed unless requested by physician.          Isolated from Aerobic Bottle     Gram Stain Aerobic Bottle Gram positive cocci in clusters    Blood Culture ID, PCR (Staphylococcus only) - Blood, Arm, Right [[id number]]  (Abnormal) Collected:  11/28/19 1254    Lab Status:  Final result Specimen:  Blood from Arm, Right Updated:  11/30/19 1413     BCID, PCR Staphylococcus spp, not aureus. Identification by BCID PCR.          ECG/EMG Results (most recent)     Procedure Component Value Units Date/Time    ECG 12 Lead [[id number]] Collected:  11/28/19 1246     Updated:  11/28/19 1247    Narrative:       HEART RATE= 97  bpm  RR Interval= 621   ms  ND Interval=   ms  P Horizontal Axis=   deg  P Front Axis=   deg  QRSD Interval= 104  ms  QT Interval= 343  ms  QRS Axis= -55  deg  T Wave Axis= 39  deg  - ABNORMAL ECG -  Atrial fibrillation  Left anterior fascicular block  Electronically Signed By:   Date and Time of Study: 2019-11-28 12:46:06    ECG 12 Lead [[id number]] Collected:  11/29/19 1815     Updated:  11/29/19 1816    Narrative:       HEART RATE= 95  bpm  RR Interval= 630  ms  ND Interval=   ms  P Horizontal Axis=   deg  P Front Axis=   deg  QRSD Interval= 109  ms  QT Interval= 340  ms  QRS Axis= -42  deg  T Wave Axis= 78  deg  - ABNORMAL ECG -  Atrial fibrillation  Left anterior fascicular block  Nonspecific T abnormalities, lateral leads  Electronically Signed By:   Date and Time of Study: 2019-11-29 18:15:02    Adult Transthoracic Echo Complete W/ Cont if Necessary Per Protocol [[id number]] Collected:  11/29/19 1128     Updated:  11/29/19 2009     BSA 2.3 m^2       CV ECHO ALEJANDRO - RVDD 4.2 cm      IVSd 1.3 cm      LVIDd 4.8 cm      LVIDs 3.0 cm      LVPWd 1.1 cm      IVS/LVPW 1.2     FS 37.3 %      EDV(Teich) 107.3 ml      ESV(Teich) 35.1 ml      EF(Teich) 67.3 %      EDV(cubed) 110.3 ml      ESV(cubed) 27.1 ml      EF(cubed) 75.4 %      LV mass(C)d 212.4 grams      LV mass(C)dI 92.6 grams/m^2      SV(Teich) 72.1 ml      SI(Teich) 31.5 ml/m^2      SV(cubed) 83.1 ml      SI(cubed) 36.2 ml/m^2      Ao root diam 3.6 cm      Ao root area 10.3 cm^2      ACS 1.1 cm      asc Aorta Diam 3.7 cm      LVOT diam 2.4 cm      LVOT area 4.4 cm^2      EDV(MOD-sp4) 106.8 ml      ESV(MOD-sp4) 36.2 ml      EF(MOD-sp4) 66.1 %      EDV(MOD-sp2) 98.7 ml      ESV(MOD-sp2) 41.9 ml      EF(MOD-sp2) 57.5 %      SV(MOD-sp4) 70.6 ml      SI(MOD-sp4) 30.8 ml/m^2      SV(MOD-sp2) 56.8 ml      SI(MOD-sp2) 24.7 ml/m^2      Ao root area (BSA corrected) 1.6     LV Martinez Vol (BSA corrected) 46.6 ml/m^2      LV Sys Vol (BSA corrected) 15.8 ml/m^2      Aortic R-R 0.69 sec      Aortic  HR 86.6 BPM      MV V2 max 156.6 cm/sec      MV max PG 9.8 mmHg      MV V2 mean 71.6 cm/sec      MV mean PG 3.0 mmHg      MV V2 VTI 38.1 cm      MVA(VTI) 3.2 cm^2      Ao pk regina 350.3 cm/sec      Ao max PG 49.1 mmHg      Ao max PG (full) 42.3 mmHg      Ao V2 mean 275.7 cm/sec      Ao mean PG 33.2 mmHg      Ao mean PG (full) 28.5 mmHg      Ao V2 VTI 62.9 cm      JES(I,A) 1.9 cm^2      JES(I,D) 1.9 cm^2      JES(V,A) 1.6 cm^2      JES(V,D) 1.6 cm^2      LV V1 max PG 6.7 mmHg      LV V1 mean PG 4.7 mmHg      LV V1 max 129.8 cm/sec      LV V1 mean 103.7 cm/sec      LV V1 VTI 27.6 cm      CO(Ao) 56.1 l/min      CI(Ao) 24.5 l/min/m^2      SV(Ao) 647.5 ml      SI(Ao) 282.3 ml/m^2      CO(LVOT) 10.6 l/min      CI(LVOT) 4.6 l/min/m^2      SV(LVOT) 122.0 ml      SI(LVOT) 53.2 ml/m^2      PA V2 max 103.4 cm/sec      PA max PG 4.3 mmHg      PA max PG (full) 2.3 mmHg      PA V2 mean 76.7 cm/sec      PA mean PG 2.7 mmHg      PA mean PG (full) 1.2 mmHg      PA V2 VTI 17.2 cm      PA acc time 0.11 sec      RV V1 max PG 2.0 mmHg      RV V1 mean PG 1.5 mmHg      RV V1 max 70.9 cm/sec      RV V1 mean 58.1 cm/sec      RV V1 VTI 14.4 cm      TR max regina 289.2 cm/sec      RVSP(TR) 42.6 mmHg      RAP systole 8.0 mmHg      PA pr(Accel) 27.6 mmHg       CV ECHO ALEJANDRO - BZI_BMI 32.3 kilograms/m^2       CV ECHO ALEJANDRO - BSA([facility]) 2.4 m^2       CV ECHO ALEJANDRO - BZI_METRIC_WEIGHT 108.0 kg       CV ECHO ALEJANDRO - BZI_METRIC_HEIGHT 182.9 cm      EF(MOD-bp) 63.0 %      LA dimension(2D) 3.6 cm      Echo EF Estimated 65 %     Narrative:       · Estimated EF = 65%.  · Left ventricular systolic function is normal.  · Left ventricular wall thickness is consistent with mild septal   asymmetric hypertrophy.  · Right ventricular cavity is borderline dilated.  · Left atrial cavity size is borderline dilated.  · There is severe calcification of the aortic valve mainly affecting the   non, left and right coronary cusp(s).  · Moderate to severe aortic valve  stenosis is present.  · Aortic valve maximum pressure gradient is 49.1 mmHg.  · Aortic valve mean pressure gradient is 33.2 mmHg.  · Mild mitral valve regurgitation is present  · Mild-to-moderate mitral valve stenosis is present  · Mild tricuspid valve regurgitation is present.             Results for orders placed during the hospital encounter of 04/27/18   Duplex Venous Lower Extremity - Bilateral CAR    Narrative · Normal bilateral lower extremity venous duplex scan.          Results for orders placed during the hospital encounter of 11/28/19   Adult Transthoracic Echo Complete W/ Cont if Necessary Per Protocol    Narrative · Estimated EF = 65%.  · Left ventricular systolic function is normal.  · Left ventricular wall thickness is consistent with mild septal   asymmetric hypertrophy.  · Right ventricular cavity is borderline dilated.  · Left atrial cavity size is borderline dilated.  · There is severe calcification of the aortic valve mainly affecting the   non, left and right coronary cusp(s).  · Moderate to severe aortic valve stenosis is present.  · Aortic valve maximum pressure gradient is 49.1 mmHg.  · Aortic valve mean pressure gradient is 33.2 mmHg.  · Mild mitral valve regurgitation is present  · Mild-to-moderate mitral valve stenosis is present  · Mild tricuspid valve regurgitation is present.          Xr Chest 1 View    Result Date: 11/30/2019  1. Right IJ central line with tip terminating over the mid SVC. 2.Cardiomegaly with low lung volumes and increased interstitial prominence. 3. Left basilar airspace opacity and likely trace left pleural effusion.  Electronically Signed By-Nic Rooney On:11/30/2019 9:57 AM This report was finalized on [id number] by  Nic Rooney, .    Xr Chest 1 View    Result Date: 11/29/2019    1.  Interval placement of right internal jugular central venous catheter which is in good position with the tip projecting over the superior vena cava. 2.  No evidence  pneumothorax. 3.  Left basilar atelectasis   Electronically Signed By-Guillermo Osuna On:11/29/2019 7:54 AM This report was finalized on [id number] by  Guillermo Osuna, .    Xr Chest 1 View    Result Date: 11/28/2019  No acute cardiopulmonary abnormality.  Electronically Signed By-Nic Rooney On:11/28/2019 1:26 PM This report was finalized on [id number] by  Manuel Ramachandran    Us Renal Bilateral    Result Date: 11/29/2019  No acute sonographic abnormality. No evidence of hydronephrosis.  Electronically Signed By-Deepika Welch On:11/29/2019 1:43 PM This report was finalized on [id number] by  Deepika Welch, Manuel    Xr Abdomen Kub    Result Date: 11/28/2019  1. Nonobstructive small bowel gas pattern. 2. Gaseous distention of the hepatic flexure with the remaining portions of the colon normal in caliber.  Electronically Signed By-Nic Rooney On:11/28/2019 3:10 PM This report was finalized on [id number] by  Nic Rooney, .        Estimated Creatinine Clearance: 81.4 mL/min (by C-G formula based on SCr of 1.13 mg/dL).    Assessment/Plan   Assessment/Plan       Active Hospital Problems:  No notes have been filed under this hospital service.  Service: Hospitalist    Problem list  Severe sepsis (CMS/HCC)    Diarrhea of presumed infectious origin    Severe dehydration    JOSEFA (acute kidney injury) (CMS/HCC)    Hypovolemic shock (CMS/HCC)  Hypovolemic shock (CMS/HCC)   Septic shock   Diarrhea of presumed infectious origin   Severe dehydration   JOSEFA (acute kidney injury) (CMS/HCC)  ARIANA  Rash       Assessement / Plan    Septic and hypovolemic shock secondary to likely gastroenteritis  Status post 30ml/kg of IVFs given in ED, hypoxia improved, on room air  -off  Levophed. On Midodrine.  -Zosyn x 1 given in ED.  Started PO vancomycin empirically for C-DIFF and Rocephin for possible gastroenteritis - Off abx since 11/30. no other source of infection. Gastroenteritis improving.      -Hypoxic respiratory failure -  likely due to fluid overload, acute on chronic diastolic heart failure improved now. . Now on RA  -Echo reviewed with preserved EF but mod to severe AS  - patient has ARIANA and chronic respiratory failure, possible COPD as he is former smoker, also with history of diaphragm paralysis  - IS, aggressive pulm toilet, OOB  -C-DIFF toxin negative  -KUB with nonobstructive gas pattern  -     Acute kidney injury improved now, creatinine is improving 1.13 (12/2) creatinine 5.09 on admission. Renal ultrasound negative.  Continue steroids, benadryl for rash- improving  DVT ppy:  -SCDs  -Eliquis     PPI ppy:  -pepcid was D/C. Tolerating PO diet     Disposition likely in next day or two if coming along well.           VTE Prophylaxis - SCDs.    CODE STATUS:    Code Status and Medical Interventions:   Ordered at: 11/28/19 1422     Code Status:    CPR     Medical Interventions (Level of Support Prior to Arrest):    Full       Admission Status:  I believe this patient meets inpatient  criteria.      I discussed the patients findings and my recommendations with patient.        Electronically signed by Misha Herrera MD, 12/02/19, 8:18 PM.  [facility] Hospitalist Team

## 2019-12-03 NOTE — PROGRESS NOTES
"KPA/PULM/CC PROGRESS NOTE       PATIENT IDENTIFICATION    Name: Agustín Willett Age: 74 y.o. Sex: male :  1945 MRN: [id number]  Agustín Willett is a 74 y.o. male with a PMH sig for ARIANA, obesity, HTN, and tobacco abuse (quit 35 years ago) presented to the ED with complaints of diarrhea for the past two weeks.  He recently had an oral procedure and required antibiotics afterwards of Amoxicillin, Clindamycin, and Diflucan.  The patient also has a generalized pruritic rash that has been present for approximately one week.  He has associated weakness.  He is unable to keep any food or liquid down due to severe diarrhea.  He feels dehydrated.  No fevers.  The sepsis protocol was initiated in the ED and he was given a fluid bolus of 30ml/kg and a dose of Zosyn.  The patient is hypotensive with a systolic in the 80's that is responsive to IV fluids.  He will be admitted to the ICU for additional care  SUBJECTIVE    : Awake.  Currently on room air.  Remains on IV fluids and pressors.  Positive urine output overnight.  No complaints of cough or productive phlegm.  No chest pains.  No nausea or vomiting.  No bowel movement since midnight.  No abdominal pain   placed on levophed overnight, on ivf as well, also had hypoxemia and was placed on bipap. CXR with left elevated diaphragm ? Atelectasis ? Effusion  - feels better today, off levophed, on room air  : Awake. Currently on RA. Off pressors since yesterday. No CP, No N/V. No fever or chills. Off IV fluids  12/3: Awake.  Currently on room air.  Complains of some minimal cough.  No complaints of chest pain.  Complains of some lower extremity edema.  Rash is improving.  Not itching anymore  OBJECTIVE    /80 (BP Location: Left arm, Patient Position: Lying)   Pulse 102   Temp 97.5 °F (36.4 °C) (Axillary)   Resp 20   Ht 182.9 cm (72.01\")   Wt 127 kg (279 lb 15.8 oz)   SpO2 97%   BMI 37.96 kg/m²   Intake/Output last 3 shifts:  I/O last 3 " completed shifts:  In: 910 [P.O.:910]  Out: 2300 [Urine:2300]  Intake/Output this shift:  No intake/output data recorded.    General Appearance:  Alert, cooperative, no distress, appears stated age  Head:  Normocephalic, without obvious abnormality, atraumatic  Eyes:  PERRL, conjunctiva/corneas clear, EOM's intact     Neck:  Supple,  no adenopathy;      Lungs:   Clear to auscultation bilaterally, respirations unlabored  Chest wall:  No tenderness  Heart:  irregullar rhythm, S1 and S2 normal, 2/6 systolic murmur, rub   or gallop  Abdomen:  Soft, non-tender, bowel sounds active all four quadrants,  no masses, no hepatomegaly, no splenomegaly  Extremities:  Extremities normal, no cyanosis or edema  Pulses: 2+ and symmetric all extremities  Skin:  Diffuse errythematous rash on trunk, better today  Neurologic:   Alert and oriented, no focal deficits    Scheduled Meds:    apixaban 5 mg Oral Q12H   insulin lispro 0-24 Units Subcutaneous 4x Daily With Meals & Nightly   predniSONE 5 mg Oral Daily With Breakfast   sodium chloride 10 mL Intravenous Q12H       Continuous Infusions:       PRN Meds:dextrose  •  dextrose  •  diphenhydrAMINE  •  glucagon (human recombinant)  •  hydrocortisone  •  hydrOXYzine  •  insulin lispro **AND** insulin lispro  •  ipratropium-albuterol  •  midodrine  •  ondansetron  •  sodium chloride  •  sodium chloride     Data Review:  Lab Results (last 24 hours)     Procedure Component Value Units Date/Time    POC Glucose Once [[id number]]  (Normal) Collected:  12/03/19 0722    Specimen:  Blood Updated:  12/03/19 0724     Glucose 84 mg/dL      Comment: Serial Number: [id number]:  [id number]       CBC & Differential [[id number]] Collected:  12/03/19 0422    Specimen:  Blood Updated:  12/03/19 0616    Narrative:       The following orders were created for panel order CBC & Differential.  Procedure                               Abnormality         Status                     ---------                                -----------         ------                     CBC Auto Differential[[id number]]        Abnormal            Final result                 Please view results for these tests on the individual orders.    CBC Auto Differential [[id number]]  (Abnormal) Collected:  12/03/19 0422    Specimen:  Blood Updated:  12/03/19 0616     WBC 8.30 10*3/mm3      RBC 3.83 10*6/mm3      Hemoglobin 11.4 g/dL      Hematocrit 34.5 %      MCV 90.0 fL      MCH 29.8 pg      MCHC 33.1 g/dL      RDW 15.5 %      RDW-SD 49.0 fl      MPV 7.8 fL      Platelets 233 10*3/mm3     Scan Slide [[id number]] Collected:  12/03/19 0422    Specimen:  Blood Updated:  12/03/19 0616     Scan Slide --     Comment: See Manual Differential Results       Manual Differential [[id number]]  (Abnormal) Collected:  12/03/19 0422    Specimen:  Blood Updated:  12/03/19 0616     Neutrophil % 72.0 %      Lymphocyte % 16.0 %      Monocyte % 7.0 %      Eosinophil % 1.0 %      Bands %  3.0 %      Metamyelocyte % 1.0 %      Neutrophils Absolute 6.23 10*3/mm3      Lymphocytes Absolute 1.33 10*3/mm3      Monocytes Absolute 0.58 10*3/mm3      Eosinophils Absolute 0.08 10*3/mm3      RBC Morphology Normal     WBC Morphology Normal     Platelet Morphology Normal    Basic Metabolic Panel [[id number]]  (Abnormal) Collected:  12/03/19 0422    Specimen:  Blood Updated:  12/03/19 0547     Glucose 93 mg/dL      BUN 33 mg/dL      Creatinine 1.01 mg/dL      Sodium 141 mmol/L      Potassium 3.7 mmol/L      Chloride 102 mmol/L      CO2 29.0 mmol/L      Calcium 9.2 mg/dL      eGFR Non African Amer 73 mL/min/1.73      BUN/Creatinine Ratio 32.7     Anion Gap 10.0 mmol/L     Narrative:       GFR Normal >60  Chronic Kidney Disease <60  Kidney Failure <15    Magnesium [[id number]]  (Normal) Collected:  12/03/19 0422    Specimen:  Blood Updated:  12/03/19 0547     Magnesium 1.8 mg/dL     Phosphorus [[id number]]  (Normal) Collected:  12/03/19 0422    Specimen:  Blood Updated:  12/03/19 0547      Phosphorus 3.0 mg/dL     POC Glucose Once [[id number]]  (Abnormal) Collected:  12/02/19 2032    Specimen:  Blood Updated:  12/02/19 2033     Glucose 161 mg/dL      Comment: Serial Number: [id number]:  [id number]       POC Glucose Once [[id number]]  (Normal) Collected:  12/02/19 1752    Specimen:  Blood Updated:  12/02/19 1802     Glucose 88 mg/dL      Comment: Serial Number: [id number]:  [id number]       POC Glucose Once [[id number]]  (Normal) Collected:  12/02/19 1628    Specimen:  Blood Updated:  12/02/19 1631     Glucose 96 mg/dL      Comment: Serial Number: [id number]:  [id number]       Blood Culture - Blood, Blood, Central Line [[id number]] Collected:  11/30/19 1428    Specimen:  Blood, Central Line Updated:  12/02/19 1445     Blood Culture No growth at 2 days    Blood Culture - Blood, Arm, Left [[id number]] Collected:  11/28/19 1254    Specimen:  Blood from Arm, Left Updated:  12/02/19 1315     Blood Culture No growth at 4 days             Imaging:  Imaging Results (Last 72 Hours)     Procedure Component Value Units Date/Time    XR Chest 1 View [[id number]] Collected:  11/30/19 0956     Updated:  11/30/19 0959    Narrative:       EXAMINATION: XR CHEST 1 VW-     DATE OF EXAM: 11/30/2019 9:30 AM     INDICATION: cough; A41.9-Sepsis, unspecified organism; R65.20-Severe  sepsis without septic shock; N17.9-Acute kidney failure, unspecified;  I95.9-Hypotension, unspecified; R19.7-Diarrhea, unspecified.     COMPARISON: Chest radiograph dated 11/29/2019     TECHNIQUE: Portable AP view of the chest was obtained.     FINDINGS:  There is a right IJ central line with tip terminating over the mid SVC.  There is cardiomegaly with central vascular congestion and mild  interstitial prominence. There is elevation left hemidiaphragm with left  basilar atelectasis and likely small left effusion. The right lung is  clear. There is no evidence of pneumothorax.       Impression:       1. Right IJ central line with tip  terminating over the mid SVC.  2.Cardiomegaly with low lung volumes and increased interstitial  prominence.  3. Left basilar airspace opacity and likely trace left pleural effusion.     Electronically Signed By-Nic Rooney On:11/30/2019 9:57 AM  This report was finalized on [id number] by  Nic Rooney, .            ASSESSMENT/PLAN:     Diarrhea of presumed infectious origin  Hypovolemic shock (CMS/HCC)   Septic shock   Diarrhea of presumed infectious origin   Severe dehydration   JOSEFA (acute kidney injury) (CMS/HCC)  ARIANA  Rash     PLAN    -30ml/kg of IVFs given in ED, hypoxia improved, on room air  -off  Levophed. Midodrine restarted. Off midodrine  -Zosyn x 1 given in ED.  Started PO vancomycin empirically for C-DIFF and Rocephin for possible gastroenteritis - Off abx since 11/30. no other source of infection. Gastroenteritis improving  -Hypoxic respiratory failure - likely due to fluid overload, acute on chronic. Resolved. Now on RA  -Echo reviewed with preserved EF but mod to severe AS  - patient has ARIANA and chronic respiratory failure, possible COPD as he is former smoker, also with history of diaphragm paralysis  - IS, aggressive pulm toilet, OOB  -C-DIFF toxin negative  -KUB with nonobstructive gas pattern  - creatinine is improving 1.13 (12/2) creatinine 5.09 on admission. Renal ultrasound negative.  Remains steroids, benadryl for rash- improving  DVT ppy:  -SCDs  -Eliquis     PPI ppy:  -pepcid was D/C. Tolerating PO diet    Full Code   OK to d/c from pulmonary stand point. He will f/u with Dr LUIS ANGEL Jacobs after D/C

## 2019-12-03 NOTE — PROGRESS NOTES
Home 02 @ hs bled in to home cpap unit. (per pt)  Patient thinks he uses 3 liters at home with cpap.

## 2019-12-04 ENCOUNTER — READMISSION MANAGEMENT (OUTPATIENT)
Dept: CALL CENTER | Facility: HOSPITAL | Age: 74
End: 2019-12-04

## 2019-12-04 ENCOUNTER — TRANSITIONAL CARE MANAGEMENT TELEPHONE ENCOUNTER (OUTPATIENT)
Dept: FAMILY MEDICINE CLINIC | Facility: CLINIC | Age: 74
End: 2019-12-04

## 2019-12-04 NOTE — DISCHARGE SUMMARY
[facility] Medicine Services  DISCHARGE SUMMARY        Prepared For PCP:  Stuart Shah MD    Patient Name: Agustín Willett  : 1945  MRN: [id number]      Date of Admission:   2019    Date of Discharge:  2019    Length of stay:  LOS: 5 days     Hospital Course     Presenting Problem:   Severe sepsis (CMS/HCC) [A41.9, R65.20]  Hypotension, unspecified hypotension type [I95.9]  Acute renal failure, unspecified acute renal failure type (CMS/HCC) [N17.9]  Diarrhea, unspecified type [R19.7]    Active Hospital Problems:  No notes have been filed under this hospital service.  Service: Hospitalist      Resolved Hospital Problems:  No notes have been filed under this hospital service.  Service: Hospitalist    Discharge diagnosis    Discharge diagnosis    Severe sepsis (CMS/HCC)    Diarrhea of presumed infectious origin    Severe dehydration    JOSEFA (acute kidney injury) (CMS/HCC)    Hypovolemic shock (CMS/HCC)  Hypovolemic shock (CMS/HCC)   Septic shock   Diarrhea of presumed infectious origin   Severe dehydration   JOSEFA (acute kidney injury) (CMS/HCC)  ARIANA  Rash        Hospital course by problem list.     Septic and hypovolemic shock secondary to likely gastroenteritis  Status post 30ml/kg of IVFs given in ED, hypoxia improved, on room air  -off  Levophed. On Midodrine.  -Zosyn x 1 given in ED.  Started PO vancomycin empirically for C-DIFF and Rocephin for possible gastroenteritis - now Off abx since . no other source of infection. Gastroenteritis improved.        -Hypoxic respiratory failure - likely due to fluid overload, acute on chronic diastolic heart failure improved now. . Now on RA  -Echo reviewed with preserved EF but mod to severe AS  - patient has ARIANA and chronic respiratory failure, possible COPD as he is former smoker, also with history of diaphragm paralysis  - now on room air  -C-DIFF toxin negative  -KUB with nonobstructive gas pattern  -      Acute kidney  injury improved now, creatinine is improving 1.13 (12/2) creatinine 5.09 on admission. Renal ultrasound negative.       PPI ppy:  -pepcid was D/C. Tolerating PO diet     Disposition likely in next day or two if coming along well.             Recommendation for Outpatient Providers:     Follow up with Family physician in a weektime.        Reasons For Change In Medications and Indications for New Medications:        Day of Discharge     HPI:       Vital Signs:         Physical Exam:  Physical Exam   Constitutional: He is oriented to person, place, and time. He appears well-developed and well-nourished. No distress.   HENT:   Head: Normocephalic and atraumatic.   Right Ear: External ear normal.   Left Ear: External ear normal.   Nose: Nose normal.   Mouth/Throat: Oropharynx is clear and moist. No oropharyngeal exudate.   Eyes: Conjunctivae and EOM are normal. Pupils are equal, round, and reactive to light. Right eye exhibits no discharge. Left eye exhibits no discharge. No scleral icterus.   Neck: Normal range of motion. No JVD present. No tracheal deviation present. No thyromegaly present.   Cardiovascular: Normal rate, regular rhythm, normal heart sounds and intact distal pulses. Exam reveals no gallop and no friction rub.   No murmur heard.  Pulmonary/Chest: Effort normal and breath sounds normal. No stridor. No respiratory distress. He has no wheezes. He has no rales. He exhibits no tenderness.   Abdominal: Soft. Bowel sounds are normal. He exhibits no distension and no mass. There is no tenderness. There is no rebound and no guarding. No hernia.   Musculoskeletal: Normal range of motion. He exhibits no edema, tenderness or deformity.   Lymphadenopathy:     He has no cervical adenopathy.   Neurological: He is alert and oriented to person, place, and time. No cranial nerve deficit or sensory deficit. He exhibits normal muscle tone. Coordination normal.   Skin: Skin is warm and dry. No rash noted. He is not  diaphoretic. No erythema.   Psychiatric: He has a normal mood and affect. His behavior is normal.   Nursing note and vitals reviewed.      Pertinent  and/or Most Recent Results     Results from last 7 days   Lab Units 12/03/19  0422 12/02/19  0506 12/01/19  0354 11/30/19  0457 11/29/19  0348 11/28/19  1721 11/28/19  1254   WBC 10*3/mm3 8.30 8.60 11.80* 7.60 9.00  --  12.50*   HEMOGLOBIN g/dL 11.4* 10.3* 10.6* 10.1* 11.6*  --  13.0   HEMATOCRIT % 34.5* 30.0* 32.7* 30.4* 35.8*  --  39.8   PLATELETS 10*3/mm3 233 219 239 207 271  --  243   SODIUM mmol/L 141 140 140 139 134* 131* 130*   POTASSIUM mmol/L 3.7 3.9 4.4 4.4 4.2 4.2 4.4   CHLORIDE mmol/L 102 104 105 108* 101 96* 91*   CO2 mmol/L 29.0 27.0 25.0 22.0 17.0* 20.0* 24.0   BUN mg/dL 33* 35* 30* 36* 47* 50* 50*   CREATININE mg/dL 1.01 1.13 1.21 1.76* 3.41* 4.83* 5.09*   GLUCOSE mg/dL 93 135* 179* 206* 113* 122* 108*   CALCIUM mg/dL 9.2 9.0 9.1 8.5* 8.5* 8.3* 9.4     Results from last 7 days   Lab Units 12/01/19  0354 11/28/19  1254   BILIRUBIN mg/dL <0.2* 0.3   ALK PHOS U/L 56 60   ALT (SGPT) U/L 10 8   AST (SGOT) U/L 10 11   PROTIME Seconds  --  13.7*   INR   --  1.37*   APTT seconds  --  47.9*           Invalid input(s): TG, LDLCALC, LDLREALC  Results from last 7 days   Lab Units 11/29/19  0348 11/28/19  2132 11/28/19  1721 11/28/19  1251   TSH uIU/mL 0.578  --   --   --    LACTATE mmol/L  --  0.8 1.0 1.0       Brief Urine Lab Results  (Last result in the past 365 days)      Color   Clarity   Blood   Leuk Est   Nitrite   Protein   CREAT   Urine HCG        11/28/19 1350 Dark Yellow Cloudy  Comment:  Result checked  Negative Trace Negative 30 mg/dL (1+)               Microbiology Results Abnormal     Procedure Component Value - Date/Time    Blood Culture - Blood, Blood, Central Line [[id number]] Collected:  11/30/19 1428    Lab Status:  Preliminary result Specimen:  Blood, Central Line Updated:  12/04/19 1445     Blood Culture No growth at 4 days    Blood Culture -  Blood, Arm, Left [[id number]] Collected:  11/28/19 1254    Lab Status:  Final result Specimen:  Blood from Arm, Left Updated:  12/03/19 1316     Blood Culture No growth at 5 days    Blood Culture - Blood, Arm, Right [[id number]]  (Abnormal) Collected:  11/28/19 1254    Lab Status:  Final result Specimen:  Blood from Arm, Right Updated:  12/01/19 0657     Blood Culture Staphylococcus, coagulase negative     Comment: Probable contaminant requires clinical correlation, susceptibility not performed unless requested by physician.          Isolated from Aerobic Bottle     Gram Stain Aerobic Bottle Gram positive cocci in clusters    Blood Culture ID, PCR (Staphylococcus only) - Blood, Arm, Right [[id number]]  (Abnormal) Collected:  11/28/19 1254    Lab Status:  Final result Specimen:  Blood from Arm, Right Updated:  11/30/19 1413     BCID, PCR Staphylococcus spp, not aureus. Identification by BCID PCR.    MRSA Screen Culture - Swab, Nares [[id number]]  (Normal) Collected:  11/28/19 1724    Lab Status:  Final result Specimen:  Swab from Nares Updated:  11/29/19 2042     MRSA SCREEN CX No Methicillin Resistant Staphylococcus aureus isolated    Clostridium Difficile Toxin - Stool, Per Rectum [[id number]] Collected:  11/28/19 1347    Lab Status:  Final result Specimen:  Stool from Per Rectum Updated:  11/29/19 0825    Narrative:       The following orders were created for panel order Clostridium Difficile Toxin - Stool, Per Rectum.  Procedure                               Abnormality         Status                     ---------                               -----------         ------                     Clostridium Difficile EI...[[id number]]  Normal              Final result                 Please view results for these tests on the individual orders.    Clostridium Difficile EIA - Stool, Per Rectum [[id number]]  (Normal) Collected:  11/28/19 1347    Lab Status:  Final result Specimen:  Stool from Per Rectum Updated:  11/29/19 0825      C Diff GDH / Toxin Negative          Xr Chest 1 View    Result Date: 11/30/2019  Impression: 1. Right IJ central line with tip terminating over the mid SVC. 2.Cardiomegaly with low lung volumes and increased interstitial prominence. 3. Left basilar airspace opacity and likely trace left pleural effusion.  Electronically Signed ByLeonela Rooney On:11/30/2019 9:57 AM This report was finalized on [id number] by  Manuel Ramachandran    Xr Chest 1 View    Result Date: 11/29/2019  Impression:   1.  Interval placement of right internal jugular central venous catheter which is in good position with the tip projecting over the superior vena cava. 2.  No evidence pneumothorax. 3.  Left basilar atelectasis   Electronically Signed ByZarina Osuna On:11/29/2019 7:54 AM This report was finalized on [id number] by  Guillermo Osuna, Manuel    Xr Chest 1 View    Result Date: 11/28/2019  Impression: No acute cardiopulmonary abnormality.  Electronically Signed ByLeonela Rooney On:11/28/2019 1:26 PM This report was finalized on [id number] by  Manuel Ramachandran    Us Renal Bilateral    Result Date: 11/29/2019  Impression: No acute sonographic abnormality. No evidence of hydronephrosis.  Electronically Signed ByJulio Welch On:11/29/2019 1:43 PM This report was finalized on [id number] by  Deepika Welch, Manuel    Xr Abdomen Kub    Result Date: 11/28/2019  Impression: 1. Nonobstructive small bowel gas pattern. 2. Gaseous distention of the hepatic flexure with the remaining portions of the colon normal in caliber.  Electronically Signed ByLeonela Rooney On:11/28/2019 3:10 PM This report was finalized on [id number] by  Nic Rooney .      Results for orders placed during the hospital encounter of 04/27/18   Duplex Venous Lower Extremity - Bilateral CAR    Narrative · Normal bilateral lower extremity venous duplex scan.          Results for orders placed during the hospital encounter of 04/27/18   Duplex Venous Lower  Extremity - Bilateral CAR    Narrative · Normal bilateral lower extremity venous duplex scan.          Results for orders placed during the hospital encounter of 11/28/19   Adult Transthoracic Echo Complete W/ Cont if Necessary Per Protocol    Narrative · Estimated EF = 65%.  · Left ventricular systolic function is normal.  · Left ventricular wall thickness is consistent with mild septal   asymmetric hypertrophy.  · Right ventricular cavity is borderline dilated.  · Left atrial cavity size is borderline dilated.  · There is severe calcification of the aortic valve mainly affecting the   non, left and right coronary cusp(s).  · Moderate to severe aortic valve stenosis is present.  · Aortic valve maximum pressure gradient is 49.1 mmHg.  · Aortic valve mean pressure gradient is 33.2 mmHg.  · Mild mitral valve regurgitation is present  · Mild-to-moderate mitral valve stenosis is present  · Mild tricuspid valve regurgitation is present.                  Test Results Pending at Discharge   Order Current Status    Blood Culture - Blood, Blood, Central Line Preliminary result            Procedures Performed           Consults:   Consults     Date and Time Order Name Status Description    11/28/2019 1358 Intensivist (on-call MD unless specified) Completed             Discharge Details        Discharge Medications      New Medications      Instructions Start Date   midodrine 5 MG tablet  Commonly known as:  PROAMATINE   5 mg, Oral, 3 Times Daily PRN      predniSONE 5 MG tablet  Commonly known as:  DELTASONE   5 mg, Oral, Daily With Breakfast         Continue These Medications      Instructions Start Date   albuterol sulfate  (90 Base) MCG/ACT inhaler  Commonly known as:  VENTOLIN HFA   2 puffs, Inhalation, Every 4 Hours PRN      allopurinol 300 MG tablet  Commonly known as:  ZYLOPRIM   300 mg, Oral, Daily      amLODIPine 10 MG tablet  Commonly known as:  NORVASC   10 mg, Oral, Daily      apixaban 5 MG tablet  tablet  Commonly known as:  ELIQUIS   5 mg, Oral, 2 Times Daily      atorvastatin 20 MG tablet  Commonly known as:  LIPITOR   20 mg, Oral, Daily      benazepril-hydrochlorthiazide 20-12.5 MG per tablet  Commonly known as:  LOTENSIN HCT   1 tablet, Oral, 2 Times Daily      chlorthalidone 25 MG tablet  Commonly known as:  HYGROTON   25 mg, Oral, Daily      fluconazole 100 MG tablet  Commonly known as:  DIFLUCAN   100 mg, Oral, Daily, Started on 11/20/19 for 14 days      metFORMIN 1000 MG tablet  Commonly known as:  GLUCOPHAGE   1,000 mg, Oral, 2 Times Daily      propranolol 10 MG tablet  Commonly known as:  INDERAL   10 mg, Oral, Daily      VITAMIN D PO   1 tablet, Oral, Daily             Allergies   Allergen Reactions   • Ace Inhibitors Angioedema         Discharge Disposition:  Home-Health Care c    Diet:  Hospital:No active diet order        Discharge Activity:         CODE STATUS:    Code Status and Medical Interventions:   Ordered at: 11/28/19 1422     Code Status:    CPR     Medical Interventions (Level of Support Prior to Arrest):    Full         Follow-up Appointments  Future Appointments   Date Time Provider Department Center   12/5/2019 10:30 AM Stuart Shah MD [id number] None   12/18/2019  2:00 PM [facility]   12/23/2019 12:15 PM Rosemarie Murray MD [facility] None   4/24/2020  9:30 AM [facility] None   5/7/2020 11:15 AM Nancy Leone APRN [facility] None       Additional Instructions for the Follow-ups that You Need to Schedule     Discharge Follow-up with PCP   As directed       Currently Documented PCP:    Stuart Shah MD    PCP Phone Number:    [phone]     Follow Up Details:  one week time.         Discharge Follow-up with Specified Provider: Pulmonary service in two week time.; 2 Weeks   As directed      To:  Pulmonary service in two week time.    Follow Up:  2 Weeks                 Condition on Discharge:       Stable          Electronically signed by Misha Herrera MD, 12/04/19, 5:25 PM.    Time: I spent  33  minutes on this discharge activity which included face-to-face encounter with the patient/reviewing the data in the system/coordination of the care with the nursing staff as well as consultants/documentation/entering orders.

## 2019-12-04 NOTE — OUTREACH NOTE
Prep Survey      Responses   Facility patient discharged from?  [facility]   Is patient eligible?  Yes   Discharge diagnosis  Severe sepsis, severe dehydration, JOSEFA, hypovolemic shockc, septic shock,    Does the patient have one of the following disease processes/diagnoses(primary or secondary)?  Sepsis   Does the patient have Home health ordered?  No   Is there a DME ordered?  No   Medication alerts for this patient  See AVS   Prep survey completed?  Yes          Isa Sparks LPN

## 2019-12-04 NOTE — OUTREACH NOTE
"Spoke with pt, feeling much better from intestinal issues, but still has a lot of congestion with persistent dry cough. Pt states he is using his \"breathing machine\" at home as directed. Appetite is fine. Pt states while he cannot say he has actual fever or chills, he does c/o feeling hot first and then cold pretty often. Pt is anxious to see Dr Shah at [facility] tomorrow, he wants to review his medications regimen and for Dr Shah to take the reins on his medications. Pt feels there are \"too many hands in the pot\".   "

## 2019-12-04 NOTE — PROGRESS NOTES
Case Management Discharge Note      Final Note: Home, declines home health.                      Final Discharge Disposition Code: 01 - home or self-care

## 2019-12-05 ENCOUNTER — READMISSION MANAGEMENT (OUTPATIENT)
Dept: CALL CENTER | Facility: HOSPITAL | Age: 74
End: 2019-12-05

## 2019-12-05 ENCOUNTER — OFFICE VISIT (OUTPATIENT)
Dept: FAMILY MEDICINE CLINIC | Facility: CLINIC | Age: 74
End: 2019-12-05

## 2019-12-05 VITALS
HEIGHT: 72 IN | HEART RATE: 99 BPM | RESPIRATION RATE: 18 BRPM | OXYGEN SATURATION: 95 % | DIASTOLIC BLOOD PRESSURE: 58 MMHG | TEMPERATURE: 97.4 F | SYSTOLIC BLOOD PRESSURE: 92 MMHG | WEIGHT: 276 LBS | BODY MASS INDEX: 37.38 KG/M2

## 2019-12-05 DIAGNOSIS — A41.9 SEPSIS, DUE TO UNSPECIFIED ORGANISM, UNSPECIFIED WHETHER ACUTE ORGAN DYSFUNCTION PRESENT (HCC): Primary | ICD-10-CM

## 2019-12-05 DIAGNOSIS — R53.1 WEAKNESS: ICD-10-CM

## 2019-12-05 DIAGNOSIS — R53.81 PHYSICAL DECONDITIONING: ICD-10-CM

## 2019-12-05 DIAGNOSIS — Z09 HOSPITAL DISCHARGE FOLLOW-UP: ICD-10-CM

## 2019-12-05 PROBLEM — G47.30 SLEEP APNEA: Status: ACTIVE | Noted: 2019-12-05

## 2019-12-05 LAB — BACTERIA SPEC AEROBE CULT: NORMAL

## 2019-12-05 PROCEDURE — 99495 TRANSJ CARE MGMT MOD F2F 14D: CPT | Performed by: FAMILY MEDICINE

## 2019-12-05 NOTE — PROGRESS NOTES
Transitional Care Follow Up Visit  Subjective     Agustín Willett is a 74 y.o. male who presents for a transitional care management visit.    Within 48 business hours after discharge our office contacted him via telephone to coordinate his care and needs.      I reviewed and discussed the details of that call along with the discharge summary, hospital problems, inpatient lab results, inpatient diagnostic studies, and consultation reports with Agustín.     Current outpatient and discharge medications have been reconciled for the patient.  Reviewed by: Stuart Shah MD      Date of TCM Phone Call 12/4/2019   [facility]   Date of Admission 11/28/2019   Date of Discharge 12/3/2019   Discharge Disposition Home or Self Care     Risk for Readmission (LACE) Score: 8 (12/3/2019  6:00 AM)      History of Present Illness   Course During Hospital Stay:      Date of Admission:   11/28/2019     Date of Discharge:  12/4/2019     Length of stay:  LOS: 5 days      Hospital Course      Presenting Problem:   Severe sepsis (CMS/HCC) [A41.9, R65.20]  Hypotension, unspecified hypotension type [I95.9]  Acute renal failure, unspecified acute renal failure type (CMS/HCC) [N17.9]  Diarrhea, unspecified type [R19.7]     Active Hospital Problems:  No notes have been filed under this hospital service.  Service: Hospitalist        Resolved Hospital Problems:  No notes have been filed under this hospital service.  Service: Hospitalist     Discharge diagnosis     Discharge diagnosis     Severe sepsis (CMS/HCC)    Diarrhea of presumed infectious origin    Severe dehydration    JOSEFA (acute kidney injury) (CMS/HCC)    Hypovolemic shock (CMS/HCC)  Hypovolemic shock (CMS/HCC)   Septic shock   Diarrhea of presumed infectious origin   Severe dehydration   JOSEFA (acute kidney injury) (CMS/HCC)  ARIANA  Rash        Hospital course by problem list.     Septic and hypovolemic shock secondary to likely gastroenteritis  Status post 30ml/kg of IVFs given  "in ED, hypoxia improved, on room air  -off  Levophed. On Midodrine.  -Zosyn x 1 given in ED.  Started PO vancomycin empirically for C-DIFF and Rocephin for possible gastroenteritis - now Off abx since 11/30. no other source of infection. Gastroenteritis improved.        -Hypoxic respiratory failure - likely due to fluid overload, acute on chronic diastolic heart failure improved now. . Now on RA  -Echo reviewed with preserved EF but mod to severe AS  - patient has ARIANA and chronic respiratory failure, possible COPD as he is former smoker, also with history of diaphragm paralysis  - now on room air  -C-DIFF toxin negative  -KUB with nonobstructive gas pattern  -      Acute kidney injury improved now, creatinine is improving 1.13 (12/2) creatinine 5.09 on admission. Renal ultrasound negative.        PPI ppy:  -pepcid was D/C. Tolerating PO diet     Disposition likely in next day or two if coming along well.     Current outpatient and discharge medications have been reconciled for the patient.  Reviewed by: Stuart Shah MD       The following portions of the patient's history were reviewed and updated as appropriate: allergies, current medications, past family history, past medical history, past social history, past surgical history and problem list.    Review of Systems   Constitutional: Negative for activity change, chills, fatigue and fever.   Respiratory: Negative for cough and shortness of breath.    Cardiovascular: Negative for chest pain and palpitations.   Gastrointestinal: Negative for abdominal pain.   Endocrine: Negative for cold intolerance.   Psychiatric/Behavioral: Negative for behavioral problems and dysphoric mood. The patient is not nervous/anxious.        BP 92/58   Pulse 99   Temp 97.4 °F (36.3 °C) (Oral)   Resp 18   Ht 182.9 cm (72.01\")   Wt 125 kg (276 lb)   SpO2 95%   BMI 37.42 kg/m²     Objective   Physical Exam   Constitutional: He appears well-developed and well-nourished.   Neck: Neck " supple. No thyromegaly present.   Cardiovascular: Normal rate. An irregularly irregular rhythm present.   No murmur heard.  Pulmonary/Chest: Effort normal and breath sounds normal.   Abdominal: Bowel sounds are normal. There is no tenderness.   Psychiatric: He has a normal mood and affect. His behavior is normal.   Nursing note and vitals reviewed.  Hospital records reviewed with pt confirming HPI.      Assessment/Plan   Agustín was seen today for tcm and short of air.    Diagnoses and all orders for this visit:    Sepsis, due to unspecified organism, unspecified whether acute organ dysfunction present (CMS/[facility])    Hospital discharge follow-up    Weakness  -     Ambulatory Referral to Home Health    Physical deconditioning  -     Ambulatory Referral to Home Health    Pt to RTC January for medication management.  Pt to get some HH for mobility and deconditioning.

## 2019-12-08 PROCEDURE — 93010 ELECTROCARDIOGRAM REPORT: CPT | Performed by: INTERNAL MEDICINE

## 2019-12-12 ENCOUNTER — READMISSION MANAGEMENT (OUTPATIENT)
Dept: CALL CENTER | Facility: HOSPITAL | Age: 74
End: 2019-12-12

## 2019-12-12 NOTE — OUTREACH NOTE
Sepsis Week 2 Survey      Responses   Facility patient discharged from?  [facility]   Does the patient have one of the following disease processes/diagnoses(primary or secondary)?  Sepsis   Week 2 attempt successful?  Yes   Call start time  1435   Call end time  1438   Meds reviewed with patient/caregiver?  Yes   Is the patient having any side effects they believe may be caused by any medication additions or changes?  No   Does the patient have all medications related to this admission filled (includes all antibiotics, inhalers, nebulizers,steroids,etc.)  Yes   Is the patient taking all medications as directed (includes completed medication regime)?  Yes   Does the patient have a primary care provider?   Yes   Does the patient have an appointment with their PCP within 7 days of discharge?  Greater than 7 days   What is preventing the patient from scheduling follow up appointments within 7 days of discharge?  Earlier appointment not available   Nursing Interventions  Verified appointment date/time/provider   Has the patient kept scheduled appointments due by today?  N/A   Has home health visited the patient within 72 hours of discharge?  N/A   Did the patient receive a copy of their discharge instructions?  Yes   Nursing interventions  Reviewed instructions with patient   What is the patient's perception of their health status since discharge?  Improving   Nursing interventions  Nurse provided patient education   Is the patient/caregiver able to teach back Sepsis?  S - Shivering,fever or very cold, E - Extreme pain or generalized discomfort (worst ever,especially abdomen), P - Pale or discolored skin, S - Sleepy, difficult to arouse,confused, I -   I feel like I might die-a feeling of hopelessness, S - Short of breath   Is patient/caregiver able to teach back steps to recovery at home?  Set small, achievable goals for return to baseline health, Rest and regain strength, Talk about feelings with family/friends, Record  milestones and struggles in a journal, Learn about sepsis(sepsis.org), Eat a balanced diet, Exercise as tolerated, Make a list of questions for PCP appoinment   Is the patient/caregiver able to teach back signs and symptoms of worsening condition:  Fever, Hyperthermia, Rapid heart rate (>90), Shortness of breath/rapid respiratory rate, Altered mental status(confusion/coma), Edema, High blood glucose without diabetes   Is the patient/caregiver able to teach back the hierarchy of who to call/visit for symptoms/problems? PCP, Specialist, Home health nurse, Urgent Care, ED, 911  Yes   Week 2 call completed?  Yes          Chelsey Omalley LPN

## 2019-12-18 ENCOUNTER — HOSPITAL ENCOUNTER (OUTPATIENT)
Dept: CARDIOLOGY | Facility: HOSPITAL | Age: 74
Discharge: HOME | End: 2019-12-18
Admitting: INTERNAL MEDICINE

## 2019-12-18 VITALS
HEIGHT: 72 IN | BODY MASS INDEX: 37.38 KG/M2 | DIASTOLIC BLOOD PRESSURE: 79 MMHG | SYSTOLIC BLOOD PRESSURE: 106 MMHG | HEART RATE: 104 BPM | WEIGHT: 276 LBS

## 2019-12-18 DIAGNOSIS — E66.09 CLASS 1 OBESITY DUE TO EXCESS CALORIES WITHOUT SERIOUS COMORBIDITY WITH BODY MASS INDEX (BMI) OF 30.0 TO 30.9 IN ADULT: ICD-10-CM

## 2019-12-18 DIAGNOSIS — I10 ESSENTIAL HYPERTENSION: ICD-10-CM

## 2019-12-18 DIAGNOSIS — I35.0 NONRHEUMATIC AORTIC VALVE STENOSIS: ICD-10-CM

## 2019-12-18 DIAGNOSIS — Z79.01 ANTICOAGULATED: ICD-10-CM

## 2019-12-18 DIAGNOSIS — I48.20 CHRONIC ATRIAL FIBRILLATION (HCC): ICD-10-CM

## 2019-12-18 DIAGNOSIS — F10.10 ALCOHOL ABUSE: ICD-10-CM

## 2019-12-18 DIAGNOSIS — I34.0 NON-RHEUMATIC MITRAL REGURGITATION: ICD-10-CM

## 2019-12-18 PROCEDURE — 93306 TTE W/DOPPLER COMPLETE: CPT | Performed by: INTERNAL MEDICINE

## 2019-12-18 PROCEDURE — 93306 TTE W/DOPPLER COMPLETE: CPT

## 2019-12-19 ENCOUNTER — READMISSION MANAGEMENT (OUTPATIENT)
Dept: CALL CENTER | Facility: HOSPITAL | Age: 74
End: 2019-12-19

## 2019-12-19 NOTE — OUTREACH NOTE
Sepsis Week 3 Survey      Responses   Facility patient discharged from?  [facility]   Does the patient have one of the following disease processes/diagnoses(primary or secondary)?  Sepsis   Week 3 attempt successful?  No   Unsuccessful attempts  Attempt 1 [NO ANSWER, VM FULL]          Chelsey Omalley LPN

## 2019-12-20 LAB
BH CV ECHO MEAS - AO MAX PG (FULL): 35.1 MMHG
BH CV ECHO MEAS - AO MAX PG: 37.9 MMHG
BH CV ECHO MEAS - AO MEAN PG (FULL): 20 MMHG
BH CV ECHO MEAS - AO MEAN PG: 22 MMHG
BH CV ECHO MEAS - AO ROOT AREA (BSA CORRECTED): 1.6
BH CV ECHO MEAS - AO ROOT AREA: 11.3 CM^2
BH CV ECHO MEAS - AO ROOT DIAM: 3.8 CM
BH CV ECHO MEAS - AO V2 MAX: 308 CM/SEC
BH CV ECHO MEAS - AO V2 MEAN: 221 CM/SEC
BH CV ECHO MEAS - AO V2 VTI: 61.6 CM
BH CV ECHO MEAS - AVA(I,A): 1.2 CM^2
BH CV ECHO MEAS - AVA(I,D): 1.2 CM^2
BH CV ECHO MEAS - AVA(V,A): 1.3 CM^2
BH CV ECHO MEAS - AVA(V,D): 1.3 CM^2
BH CV ECHO MEAS - BSA(HAYCOCK): 2.6 M^2
BH CV ECHO MEAS - BSA: 2.4 M^2
BH CV ECHO MEAS - BZI_BMI: 37.4 KILOGRAMS/M^2
BH CV ECHO MEAS - BZI_METRIC_HEIGHT: 182.9 CM
BH CV ECHO MEAS - BZI_METRIC_WEIGHT: 125.2 KG
BH CV ECHO MEAS - EDV(CUBED): 59.3 ML
BH CV ECHO MEAS - EDV(MOD-SP2): 127 ML
BH CV ECHO MEAS - EDV(MOD-SP4): 123 ML
BH CV ECHO MEAS - EDV(TEICH): 65.9 ML
BH CV ECHO MEAS - EF(CUBED): 58.9 %
BH CV ECHO MEAS - EF(MOD-BP): 60 %
BH CV ECHO MEAS - EF(MOD-SP2): 56.7 %
BH CV ECHO MEAS - EF(MOD-SP4): 64.2 %
BH CV ECHO MEAS - EF(TEICH): 51.1 %
BH CV ECHO MEAS - ESV(CUBED): 24.4 ML
BH CV ECHO MEAS - ESV(MOD-SP2): 55 ML
BH CV ECHO MEAS - ESV(MOD-SP4): 44 ML
BH CV ECHO MEAS - ESV(TEICH): 32.2 ML
BH CV ECHO MEAS - FS: 25.6 %
BH CV ECHO MEAS - IVS/LVPW: 1
BH CV ECHO MEAS - IVSD: 1.5 CM
BH CV ECHO MEAS - LAT PEAK E' VEL: 10.8 CM/SEC
BH CV ECHO MEAS - LV DIASTOLIC VOL/BSA (35-75): 50.3 ML/M^2
BH CV ECHO MEAS - LV MASS(C)D: 224.6 GRAMS
BH CV ECHO MEAS - LV MASS(C)DI: 91.9 GRAMS/M^2
BH CV ECHO MEAS - LV MAX PG: 2.8 MMHG
BH CV ECHO MEAS - LV MEAN PG: 2 MMHG
BH CV ECHO MEAS - LV SYSTOLIC VOL/BSA (12-30): 18 ML/M^2
BH CV ECHO MEAS - LV V1 MAX: 84.2 CM/SEC
BH CV ECHO MEAS - LV V1 MEAN: 61.7 CM/SEC
BH CV ECHO MEAS - LV V1 VTI: 14.8 CM
BH CV ECHO MEAS - LVIDD: 3.9 CM
BH CV ECHO MEAS - LVIDS: 2.9 CM
BH CV ECHO MEAS - LVLD AP2: 8.6 CM
BH CV ECHO MEAS - LVLD AP4: 8.1 CM
BH CV ECHO MEAS - LVLS AP2: 7.9 CM
BH CV ECHO MEAS - LVLS AP4: 7.1 CM
BH CV ECHO MEAS - LVOT AREA (M): 4.9 CM^2
BH CV ECHO MEAS - LVOT AREA: 4.9 CM^2
BH CV ECHO MEAS - LVOT DIAM: 2.5 CM
BH CV ECHO MEAS - LVPWD: 1.5 CM
BH CV ECHO MEAS - MED PEAK E' VEL: 8.7 CM/SEC
BH CV ECHO MEAS - MV DEC SLOPE: 462 CM/SEC^2
BH CV ECHO MEAS - MV DEC TIME: 0.21 SEC
BH CV ECHO MEAS - MV E MAX VEL: 98.5 CM/SEC
BH CV ECHO MEAS - MV MAX PG: 4.9 MMHG
BH CV ECHO MEAS - MV MEAN PG: 2 MMHG
BH CV ECHO MEAS - MV P1/2T MAX VEL: 112 CM/SEC
BH CV ECHO MEAS - MV P1/2T: 71 MSEC
BH CV ECHO MEAS - MV V2 MAX: 111 CM/SEC
BH CV ECHO MEAS - MV V2 MEAN: 60 CM/SEC
BH CV ECHO MEAS - MV V2 VTI: 22.1 CM
BH CV ECHO MEAS - MVA P1/2T LCG: 2 CM^2
BH CV ECHO MEAS - MVA(P1/2T): 3.1 CM^2
BH CV ECHO MEAS - MVA(VTI): 3.3 CM^2
BH CV ECHO MEAS - PA ACC TIME: 0.07 SEC
BH CV ECHO MEAS - PA MAX PG (FULL): 1.9 MMHG
BH CV ECHO MEAS - PA MAX PG: 3.7 MMHG
BH CV ECHO MEAS - PA PR(ACCEL): 49.3 MMHG
BH CV ECHO MEAS - PA V2 MAX: 96.4 CM/SEC
BH CV ECHO MEAS - PVA(V,A): 3.2 CM^2
BH CV ECHO MEAS - PVA(V,D): 3.2 CM^2
BH CV ECHO MEAS - QP/QS: 0.64
BH CV ECHO MEAS - RAP SYSTOLE: 3 MMHG
BH CV ECHO MEAS - RV MAX PG: 1.8 MMHG
BH CV ECHO MEAS - RV MEAN PG: 1 MMHG
BH CV ECHO MEAS - RV V1 MAX: 68 CM/SEC
BH CV ECHO MEAS - RV V1 MEAN: 42.7 CM/SEC
BH CV ECHO MEAS - RV V1 VTI: 10.3 CM
BH CV ECHO MEAS - RVOT AREA: 4.5 CM^2
BH CV ECHO MEAS - RVOT DIAM: 2.4 CM
BH CV ECHO MEAS - RVSP: 36.4 MMHG
BH CV ECHO MEAS - SI(AO): 286 ML/M^2
BH CV ECHO MEAS - SI(CUBED): 14.3 ML/M^2
BH CV ECHO MEAS - SI(LVOT): 29.7 ML/M^2
BH CV ECHO MEAS - SI(MOD-SP2): 29.5 ML/M^2
BH CV ECHO MEAS - SI(MOD-SP4): 32.3 ML/M^2
BH CV ECHO MEAS - SI(TEICH): 13.8 ML/M^2
BH CV ECHO MEAS - SV(AO): 698.6 ML
BH CV ECHO MEAS - SV(CUBED): 34.9 ML
BH CV ECHO MEAS - SV(LVOT): 72.6 ML
BH CV ECHO MEAS - SV(MOD-SP2): 72 ML
BH CV ECHO MEAS - SV(MOD-SP4): 79 ML
BH CV ECHO MEAS - SV(RVOT): 46.6 ML
BH CV ECHO MEAS - SV(TEICH): 33.7 ML
BH CV ECHO MEAS - TAPSE (>1.6): 2.3 CM
BH CV ECHO MEAS - TR MAX VEL: 289 CM/SEC
BH CV ECHO MEASUREMENTS AVERAGE E/E' RATIO: 10.1
BH CV XLRA - RV BASE: 3.5 CM
BH CV XLRA - RV LENGTH: 8.4 CM
BH CV XLRA - RV MID: 2.4 CM
BH CV XLRA - TDI S': 14 CM/SEC
LEFT ATRIUM VOLUME INDEX: 46 ML/M2

## 2019-12-23 ENCOUNTER — OFFICE VISIT (OUTPATIENT)
Dept: CARDIOLOGY | Facility: CLINIC | Age: 74
End: 2019-12-23

## 2019-12-23 ENCOUNTER — READMISSION MANAGEMENT (OUTPATIENT)
Dept: CALL CENTER | Facility: HOSPITAL | Age: 74
End: 2019-12-23

## 2019-12-23 VITALS
HEIGHT: 72 IN | DIASTOLIC BLOOD PRESSURE: 73 MMHG | HEART RATE: 95 BPM | SYSTOLIC BLOOD PRESSURE: 122 MMHG | BODY MASS INDEX: 36.44 KG/M2 | WEIGHT: 269 LBS

## 2019-12-23 DIAGNOSIS — I35.0 NONRHEUMATIC AORTIC VALVE STENOSIS: Primary | ICD-10-CM

## 2019-12-23 DIAGNOSIS — I34.0 NON-RHEUMATIC MITRAL REGURGITATION: ICD-10-CM

## 2019-12-23 DIAGNOSIS — I10 ESSENTIAL HYPERTENSION: ICD-10-CM

## 2019-12-23 DIAGNOSIS — I48.0 PAROXYSMAL ATRIAL FIBRILLATION (HCC): ICD-10-CM

## 2019-12-23 PROCEDURE — 93000 ELECTROCARDIOGRAM COMPLETE: CPT | Performed by: INTERNAL MEDICINE

## 2019-12-23 PROCEDURE — 99213 OFFICE O/P EST LOW 20 MIN: CPT | Performed by: INTERNAL MEDICINE

## 2019-12-23 RX ORDER — ASPIRIN 81 MG/1
81 TABLET ORAL DAILY
COMMUNITY
End: 2021-01-01 | Stop reason: HOSPADM

## 2019-12-23 NOTE — OUTREACH NOTE
Sepsis Week 3 Survey      Responses   Facility patient discharged from?  [facility]   Does the patient have one of the following disease processes/diagnoses(primary or secondary)?  Sepsis   Week 3 attempt successful?  Yes   Call start time  1337   Call end time  1340   Meds reviewed with patient/caregiver?  Yes   Is the patient having any side effects they believe may be caused by any medication additions or changes?  No   Does the patient have all medications related to this admission filled (includes all antibiotics, inhalers, nebulizers,steroids,etc.)  Yes   Is the patient taking all medications as directed (includes completed medication regime)?  Yes   Does the patient have a primary care provider?   Yes   Has the patient kept scheduled appointments due by today?  Yes   Has home health visited the patient within 72 hours of discharge?  N/A   Did the patient receive a copy of their discharge instructions?  Yes   Nursing interventions  Reviewed instructions with patient   What is the patient's perception of their health status since discharge?  Improving   Nursing interventions  Nurse provided patient education   Is the patient/caregiver able to teach back Sepsis?  S - Shivering,fever or very cold, E - Extreme pain or generalized discomfort (worst ever,especially abdomen), P - Pale or discolored skin, S - Sleepy, difficult to arouse,confused, I -   I feel like I might die-a feeling of hopelessness, S - Short of breath   Is patient/caregiver able to teach back steps to recovery at home?  Set small, achievable goals for return to baseline health, Rest and regain strength   Is the patient/caregiver able to teach back signs and symptoms of worsening condition:  Fever, Rapid heart rate (>90), Altered mental status(confusion/coma), High blood glucose without diabetes, Hyperthermia, Shortness of breath/rapid respiratory rate, Edema   Is the patient/caregiver able to teach back the hierarchy of who to call/visit for  symptoms/problems? PCP, Specialist, Home health nurse, Urgent Care, ED, 911  Yes   Week 3 call completed?  Yes   Revoked  No further contact(revokes)-requires comment   Graduated/Revoked comments  PATIENT DOING WELL WITH NO NEEDS. VOICES NO NEED FOR FUTURE CALLS.           Chelsey Omalley LPN

## 2019-12-23 NOTE — PROGRESS NOTES
Subjective:        Agustín Willett is a 74 y.o. male who here for follow up    CC  Follow-up after the hospitalization  HPI  74-year-old male recently discharged from the hospital from the sepsis also known to have the hypertension aortic stenosis mitral regurgitation as well as atrial fibrillation here for the follow-up with no complaints of chest pains or tightness in the chest     Problem List Items Addressed This Visit        Cardiovascular and Mediastinum    Hypertension    Relevant Orders    Adult Transthoracic Echo Complete W/ Cont if Necessary Per Protocol    A-fib (CMS/[facility])    Nonrheumatic aortic valve stenosis - Primary    Relevant Orders    Adult Transthoracic Echo Complete W/ Cont if Necessary Per Protocol    Non-rheumatic mitral regurgitation    Relevant Orders    Adult Transthoracic Echo Complete W/ Cont if Necessary Per Protocol        .Presenting Problem:   Severe sepsis (CMS/HCC) [A41.9, R65.20]  Hypotension, unspecified hypotension type [I95.9]  Acute renal failure, unspecified acute renal failure type (CMS/HCC) [N17.9]  Diarrhea, unspecified type [R19.7]    The following portions of the patient's history were reviewed and updated as appropriate: allergies, current medications, past family history, past medical history, past social history, past surgical history and problem list.    Past Medical History:   Diagnosis Date   • Atrial fibrillation (CMS/HCC)    • Dyslipidemia    • Erectile dysfunction    • Hx of complete eye exam 2016    UNKNOWN PER PT    • Hyperlipidemia    • Hypertension    • Hypogonadism male    • ARIANA (obstructive sleep apnea)    • PONV (postoperative nausea and vomiting)    • Type 2 diabetes mellitus (CMS/[facility])    • Vitamin D deficiency      reports that he quit smoking about 50 years ago. His smoking use included cigarettes. He has a 30.00 pack-year smoking history. He has never used smokeless tobacco. He reports that he drinks alcohol. He reports that he does not use drugs.  "  Family History   Problem Relation Age of Onset   • ALS Mother    • Hypertension Father        Review of Systems  Constitutional: No wt loss, fever, fatigue  Gastrointestinal: No nausea, abdominal pain  Behavioral/Psych: No insomnia or anxiety   Cardiovascular no chest pains or tightness no chest  Objective:       Physical Exam  /73 (BP Location: Left arm, Patient Position: Sitting)   Pulse 95   Ht 182.9 cm (72\")   Wt 122 kg (269 lb)   BMI 36.48 kg/m²   General appearance: No acute changes   Neck: Trachea midline; NECK, supple, no thyromegaly or lymphadenopathy   Lungs: Normal size and shape, normal breath sounds, equal distribution of air, no rales and rhonchi   CV: S1-S2 regular, no murmurs, no rub, no gallop   Abdomen: Soft, non-tender; no masses , no abnormal abdominal sounds   Extremities: No deformity , normal color , no peripheral edema   Skin: Normal temperature, turgor and texture; no rash, ulcers            ECG 12 Lead  Date/Time: 12/23/2019 1:12 PM  Performed by: Rosemarie Murray MD  Authorized by: Rosemarie Murray MD   Comparison: compared with previous ECG   Similar to previous ECG  Rhythm: atrial fibrillation  ST Flattening: all    Clinical impression: abnormal EKG              Echocardiogram:        Current Outpatient Medications:   •  allopurinol (ZYLOPRIM) 300 MG tablet, Take 1 tablet by mouth Daily., Disp: 90 tablet, Rfl: 3  •  amLODIPine (NORVASC) 10 MG tablet, Take 1 tablet by mouth Daily., Disp: 90 tablet, Rfl: 1  •  apixaban (ELIQUIS) 5 MG tablet tablet, Take 5 mg by mouth 2 (Two) Times a Day., Disp: , Rfl:   •  aspirin 81 MG EC tablet, Take 81 mg by mouth Daily., Disp: , Rfl:   •  atorvastatin (LIPITOR) 20 MG tablet, Take 1 tablet by mouth Daily., Disp: 90 tablet, Rfl: 1  •  Cholecalciferol (VITAMIN D PO), Take 1 tablet by mouth Daily., Disp: , Rfl:   •  propranolol (INDERAL) 10 MG tablet, Take 10 mg by mouth Daily., Disp: , Rfl:    Assessment:        Patient Active " Problem List   Diagnosis   • Benign non-nodular prostatic hyperplasia without lower urinary tract symptoms   • Diabetes mellitus type 2, controlled, without complications (CMS/HCC)   • Vitamin D deficiency   • Hyperuricemia   • Low testosterone   • Dyslipidemia   • Hypertension   • Acanthosis nigricans   • Gout   • Diabetes mellitus (CMS/HCC)   • Impotence of organic origin   • Hypogonadism in male   • Class 1 obesity due to excess calories without serious comorbidity with body mass index (BMI) of 30.0 to 30.9 in adult   • Diverticulitis of large intestine without perforation or abscess without bleeding   • Abdominal pain   • A-fib (CMS/HCC)   • Nonrheumatic aortic valve stenosis   • Non-rheumatic mitral regurgitation   • Anticoagulated   • Alcohol abuse   • Angioedema   • Wheezes   • Diarrhea of presumed infectious origin   • Sigmoid polyp   • Sleep apnea   • Trigger finger, acquired               Plan:            ICD-10-CM ICD-9-CM   1. Nonrheumatic aortic valve stenosis I35.0 424.1   2. Non-rheumatic mitral regurgitation I34.0 424.0   3. Essential hypertension I10 401.9   4. Paroxysmal atrial fibrillation (CMS/HCC) I48.0 427.31     1. Nonrheumatic aortic valve stenosis  Considering patient's medical condition as well as the risk factors, patient will require echocardiogram for further evaluation for the LV function, four-chamber evaluation, including the pressures, valvular function and  pericardial disease and pericardial effusion    - Adult Transthoracic Echo Complete W/ Cont if Necessary Per Protocol    2. Non-rheumatic mitral regurgitation  Treat conservatively  - Adult Transthoracic Echo Complete W/ Cont if Necessary Per Protocol    3. Essential hypertension  Blood pressure under control  - Adult Transthoracic Echo Complete W/ Cont if Necessary Per Protocol    4. Paroxysmal atrial fibrillation (CMS/HCC)  Under control       6 MONTH WITH ECHO  COUNSELING:    Agustín Restrepo was given to patient for  the following topics: diagnostic results, risk factor reductions, impressions, risks and benefits of treatment options and importance of treatment compliance .       SMOKING COUNSELING:    [unfilled]    Dictated using Dragon dictation

## 2020-01-06 ENCOUNTER — OFFICE VISIT (OUTPATIENT)
Dept: FAMILY MEDICINE CLINIC | Facility: CLINIC | Age: 75
End: 2020-01-06

## 2020-01-06 VITALS
TEMPERATURE: 98 F | HEART RATE: 94 BPM | DIASTOLIC BLOOD PRESSURE: 84 MMHG | OXYGEN SATURATION: 98 % | HEIGHT: 72 IN | SYSTOLIC BLOOD PRESSURE: 130 MMHG | WEIGHT: 272 LBS | RESPIRATION RATE: 18 BRPM | BODY MASS INDEX: 36.84 KG/M2

## 2020-01-06 DIAGNOSIS — E78.00 PURE HYPERCHOLESTEROLEMIA: ICD-10-CM

## 2020-01-06 DIAGNOSIS — E11.9 CONTROLLED TYPE 2 DIABETES MELLITUS WITHOUT COMPLICATION, UNSPECIFIED WHETHER LONG TERM INSULIN USE (HCC): ICD-10-CM

## 2020-01-06 DIAGNOSIS — M10.9 GOUT, UNSPECIFIED CAUSE, UNSPECIFIED CHRONICITY, UNSPECIFIED SITE: ICD-10-CM

## 2020-01-06 DIAGNOSIS — I10 ESSENTIAL HYPERTENSION: ICD-10-CM

## 2020-01-06 DIAGNOSIS — Z00.00 MEDICARE ANNUAL WELLNESS VISIT, SUBSEQUENT: Primary | ICD-10-CM

## 2020-01-06 PROBLEM — R80.9 MICROALBUMINURIA: Status: ACTIVE | Noted: 2020-01-06

## 2020-01-06 PROCEDURE — G0439 PPPS, SUBSEQ VISIT: HCPCS | Performed by: FAMILY MEDICINE

## 2020-01-06 PROCEDURE — 99214 OFFICE O/P EST MOD 30 MIN: CPT | Performed by: FAMILY MEDICINE

## 2020-01-06 RX ORDER — ATORVASTATIN CALCIUM 20 MG/1
20 TABLET, FILM COATED ORAL DAILY
Qty: 90 TABLET | Refills: 1 | Status: SHIPPED | OUTPATIENT
Start: 2020-01-06 | End: 2020-05-07 | Stop reason: SDUPTHER

## 2020-01-06 RX ORDER — AMLODIPINE BESYLATE 10 MG/1
10 TABLET ORAL DAILY
Qty: 90 TABLET | Refills: 1 | Status: CANCELLED | OUTPATIENT
Start: 2020-01-06

## 2020-01-06 RX ORDER — CHLORTHALIDONE 25 MG/1
25 TABLET ORAL DAILY
Qty: 90 TABLET | Refills: 1 | Status: SHIPPED | OUTPATIENT
Start: 2020-01-06 | End: 2020-05-07 | Stop reason: SDUPTHER

## 2020-01-06 RX ORDER — ATORVASTATIN CALCIUM 20 MG/1
20 TABLET, FILM COATED ORAL DAILY
Qty: 90 TABLET | Refills: 1 | Status: CANCELLED | OUTPATIENT
Start: 2020-01-06

## 2020-01-06 RX ORDER — PROPRANOLOL HYDROCHLORIDE 10 MG/1
10 TABLET ORAL DAILY
Qty: 90 TABLET | Refills: 1 | Status: SHIPPED | OUTPATIENT
Start: 2020-01-06 | End: 2020-05-07 | Stop reason: SDUPTHER

## 2020-01-06 RX ORDER — CHLORTHALIDONE 25 MG/1
TABLET ORAL
COMMUNITY
Start: 2020-01-05 | End: 2020-01-06 | Stop reason: SDUPTHER

## 2020-01-06 RX ORDER — ALLOPURINOL 300 MG/1
300 TABLET ORAL DAILY
Qty: 90 TABLET | Refills: 1 | Status: SHIPPED | OUTPATIENT
Start: 2020-01-06 | End: 2020-05-07 | Stop reason: SDUPTHER

## 2020-01-06 NOTE — PROGRESS NOTES
Subjective   Agustín Willett is a 74 y.o. male.     History of Present Illness     Chief Complaint:   Chief Complaint   Patient presents with   • Hypertension     med refill  - meds reviewed with pt today    • Hyperlipidemia   • Gout   • medicare wellness       Agustín Willett 74 y.o. male who presents today for Medical Management of the below listed issues and medication refills.  he has a problem list of   Patient Active Problem List   Diagnosis   • Benign non-nodular prostatic hyperplasia without lower urinary tract symptoms   • Diabetes mellitus type 2, controlled, without complications (CMS/[facility])   • Vitamin D deficiency   • Hyperuricemia   • Low testosterone   • Dyslipidemia   • Hypertension   • Acanthosis nigricans   • Gout   • Diabetes mellitus (CMS/[facility])   • Impotence of organic origin   • Hypogonadism in male   • Class 1 obesity due to excess calories without serious comorbidity with body mass index (BMI) of 30.0 to 30.9 in adult   • Diverticulitis of large intestine without perforation or abscess without bleeding   • Abdominal pain   • A-fib (CMS/[facility])   • Nonrheumatic aortic valve stenosis   • Non-rheumatic mitral regurgitation   • Anticoagulated   • Alcohol abuse   • Angioedema   • Wheezes   • Diarrhea of presumed infectious origin   • Sigmoid polyp   • Sleep apnea   • Trigger finger, acquired   • Pure hypercholesterolemia   • Microalbuminuria   .  Since the last visit, he has overall felt well. Pt doing well off the Norvasc since his Sepsis episode.  he has been compliant with   Current Outpatient Medications:   •  atorvastatin (LIPITOR) 20 MG tablet, Take 1 tablet by mouth Daily., Disp: 90 tablet, Rfl: 1  •  allopurinol (ZYLOPRIM) 300 MG tablet, Take 1 tablet by mouth Daily., Disp: 90 tablet, Rfl: 1  •  amLODIPine (NORVASC) 10 MG tablet, Take 1 tablet by mouth Daily., Disp: 90 tablet, Rfl: 1  •  apixaban (ELIQUIS) 5 MG tablet tablet, Take 5 mg by mouth 2 (Two) Times a Day., Disp: , Rfl:   •  aspirin 81  "MG EC tablet, Take 81 mg by mouth Daily., Disp: , Rfl:   •  chlorthalidone (HYGROTON) 25 MG tablet, Take 1 tablet by mouth Daily., Disp: 90 tablet, Rfl: 1  •  Cholecalciferol (VITAMIN D PO), Take 1 tablet by mouth Daily., Disp: , Rfl:   •  propranolol (INDERAL) 10 MG tablet, Take 1 tablet by mouth Daily., Disp: 90 tablet, Rfl: 1.  he denies medication side effects.    All of the other chronic condition(s) listed above are stable w/o issues.    /84   Pulse 94   Temp 98 °F (36.7 °C) (Oral)   Resp 18   Ht 182.9 cm (72.01\")   Wt 123 kg (272 lb)   SpO2 98%   BMI 36.88 kg/m²     Results for orders placed or performed during the hospital encounter of 12/18/19   Adult Transthoracic Echo Complete W/ Cont if Necessary Per Protocol   Result Value Ref Range    BSA 2.4 m^2    IVSd 1.5 cm    LVIDd 3.9 cm    LVIDs 2.9 cm    LVPWd 1.5 cm    IVS/LVPW 1.0     FS 25.6 %    EDV(Teich) 65.9 ml    ESV(Teich) 32.2 ml    EF(Teich) 51.1 %    EDV(cubed) 59.3 ml    ESV(cubed) 24.4 ml    EF(cubed) 58.9 %    LV mass(C)d 224.6 grams    LV mass(C)dI 91.9 grams/m^2    SV(Teich) 33.7 ml    SI(Teich) 13.8 ml/m^2    SV(cubed) 34.9 ml    SI(cubed) 14.3 ml/m^2    Ao root diam 3.8 cm    Ao root area 11.3 cm^2    LVOT diam 2.5 cm    LVOT area 4.9 cm^2    LVOT area(traced) 4.9 cm^2    RVOT diam 2.4 cm    RVOT area 4.5 cm^2    LVLd ap4 8.1 cm    EDV(MOD-sp4) 123.0 ml    LVLs ap4 7.1 cm    ESV(MOD-sp4) 44.0 ml    EF(MOD-sp4) 64.2 %    LVLd ap2 8.6 cm    EDV(MOD-sp2) 127.0 ml    LVLs ap2 7.9 cm    ESV(MOD-sp2) 55.0 ml    EF(MOD-sp2) 56.7 %    SV(MOD-sp4) 79.0 ml    SI(MOD-sp4) 32.3 ml/m^2    SV(MOD-sp2) 72.0 ml    SI(MOD-sp2) 29.5 ml/m^2    Ao root area (BSA corrected) 1.6     LV Martinez Vol (BSA corrected) 50.3 ml/m^2    LV Sys Vol (BSA corrected) 18.0 ml/m^2    TAPSE (>1.6) 2.3 cm    MV E max regnia 98.5 cm/sec    MV V2 max 111.0 cm/sec    MV max PG 4.9 mmHg    MV V2 mean 60.0 cm/sec    MV mean PG 2.0 mmHg    MV V2 VTI 22.1 cm    MVA(VTI) 3.3 cm^2 "    MV P1/2t max charles 112.0 cm/sec    MV P1/2t 71.0 msec    MVA(P1/2t) 3.1 cm^2    MV dec slope 462.0 cm/sec^2    MV dec time 0.21 sec    Ao pk charles 308.0 cm/sec    Ao max PG 37.9 mmHg    Ao max PG (full) 35.1 mmHg    Ao V2 mean 221.0 cm/sec    Ao mean PG 22.0 mmHg    Ao mean PG (full) 20.0 mmHg    Ao V2 VTI 61.6 cm    JES(I,A) 1.2 cm^2    JES(I,D) 1.2 cm^2    JES(V,A) 1.3 cm^2    JES(V,D) 1.3 cm^2    LV V1 max PG 2.8 mmHg    LV V1 mean PG 2.0 mmHg    LV V1 max 84.2 cm/sec    LV V1 mean 61.7 cm/sec    LV V1 VTI 14.8 cm    SV(Ao) 698.6 ml    SI(Ao) 286.0 ml/m^2    SV(LVOT) 72.6 ml    SV(RVOT) 46.6 ml    SI(LVOT) 29.7 ml/m^2    PA V2 max 96.4 cm/sec    PA max PG 3.7 mmHg    PA max PG (full) 1.9 mmHg     CV ECHO ALEJANDRO - PVA(V,A) 3.2 cm^2     CV ECHO ALEJANDRO - PVA(V,D) 3.2 cm^2    PA acc time 0.07 sec    RV V1 max PG 1.8 mmHg    RV V1 mean PG 1.0 mmHg    RV V1 max 68.0 cm/sec    RV V1 mean 42.7 cm/sec    RV V1 VTI 10.3 cm    TR max charles 289.0 cm/sec    RVSP(TR) 36.4 mmHg    RAP systole 3.0 mmHg    PA pr(Accel) 49.3 mmHg    Qp/Qs 0.64     MVA P1/2T LCG 2.0 cm^2     CV ECHO ALEJANDRO - BZI_BMI 37.4 kilograms/m^2     CV ECHO ALEJANDRO - BSA([facility]) 2.6 m^2     CV ECHO ALEJANDRO - BZI_METRIC_WEIGHT 125.2 kg     CV ECHO ALEJANDRO - BZI_METRIC_HEIGHT 182.9 cm    TDI S' 14.00 cm/sec    RV Base 3.50 cm    RV Length 8.40 cm    RV Mid 2.40 cm    LA Volume Index 46.0 mL/m2    Avg E/e' ratio 10.10     EF(MOD-bp) 60 %    Lat Peak E' Charles 10.8 cm/sec    Med Peak E' Charles 8.70 cm/sec           The following portions of the patient's history were reviewed and updated as appropriate: allergies, current medications, past family history, past medical history, past social history, past surgical history and problem list.    Review of Systems   Constitutional: Negative for activity change, chills, fatigue and fever.   Respiratory: Negative for cough and shortness of breath.    Cardiovascular: Negative for chest pain and palpitations.   Gastrointestinal:  Negative for abdominal pain.   Endocrine: Negative for cold intolerance.   Psychiatric/Behavioral: Negative for behavioral problems and dysphoric mood. The patient is not nervous/anxious.        Objective   Physical Exam   Constitutional: He appears well-developed and well-nourished.   Neck: Neck supple. No thyromegaly present.   Cardiovascular: Normal rate. An irregularly irregular rhythm present.   No murmur heard.  Pulmonary/Chest: Effort normal and breath sounds normal.   Abdominal: Bowel sounds are normal. There is no tenderness.   Psychiatric: He has a normal mood and affect. His behavior is normal.   Nursing note and vitals reviewed.      Assessment/Plan   Agustín was seen today for hypertension, hyperlipidemia, gout and medicare wellness.    Diagnoses and all orders for this visit:    Medicare annual wellness visit, subsequent    Controlled type 2 diabetes mellitus without complication, unspecified whether long term insulin use (CMS/[facility])    Essential hypertension  -     chlorthalidone (HYGROTON) 25 MG tablet; Take 1 tablet by mouth Daily.  -     propranolol (INDERAL) 10 MG tablet; Take 1 tablet by mouth Daily.    Pure hypercholesterolemia  -     atorvastatin (LIPITOR) 20 MG tablet; Take 1 tablet by mouth Daily.    Gout, unspecified cause, unspecified chronicity, unspecified site  -     allopurinol (ZYLOPRIM) 300 MG tablet; Take 1 tablet by mouth Daily.

## 2020-01-06 NOTE — PROGRESS NOTES
The ABCs of the Annual Wellness Visit  Subsequent Medicare Wellness Visit    Chief Complaint   Patient presents with   • Hypertension     med refill  - meds reviewed with pt today    • Hyperlipidemia   • Gout   • medicare wellness       Subjective   History of Present Illness:  Agustín Willett is a 74 y.o. male who presents for a Subsequent Medicare Wellness Visit.    HEALTH RISK ASSESSMENT    Recent Hospitalizations:  Recently treated at the following:  [facility]    Current Medical Providers:  Patient Care Team:  Stuart Shah MD as PCP - General (Family Medicine)  Nancy Leone APRN as Nurse Practitioner (Endocrinology)  Merlyn Hernández MD as Consulting Physician (Gastroenterology)  Rosemarie Murray MD as Consulting Physician (Cardiology)  Dianna Jacobs MD as Consulting Physician (Pulmonary Disease)    Smoking Status:  Social History     Tobacco Use   Smoking Status Former Smoker   • Packs/day: 2.00   • Years: 15.00   • Pack years: 30.00   • Types: Cigarettes   • Last attempt to quit: 1970   • Years since quittin.0   Smokeless Tobacco Never Used       Alcohol Consumption:  Social History     Substance and Sexual Activity   Alcohol Use Yes    Comment: 1 drink weekly       Depression Screen:   PHQ-2/PHQ-9 Depression Screening 2020   Little interest or pleasure in doing things 0   Feeling down, depressed, or hopeless 0   Trouble falling or staying asleep, or sleeping too much 0   Feeling tired or having little energy 0   Poor appetite or overeating 0   Feeling bad about yourself - or that you are a failure or have let yourself or your family down 0   Trouble concentrating on things, such as reading the newspaper or watching television 0   Moving or speaking so slowly that other people could have noticed. Or the opposite - being so fidgety or restless that you have been moving around a lot more than usual 0   Thoughts that you would be better off dead, or of hurting yourself in  some way 0   Total Score 0   If you checked off any problems, how difficult have these problems made it for you to do your work, take care of things at home, or get along with other people? Not difficult at all       Fall Risk Screen:  ENRIQUETA Fall Risk Assessment has not been completed.    Health Habits and Functional and Cognitive Screening:  Functional & Cognitive Status 1/6/2020   Do you have difficulty preparing food and eating? No   Do you have difficulty bathing yourself, getting dressed or grooming yourself? No   Do you have difficulty using the toilet? No   Do you have difficulty moving around from place to place? No   Do you have trouble with steps or getting out of a bed or a chair? No   Current Diet Well Balanced Diet   Dental Exam Up to date   Eye Exam Up to date   Exercise (times per week) 0 times per week   Current Exercise Activities Include None   Do you need help using the phone?  No   Are you deaf or do you have serious difficulty hearing?  No   Do you need help with transportation? No   Do you need help shopping? No   Do you need help preparing meals?  No   Do you need help with housework?  No   Do you need help with laundry? No   Do you need help taking your medications? No   Do you need help managing money? No   Do you ever drive or ride in a car without wearing a seat belt? No   Have you felt unusual stress, anger or loneliness in the last month? No   Who do you live with? Alone   If you need help, do you have trouble finding someone available to you? Yes   Have you been bothered in the last four weeks by sexual problems? No   Do you have difficulty concentrating, remembering or making decisions? No         Does the patient have evidence of cognitive impairment? No    Asprin use counseling:Contraindicated from taking ASA    Age-appropriate Screening Schedule:  Refer to the list below for future screening recommendations based on patient's age, sex and/or medical conditions. Orders for these  recommended tests are listed in the plan section. The patient has been provided with a written plan.    Health Maintenance   Topic Date Due   • DIABETIC FOOT EXAM  08/29/2019   • TDAP/TD VACCINES (1 - Tdap) 04/09/2020 (Originally 6/9/1956)   • ZOSTER VACCINE (2 of 3) 12/13/2020 (Originally 11/8/2017)   • DIABETIC EYE EXAM  04/25/2020   • HEMOGLOBIN A1C  05/07/2020   • LIPID PANEL  11/07/2020   • URINE MICROALBUMIN  11/07/2020   • COLONOSCOPY  07/02/2023   • PNEUMOCOCCAL VACCINE (65+ HIGH RISK)  Completed   • INFLUENZA VACCINE  Completed          The following portions of the patient's history were reviewed and updated as appropriate: allergies, current medications, past family history, past medical history, past social history, past surgical history and problem list.    Outpatient Medications Prior to Visit   Medication Sig Dispense Refill   • allopurinol (ZYLOPRIM) 300 MG tablet Take 1 tablet by mouth Daily. 90 tablet 3   • atorvastatin (LIPITOR) 20 MG tablet Take 1 tablet by mouth Daily. 90 tablet 1   • amLODIPine (NORVASC) 10 MG tablet Take 1 tablet by mouth Daily. 90 tablet 1   • apixaban (ELIQUIS) 5 MG tablet tablet Take 5 mg by mouth 2 (Two) Times a Day.     • aspirin 81 MG EC tablet Take 81 mg by mouth Daily.     • Cholecalciferol (VITAMIN D PO) Take 1 tablet by mouth Daily.     • chlorthalidone (HYGROTON) 25 MG tablet      • propranolol (INDERAL) 10 MG tablet Take 10 mg by mouth Daily.       No facility-administered medications prior to visit.        Patient Active Problem List   Diagnosis   • Benign non-nodular prostatic hyperplasia without lower urinary tract symptoms   • Diabetes mellitus type 2, controlled, without complications (CMS/[facility])   • Vitamin D deficiency   • Hyperuricemia   • Low testosterone   • Dyslipidemia   • Hypertension   • Acanthosis nigricans   • Gout   • Diabetes mellitus (CMS/[facility])   • Impotence of organic origin   • Hypogonadism in male   • Class 1 obesity due to excess calories  "without serious comorbidity with body mass index (BMI) of 30.0 to 30.9 in adult   • Diverticulitis of large intestine without perforation or abscess without bleeding   • Abdominal pain   • A-fib (CMS/[facility])   • Nonrheumatic aortic valve stenosis   • Non-rheumatic mitral regurgitation   • Anticoagulated   • Alcohol abuse   • Angioedema   • Wheezes   • Diarrhea of presumed infectious origin   • Sigmoid polyp   • Sleep apnea   • Trigger finger, acquired   • Pure hypercholesterolemia   • Microalbuminuria       Advanced Care Planning:  Patient does not have an advance directive - information provided to the patient today    Review of Systems    Compared to one year ago, the patient feels his physical health is the same.  Compared to one year ago, the patient feels his mental health is the same.    Reviewed chart for potential of high risk medication in the elderly: yes  Reviewed chart for potential of harmful drug interactions in the elderly:yes    Objective         Vitals:    01/06/20 1101   BP: 130/84   Pulse: 94   Resp: 18   Temp: 98 °F (36.7 °C)   TempSrc: Oral   SpO2: 98%   Weight: 123 kg (272 lb)   Height: 182.9 cm (72.01\")   PainSc: 0-No pain       Body mass index is 36.88 kg/m².  Discussed the patient's BMI with him. The BMI is above average; BMI management plan is completed.    Physical Exam    Lab Results   Component Value Date     (H) 11/07/2019    CHLPL 104 11/07/2019    TRIG 143 11/07/2019    HDL 28 (L) 11/07/2019    LDL 47 11/07/2019    VLDL 29 11/07/2019    HGBA1C 5.8 (H) 11/07/2019        Assessment/Plan   Medicare Risks and Personalized Health Plan  CMS Preventative Services Quick Reference  Cardiovascular risk    The above risks/problems have been discussed with the patient.  Pertinent information has been shared with the patient in the After Visit Summary.  Follow up plans and orders are seen below in the Assessment/Plan Section.    Diagnoses and all orders for this visit:    1. Medicare annual " wellness visit, subsequent (Primary)    2. Controlled type 2 diabetes mellitus without complication, unspecified whether long term insulin use (CMS/[facility])    3. Essential hypertension  -     chlorthalidone (HYGROTON) 25 MG tablet; Take 1 tablet by mouth Daily.  Dispense: 90 tablet; Refill: 1  -     propranolol (INDERAL) 10 MG tablet; Take 1 tablet by mouth Daily.  Dispense: 90 tablet; Refill: 1    4. Pure hypercholesterolemia  -     atorvastatin (LIPITOR) 20 MG tablet; Take 1 tablet by mouth Daily.  Dispense: 90 tablet; Refill: 1    5. Gout, unspecified cause, unspecified chronicity, unspecified site  -     allopurinol (ZYLOPRIM) 300 MG tablet; Take 1 tablet by mouth Daily.  Dispense: 90 tablet; Refill: 1    Other orders  -     Cancel: amLODIPine (NORVASC) 10 MG tablet; Take 1 tablet by mouth Daily.  Dispense: 90 tablet; Refill: 1  -     Cancel: atorvastatin (LIPITOR) 20 MG tablet; Take 1 tablet by mouth Daily.  Dispense: 90 tablet; Refill: 1      Follow Up:  Return in about 6 months (around 7/6/2020) for Recheck.     An After Visit Summary and PPPS were given to the patient.

## 2020-01-06 NOTE — PATIENT INSTRUCTIONS
Medicare Wellness  Personal Prevention Plan of Service     Date of Office Visit:  2020  Encounter Provider:  Stuart Shah MD  Place of Service:  [facility]  Patient Name: Agustín Willett  :  1945    As part of the Medicare Wellness portion of your visit today, we are providing you with this personalized preventive plan of services (PPPS). This plan is based upon recommendations of the [place] Preventive Services Task Force (USPSTF) and the Advisory Committee on Immunization Practices (ACIP).    This lists the preventive care services that should be considered, and provides dates of when you are due. Items listed as completed are up-to-date and do not require any further intervention.    Health Maintenance   Topic Date Due   • DIABETIC FOOT EXAM  2019   • TDAP/TD VACCINES (1 - Tdap) 2020 (Originally 1956)   • ZOSTER VACCINE (2 of 3) 2020 (Originally 2017)   • DIABETIC EYE EXAM  2020   • HEMOGLOBIN A1C  2020   • LIPID PANEL  2020   • URINE MICROALBUMIN  2020   • MEDICARE ANNUAL WELLNESS  2021   • COLONOSCOPY  2023   • PNEUMOCOCCAL VACCINE (65+ HIGH RISK)  Completed   • INFLUENZA VACCINE  Completed   • AAA SCREEN (ONE-TIME)  Completed   • HEPATITIS C SCREENING  Discontinued       No orders of the defined types were placed in this encounter.      Return in about 6 months (around 2020) for Recheck.

## 2020-01-13 RX ORDER — BENAZEPRIL HYDROCHLORIDE AND HYDROCHLOROTHIAZIDE 20; 12.5 MG/1; MG/1
TABLET ORAL
Qty: 180 TABLET | Refills: 1 | OUTPATIENT
Start: 2020-01-13

## 2020-02-25 ENCOUNTER — OFFICE VISIT (OUTPATIENT)
Dept: FAMILY MEDICINE CLINIC | Facility: CLINIC | Age: 75
End: 2020-02-25

## 2020-02-25 VITALS
RESPIRATION RATE: 20 BRPM | TEMPERATURE: 98.2 F | BODY MASS INDEX: 39.68 KG/M2 | HEART RATE: 102 BPM | SYSTOLIC BLOOD PRESSURE: 142 MMHG | HEIGHT: 72 IN | WEIGHT: 293 LBS | DIASTOLIC BLOOD PRESSURE: 88 MMHG

## 2020-02-25 DIAGNOSIS — E11.9 CONTROLLED TYPE 2 DIABETES MELLITUS WITHOUT COMPLICATION, UNSPECIFIED WHETHER LONG TERM INSULIN USE (HCC): ICD-10-CM

## 2020-02-25 DIAGNOSIS — M25.541 ARTHRALGIA OF BOTH HANDS: Primary | ICD-10-CM

## 2020-02-25 DIAGNOSIS — M25.542 ARTHRALGIA OF BOTH HANDS: Primary | ICD-10-CM

## 2020-02-25 PROCEDURE — 99213 OFFICE O/P EST LOW 20 MIN: CPT | Performed by: FAMILY MEDICINE

## 2020-02-25 RX ORDER — METHYLPREDNISOLONE 4 MG/1
TABLET ORAL
Qty: 21 TABLET | Refills: 0 | Status: CANCELLED | OUTPATIENT
Start: 2020-02-25

## 2020-02-25 RX ORDER — PREDNISONE 20 MG/1
TABLET ORAL
Qty: 20 TABLET | Refills: 0 | Status: SHIPPED | OUTPATIENT
Start: 2020-02-25 | End: 2020-05-07 | Stop reason: ALTCHOICE

## 2020-02-25 NOTE — PROGRESS NOTES
"Subjective   Agustín Willett is a 74 y.o. male.     CC: Joint Pain    History of Present Illness     Pt with h/o gout (on Allopurinol) comes in today c/o some aches/pains, mainly of the hands. Ongoing for 1 week or so.   No tick bites and no FH of RA/ESPERANZA. Using some OTC NSAIDS w/o help. The right hand started first and now the left.     The following portions of the patient's history were reviewed and updated as appropriate: allergies, current medications, past family history, past medical history, past social history, past surgical history and problem list.    Review of Systems   Constitutional: Negative for chills and fever.   Musculoskeletal: Positive for arthralgias.       /88   Pulse 102   Temp 98.2 °F (36.8 °C) (Oral)   Resp 20   Ht 182.9 cm (72\")   Wt 133 kg (293 lb)   BMI 39.74 kg/m²     Objective   Physical Exam   Constitutional: He appears well-developed and well-nourished.   Musculoskeletal: He exhibits tenderness (and swelling/warmth of bilateral hands).   Psychiatric: He has a normal mood and affect. His behavior is normal.       Assessment/Plan   Agustín was seen today for edema.    Diagnoses and all orders for this visit:    Arthralgia of both hands  -     Uric Acid  -     ESPERANZA  -     Rheumatoid Factor  -     diclofenac (VOLTAREN) 1 % gel gel; Apply 4 g topically to the appropriate area as directed 4 (Four) Times a Day.  -     predniSONE (DELTASONE) 20 MG tablet; Take 3 tabs QDPC x 3 days then 2 tabs QDPC x 4 days then 1 tab QDPC x 3 days    Controlled type 2 diabetes mellitus without complication, unspecified whether long term insulin use (CMS/[facility])  -     Comprehensive Metabolic Panel  -     CBC & Differential  -     Hemoglobin A1c               "

## 2020-02-26 LAB
ALBUMIN SERPL-MCNC: 3.9 G/DL (ref 3.5–5.2)
ALBUMIN/GLOB SERPL: 1.3 G/DL
ALP SERPL-CCNC: 59 U/L (ref 39–117)
ALT SERPL-CCNC: 10 U/L (ref 1–41)
ANA SER QL: NEGATIVE
AST SERPL-CCNC: 14 U/L (ref 1–40)
BASOPHILS # BLD AUTO: 0.05 10*3/MM3 (ref 0–0.2)
BASOPHILS NFR BLD AUTO: 0.6 % (ref 0–1.5)
BILIRUB SERPL-MCNC: 0.3 MG/DL (ref 0.2–1.2)
BUN SERPL-MCNC: 22 MG/DL (ref 8–23)
BUN/CREAT SERPL: 16.5 (ref 7–25)
CALCIUM SERPL-MCNC: 9.2 MG/DL (ref 8.6–10.5)
CHLORIDE SERPL-SCNC: 99 MMOL/L (ref 98–107)
CO2 SERPL-SCNC: 27.1 MMOL/L (ref 22–29)
CREAT SERPL-MCNC: 1.33 MG/DL (ref 0.76–1.27)
EOSINOPHIL # BLD AUTO: 0.92 10*3/MM3 (ref 0–0.4)
EOSINOPHIL NFR BLD AUTO: 10.1 % (ref 0.3–6.2)
ERYTHROCYTE [DISTWIDTH] IN BLOOD BY AUTOMATED COUNT: 13.7 % (ref 12.3–15.4)
GLOBULIN SER CALC-MCNC: 2.9 GM/DL
GLUCOSE SERPL-MCNC: 121 MG/DL (ref 65–99)
HBA1C MFR BLD: 5.6 % (ref 4.8–5.6)
HCT VFR BLD AUTO: 41.1 % (ref 37.5–51)
HGB BLD-MCNC: 13 G/DL (ref 13–17.7)
IMM GRANULOCYTES # BLD AUTO: 0.04 10*3/MM3 (ref 0–0.05)
IMM GRANULOCYTES NFR BLD AUTO: 0.4 % (ref 0–0.5)
LYMPHOCYTES # BLD AUTO: 1.16 10*3/MM3 (ref 0.7–3.1)
LYMPHOCYTES NFR BLD AUTO: 12.8 % (ref 19.6–45.3)
MCH RBC QN AUTO: 29.5 PG (ref 26.6–33)
MCHC RBC AUTO-ENTMCNC: 31.6 G/DL (ref 31.5–35.7)
MCV RBC AUTO: 93.2 FL (ref 79–97)
MONOCYTES # BLD AUTO: 0.77 10*3/MM3 (ref 0.1–0.9)
MONOCYTES NFR BLD AUTO: 8.5 % (ref 5–12)
NEUTROPHILS # BLD AUTO: 6.15 10*3/MM3 (ref 1.7–7)
NEUTROPHILS NFR BLD AUTO: 67.6 % (ref 42.7–76)
NRBC BLD AUTO-RTO: 0 /100 WBC (ref 0–0.2)
PLATELET # BLD AUTO: 210 10*3/MM3 (ref 140–450)
POTASSIUM SERPL-SCNC: 4.3 MMOL/L (ref 3.5–5.2)
PROT SERPL-MCNC: 6.8 G/DL (ref 6–8.5)
RBC # BLD AUTO: 4.41 10*6/MM3 (ref 4.14–5.8)
RHEUMATOID FACT SERPL-ACNC: <10 IU/ML (ref 0–13.9)
SODIUM SERPL-SCNC: 140 MMOL/L (ref 136–145)
URATE SERPL-MCNC: 7 MG/DL (ref 3.4–7)
WBC # BLD AUTO: 9.09 10*3/MM3 (ref 3.4–10.8)

## 2020-03-13 ENCOUNTER — TELEPHONE (OUTPATIENT)
Dept: FAMILY MEDICINE CLINIC | Facility: CLINIC | Age: 75
End: 2020-03-13

## 2020-03-13 DIAGNOSIS — M25.542 ARTHRALGIA OF BOTH HANDS: Primary | ICD-10-CM

## 2020-03-13 DIAGNOSIS — M25.541 ARTHRALGIA OF BOTH HANDS: Primary | ICD-10-CM

## 2020-03-13 NOTE — TELEPHONE ENCOUNTER
Pt can't take NSAIDS due to renal and just had a 10 day taper of Prednisone. He DOES have some Voltaren Gel which he can continue to use. He can also use Arthritis Strength Tylenol. Get pt appt with Hand Surgery to evaluate further.

## 2020-03-13 NOTE — TELEPHONE ENCOUNTER
Pt called stating that he is having pain in hand again and has some swelling he is wanting to know if you can send him something in?  Thanks

## 2020-05-07 ENCOUNTER — OFFICE VISIT (OUTPATIENT)
Dept: ENDOCRINOLOGY | Age: 75
End: 2020-05-07

## 2020-05-07 DIAGNOSIS — E55.9 VITAMIN D DEFICIENCY: ICD-10-CM

## 2020-05-07 DIAGNOSIS — E78.00 PURE HYPERCHOLESTEROLEMIA: ICD-10-CM

## 2020-05-07 DIAGNOSIS — M10.9 GOUT, UNSPECIFIED CAUSE, UNSPECIFIED CHRONICITY, UNSPECIFIED SITE: ICD-10-CM

## 2020-05-07 DIAGNOSIS — E78.5 DYSLIPIDEMIA: ICD-10-CM

## 2020-05-07 DIAGNOSIS — R79.89 LOW TESTOSTERONE: ICD-10-CM

## 2020-05-07 DIAGNOSIS — E29.1 HYPOGONADISM IN MALE: ICD-10-CM

## 2020-05-07 DIAGNOSIS — E11.9 CONTROLLED TYPE 2 DIABETES MELLITUS WITHOUT COMPLICATION, UNSPECIFIED WHETHER LONG TERM INSULIN USE (HCC): Primary | ICD-10-CM

## 2020-05-07 DIAGNOSIS — I10 ESSENTIAL HYPERTENSION: ICD-10-CM

## 2020-05-07 DIAGNOSIS — E79.0 HYPERURICEMIA: ICD-10-CM

## 2020-05-07 PROCEDURE — 99214 OFFICE O/P EST MOD 30 MIN: CPT | Performed by: NURSE PRACTITIONER

## 2020-05-07 RX ORDER — ATORVASTATIN CALCIUM 20 MG/1
20 TABLET, FILM COATED ORAL DAILY
Qty: 90 TABLET | Refills: 1 | Status: SHIPPED | OUTPATIENT
Start: 2020-05-07 | End: 2021-01-28

## 2020-05-07 RX ORDER — CHLORTHALIDONE 25 MG/1
25 TABLET ORAL DAILY
Qty: 90 TABLET | Refills: 1 | Status: SHIPPED | OUTPATIENT
Start: 2020-05-07 | End: 2020-06-25 | Stop reason: SDUPTHER

## 2020-05-07 RX ORDER — ALLOPURINOL 300 MG/1
300 TABLET ORAL DAILY
Qty: 90 TABLET | Refills: 1 | Status: SHIPPED | OUTPATIENT
Start: 2020-05-07 | End: 2020-12-04

## 2020-05-07 RX ORDER — PROPRANOLOL HYDROCHLORIDE 10 MG/1
10 TABLET ORAL DAILY
Qty: 90 TABLET | Refills: 1 | Status: SHIPPED | OUTPATIENT
Start: 2020-05-07 | End: 2020-11-10

## 2020-05-07 NOTE — PATIENT INSTRUCTIONS
Continue all your current medications follow-up in 6 months contact the office if you have any questions or concerns

## 2020-05-07 NOTE — PROGRESS NOTES
Subjective   Agustín Willett is a 74 y.o. male is here today for follow-up.  Chief Complaint   Patient presents with   • Vitamin D Deficiency     no lab review; denies any problems or concerns    • Hypogonadism   • Hyperlipidemia     There were no vitals taken for this visit.  Current Outpatient Medications on File Prior to Visit   Medication Sig   • apixaban (ELIQUIS) 5 MG tablet tablet Take 5 mg by mouth 2 (Two) Times a Day.   • aspirin 81 MG EC tablet Take 81 mg by mouth Daily.   • Cholecalciferol (VITAMIN D PO) Take 1 tablet by mouth Daily.   • [DISCONTINUED] allopurinol (ZYLOPRIM) 300 MG tablet Take 1 tablet by mouth Daily.   • [DISCONTINUED] atorvastatin (LIPITOR) 20 MG tablet Take 1 tablet by mouth Daily.   • [DISCONTINUED] chlorthalidone (HYGROTON) 25 MG tablet Take 1 tablet by mouth Daily.   • [DISCONTINUED] propranolol (INDERAL) 10 MG tablet Take 1 tablet by mouth Daily.   • [DISCONTINUED] amLODIPine (NORVASC) 10 MG tablet Take 1 tablet by mouth Daily.   • [DISCONTINUED] diclofenac (VOLTAREN) 1 % gel gel Apply 4 g topically to the appropriate area as directed 4 (Four) Times a Day.   • [DISCONTINUED] metFORMIN (GLUCOPHAGE) 1000 MG tablet    • [DISCONTINUED] predniSONE (DELTASONE) 20 MG tablet Take 3 tabs QDPC x 3 days then 2 tabs QDPC x 4 days then 1 tab QDPC x 3 days     No current facility-administered medications on file prior to visit.      Family History   Problem Relation Age of Onset   • ALS Mother    • Hypertension Father      Social History     Tobacco Use   • Smoking status: Former Smoker     Packs/day: 2.00     Years: 15.00     Pack years: 30.00     Types: Cigarettes     Last attempt to quit: 1970     Years since quittin.3   • Smokeless tobacco: Never Used   Substance Use Topics   • Alcohol use: Yes     Comment: 1 drink weekly   • Drug use: No     Allergies   Allergen Reactions   • Amoxil [Amoxicillin] Hives   • Clindamycin/Lincomycin Hives   • Ace Inhibitors Angioedema   You have chosen to  receive care through a telehealth visit.  Do you consent to use a video/audio connection for your medical care today? Yes  Total time 20 min     History of Present Illness  Encounter Diagnoses   Name Primary?   • Controlled type 2 diabetes mellitus without complication, unspecified whether long term insulin use (CMS/[facility]) Yes   • Dyslipidemia    • Hypogonadism in male    • Low testosterone    • Vitamin D deficiency    • Pure hypercholesterolemia    • Hyperuricemia    • Gout, unspecified cause, unspecified chronicity, unspecified site    • Essential hypertension      74-year-old male patient being evaluated today via video for the above diagnoses.  He had labs done February 25 which were reviewed.  He was taken off metformin by his primary care provider.  His medication list was reviewed and updated.  He is requesting 90-day refills on his medication.  His uric acid was in satisfactory range.  He states he is feeling good.  His last blood pressure reading was slightly elevated.  He is currently on propranolol and chlorthalidone.  He is not currently checking his blood pressures.  His most recent A1c in February was 5.6.  He states he feels good overall.  He denies any problems with neuropathy.  He does have a history of gout  The following portions of the patient's history were reviewed and updated as appropriate: allergies, current medications, past family history, past medical history, past social history, past surgical history and problem list.    Review of Systems   Constitutional: Negative for fatigue and fever.   Respiratory: Negative for cough.    Cardiovascular: Negative for chest pain.   Endocrine: Negative for cold intolerance and heat intolerance.   Neurological: Negative for dizziness, light-headedness, numbness and headaches.   Psychiatric/Behavioral: Negative for sleep disturbance.       Objective   Physical Exam   Constitutional: He is oriented to person, place, and time. He appears well-developed and  well-nourished. No distress.   HENT:   Head: Normocephalic.   Nose: Nose normal.   Eyes: Pupils are equal, round, and reactive to light.   Neck: Normal range of motion.   Cardiovascular: Normal rate and regular rhythm.   Pulmonary/Chest: Effort normal.   Musculoskeletal: Normal range of motion.   Neurological: He is alert and oriented to person, place, and time.   Skin: Skin is warm and dry.   Psychiatric: He has a normal mood and affect. His behavior is normal. Judgment and thought content normal.     Results for orders placed or performed in visit on 02/25/20   Comprehensive Metabolic Panel   Result Value Ref Range    Glucose 121 (H) 65 - 99 mg/dL    BUN 22 8 - 23 mg/dL    Creatinine 1.33 (H) 0.76 - 1.27 mg/dL    eGFR Non African Am 53 (L) >60 mL/min/1.73    eGFR African Am 64 >60 mL/min/1.73    BUN/Creatinine Ratio 16.5 7.0 - 25.0    Sodium 140 136 - 145 mmol/L    Potassium 4.3 3.5 - 5.2 mmol/L    Chloride 99 98 - 107 mmol/L    Total CO2 27.1 22.0 - 29.0 mmol/L    Calcium 9.2 8.6 - 10.5 mg/dL    Total Protein 6.8 6.0 - 8.5 g/dL    Albumin 3.90 3.50 - 5.20 g/dL    Globulin 2.9 gm/dL    A/G Ratio 1.3 g/dL    Total Bilirubin 0.3 0.2 - 1.2 mg/dL    Alkaline Phosphatase 59 39 - 117 U/L    AST (SGOT) 14 1 - 40 U/L    ALT (SGPT) 10 1 - 41 U/L   Hemoglobin A1c   Result Value Ref Range    Hemoglobin A1C 5.60 4.80 - 5.60 %   Rheumatoid Factor   Result Value Ref Range    RA Latex Turbid <10.0 0.0 - 13.9 IU/mL   ESPERANZA   Result Value Ref Range    ESPERANZA Direct Negative Negative   Uric Acid   Result Value Ref Range    Uric Acid 7.0 3.4 - 7.0 mg/dL   CBC & Differential   Result Value Ref Range    WBC 9.09 3.40 - 10.80 10*3/mm3    RBC 4.41 4.14 - 5.80 10*6/mm3    Hemoglobin 13.0 13.0 - 17.7 g/dL    Hematocrit 41.1 37.5 - 51.0 %    MCV 93.2 79.0 - 97.0 fL    MCH 29.5 26.6 - 33.0 pg    MCHC 31.6 31.5 - 35.7 g/dL    RDW 13.7 12.3 - 15.4 %    Platelets 210 140 - 450 10*3/mm3    Neutrophil Rel % 67.6 42.7 - 76.0 %    Lymphocyte Rel % 12.8  (L) 19.6 - 45.3 %    Monocyte Rel % 8.5 5.0 - 12.0 %    Eosinophil Rel % 10.1 (H) 0.3 - 6.2 %    Basophil Rel % 0.6 0.0 - 1.5 %    Neutrophils Absolute 6.15 1.70 - 7.00 10*3/mm3    Lymphocytes Absolute 1.16 0.70 - 3.10 10*3/mm3    Monocytes Absolute 0.77 0.10 - 0.90 10*3/mm3    Eosinophils Absolute 0.92 (H) 0.00 - 0.40 10*3/mm3    Basophils Absolute 0.05 0.00 - 0.20 10*3/mm3    Immature Granulocyte Rel % 0.4 0.0 - 0.5 %    Immature Grans Absolute 0.04 0.00 - 0.05 10*3/mm3    nRBC 0.0 0.0 - 0.2 /100 WBC         Assessment/Plan   Problems Addressed this Visit        Cardiovascular and Mediastinum    Hypertension    Relevant Medications    chlorthalidone (HYGROTON) 25 MG tablet    propranolol (INDERAL) 10 MG tablet    Pure hypercholesterolemia    Relevant Medications    atorvastatin (LIPITOR) 20 MG tablet       Digestive    Vitamin D deficiency       Endocrine    Diabetes mellitus type 2, controlled, without complications (CMS/[facility]) - Primary    Hypogonadism in male       Other    Hyperuricemia    Low testosterone    Dyslipidemia    Gout    Relevant Medications    allopurinol (ZYLOPRIM) 300 MG tablet          In summary patient was evaluated via video.  Metabolically and clinically he is stable.  He will continue all his current medications as prescribed.  His labs were reviewed and clinically he is stable.  He will follow-up in 6 months with Dr. Dunn or myself with labs prior.  He is been encouraged to reach out to the office should he have any questions or concerns.  His A1c is normal at 5.6.  He has been taken off metformin by his primary care provider.  His creatinine was slightly elevated at 1.33.  He is taking over-the-counter vitamin D for vitamin D deficiency.  He is not currently monitoring his blood pressure.  He has been encouraged to call the office if he feels he has any changes or needs to be seen sooner.

## 2020-06-25 ENCOUNTER — HOSPITAL ENCOUNTER (OUTPATIENT)
Dept: CARDIOLOGY | Facility: HOSPITAL | Age: 75
Discharge: HOME | End: 2020-06-25
Admitting: INTERNAL MEDICINE

## 2020-06-25 ENCOUNTER — OFFICE VISIT (OUTPATIENT)
Dept: CARDIOLOGY | Facility: CLINIC | Age: 75
End: 2020-06-25

## 2020-06-25 VITALS
HEIGHT: 72 IN | SYSTOLIC BLOOD PRESSURE: 134 MMHG | DIASTOLIC BLOOD PRESSURE: 85 MMHG | HEART RATE: 87 BPM | WEIGHT: 293 LBS | BODY MASS INDEX: 39.68 KG/M2

## 2020-06-25 VITALS
WEIGHT: 307 LBS | BODY MASS INDEX: 41.58 KG/M2 | HEIGHT: 72 IN | HEART RATE: 93 BPM | DIASTOLIC BLOOD PRESSURE: 103 MMHG | SYSTOLIC BLOOD PRESSURE: 159 MMHG

## 2020-06-25 DIAGNOSIS — I34.0 NON-RHEUMATIC MITRAL REGURGITATION: Primary | ICD-10-CM

## 2020-06-25 DIAGNOSIS — I10 ESSENTIAL HYPERTENSION: ICD-10-CM

## 2020-06-25 DIAGNOSIS — I48.0 PAROXYSMAL ATRIAL FIBRILLATION (HCC): ICD-10-CM

## 2020-06-25 DIAGNOSIS — I35.0 NONRHEUMATIC AORTIC VALVE STENOSIS: ICD-10-CM

## 2020-06-25 PROCEDURE — 93306 TTE W/DOPPLER COMPLETE: CPT

## 2020-06-25 PROCEDURE — 93000 ELECTROCARDIOGRAM COMPLETE: CPT | Performed by: INTERNAL MEDICINE

## 2020-06-25 PROCEDURE — 93306 TTE W/DOPPLER COMPLETE: CPT | Performed by: INTERNAL MEDICINE

## 2020-06-25 PROCEDURE — 99213 OFFICE O/P EST LOW 20 MIN: CPT | Performed by: INTERNAL MEDICINE

## 2020-06-25 RX ORDER — CHLORTHALIDONE 25 MG/1
25 TABLET ORAL DAILY
Qty: 90 TABLET | Refills: 1 | Status: SHIPPED | OUTPATIENT
Start: 2020-06-25 | End: 2020-12-04

## 2020-06-25 NOTE — PROGRESS NOTES
ECHO RESULTS, HTN     Subjective:        Agustín Willett is a 75 y.o. male who here for follow up    CC  AS  HPI  75-year-old male with aortic stenosis mitral regurgitation hypertension atrial fibrillation here for the follow-up, shortness of breath on exertion no different than before no chest pains no dizziness or lightheadedness     Problem List Items Addressed This Visit        Cardiovascular and Mediastinum    Hypertension    A-fib (CMS/HCC)    Nonrheumatic aortic valve stenosis    Relevant Orders    Adult Transthoracic Echo Complete W/ Cont if Necessary Per Protocol    Non-rheumatic mitral regurgitation - Primary    Relevant Orders    Adult Transthoracic Echo Complete W/ Cont if Necessary Per Protocol        .    The following portions of the patient's history were reviewed and updated as appropriate: allergies, current medications, past family history, past medical history, past social history, past surgical history and problem list.    Past Medical History:   Diagnosis Date   • Atrial fibrillation (CMS/HCC)    • Dyslipidemia    • Erectile dysfunction    • Hx of complete eye exam 2016    UNKNOWN PER PT    • Hyperlipidemia    • Hypertension    • Hypogonadism male    • ARIANA (obstructive sleep apnea)    • PONV (postoperative nausea and vomiting)    • Type 2 diabetes mellitus (CMS/HCC)    • Vitamin D deficiency      reports that he quit smoking about 50 years ago. His smoking use included cigarettes. He has a 30.00 pack-year smoking history. He has never used smokeless tobacco. He reports that he drinks alcohol. He reports that he does not use drugs.   Family History   Problem Relation Age of Onset   • ALS Mother    • Hypertension Father        Review of Systems  Constitutional: No wt loss, fever, fatigue  Gastrointestinal: No nausea, abdominal pain  Behavioral/Psych: No insomnia or anxiety   Cardiovascular no chest pains or tightness in the chest  Objective:       Physical Exam  BP (!) 159/103   Pulse 93   Ht  "182.9 cm (72\")   Wt (!) 139 kg (307 lb)   BMI 41.64 kg/m²   General appearance: No acute changes   Neck: Trachea midline; NECK, supple, no thyromegaly or lymphadenopathy   Lungs: Normal size and shape, normal breath sounds, equal distribution of air, no rales and rhonchi   CV: S1-S2 regular, no murmurs, no rub, no gallop   Abdomen: Soft, non-tender; no masses , no abnormal abdominal sounds   Extremities: No deformity , normal color , no peripheral edema   Skin: Normal temperature, turgor and texture; no rash, ulcers            ECG 12 Lead  Date/Time: 6/25/2020 11:41 AM  Performed by: Rosemarie Murray MD  Authorized by: Rosemarie Murray MD   Comparison: compared with previous ECG   Similar to previous ECG  Rhythm: atrial fibrillation  ST Flattening: all    Clinical impression: abnormal EKG              Echocardiogram:        Current Outpatient Medications:   •  allopurinol (ZYLOPRIM) 300 MG tablet, Take 1 tablet by mouth Daily., Disp: 90 tablet, Rfl: 1  •  apixaban (ELIQUIS) 5 MG tablet tablet, Take 5 mg by mouth 2 (Two) Times a Day., Disp: , Rfl:   •  aspirin 81 MG EC tablet, Take 81 mg by mouth Daily., Disp: , Rfl:   •  atorvastatin (LIPITOR) 20 MG tablet, Take 1 tablet by mouth Daily., Disp: 90 tablet, Rfl: 1  •  chlorthalidone (HYGROTON) 25 MG tablet, Take 1 tablet by mouth Daily., Disp: 90 tablet, Rfl: 1  •  Cholecalciferol (VITAMIN D PO), Take 1 tablet by mouth Daily., Disp: , Rfl:   •  propranolol (INDERAL) 10 MG tablet, Take 1 tablet by mouth Daily., Disp: 90 tablet, Rfl: 1   Assessment:        Patient Active Problem List   Diagnosis   • Benign non-nodular prostatic hyperplasia without lower urinary tract symptoms   • Diabetes mellitus type 2, controlled, without complications (CMS/HCC)   • Vitamin D deficiency   • Hyperuricemia   • Low testosterone   • Dyslipidemia   • Hypertension   • Acanthosis nigricans   • Gout   • Diabetes mellitus (CMS/HCC)   • Impotence of organic origin   • Hypogonadism " in male   • Class 1 obesity due to excess calories without serious comorbidity with body mass index (BMI) of 30.0 to 30.9 in adult   • Diverticulitis of large intestine without perforation or abscess without bleeding   • Abdominal pain   • A-fib (CMS/HCC)   • Nonrheumatic aortic valve stenosis   • Non-rheumatic mitral regurgitation   • Anticoagulated   • Alcohol abuse   • Angioedema   • Wheezes   • Diarrhea of presumed infectious origin   • Sigmoid polyp   • Sleep apnea   • Trigger finger, acquired   • Pure hypercholesterolemia   • Microalbuminuria               Plan:            ICD-10-CM ICD-9-CM   1. Non-rheumatic mitral regurgitation I34.0 424.0   2. Nonrheumatic aortic valve stenosis I35.0 424.1   3. Essential hypertension I10 401.9   4. Paroxysmal atrial fibrillation (CMS/HCC) I48.0 427.31     1. Nonrheumatic aortic valve stenosis  Considering patient's medical condition as well as the risk factors, patient will require echocardiogram for further evaluation for the LV function, four-chamber evaluation, including the pressures, valvular function and  pericardial disease and pericardial effusion    - Adult Transthoracic Echo Complete W/ Cont if Necessary Per Protocol; Future    2. Non-rheumatic mitral regurgitation  Considering patient's medical condition as well as the risk factors, patient will require echocardiogram for further evaluation for the LV function, four-chamber evaluation, including the pressures, valvular function and  pericardial disease and pericardial effusion    - Adult Transthoracic Echo Complete W/ Cont if Necessary Per Protocol; Future    3. Essential hypertension  Blood pressure under control    4. Paroxysmal atrial fibrillation (CMS/HCC)  Controlled       6 MONTH WITH ECHO    AS  COUNSELING:    Agustín Restrepo was given to patient for the following topics: diagnostic results, risk factor reductions, impressions, risks and benefits of treatment options and importance of treatment  compliance .       SMOKING COUNSELING:    [unfilled]    Dictated using Dragon dictation

## 2020-06-26 LAB
AORTIC DIMENSIONLESS INDEX: 0.2 (DI)
BH CV ECHO MEAS - ACS: 0.92 CM
BH CV ECHO MEAS - AO MAX PG (FULL): 38.9 MMHG
BH CV ECHO MEAS - AO MAX PG: 41 MMHG
BH CV ECHO MEAS - AO MEAN PG (FULL): 23.4 MMHG
BH CV ECHO MEAS - AO MEAN PG: 24.5 MMHG
BH CV ECHO MEAS - AO ROOT AREA (BSA CORRECTED): 1.9
BH CV ECHO MEAS - AO ROOT AREA: 16 CM^2
BH CV ECHO MEAS - AO ROOT DIAM: 4.5 CM
BH CV ECHO MEAS - AO V2 MAX: 320.1 CM/SEC
BH CV ECHO MEAS - AO V2 MEAN: 235.5 CM/SEC
BH CV ECHO MEAS - AO V2 VTI: 64.6 CM
BH CV ECHO MEAS - AVA(I,A): 0.86 CM^2
BH CV ECHO MEAS - AVA(I,D): 0.86 CM^2
BH CV ECHO MEAS - AVA(V,A): 0.83 CM^2
BH CV ECHO MEAS - AVA(V,D): 0.83 CM^2
BH CV ECHO MEAS - BSA(HAYCOCK): 2.5 M^2
BH CV ECHO MEAS - BSA: 2.4 M^2
BH CV ECHO MEAS - BZI_BMI: 35.1 KILOGRAMS/M^2
BH CV ECHO MEAS - BZI_METRIC_HEIGHT: 182.9 CM
BH CV ECHO MEAS - BZI_METRIC_WEIGHT: 117.5 KG
BH CV ECHO MEAS - EDV(CUBED): 63.6 ML
BH CV ECHO MEAS - EDV(MOD-SP2): 105 ML
BH CV ECHO MEAS - EDV(MOD-SP4): 131 ML
BH CV ECHO MEAS - EDV(TEICH): 69.7 ML
BH CV ECHO MEAS - EF(CUBED): 70.2 %
BH CV ECHO MEAS - EF(MOD-BP): 55.8 %
BH CV ECHO MEAS - EF(MOD-SP2): 59 %
BH CV ECHO MEAS - EF(MOD-SP4): 53.4 %
BH CV ECHO MEAS - EF(TEICH): 62.4 %
BH CV ECHO MEAS - ESV(CUBED): 19 ML
BH CV ECHO MEAS - ESV(MOD-SP2): 43 ML
BH CV ECHO MEAS - ESV(MOD-SP4): 61 ML
BH CV ECHO MEAS - ESV(TEICH): 26.2 ML
BH CV ECHO MEAS - FS: 33.2 %
BH CV ECHO MEAS - IVS/LVPW: 1.4
BH CV ECHO MEAS - IVSD: 1.9 CM
BH CV ECHO MEAS - LAT PEAK E' VEL: 17.8 CM/SEC
BH CV ECHO MEAS - LV DIASTOLIC VOL/BSA (35-75): 55.1 ML/M^2
BH CV ECHO MEAS - LV MASS(C)D: 253.3 GRAMS
BH CV ECHO MEAS - LV MASS(C)DI: 106.5 GRAMS/M^2
BH CV ECHO MEAS - LV MAX PG: 2 MMHG
BH CV ECHO MEAS - LV MEAN PG: 1.1 MMHG
BH CV ECHO MEAS - LV SYSTOLIC VOL/BSA (12-30): 25.7 ML/M^2
BH CV ECHO MEAS - LV V1 MAX: 71.3 CM/SEC
BH CV ECHO MEAS - LV V1 MEAN: 49.9 CM/SEC
BH CV ECHO MEAS - LV V1 VTI: 14.9 CM
BH CV ECHO MEAS - LVIDD: 4 CM
BH CV ECHO MEAS - LVIDS: 2.7 CM
BH CV ECHO MEAS - LVLD AP2: 7.9 CM
BH CV ECHO MEAS - LVLD AP4: 9 CM
BH CV ECHO MEAS - LVLS AP2: 6.9 CM
BH CV ECHO MEAS - LVLS AP4: 8.1 CM
BH CV ECHO MEAS - LVOT AREA (M): 3.8 CM^2
BH CV ECHO MEAS - LVOT AREA: 3.7 CM^2
BH CV ECHO MEAS - LVOT DIAM: 2.2 CM
BH CV ECHO MEAS - LVPWD: 1.3 CM
BH CV ECHO MEAS - MED PEAK E' VEL: 10.6 CM/SEC
BH CV ECHO MEAS - MR MAX PG: 147.4 MMHG
BH CV ECHO MEAS - MR MAX VEL: 607.1 CM/SEC
BH CV ECHO MEAS - MR MEAN PG: 104.1 MMHG
BH CV ECHO MEAS - MR MEAN VEL: 492.3 CM/SEC
BH CV ECHO MEAS - MR VTI: 195 CM
BH CV ECHO MEAS - MV DEC SLOPE: 672.7 CM/SEC^2
BH CV ECHO MEAS - MV DEC TIME: 0.15 SEC
BH CV ECHO MEAS - MV E MAX VEL: 129.5 CM/SEC
BH CV ECHO MEAS - MV MAX PG: 8.3 MMHG
BH CV ECHO MEAS - MV MEAN PG: 2.9 MMHG
BH CV ECHO MEAS - MV P1/2T MAX VEL: 150.7 CM/SEC
BH CV ECHO MEAS - MV P1/2T: 65.6 MSEC
BH CV ECHO MEAS - MV V2 MAX: 143.9 CM/SEC
BH CV ECHO MEAS - MV V2 MEAN: 74.9 CM/SEC
BH CV ECHO MEAS - MV V2 VTI: 26.5 CM
BH CV ECHO MEAS - MVA P1/2T LCG: 1.5 CM^2
BH CV ECHO MEAS - MVA(P1/2T): 3.4 CM^2
BH CV ECHO MEAS - MVA(VTI): 2.1 CM^2
BH CV ECHO MEAS - PA ACC TIME: 0.13 SEC
BH CV ECHO MEAS - PA MAX PG (FULL): 1.3 MMHG
BH CV ECHO MEAS - PA MAX PG: 2.6 MMHG
BH CV ECHO MEAS - PA PR(ACCEL): 22 MMHG
BH CV ECHO MEAS - PA V2 MAX: 80.1 CM/SEC
BH CV ECHO MEAS - PVA(V,A): 3.9 CM^2
BH CV ECHO MEAS - PVA(V,D): 3.9 CM^2
BH CV ECHO MEAS - QP/QS: 1.1
BH CV ECHO MEAS - RAP SYSTOLE: 15 MMHG
BH CV ECHO MEAS - RV BASE (<4.1) - OBSOLETE: 3.3 CM
BH CV ECHO MEAS - RV LENGTH (<8.5) - OBSOLETE: 8.2 CM
BH CV ECHO MEAS - RV MAX PG: 1.3 MMHG
BH CV ECHO MEAS - RV MEAN PG: 0.69 MMHG
BH CV ECHO MEAS - RV V1 MAX: 56.8 CM/SEC
BH CV ECHO MEAS - RV V1 MEAN: 38.3 CM/SEC
BH CV ECHO MEAS - RV V1 VTI: 11.2 CM
BH CV ECHO MEAS - RVOT AREA: 5.6 CM^2
BH CV ECHO MEAS - RVOT DIAM: 2.7 CM
BH CV ECHO MEAS - RVSP: 59.4 MMHG
BH CV ECHO MEAS - SI(AO): 435.4 ML/M^2
BH CV ECHO MEAS - SI(CUBED): 18.8 ML/M^2
BH CV ECHO MEAS - SI(LVOT): 23.4 ML/M^2
BH CV ECHO MEAS - SI(MOD-SP2): 26.1 ML/M^2
BH CV ECHO MEAS - SI(MOD-SP4): 29.4 ML/M^2
BH CV ECHO MEAS - SI(TEICH): 18.3 ML/M^2
BH CV ECHO MEAS - SV(AO): 1035 ML
BH CV ECHO MEAS - SV(CUBED): 44.6 ML
BH CV ECHO MEAS - SV(LVOT): 55.7 ML
BH CV ECHO MEAS - SV(MOD-SP2): 62 ML
BH CV ECHO MEAS - SV(MOD-SP4): 70 ML
BH CV ECHO MEAS - SV(RVOT): 62.4 ML
BH CV ECHO MEAS - SV(TEICH): 43.5 ML
BH CV ECHO MEAS - TAPSE (>1.6): 1.8 CM
BH CV ECHO MEAS - TR MAX VEL: 333.2 CM/SEC
BH CV ECHO MEASUREMENTS AVERAGE E/E' RATIO: 9.12
BH CV XLRA - RV BASE: 3.3 CM
BH CV XLRA - RV LENGTH: 8.2 CM
BH CV XLRA - RV MID: 3.2 CM
BH CV XLRA - TDI S': 13.2 CM/SEC
LEFT ATRIUM VOLUME INDEX: 55 ML/M2

## 2020-11-10 DIAGNOSIS — I10 ESSENTIAL HYPERTENSION: ICD-10-CM

## 2020-11-10 RX ORDER — PROPRANOLOL HYDROCHLORIDE 10 MG/1
TABLET ORAL
Qty: 90 TABLET | Refills: 1 | Status: SHIPPED | OUTPATIENT
Start: 2020-11-10 | End: 2021-03-24 | Stop reason: SDUPTHER

## 2020-12-04 DIAGNOSIS — I10 ESSENTIAL HYPERTENSION: ICD-10-CM

## 2020-12-04 DIAGNOSIS — M10.9 GOUT, UNSPECIFIED CAUSE, UNSPECIFIED CHRONICITY, UNSPECIFIED SITE: ICD-10-CM

## 2020-12-04 RX ORDER — CHLORTHALIDONE 25 MG/1
TABLET ORAL
Qty: 90 TABLET | Refills: 1 | Status: SHIPPED | OUTPATIENT
Start: 2020-12-04 | End: 2021-03-24 | Stop reason: SDUPTHER

## 2020-12-04 RX ORDER — ALLOPURINOL 300 MG/1
TABLET ORAL
Qty: 90 TABLET | Refills: 1 | Status: SHIPPED | OUTPATIENT
Start: 2020-12-04 | End: 2021-03-24 | Stop reason: SDUPTHER

## 2021-01-01 ENCOUNTER — ANESTHESIA EVENT (OUTPATIENT)
Dept: PERIOP | Facility: HOSPITAL | Age: 76
End: 2021-01-01

## 2021-01-01 ENCOUNTER — READMISSION MANAGEMENT (OUTPATIENT)
Dept: CALL CENTER | Facility: HOSPITAL | Age: 76
End: 2021-01-01

## 2021-01-01 ENCOUNTER — LAB (OUTPATIENT)
Dept: LAB | Facility: HOSPITAL | Age: 76
End: 2021-01-01

## 2021-01-01 ENCOUNTER — HOSPITAL ENCOUNTER (OUTPATIENT)
Dept: CARDIOLOGY | Facility: HOSPITAL | Age: 76
Discharge: HOME | End: 2021-10-28
Admitting: INTERNAL MEDICINE

## 2021-01-01 ENCOUNTER — APPOINTMENT (OUTPATIENT)
Dept: CT IMAGING | Facility: HOSPITAL | Age: 76
End: 2021-01-01

## 2021-01-01 ENCOUNTER — APPOINTMENT (OUTPATIENT)
Dept: CARDIOLOGY | Facility: HOSPITAL | Age: 76
End: 2021-01-01

## 2021-01-01 ENCOUNTER — HOSPITAL ENCOUNTER (INPATIENT)
Facility: HOSPITAL | Age: 76
LOS: 1 days | Discharge: HOME | End: 2021-09-08
Attending: THORACIC SURGERY (CARDIOTHORACIC VASCULAR SURGERY) | Admitting: THORACIC SURGERY (CARDIOTHORACIC VASCULAR SURGERY)

## 2021-01-01 ENCOUNTER — TRANSITIONAL CARE MANAGEMENT TELEPHONE ENCOUNTER (OUTPATIENT)
Dept: CALL CENTER | Facility: HOSPITAL | Age: 76
End: 2021-01-01

## 2021-01-01 ENCOUNTER — APPOINTMENT (OUTPATIENT)
Dept: GENERAL RADIOLOGY | Facility: HOSPITAL | Age: 76
End: 2021-01-01

## 2021-01-01 ENCOUNTER — HOSPITAL ENCOUNTER (OUTPATIENT)
Dept: CARDIOLOGY | Facility: HOSPITAL | Age: 76
Discharge: HOME | End: 2021-07-29
Admitting: INTERNAL MEDICINE

## 2021-01-01 ENCOUNTER — APPOINTMENT (OUTPATIENT)
Dept: LAB | Facility: HOSPITAL | Age: 76
End: 2021-01-01

## 2021-01-01 ENCOUNTER — EPISODE CHANGES (OUTPATIENT)
Dept: CASE MANAGEMENT | Facility: OTHER | Age: 76
End: 2021-01-01

## 2021-01-01 ENCOUNTER — TELEPHONE (OUTPATIENT)
Dept: FAMILY MEDICINE CLINIC | Facility: CLINIC | Age: 76
End: 2021-01-01

## 2021-01-01 ENCOUNTER — ANESTHESIA (OUTPATIENT)
Dept: PERIOP | Facility: HOSPITAL | Age: 76
End: 2021-01-01

## 2021-01-01 ENCOUNTER — TELEPHONE (OUTPATIENT)
Dept: CARDIOLOGY | Facility: CLINIC | Age: 76
End: 2021-01-01

## 2021-01-01 ENCOUNTER — HOSPITAL ENCOUNTER (INPATIENT)
Facility: HOSPITAL | Age: 76
LOS: 2 days | Discharge: HOME | End: 2021-09-02
Attending: INTERNAL MEDICINE | Admitting: INTERNAL MEDICINE

## 2021-01-01 ENCOUNTER — HOSPITAL ENCOUNTER (OUTPATIENT)
Dept: CARDIOLOGY | Facility: HOSPITAL | Age: 76
Discharge: HOME | End: 2021-09-16
Admitting: NURSE PRACTITIONER

## 2021-01-01 ENCOUNTER — OFFICE VISIT (OUTPATIENT)
Dept: CARDIOLOGY | Facility: CLINIC | Age: 76
End: 2021-01-01

## 2021-01-01 ENCOUNTER — APPOINTMENT (OUTPATIENT)
Dept: RESPIRATORY THERAPY | Facility: HOSPITAL | Age: 76
End: 2021-01-01

## 2021-01-01 ENCOUNTER — TELEPHONE (OUTPATIENT)
Dept: ONCOLOGY | Facility: CLINIC | Age: 76
End: 2021-01-01

## 2021-01-01 ENCOUNTER — HOSPITAL ENCOUNTER (INPATIENT)
Facility: HOSPITAL | Age: 76
LOS: 5 days | Discharge: HOME | End: 2021-09-28
Attending: HOSPITALIST | Admitting: INTERNAL MEDICINE

## 2021-01-01 ENCOUNTER — OFFICE VISIT (OUTPATIENT)
Dept: FAMILY MEDICINE CLINIC | Facility: CLINIC | Age: 76
End: 2021-01-01

## 2021-01-01 ENCOUNTER — TRANSCRIBE ORDERS (OUTPATIENT)
Dept: CARDIAC REHAB | Facility: HOSPITAL | Age: 76
End: 2021-01-01

## 2021-01-01 ENCOUNTER — CLINICAL SUPPORT NO REQUIREMENTS (OUTPATIENT)
Dept: CARDIOLOGY | Facility: CLINIC | Age: 76
End: 2021-01-01

## 2021-01-01 ENCOUNTER — HOSPITAL ENCOUNTER (OUTPATIENT)
Dept: CARDIOLOGY | Facility: HOSPITAL | Age: 76
Discharge: HOME | End: 2021-08-25
Admitting: INTERNAL MEDICINE

## 2021-01-01 ENCOUNTER — APPOINTMENT (OUTPATIENT)
Dept: CARDIAC REHAB | Facility: HOSPITAL | Age: 76
End: 2021-01-01

## 2021-01-01 ENCOUNTER — DOCUMENTATION (OUTPATIENT)
Dept: CARDIOLOGY | Facility: HOSPITAL | Age: 76
End: 2021-01-01

## 2021-01-01 ENCOUNTER — HOSPITAL ENCOUNTER (EMERGENCY)
Facility: HOSPITAL | Age: 76
Discharge: HOME | End: 2021-03-29
Attending: EMERGENCY MEDICINE | Admitting: EMERGENCY MEDICINE

## 2021-01-01 VITALS
HEART RATE: 86 BPM | HEIGHT: 72 IN | WEIGHT: 315 LBS | BODY MASS INDEX: 42.66 KG/M2 | DIASTOLIC BLOOD PRESSURE: 81 MMHG | SYSTOLIC BLOOD PRESSURE: 149 MMHG | OXYGEN SATURATION: 90 %

## 2021-01-01 VITALS
WEIGHT: 315 LBS | HEIGHT: 72 IN | DIASTOLIC BLOOD PRESSURE: 70 MMHG | SYSTOLIC BLOOD PRESSURE: 134 MMHG | BODY MASS INDEX: 42.66 KG/M2 | HEART RATE: 90 BPM

## 2021-01-01 VITALS
SYSTOLIC BLOOD PRESSURE: 132 MMHG | BODY MASS INDEX: 42.66 KG/M2 | HEIGHT: 72 IN | WEIGHT: 315 LBS | DIASTOLIC BLOOD PRESSURE: 80 MMHG | HEART RATE: 85 BPM

## 2021-01-01 VITALS
HEART RATE: 92 BPM | SYSTOLIC BLOOD PRESSURE: 153 MMHG | DIASTOLIC BLOOD PRESSURE: 91 MMHG | WEIGHT: 315 LBS | HEIGHT: 72 IN | BODY MASS INDEX: 42.66 KG/M2

## 2021-01-01 VITALS
OXYGEN SATURATION: 93 % | TEMPERATURE: 98 F | HEART RATE: 99 BPM | SYSTOLIC BLOOD PRESSURE: 117 MMHG | BODY MASS INDEX: 42.66 KG/M2 | WEIGHT: 315 LBS | DIASTOLIC BLOOD PRESSURE: 59 MMHG | RESPIRATION RATE: 20 BRPM | HEIGHT: 72 IN

## 2021-01-01 VITALS
HEART RATE: 88 BPM | DIASTOLIC BLOOD PRESSURE: 84 MMHG | RESPIRATION RATE: 16 BRPM | TEMPERATURE: 97 F | BODY MASS INDEX: 42.66 KG/M2 | HEIGHT: 72 IN | SYSTOLIC BLOOD PRESSURE: 139 MMHG | OXYGEN SATURATION: 91 % | WEIGHT: 315 LBS

## 2021-01-01 VITALS
BODY MASS INDEX: 42.66 KG/M2 | SYSTOLIC BLOOD PRESSURE: 126 MMHG | HEART RATE: 86 BPM | OXYGEN SATURATION: 97 % | HEIGHT: 72 IN | TEMPERATURE: 98.1 F | WEIGHT: 315 LBS | DIASTOLIC BLOOD PRESSURE: 78 MMHG | RESPIRATION RATE: 18 BRPM

## 2021-01-01 VITALS
SYSTOLIC BLOOD PRESSURE: 148 MMHG | HEART RATE: 99 BPM | RESPIRATION RATE: 18 BRPM | DIASTOLIC BLOOD PRESSURE: 100 MMHG | TEMPERATURE: 96.7 F | OXYGEN SATURATION: 97 %

## 2021-01-01 VITALS
TEMPERATURE: 97.6 F | SYSTOLIC BLOOD PRESSURE: 124 MMHG | WEIGHT: 315 LBS | DIASTOLIC BLOOD PRESSURE: 82 MMHG | HEART RATE: 78 BPM | RESPIRATION RATE: 20 BRPM | BODY MASS INDEX: 42.66 KG/M2 | HEIGHT: 72 IN | OXYGEN SATURATION: 97 %

## 2021-01-01 VITALS
SYSTOLIC BLOOD PRESSURE: 156 MMHG | WEIGHT: 315 LBS | DIASTOLIC BLOOD PRESSURE: 96 MMHG | HEART RATE: 70 BPM | BODY MASS INDEX: 42.66 KG/M2 | HEIGHT: 72 IN

## 2021-01-01 VITALS
WEIGHT: 315 LBS | DIASTOLIC BLOOD PRESSURE: 84 MMHG | HEIGHT: 72 IN | OXYGEN SATURATION: 96 % | BODY MASS INDEX: 42.66 KG/M2 | HEART RATE: 77 BPM | SYSTOLIC BLOOD PRESSURE: 142 MMHG

## 2021-01-01 DIAGNOSIS — I35.0 NONRHEUMATIC AORTIC VALVE STENOSIS: Primary | ICD-10-CM

## 2021-01-01 DIAGNOSIS — I35.0 AORTIC STENOSIS, SEVERE: Primary | ICD-10-CM

## 2021-01-01 DIAGNOSIS — E78.00 PURE HYPERCHOLESTEROLEMIA: ICD-10-CM

## 2021-01-01 DIAGNOSIS — I48.0 PAROXYSMAL ATRIAL FIBRILLATION (HCC): ICD-10-CM

## 2021-01-01 DIAGNOSIS — I47.20 VT (VENTRICULAR TACHYCARDIA) (HCC): Primary | ICD-10-CM

## 2021-01-01 DIAGNOSIS — I10 ESSENTIAL HYPERTENSION: ICD-10-CM

## 2021-01-01 DIAGNOSIS — L91.8 SKIN TAG: ICD-10-CM

## 2021-01-01 DIAGNOSIS — R06.02 SHORTNESS OF BREATH: ICD-10-CM

## 2021-01-01 DIAGNOSIS — S00.81XA ABRASION OF FACE, INITIAL ENCOUNTER: ICD-10-CM

## 2021-01-01 DIAGNOSIS — I35.0 NONRHEUMATIC AORTIC VALVE STENOSIS: ICD-10-CM

## 2021-01-01 DIAGNOSIS — E78.5 DYSLIPIDEMIA: ICD-10-CM

## 2021-01-01 DIAGNOSIS — I48.11 LONGSTANDING PERSISTENT ATRIAL FIBRILLATION (HCC): ICD-10-CM

## 2021-01-01 DIAGNOSIS — Z09 HOSPITAL DISCHARGE FOLLOW-UP: ICD-10-CM

## 2021-01-01 DIAGNOSIS — I35.0 AORTIC STENOSIS, SEVERE: ICD-10-CM

## 2021-01-01 DIAGNOSIS — Z95.810 PRESENCE OF AUTOMATIC CARDIOVERTER/DEFIBRILLATOR (AICD): ICD-10-CM

## 2021-01-01 DIAGNOSIS — N28.9 RENAL INSUFFICIENCY: Primary | ICD-10-CM

## 2021-01-01 DIAGNOSIS — Z79.01 ANTICOAGULATED: ICD-10-CM

## 2021-01-01 DIAGNOSIS — Z95.2 S/P TAVR (TRANSCATHETER AORTIC VALVE REPLACEMENT): ICD-10-CM

## 2021-01-01 DIAGNOSIS — R06.02 SHORTNESS OF BREATH: Primary | ICD-10-CM

## 2021-01-01 DIAGNOSIS — R55 SYNCOPE, UNSPECIFIED SYNCOPE TYPE: ICD-10-CM

## 2021-01-01 DIAGNOSIS — Z95.2 S/P TAVR (TRANSCATHETER AORTIC VALVE REPLACEMENT): Primary | ICD-10-CM

## 2021-01-01 DIAGNOSIS — D64.9 ANEMIA, UNSPECIFIED TYPE: ICD-10-CM

## 2021-01-01 DIAGNOSIS — I35.0 AORTIC VALVE STENOSIS, ETIOLOGY OF CARDIAC VALVE DISEASE UNSPECIFIED: ICD-10-CM

## 2021-01-01 DIAGNOSIS — M10.9 GOUT, UNSPECIFIED CAUSE, UNSPECIFIED CHRONICITY, UNSPECIFIED SITE: ICD-10-CM

## 2021-01-01 DIAGNOSIS — R58 BLEEDING: Primary | ICD-10-CM

## 2021-01-01 DIAGNOSIS — I35.0 AORTIC VALVE STENOSIS, ETIOLOGY OF CARDIAC VALVE DISEASE UNSPECIFIED: Primary | ICD-10-CM

## 2021-01-01 LAB
ABO GROUP BLD: NORMAL
ABO GROUP BLD: NORMAL
ACT BLD: 114 SECONDS (ref 82–152)
ACT BLD: 131 SECONDS (ref 82–152)
ACT BLD: 158 SECONDS (ref 89–137)
ACT BLD: 208 SECONDS (ref 89–137)
ACT BLD: 219 SECONDS (ref 89–137)
ACT BLD: 263 SECONDS (ref 82–152)
ALBUMIN SERPL ELPH-MCNC: 3.4 G/DL (ref 2.9–4.4)
ALBUMIN SERPL-MCNC: 3.3 G/DL (ref 3.5–5.2)
ALBUMIN SERPL-MCNC: 3.4 G/DL (ref 3.5–5.2)
ALBUMIN SERPL-MCNC: 3.5 G/DL (ref 3.5–5.2)
ALBUMIN SERPL-MCNC: 3.7 G/DL (ref 3.5–5.2)
ALBUMIN SERPL-MCNC: 3.8 G/DL (ref 3.5–5.2)
ALBUMIN SERPL-MCNC: 3.9 G/DL (ref 3.5–5.2)
ALBUMIN/GLOB SERPL: 1 G/DL
ALBUMIN/GLOB SERPL: 1.1 G/DL
ALBUMIN/GLOB SERPL: 1.1 {RATIO} (ref 0.7–1.7)
ALBUMIN/GLOB SERPL: 1.6 G/DL
ALP SERPL-CCNC: 52 U/L (ref 39–117)
ALP SERPL-CCNC: 56 U/L (ref 39–117)
ALP SERPL-CCNC: 59 U/L (ref 39–117)
ALPHA1 GLOB SERPL ELPH-MCNC: 0.2 G/DL (ref 0–0.4)
ALPHA2 GLOB SERPL ELPH-MCNC: 0.7 G/DL (ref 0.4–1)
ALT SERPL W P-5'-P-CCNC: 14 U/L (ref 1–41)
ALT SERPL W P-5'-P-CCNC: 15 U/L (ref 1–41)
ALT SERPL W P-5'-P-CCNC: 16 U/L (ref 1–41)
ANION GAP SERPL CALCULATED.3IONS-SCNC: 10 MMOL/L (ref 5–15)
ANION GAP SERPL CALCULATED.3IONS-SCNC: 10 MMOL/L (ref 5–15)
ANION GAP SERPL CALCULATED.3IONS-SCNC: 10.3 MMOL/L (ref 5–15)
ANION GAP SERPL CALCULATED.3IONS-SCNC: 10.3 MMOL/L (ref 5–15)
ANION GAP SERPL CALCULATED.3IONS-SCNC: 11 MMOL/L (ref 5–15)
ANION GAP SERPL CALCULATED.3IONS-SCNC: 11 MMOL/L (ref 5–15)
ANION GAP SERPL CALCULATED.3IONS-SCNC: 11.4 MMOL/L (ref 5–15)
ANION GAP SERPL CALCULATED.3IONS-SCNC: 11.5 MMOL/L (ref 5–15)
ANION GAP SERPL CALCULATED.3IONS-SCNC: 12 MMOL/L (ref 5–15)
ANION GAP SERPL CALCULATED.3IONS-SCNC: 14 MMOL/L (ref 5–15)
ANION GAP SERPL CALCULATED.3IONS-SCNC: 14 MMOL/L (ref 5–15)
ANION GAP SERPL CALCULATED.3IONS-SCNC: 8.1 MMOL/L (ref 5–15)
ANION GAP SERPL CALCULATED.3IONS-SCNC: 9.1 MMOL/L (ref 5–15)
ANION GAP SERPL CALCULATED.3IONS-SCNC: 9.3 MMOL/L (ref 5–15)
AORTIC DIMENSIONLESS INDEX: 0.2 (DI)
AORTIC DIMENSIONLESS INDEX: 0.3 (DI)
AORTIC DIMENSIONLESS INDEX: 0.7 (DI)
APTT PPP: 26.5 SECONDS (ref 24–31)
APTT PPP: 35.6 SECONDS (ref 22.7–35.4)
ARTERIAL PATENCY WRIST A: POSITIVE
ASCENDING AORTA: 4 CM
AST SERPL-CCNC: 14 U/L (ref 1–40)
AST SERPL-CCNC: 19 U/L (ref 1–40)
AST SERPL-CCNC: 19 U/L (ref 1–40)
ATMOSPHERIC PRESS: ABNORMAL MM[HG]
B PARAPERT DNA SPEC QL NAA+PROBE: NOT DETECTED
B PERT DNA SPEC QL NAA+PROBE: NOT DETECTED
B-GLOBULIN SERPL ELPH-MCNC: 1.1 G/DL (ref 0.7–1.3)
BACTERIA UR QL AUTO: ABNORMAL /HPF
BASE EXCESS BLDA CALC-SCNC: 2.4 MMOL/L (ref 0–3)
BASE EXCESS BLDA CALC-SCNC: 6 MMOL/L (ref -5–5)
BASE EXCESS BLDA CALC-SCNC: 6 MMOL/L (ref -5–5)
BASOPHILS # BLD AUTO: 0 10*3/MM3 (ref 0–0.2)
BASOPHILS # BLD AUTO: 0.03 10*3/MM3 (ref 0–0.2)
BASOPHILS # BLD AUTO: 0.04 10*3/MM3 (ref 0–0.2)
BASOPHILS # BLD AUTO: 0.05 10*3/MM3 (ref 0–0.2)
BASOPHILS # BLD AUTO: 0.06 10*3/MM3 (ref 0–0.2)
BASOPHILS NFR BLD AUTO: 0.4 % (ref 0–1.5)
BASOPHILS NFR BLD AUTO: 0.4 % (ref 0–1.5)
BASOPHILS NFR BLD AUTO: 0.6 % (ref 0–1.5)
BASOPHILS NFR BLD AUTO: 0.7 % (ref 0–1.5)
BASOPHILS NFR BLD AUTO: 0.8 % (ref 0–1.5)
BASOPHILS NFR BLD AUTO: 0.8 % (ref 0–1.5)
BDY SITE: ABNORMAL
BH BB BLOOD EXPIRATION DATE: NORMAL
BH BB BLOOD TYPE BARCODE: 5100
BH BB DISPENSE STATUS: NORMAL
BH BB PRODUCT CODE: NORMAL
BH BB UNIT NUMBER: NORMAL
BH CV ECHO AV AORTIC VALVE AT ACCEL TIME CALCULATED: 66 MSEC
BH CV ECHO AV AORTIC VALVE AT ACCEL TIME CALCULATED: NORMAL MSEC
BH CV ECHO MEAS - ACS: 1.8 CM
BH CV ECHO MEAS - AI DEC SLOPE: 300.5 CM/SEC^2
BH CV ECHO MEAS - AI MAX PG: 21.6 MMHG
BH CV ECHO MEAS - AI MAX VEL: 232.2 CM/SEC
BH CV ECHO MEAS - AI P1/2T: 226.3 MSEC
BH CV ECHO MEAS - AO ACC TIME: 0.07 SEC
BH CV ECHO MEAS - AO MAX PG (FULL): 20.1 MMHG
BH CV ECHO MEAS - AO MAX PG (FULL): 23.4 MMHG
BH CV ECHO MEAS - AO MAX PG (FULL): 38.7 MMHG
BH CV ECHO MEAS - AO MAX PG (FULL): 51.6 MMHG
BH CV ECHO MEAS - AO MAX PG: 28.5 MMHG
BH CV ECHO MEAS - AO MAX PG: 30 MMHG
BH CV ECHO MEAS - AO MAX PG: 41.9 MMHG
BH CV ECHO MEAS - AO MAX PG: 53.5 MMHG
BH CV ECHO MEAS - AO MEAN PG (FULL): 14.6 MMHG
BH CV ECHO MEAS - AO MEAN PG (FULL): 16 MMHG
BH CV ECHO MEAS - AO MEAN PG (FULL): 30.9 MMHG
BH CV ECHO MEAS - AO MEAN PG (FULL): 8 MMHG
BH CV ECHO MEAS - AO MEAN PG: 13 MMHG
BH CV ECHO MEAS - AO MEAN PG: 17.8 MMHG
BH CV ECHO MEAS - AO MEAN PG: 18.4 MMHG
BH CV ECHO MEAS - AO MEAN PG: 32 MMHG
BH CV ECHO MEAS - AO ROOT AREA (BSA CORRECTED): 1.3
BH CV ECHO MEAS - AO ROOT AREA (BSA CORRECTED): 1.4
BH CV ECHO MEAS - AO ROOT AREA: 10.2 CM^2
BH CV ECHO MEAS - AO ROOT AREA: 9.3 CM^2
BH CV ECHO MEAS - AO ROOT DIAM: 3.4 CM
BH CV ECHO MEAS - AO ROOT DIAM: 3.6 CM
BH CV ECHO MEAS - AO V2 MAX: 267 CM/SEC
BH CV ECHO MEAS - AO V2 MAX: 272.4 CM/SEC
BH CV ECHO MEAS - AO V2 MAX: 323.6 CM/SEC
BH CV ECHO MEAS - AO V2 MAX: 365.9 CM/SEC
BH CV ECHO MEAS - AO V2 MEAN: 159 CM/SEC
BH CV ECHO MEAS - AO V2 MEAN: 190.1 CM/SEC
BH CV ECHO MEAS - AO V2 MEAN: 203.8 CM/SEC
BH CV ECHO MEAS - AO V2 MEAN: 268.8 CM/SEC
BH CV ECHO MEAS - AO V2 VTI: 32.7 CM
BH CV ECHO MEAS - AO V2 VTI: 55.3 CM
BH CV ECHO MEAS - AO V2 VTI: 61.6 CM
BH CV ECHO MEAS - AO V2 VTI: 74.5 CM
BH CV ECHO MEAS - AORTIC HR: 83.5 BPM
BH CV ECHO MEAS - AORTIC R-R: 0.72 SEC
BH CV ECHO MEAS - ASC AORTA: 4 CM
BH CV ECHO MEAS - AT: 0.07 SEC
BH CV ECHO MEAS - AT: 116 SEC
BH CV ECHO MEAS - AVA(I,A): 0.71 CM^2
BH CV ECHO MEAS - AVA(I,A): 1 CM^2
BH CV ECHO MEAS - AVA(I,A): 1.9 CM^2
BH CV ECHO MEAS - AVA(I,A): 3 CM^2
BH CV ECHO MEAS - AVA(I,D): 0.71 CM^2
BH CV ECHO MEAS - AVA(I,D): 1 CM^2
BH CV ECHO MEAS - AVA(I,D): 1.9 CM^2
BH CV ECHO MEAS - AVA(I,D): 3 CM^2
BH CV ECHO MEAS - AVA(V,A): 0.72 CM^2
BH CV ECHO MEAS - AVA(V,A): 0.87 CM^2
BH CV ECHO MEAS - AVA(V,A): 1.9 CM^2
BH CV ECHO MEAS - AVA(V,A): 2.5 CM^2
BH CV ECHO MEAS - AVA(V,D): 0.72 CM^2
BH CV ECHO MEAS - AVA(V,D): 0.87 CM^2
BH CV ECHO MEAS - AVA(V,D): 1.9 CM^2
BH CV ECHO MEAS - AVA(V,D): 2.5 CM^2
BH CV ECHO MEAS - BSA(HAYCOCK): 2.8 M^2
BH CV ECHO MEAS - BSA: 2.6 M^2
BH CV ECHO MEAS - BZI_BMI: 43.1 KILOGRAMS/M^2
BH CV ECHO MEAS - BZI_BMI: 43.9 KILOGRAMS/M^2
BH CV ECHO MEAS - BZI_BMI: 44.8 KILOGRAMS/M^2
BH CV ECHO MEAS - BZI_BMI: 45.3 KILOGRAMS/M^2
BH CV ECHO MEAS - BZI_METRIC_HEIGHT: 182.9 CM
BH CV ECHO MEAS - BZI_METRIC_WEIGHT: 144.2 KG
BH CV ECHO MEAS - BZI_METRIC_WEIGHT: 147 KG
BH CV ECHO MEAS - BZI_METRIC_WEIGHT: 149.7 KG
BH CV ECHO MEAS - BZI_METRIC_WEIGHT: 151.5 KG
BH CV ECHO MEAS - CI(LVOT): 3.3 L/MIN/M^2
BH CV ECHO MEAS - CO(LVOT): 8.7 L/MIN
BH CV ECHO MEAS - CONTRAST EF 4CH: 65 CM2
BH CV ECHO MEAS - EDV(CUBED): 105.6 ML
BH CV ECHO MEAS - EDV(CUBED): 158.1 ML
BH CV ECHO MEAS - EDV(MOD-SP2): 119 ML
BH CV ECHO MEAS - EDV(MOD-SP2): 130 ML
BH CV ECHO MEAS - EDV(MOD-SP2): 180 ML
BH CV ECHO MEAS - EDV(MOD-SP4): 101 ML
BH CV ECHO MEAS - EDV(MOD-SP4): 128 ML
BH CV ECHO MEAS - EDV(MOD-SP4): 140 ML
BH CV ECHO MEAS - EDV(MOD-SP4): 89 ML
BH CV ECHO MEAS - EDV(TEICH): 103.8 ML
BH CV ECHO MEAS - EDV(TEICH): 141.8 ML
BH CV ECHO MEAS - EF(CUBED): 70.3 %
BH CV ECHO MEAS - EF(CUBED): 77.7 %
BH CV ECHO MEAS - EF(MOD-BP): 53 %
BH CV ECHO MEAS - EF(MOD-BP): 55 %
BH CV ECHO MEAS - EF(MOD-BP): 60 %
BH CV ECHO MEAS - EF(MOD-BP): 64.8 %
BH CV ECHO MEAS - EF(MOD-SP2): 53.1 %
BH CV ECHO MEAS - EF(MOD-SP2): 55.5 %
BH CV ECHO MEAS - EF(MOD-SP2): 63.3 %
BH CV ECHO MEAS - EF(MOD-SP4): 56.2 %
BH CV ECHO MEAS - EF(MOD-SP4): 57.4 %
BH CV ECHO MEAS - EF(MOD-SP4): 60.3 %
BH CV ECHO MEAS - EF(MOD-SP4): 66.4 %
BH CV ECHO MEAS - EF(TEICH): 61.4 %
BH CV ECHO MEAS - EF(TEICH): 69.9 %
BH CV ECHO MEAS - ESV(CUBED): 23.5 ML
BH CV ECHO MEAS - ESV(MOD-SP2): 53 ML
BH CV ECHO MEAS - ESV(MOD-SP2): 61 ML
BH CV ECHO MEAS - ESV(MOD-SP2): 66 ML
BH CV ECHO MEAS - ESV(MOD-SP4): 39 ML
BH CV ECHO MEAS - ESV(MOD-SP4): 43 ML
BH CV ECHO MEAS - ESV(MOD-SP4): 47 ML
BH CV ECHO MEAS - ESV(MOD-SP4): 50.8 ML
BH CV ECHO MEAS - ESV(TEICH): 31.3 ML
BH CV ECHO MEAS - ESV(TEICH): 54.8 ML
BH CV ECHO MEAS - FS: 33.3 %
BH CV ECHO MEAS - FS: 39.4 %
BH CV ECHO MEAS - IVS/LVPW: 0.94
BH CV ECHO MEAS - IVS/LVPW: 1
BH CV ECHO MEAS - IVSD: 1.1 CM
BH CV ECHO MEAS - IVSD: 1.4 CM
BH CV ECHO MEAS - LAT PEAK E' VEL: 11.3 CM/SEC
BH CV ECHO MEAS - LAT PEAK E' VEL: 14.4 CM/SEC
BH CV ECHO MEAS - LV DIASTOLIC VOL/BSA (35-75): 33.6 ML/M^2
BH CV ECHO MEAS - LV DIASTOLIC VOL/BSA (35-75): 38.6 ML/M^2
BH CV ECHO MEAS - LV DIASTOLIC VOL/BSA (35-75): 48.5 ML/M^2
BH CV ECHO MEAS - LV DIASTOLIC VOL/BSA (35-75): 54 ML/M^2
BH CV ECHO MEAS - LV MASS(C)D: 238.6 GRAMS
BH CV ECHO MEAS - LV MASS(C)D: 267.5 GRAMS
BH CV ECHO MEAS - LV MASS(C)DI: 101 GRAMS/M^2
BH CV ECHO MEAS - LV MASS(C)DI: 91.3 GRAMS/M^2
BH CV ECHO MEAS - LV MAX PG: 1.9 MMHG
BH CV ECHO MEAS - LV MAX PG: 3.2 MMHG
BH CV ECHO MEAS - LV MAX PG: 6.5 MMHG
BH CV ECHO MEAS - LV MAX PG: 8.4 MMHG
BH CV ECHO MEAS - LV MEAN PG: 1.1 MMHG
BH CV ECHO MEAS - LV MEAN PG: 1.8 MMHG
BH CV ECHO MEAS - LV MEAN PG: 3.9 MMHG
BH CV ECHO MEAS - LV MEAN PG: 5 MMHG
BH CV ECHO MEAS - LV SYSTOLIC VOL/BSA (12-30): 14.7 ML/M^2
BH CV ECHO MEAS - LV SYSTOLIC VOL/BSA (12-30): 16.4 ML/M^2
BH CV ECHO MEAS - LV SYSTOLIC VOL/BSA (12-30): 18.1 ML/M^2
BH CV ECHO MEAS - LV SYSTOLIC VOL/BSA (12-30): 19.3 ML/M^2
BH CV ECHO MEAS - LV V1 MAX: 127.6 CM/SEC
BH CV ECHO MEAS - LV V1 MAX: 145 CM/SEC
BH CV ECHO MEAS - LV V1 MAX: 68.9 CM/SEC
BH CV ECHO MEAS - LV V1 MAX: 89.7 CM/SEC
BH CV ECHO MEAS - LV V1 MEAN: 110 CM/SEC
BH CV ECHO MEAS - LV V1 MEAN: 48.3 CM/SEC
BH CV ECHO MEAS - LV V1 MEAN: 61.8 CM/SEC
BH CV ECHO MEAS - LV V1 MEAN: 90.2 CM/SEC
BH CV ECHO MEAS - LV V1 VTI: 13.9 CM
BH CV ECHO MEAS - LV V1 VTI: 20.3 CM
BH CV ECHO MEAS - LV V1 VTI: 21.8 CM
BH CV ECHO MEAS - LV V1 VTI: 25.1 CM
BH CV ECHO MEAS - LVIDD: 4.7 CM
BH CV ECHO MEAS - LVIDD: 5.4 CM
BH CV ECHO MEAS - LVIDS: 2.9 CM
BH CV ECHO MEAS - LVIDS: 3.6 CM
BH CV ECHO MEAS - LVLD AP2: 7.8 CM
BH CV ECHO MEAS - LVLD AP2: 8.6 CM
BH CV ECHO MEAS - LVLD AP2: 8.9 CM
BH CV ECHO MEAS - LVLD AP4: 8.2 CM
BH CV ECHO MEAS - LVLD AP4: 8.2 CM
BH CV ECHO MEAS - LVLD AP4: 8.4 CM
BH CV ECHO MEAS - LVLS AP2: 6.9 CM
BH CV ECHO MEAS - LVLS AP2: 7.6 CM
BH CV ECHO MEAS - LVLS AP2: 7.9 CM
BH CV ECHO MEAS - LVLS AP4: 6.8 CM
BH CV ECHO MEAS - LVLS AP4: 7.1 CM
BH CV ECHO MEAS - LVLS AP4: 7.5 CM
BH CV ECHO MEAS - LVOT AREA (M): 3.1 CM^2
BH CV ECHO MEAS - LVOT AREA (M): 3.8 CM^2
BH CV ECHO MEAS - LVOT AREA (M): 4.5 CM^2
BH CV ECHO MEAS - LVOT AREA: 3.1 CM^2
BH CV ECHO MEAS - LVOT AREA: 3.8 CM^2
BH CV ECHO MEAS - LVOT AREA: 4.1 CM^2
BH CV ECHO MEAS - LVOT AREA: 4.5 CM^2
BH CV ECHO MEAS - LVOT DIAM: 2 CM
BH CV ECHO MEAS - LVOT DIAM: 2.2 CM
BH CV ECHO MEAS - LVOT DIAM: 2.3 CM
BH CV ECHO MEAS - LVOT DIAM: 2.4 CM
BH CV ECHO MEAS - LVPWD: 1.1 CM
BH CV ECHO MEAS - LVPWD: 1.4 CM
BH CV ECHO MEAS - MED PEAK E' VEL: 10.8 CM/SEC
BH CV ECHO MEAS - MED PEAK E' VEL: 7.1 CM/SEC
BH CV ECHO MEAS - MR MAX PG: 135.3 MMHG
BH CV ECHO MEAS - MR MAX PG: 140.1 MMHG
BH CV ECHO MEAS - MR MAX PG: 143.7 MMHG
BH CV ECHO MEAS - MR MAX VEL: 581.5 CM/SEC
BH CV ECHO MEAS - MR MAX VEL: 591.8 CM/SEC
BH CV ECHO MEAS - MR MAX VEL: 599.4 CM/SEC
BH CV ECHO MEAS - MV DEC SLOPE: 620.2 CM/SEC^2
BH CV ECHO MEAS - MV DEC SLOPE: 791.3 CM/SEC^2
BH CV ECHO MEAS - MV DEC TIME: 0.16 SEC
BH CV ECHO MEAS - MV DEC TIME: 182 SEC
BH CV ECHO MEAS - MV E MAX VEL: 134.4 CM/SEC
BH CV ECHO MEAS - MV E MAX VEL: 164 CM/SEC
BH CV ECHO MEAS - MV MAX PG: 11.6 MMHG
BH CV ECHO MEAS - MV MAX PG: 13.8 MMHG
BH CV ECHO MEAS - MV MEAN PG: 4.9 MMHG
BH CV ECHO MEAS - MV MEAN PG: 5.2 MMHG
BH CV ECHO MEAS - MV P1/2T MAX VEL: 180.4 CM/SEC
BH CV ECHO MEAS - MV P1/2T MAX VEL: 188.8 CM/SEC
BH CV ECHO MEAS - MV P1/2T: 69.9 MSEC
BH CV ECHO MEAS - MV P1/2T: 85.2 MSEC
BH CV ECHO MEAS - MV V2 MAX: 170.2 CM/SEC
BH CV ECHO MEAS - MV V2 MAX: 185.4 CM/SEC
BH CV ECHO MEAS - MV V2 MEAN: 100.3 CM/SEC
BH CV ECHO MEAS - MV V2 MEAN: 103 CM/SEC
BH CV ECHO MEAS - MV V2 VTI: 34.2 CM
BH CV ECHO MEAS - MV V2 VTI: 35 CM
BH CV ECHO MEAS - MVA P1/2T LCG: 1.2 CM^2
BH CV ECHO MEAS - MVA P1/2T LCG: 1.2 CM^2
BH CV ECHO MEAS - MVA(P1/2T): 2.6 CM^2
BH CV ECHO MEAS - MVA(P1/2T): 3.1 CM^2
BH CV ECHO MEAS - MVA(VTI): 1.6 CM^2
BH CV ECHO MEAS - MVA(VTI): 1.8 CM^2
BH CV ECHO MEAS - PA ACC TIME: 0.05 SEC
BH CV ECHO MEAS - PA ACC TIME: 0.12 SEC
BH CV ECHO MEAS - PA MAX PG (FULL): 1.2 MMHG
BH CV ECHO MEAS - PA MAX PG (FULL): 1.5 MMHG
BH CV ECHO MEAS - PA MAX PG: 2.6 MMHG
BH CV ECHO MEAS - PA MAX PG: 4.4 MMHG
BH CV ECHO MEAS - PA PR(ACCEL): 26.7 MMHG
BH CV ECHO MEAS - PA PR(ACCEL): 57.6 MMHG
BH CV ECHO MEAS - PA V2 MAX: 105.3 CM/SEC
BH CV ECHO MEAS - PA V2 MAX: 80 CM/SEC
BH CV ECHO MEAS - PULM DIAS VEL: 42.5 CM/SEC
BH CV ECHO MEAS - PULM DIAS VEL: 55.7 CM/SEC
BH CV ECHO MEAS - PULM S/D: 0.78
BH CV ECHO MEAS - PULM S/D: 0.79
BH CV ECHO MEAS - PULM SYS VEL: 33.5 CM/SEC
BH CV ECHO MEAS - PULM SYS VEL: 43.2 CM/SEC
BH CV ECHO MEAS - PVA(V,A): 3.1 CM^2
BH CV ECHO MEAS - PVA(V,A): 7.2 CM^2
BH CV ECHO MEAS - PVA(V,D): 3.1 CM^2
BH CV ECHO MEAS - PVA(V,D): 7.2 CM^2
BH CV ECHO MEAS - QP/QS: 0.8
BH CV ECHO MEAS - QP/QS: 2.7
BH CV ECHO MEAS - RAP SYSTOLE: 15 MMHG
BH CV ECHO MEAS - RAP SYSTOLE: 15 MMHG
BH CV ECHO MEAS - RAP SYSTOLE: 8 MMHG
BH CV ECHO MEAS - RV MAX PG: 1.1 MMHG
BH CV ECHO MEAS - RV MAX PG: 3.2 MMHG
BH CV ECHO MEAS - RV MEAN PG: 0.54 MMHG
BH CV ECHO MEAS - RV MEAN PG: 1.9 MMHG
BH CV ECHO MEAS - RV V1 MAX: 51.4 CM/SEC
BH CV ECHO MEAS - RV V1 MAX: 89.7 CM/SEC
BH CV ECHO MEAS - RV V1 MEAN: 33.8 CM/SEC
BH CV ECHO MEAS - RV V1 MEAN: 64.9 CM/SEC
BH CV ECHO MEAS - RV V1 VTI: 10.6 CM
BH CV ECHO MEAS - RV V1 VTI: 17.3 CM
BH CV ECHO MEAS - RVOT AREA: 4.8 CM^2
BH CV ECHO MEAS - RVOT AREA: 8.4 CM^2
BH CV ECHO MEAS - RVOT DIAM: 2.5 CM
BH CV ECHO MEAS - RVOT DIAM: 3.3 CM
BH CV ECHO MEAS - RVSP: 45.8 MMHG
BH CV ECHO MEAS - RVSP: 55 MMHG
BH CV ECHO MEAS - RVSP: 68 MMHG
BH CV ECHO MEAS - SI(AO): 241.1 ML/M^2
BH CV ECHO MEAS - SI(AO): 262.9 ML/M^2
BH CV ECHO MEAS - SI(CUBED): 31 ML/M^2
BH CV ECHO MEAS - SI(CUBED): 42.5 ML/M^2
BH CV ECHO MEAS - SI(LVOT): 20 ML/M^2
BH CV ECHO MEAS - SI(LVOT): 24.3 ML/M^2
BH CV ECHO MEAS - SI(LVOT): 38 ML/M^2
BH CV ECHO MEAS - SI(LVOT): 39.4 ML/M^2
BH CV ECHO MEAS - SI(MOD-SP2): 24.9 ML/M^2
BH CV ECHO MEAS - SI(MOD-SP2): 26.4 ML/M^2
BH CV ECHO MEAS - SI(MOD-SP2): 43.9 ML/M^2
BH CV ECHO MEAS - SI(MOD-SP4): 18.9 ML/M^2
BH CV ECHO MEAS - SI(MOD-SP4): 22.2 ML/M^2
BH CV ECHO MEAS - SI(MOD-SP4): 29.3 ML/M^2
BH CV ECHO MEAS - SI(MOD-SP4): 35.8 ML/M^2
BH CV ECHO MEAS - SI(TEICH): 27.4 ML/M^2
BH CV ECHO MEAS - SI(TEICH): 33.3 ML/M^2
BH CV ECHO MEAS - SV(AO): 630.6 ML
BH CV ECHO MEAS - SV(AO): 696.4 ML
BH CV ECHO MEAS - SV(CUBED): 111.1 ML
BH CV ECHO MEAS - SV(CUBED): 82.1 ML
BH CV ECHO MEAS - SV(LVOT): 104 ML
BH CV ECHO MEAS - SV(LVOT): 53 ML
BH CV ECHO MEAS - SV(LVOT): 63.5 ML
BH CV ECHO MEAS - SV(LVOT): 98.6 ML
BH CV ECHO MEAS - SV(MOD-SP2): 114 ML
BH CV ECHO MEAS - SV(MOD-SP2): 66 ML
BH CV ECHO MEAS - SV(MOD-SP2): 69 ML
BH CV ECHO MEAS - SV(MOD-SP4): 50 ML
BH CV ECHO MEAS - SV(MOD-SP4): 58 ML
BH CV ECHO MEAS - SV(MOD-SP4): 77.1 ML
BH CV ECHO MEAS - SV(MOD-SP4): 93 ML
BH CV ECHO MEAS - SV(RVOT): 145.3 ML
BH CV ECHO MEAS - SV(RVOT): 50.8 ML
BH CV ECHO MEAS - SV(TEICH): 72.5 ML
BH CV ECHO MEAS - SV(TEICH): 87 ML
BH CV ECHO MEAS - TAPSE (>1.6): 2.1 CM
BH CV ECHO MEAS - TAPSE (>1.6): 2.7 CM
BH CV ECHO MEAS - TR MAX PG: 40 MMHG
BH CV ECHO MEAS - TR MAX VEL: 304.4 CM/SEC
BH CV ECHO MEAS - TR MAX VEL: 317.1 CM/SEC
BH CV ECHO MEAS - TR MAX VEL: 364.9 CM/SEC
BH CV ECHO MEASUREMENTS AVERAGE E/E' RATIO: 10.67
BH CV ECHO MEASUREMENTS AVERAGE E/E' RATIO: 17.83
BH CV VAS BP RIGHT ARM: NORMAL MMHG
BH CV XLRA - RV BASE: 4.3 CM
BH CV XLRA - RV BASE: 4.9 CM
BH CV XLRA - RV LENGTH: 6.9 CM
BH CV XLRA - RV LENGTH: 8.4 CM
BH CV XLRA - RV MID: 4.4 CM
BH CV XLRA - RV MID: 4.4 CM
BH CV XLRA - TDI S': 12.7 CM/SEC
BH CV XLRA - TDI S': 12.9 CM/SEC
BILIRUB SERPL-MCNC: 0.3 MG/DL (ref 0–1.2)
BILIRUB SERPL-MCNC: 0.4 MG/DL (ref 0–1.2)
BILIRUB SERPL-MCNC: 0.4 MG/DL (ref 0–1.2)
BILIRUB UR QL STRIP: NEGATIVE
BLD GP AB SCN SERPL QL: NEGATIVE
BLD GP AB SCN SERPL QL: NEGATIVE
BUN SERPL-MCNC: 14 MG/DL (ref 8–23)
BUN SERPL-MCNC: 15 MG/DL (ref 8–23)
BUN SERPL-MCNC: 16 MG/DL (ref 8–23)
BUN SERPL-MCNC: 16 MG/DL (ref 8–23)
BUN SERPL-MCNC: 18 MG/DL (ref 8–23)
BUN SERPL-MCNC: 18 MG/DL (ref 8–23)
BUN SERPL-MCNC: 20 MG/DL (ref 8–23)
BUN SERPL-MCNC: 21 MG/DL (ref 8–23)
BUN SERPL-MCNC: 22 MG/DL (ref 8–23)
BUN SERPL-MCNC: 22 MG/DL (ref 8–23)
BUN SERPL-MCNC: 25 MG/DL (ref 8–23)
BUN SERPL-MCNC: 27 MG/DL (ref 8–23)
BUN SERPL-MCNC: 30 MG/DL (ref 8–23)
BUN/CREAT SERPL: 10.1 (ref 7–25)
BUN/CREAT SERPL: 12 (ref 7–25)
BUN/CREAT SERPL: 12.1 (ref 7–25)
BUN/CREAT SERPL: 13.2 (ref 7–25)
BUN/CREAT SERPL: 13.4 (ref 7–25)
BUN/CREAT SERPL: 13.6 (ref 7–25)
BUN/CREAT SERPL: 13.8 (ref 7–25)
BUN/CREAT SERPL: 14.4 (ref 7–25)
BUN/CREAT SERPL: 14.4 (ref 7–25)
BUN/CREAT SERPL: 15.6 (ref 7–25)
BUN/CREAT SERPL: 16.3 (ref 7–25)
BUN/CREAT SERPL: 16.9 (ref 7–25)
BUN/CREAT SERPL: 16.9 (ref 7–25)
BUN/CREAT SERPL: 17.6 (ref 7–25)
BUN/CREAT SERPL: 18 (ref 7–25)
BUN/CREAT SERPL: 18.2 (ref 7–25)
BUN/CREAT SERPL: 18.3 (ref 7–25)
BUN/CREAT SERPL: 19.9 (ref 7–25)
C PNEUM DNA NPH QL NAA+NON-PROBE: NOT DETECTED
CA-I BLDA-SCNC: ABNORMAL MMOL/L
CA-I BLDA-SCNC: ABNORMAL MMOL/L
CALCIUM SPEC-SCNC: 8.6 MG/DL (ref 8.6–10.5)
CALCIUM SPEC-SCNC: 8.7 MG/DL (ref 8.6–10.5)
CALCIUM SPEC-SCNC: 8.7 MG/DL (ref 8.6–10.5)
CALCIUM SPEC-SCNC: 8.8 MG/DL (ref 8.6–10.5)
CALCIUM SPEC-SCNC: 8.9 MG/DL (ref 8.6–10.5)
CALCIUM SPEC-SCNC: 8.9 MG/DL (ref 8.6–10.5)
CALCIUM SPEC-SCNC: 9 MG/DL (ref 8.6–10.5)
CALCIUM SPEC-SCNC: 9.1 MG/DL (ref 8.6–10.5)
CALCIUM SPEC-SCNC: 9.1 MG/DL (ref 8.6–10.5)
CALCIUM SPEC-SCNC: 9.2 MG/DL (ref 8.6–10.5)
CALCIUM SPEC-SCNC: 9.3 MG/DL (ref 8.6–10.5)
CALCIUM SPEC-SCNC: 9.4 MG/DL (ref 8.6–10.5)
CALCIUM SPEC-SCNC: 9.4 MG/DL (ref 8.6–10.5)
CALCIUM SPEC-SCNC: 9.5 MG/DL (ref 8.6–10.5)
CALCIUM SPEC-SCNC: 9.5 MG/DL (ref 8.6–10.5)
CHLORIDE SERPL-SCNC: 100 MMOL/L (ref 98–107)
CHLORIDE SERPL-SCNC: 102 MMOL/L (ref 98–107)
CHLORIDE SERPL-SCNC: 103 MMOL/L (ref 98–107)
CHLORIDE SERPL-SCNC: 103 MMOL/L (ref 98–107)
CHLORIDE SERPL-SCNC: 104 MMOL/L (ref 98–107)
CHLORIDE SERPL-SCNC: 97 MMOL/L (ref 98–107)
CHLORIDE SERPL-SCNC: 98 MMOL/L (ref 98–107)
CHLORIDE SERPL-SCNC: 98 MMOL/L (ref 98–107)
CHLORIDE SERPL-SCNC: 99 MMOL/L (ref 98–107)
CHOLEST SERPL-MCNC: 106 MG/DL (ref 0–200)
CHOLEST SERPL-MCNC: 86 MG/DL (ref 0–200)
CK SERPL-CCNC: 78 U/L (ref 20–200)
CLARITY UR: CLEAR
CO2 BLDA-SCNC: 29.7 MMOL/L (ref 22–29)
CO2 BLDA-SCNC: 34 MMOL/L (ref 24–29)
CO2 BLDA-SCNC: 34 MMOL/L (ref 24–29)
CO2 SERPL-SCNC: 23 MMOL/L (ref 22–29)
CO2 SERPL-SCNC: 25 MMOL/L (ref 22–29)
CO2 SERPL-SCNC: 26 MMOL/L (ref 22–29)
CO2 SERPL-SCNC: 27 MMOL/L (ref 22–29)
CO2 SERPL-SCNC: 27 MMOL/L (ref 22–29)
CO2 SERPL-SCNC: 27.5 MMOL/L (ref 22–29)
CO2 SERPL-SCNC: 28 MMOL/L (ref 22–29)
CO2 SERPL-SCNC: 28 MMOL/L (ref 22–29)
CO2 SERPL-SCNC: 29.9 MMOL/L (ref 22–29)
CO2 SERPL-SCNC: 30.6 MMOL/L (ref 22–29)
CO2 SERPL-SCNC: 31.7 MMOL/L (ref 22–29)
CO2 SERPL-SCNC: 31.7 MMOL/L (ref 22–29)
CO2 SERPL-SCNC: 32 MMOL/L (ref 22–29)
CO2 SERPL-SCNC: 32.7 MMOL/L (ref 22–29)
CO2 SERPL-SCNC: 32.9 MMOL/L (ref 22–29)
COLOR UR: YELLOW
CREAT SERPL-MCNC: 1.19 MG/DL (ref 0.76–1.27)
CREAT SERPL-MCNC: 1.19 MG/DL (ref 0.76–1.27)
CREAT SERPL-MCNC: 1.2 MG/DL (ref 0.76–1.27)
CREAT SERPL-MCNC: 1.23 MG/DL (ref 0.76–1.27)
CREAT SERPL-MCNC: 1.24 MG/DL (ref 0.76–1.27)
CREAT SERPL-MCNC: 1.25 MG/DL (ref 0.76–1.27)
CREAT SERPL-MCNC: 1.3 MG/DL (ref 0.76–1.27)
CREAT SERPL-MCNC: 1.3 MG/DL (ref 0.76–1.27)
CREAT SERPL-MCNC: 1.32 MG/DL (ref 0.76–1.27)
CREAT SERPL-MCNC: 1.35 MG/DL (ref 0.76–1.27)
CREAT SERPL-MCNC: 1.36 MG/DL (ref 0.76–1.27)
CREAT SERPL-MCNC: 1.36 MG/DL (ref 0.76–1.27)
CREAT SERPL-MCNC: 1.37 MG/DL (ref 0.76–1.27)
CREAT SERPL-MCNC: 1.38 MG/DL (ref 0.76–1.27)
CREAT SERPL-MCNC: 1.39 MG/DL (ref 0.76–1.27)
CREAT SERPL-MCNC: 1.39 MG/DL (ref 0.76–1.27)
CREAT SERPL-MCNC: 1.54 MG/DL (ref 0.76–1.27)
CREAT SERPL-MCNC: 1.54 MG/DL (ref 0.76–1.27)
CREAT SERPL-MCNC: 1.67 MG/DL (ref 0.76–1.27)
CROSSMATCH INTERPRETATION: NORMAL
DEPRECATED RDW RBC AUTO: 46.2 FL (ref 37–54)
DEPRECATED RDW RBC AUTO: 46.6 FL (ref 37–54)
DEPRECATED RDW RBC AUTO: 46.6 FL (ref 37–54)
DEPRECATED RDW RBC AUTO: 47.9 FL (ref 37–54)
DEPRECATED RDW RBC AUTO: 48.3 FL (ref 37–54)
DEPRECATED RDW RBC AUTO: 48.5 FL (ref 37–54)
DEPRECATED RDW RBC AUTO: 49.1 FL (ref 37–54)
DEPRECATED RDW RBC AUTO: 50 FL (ref 37–54)
DEPRECATED RDW RBC AUTO: 55.2 FL (ref 37–54)
DEPRECATED RDW RBC AUTO: 55.8 FL (ref 37–54)
DEPRECATED RDW RBC AUTO: 66.9 FL (ref 37–54)
DEPRECATED RDW RBC AUTO: 68.7 FL (ref 37–54)
DEPRECATED RDW RBC AUTO: 70 FL (ref 37–54)
DEPRECATED RDW RBC AUTO: 70.4 FL (ref 37–54)
DEPRECATED RDW RBC AUTO: 71.8 FL (ref 37–54)
EOSINOPHIL # BLD AUTO: 0.2 10*3/MM3 (ref 0–0.4)
EOSINOPHIL # BLD AUTO: 0.38 10*3/MM3 (ref 0–0.4)
EOSINOPHIL # BLD AUTO: 0.4 10*3/MM3 (ref 0–0.4)
EOSINOPHIL # BLD AUTO: 0.47 10*3/MM3 (ref 0–0.4)
EOSINOPHIL # BLD AUTO: 0.48 10*3/MM3 (ref 0–0.4)
EOSINOPHIL # BLD AUTO: 0.5 10*3/MM3 (ref 0–0.4)
EOSINOPHIL # BLD AUTO: 0.52 10*3/MM3 (ref 0–0.4)
EOSINOPHIL # BLD AUTO: 0.56 10*3/MM3 (ref 0–0.4)
EOSINOPHIL # BLD AUTO: 0.56 10*3/MM3 (ref 0–0.4)
EOSINOPHIL NFR BLD AUTO: 3.1 % (ref 0.3–6.2)
EOSINOPHIL NFR BLD AUTO: 4.9 % (ref 0.3–6.2)
EOSINOPHIL NFR BLD AUTO: 6.6 % (ref 0.3–6.2)
EOSINOPHIL NFR BLD AUTO: 6.7 % (ref 0.3–6.2)
EOSINOPHIL NFR BLD AUTO: 7.2 % (ref 0.3–6.2)
EOSINOPHIL NFR BLD AUTO: 7.2 % (ref 0.3–6.2)
EOSINOPHIL NFR BLD AUTO: 7.4 % (ref 0.3–6.2)
EOSINOPHIL NFR BLD AUTO: 7.6 % (ref 0.3–6.2)
EOSINOPHIL NFR BLD AUTO: 8.1 % (ref 0.3–6.2)
EOSINOPHIL NFR BLD AUTO: 8.1 % (ref 0.3–6.2)
EOSINOPHIL NFR BLD AUTO: 8.5 % (ref 0.3–6.2)
ERYTHROCYTE [DISTWIDTH] IN BLOOD BY AUTOMATED COUNT: 13.8 % (ref 12.3–15.4)
ERYTHROCYTE [DISTWIDTH] IN BLOOD BY AUTOMATED COUNT: 15.7 % (ref 12.3–15.4)
ERYTHROCYTE [DISTWIDTH] IN BLOOD BY AUTOMATED COUNT: 15.7 % (ref 12.3–15.4)
ERYTHROCYTE [DISTWIDTH] IN BLOOD BY AUTOMATED COUNT: 15.8 % (ref 12.3–15.4)
ERYTHROCYTE [DISTWIDTH] IN BLOOD BY AUTOMATED COUNT: 15.9 % (ref 12.3–15.4)
ERYTHROCYTE [DISTWIDTH] IN BLOOD BY AUTOMATED COUNT: 15.9 % (ref 12.3–15.4)
ERYTHROCYTE [DISTWIDTH] IN BLOOD BY AUTOMATED COUNT: 16 % (ref 12.3–15.4)
ERYTHROCYTE [DISTWIDTH] IN BLOOD BY AUTOMATED COUNT: 16.4 % (ref 12.3–15.4)
ERYTHROCYTE [DISTWIDTH] IN BLOOD BY AUTOMATED COUNT: 18.6 % (ref 12.3–15.4)
ERYTHROCYTE [DISTWIDTH] IN BLOOD BY AUTOMATED COUNT: 19.2 % (ref 12.3–15.4)
ERYTHROCYTE [DISTWIDTH] IN BLOOD BY AUTOMATED COUNT: 23.5 % (ref 12.3–15.4)
ERYTHROCYTE [DISTWIDTH] IN BLOOD BY AUTOMATED COUNT: 23.6 % (ref 12.3–15.4)
ERYTHROCYTE [DISTWIDTH] IN BLOOD BY AUTOMATED COUNT: 23.9 % (ref 12.3–15.4)
ERYTHROCYTE [DISTWIDTH] IN BLOOD BY AUTOMATED COUNT: 24 % (ref 12.3–15.4)
ERYTHROCYTE [DISTWIDTH] IN BLOOD BY AUTOMATED COUNT: 24.7 % (ref 12.3–15.4)
FERRITIN SERPL-MCNC: 22 NG/ML (ref 30–400)
FLUAV SUBTYP SPEC NAA+PROBE: NOT DETECTED
FLUBV RNA ISLT QL NAA+PROBE: NOT DETECTED
FOLATE SERPL-MCNC: 13 NG/ML (ref 4.78–24.2)
GAMMA GLOB SERPL ELPH-MCNC: 1 G/DL (ref 0.4–1.8)
GFR SERPL CREATININE-BSD FRML MDRD: 40 ML/MIN/1.73
GFR SERPL CREATININE-BSD FRML MDRD: 44 ML/MIN/1.73
GFR SERPL CREATININE-BSD FRML MDRD: 44 ML/MIN/1.73
GFR SERPL CREATININE-BSD FRML MDRD: 50 ML/MIN/1.73
GFR SERPL CREATININE-BSD FRML MDRD: 51 ML/MIN/1.73
GFR SERPL CREATININE-BSD FRML MDRD: 52 ML/MIN/1.73
GFR SERPL CREATININE-BSD FRML MDRD: 53 ML/MIN/1.73
GFR SERPL CREATININE-BSD FRML MDRD: 54 ML/MIN/1.73
GFR SERPL CREATININE-BSD FRML MDRD: 54 ML/MIN/1.73
GFR SERPL CREATININE-BSD FRML MDRD: 56 ML/MIN/1.73
GFR SERPL CREATININE-BSD FRML MDRD: 57 ML/MIN/1.73
GFR SERPL CREATININE-BSD FRML MDRD: 58 ML/MIN/1.73
GFR SERPL CREATININE-BSD FRML MDRD: 59 ML/MIN/1.73
GFR SERPL CREATININE-BSD FRML MDRD: 60 ML/MIN/1.73
GFR SERPL CREATININE-BSD FRML MDRD: 60 ML/MIN/1.73
GLOBULIN SER CALC-MCNC: 3 G/DL (ref 2.2–3.9)
GLOBULIN UR ELPH-MCNC: 2.5 GM/DL
GLOBULIN UR ELPH-MCNC: 3.5 GM/DL
GLOBULIN UR ELPH-MCNC: 3.5 GM/DL
GLUCOSE BLDC GLUCOMTR-MCNC: 114 MG/DL (ref 70–130)
GLUCOSE BLDC GLUCOMTR-MCNC: 115 MG/DL (ref 70–130)
GLUCOSE BLDC GLUCOMTR-MCNC: 117 MG/DL (ref 70–130)
GLUCOSE BLDC GLUCOMTR-MCNC: 88 MG/DL (ref 70–105)
GLUCOSE SERPL-MCNC: 102 MG/DL (ref 65–99)
GLUCOSE SERPL-MCNC: 102 MG/DL (ref 65–99)
GLUCOSE SERPL-MCNC: 103 MG/DL (ref 65–99)
GLUCOSE SERPL-MCNC: 105 MG/DL (ref 65–99)
GLUCOSE SERPL-MCNC: 106 MG/DL (ref 65–99)
GLUCOSE SERPL-MCNC: 107 MG/DL (ref 65–99)
GLUCOSE SERPL-MCNC: 107 MG/DL (ref 65–99)
GLUCOSE SERPL-MCNC: 113 MG/DL (ref 65–99)
GLUCOSE SERPL-MCNC: 120 MG/DL (ref 65–99)
GLUCOSE SERPL-MCNC: 126 MG/DL (ref 65–99)
GLUCOSE SERPL-MCNC: 132 MG/DL (ref 65–99)
GLUCOSE SERPL-MCNC: 159 MG/DL (ref 65–99)
GLUCOSE SERPL-MCNC: 83 MG/DL (ref 65–99)
GLUCOSE SERPL-MCNC: 94 MG/DL (ref 65–99)
GLUCOSE SERPL-MCNC: 96 MG/DL (ref 65–99)
GLUCOSE SERPL-MCNC: 98 MG/DL (ref 65–99)
GLUCOSE SERPL-MCNC: 99 MG/DL (ref 65–99)
GLUCOSE SERPL-MCNC: 99 MG/DL (ref 65–99)
GLUCOSE UR STRIP-MCNC: ABNORMAL MG/DL
GLUCOSE UR STRIP-MCNC: NEGATIVE MG/DL
GLUCOSE UR STRIP-MCNC: NEGATIVE MG/DL
HADV DNA SPEC NAA+PROBE: NOT DETECTED
HBA1C MFR BLD: 5.9 % (ref 3.5–5.6)
HBA1C MFR BLD: 5.9 % (ref 4.8–5.6)
HCO3 BLDA-SCNC: 28.2 MMOL/L (ref 21–28)
HCO3 BLDA-SCNC: 32.4 MMOL/L (ref 22–26)
HCO3 BLDA-SCNC: 32.4 MMOL/L (ref 22–26)
HCOV 229E RNA SPEC QL NAA+PROBE: NOT DETECTED
HCOV HKU1 RNA SPEC QL NAA+PROBE: NOT DETECTED
HCOV NL63 RNA SPEC QL NAA+PROBE: NOT DETECTED
HCOV OC43 RNA SPEC QL NAA+PROBE: NOT DETECTED
HCT VFR BLD AUTO: 27.5 % (ref 37.5–51)
HCT VFR BLD AUTO: 28.9 % (ref 37.5–51)
HCT VFR BLD AUTO: 29.5 % (ref 37.5–51)
HCT VFR BLD AUTO: 29.6 % (ref 37.5–51)
HCT VFR BLD AUTO: 31 % (ref 37.5–51)
HCT VFR BLD AUTO: 31.2 % (ref 37.5–51)
HCT VFR BLD AUTO: 31.8 % (ref 37.5–51)
HCT VFR BLD AUTO: 32.2 % (ref 37.5–51)
HCT VFR BLD AUTO: 32.5 % (ref 37.5–51)
HCT VFR BLD AUTO: 32.7 % (ref 37.5–51)
HCT VFR BLD AUTO: 32.8 % (ref 37.5–51)
HCT VFR BLD AUTO: 33 % (ref 37.5–51)
HCT VFR BLD AUTO: 34.8 % (ref 37.5–51)
HCT VFR BLD AUTO: 37.4 % (ref 37.5–51)
HCT VFR BLD AUTO: 40.1 % (ref 37.5–51)
HCT VFR BLDA CALC: 28 % (ref 38–51)
HCT VFR BLDA CALC: 28 % (ref 38–51)
HCT VFR BLDA CALC: 33 % (ref 38–51)
HCT VFR BLDA CALC: 34 % (ref 38–51)
HDLC SERPL-MCNC: 26 MG/DL (ref 40–60)
HDLC SERPL-MCNC: 44 MG/DL (ref 40–60)
HEMOCCULT STL QL: NEGATIVE
HEMODILUTION: NO
HGB BLD-MCNC: 10.1 G/DL (ref 13–17.7)
HGB BLD-MCNC: 10.1 G/DL (ref 13–17.7)
HGB BLD-MCNC: 10.2 G/DL (ref 13–17.7)
HGB BLD-MCNC: 10.4 G/DL (ref 13–17.7)
HGB BLD-MCNC: 11.4 G/DL (ref 13–17.7)
HGB BLD-MCNC: 12.5 G/DL (ref 13–17.7)
HGB BLD-MCNC: 7.8 G/DL (ref 13–17.7)
HGB BLD-MCNC: 8.2 G/DL (ref 13–17.7)
HGB BLD-MCNC: 8.5 G/DL (ref 13–17.7)
HGB BLD-MCNC: 8.5 G/DL (ref 13–17.7)
HGB BLD-MCNC: 8.7 G/DL (ref 13–17.7)
HGB BLD-MCNC: 8.7 G/DL (ref 13–17.7)
HGB BLD-MCNC: 8.9 G/DL (ref 13–17.7)
HGB BLD-MCNC: 9.6 G/DL (ref 13–17.7)
HGB BLD-MCNC: 9.8 G/DL (ref 13–17.7)
HGB BLDA-MCNC: 11.2 G/DL (ref 12–17)
HGB BLDA-MCNC: 11.6 G/DL (ref 12–17)
HGB BLDA-MCNC: 9.5 G/DL (ref 12–17)
HGB BLDA-MCNC: 9.5 G/DL (ref 12–17)
HGB UR QL STRIP.AUTO: ABNORMAL
HGB UR QL STRIP.AUTO: NEGATIVE
HGB UR QL STRIP.AUTO: NEGATIVE
HMPV RNA NPH QL NAA+NON-PROBE: NOT DETECTED
HOLD SPECIMEN: NORMAL
HPIV1 RNA SPEC QL NAA+PROBE: NOT DETECTED
HPIV2 RNA SPEC QL NAA+PROBE: NOT DETECTED
HPIV3 RNA NPH QL NAA+PROBE: NOT DETECTED
HPIV4 P GENE NPH QL NAA+PROBE: NOT DETECTED
HYALINE CASTS UR QL AUTO: ABNORMAL /LPF
IMM GRANULOCYTES # BLD AUTO: 0.02 10*3/MM3 (ref 0–0.05)
IMM GRANULOCYTES # BLD AUTO: 0.02 10*3/MM3 (ref 0–0.05)
IMM GRANULOCYTES # BLD AUTO: 0.03 10*3/MM3 (ref 0–0.05)
IMM GRANULOCYTES # BLD AUTO: 0.04 10*3/MM3 (ref 0–0.05)
IMM GRANULOCYTES # BLD AUTO: 0.04 10*3/MM3 (ref 0–0.05)
IMM GRANULOCYTES NFR BLD AUTO: 0.3 % (ref 0–0.5)
IMM GRANULOCYTES NFR BLD AUTO: 0.3 % (ref 0–0.5)
IMM GRANULOCYTES NFR BLD AUTO: 0.4 % (ref 0–0.5)
IMM GRANULOCYTES NFR BLD AUTO: 0.4 % (ref 0–0.5)
IMM GRANULOCYTES NFR BLD AUTO: 0.5 % (ref 0–0.5)
IMM GRANULOCYTES NFR BLD AUTO: 0.5 % (ref 0–0.5)
IMM GRANULOCYTES NFR BLD AUTO: 0.6 % (ref 0–0.5)
INHALED O2 CONCENTRATION: 40 %
INR PPP: 1.03 (ref 0.93–1.1)
INR PPP: 1.08 (ref 0.93–1.1)
INR PPP: 1.26 (ref 0.9–1.1)
IRON 24H UR-MRATE: 24 MCG/DL (ref 59–158)
IRON SATN MFR SERPL: 4 % (ref 20–50)
KAPPA LC FREE SER-MCNC: 39.6 MG/L (ref 3.3–19.4)
KAPPA LC FREE/LAMBDA FREE SER: 1.18 {RATIO} (ref 0.26–1.65)
KETONES UR QL STRIP: NEGATIVE
LABORATORY COMMENT REPORT: NORMAL
LAMBDA LC FREE SERPL-MCNC: 33.5 MG/L (ref 5.7–26.3)
LDLC SERPL CALC-MCNC: 45 MG/DL (ref 0–100)
LDLC SERPL CALC-MCNC: 46 MG/DL (ref 0–100)
LDLC/HDLC SERPL: 1.05 {RATIO}
LDLC/HDLC SERPL: 1.82 {RATIO}
LEFT ATRIUM VOLUME INDEX: 49.4 ML/M2
LEFT ATRIUM VOLUME INDEX: 53 ML/M2
LEUKOCYTE ESTERASE UR QL STRIP.AUTO: NEGATIVE
LV EF 2D ECHO EST: 53 %
LYMPHOCYTES # BLD AUTO: 0.4 10*3/MM3 (ref 0.7–3.1)
LYMPHOCYTES # BLD AUTO: 0.7 10*3/MM3 (ref 0.7–3.1)
LYMPHOCYTES # BLD AUTO: 0.88 10*3/MM3 (ref 0.7–3.1)
LYMPHOCYTES # BLD AUTO: 0.9 10*3/MM3 (ref 0.7–3.1)
LYMPHOCYTES # BLD AUTO: 1 10*3/MM3 (ref 0.7–3.1)
LYMPHOCYTES # BLD AUTO: 1.03 10*3/MM3 (ref 0.7–3.1)
LYMPHOCYTES # BLD AUTO: 1.1 10*3/MM3 (ref 0.7–3.1)
LYMPHOCYTES # BLD AUTO: 1.11 10*3/MM3 (ref 0.7–3.1)
LYMPHOCYTES # BLD AUTO: 1.14 10*3/MM3 (ref 0.7–3.1)
LYMPHOCYTES # BLD AUTO: 1.15 10*3/MM3 (ref 0.7–3.1)
LYMPHOCYTES # BLD AUTO: 1.18 10*3/MM3 (ref 0.7–3.1)
LYMPHOCYTES NFR BLD AUTO: 11.4 % (ref 19.6–45.3)
LYMPHOCYTES NFR BLD AUTO: 12.6 % (ref 19.6–45.3)
LYMPHOCYTES NFR BLD AUTO: 13.8 % (ref 19.6–45.3)
LYMPHOCYTES NFR BLD AUTO: 14.9 % (ref 19.6–45.3)
LYMPHOCYTES NFR BLD AUTO: 16 % (ref 19.6–45.3)
LYMPHOCYTES NFR BLD AUTO: 16.1 % (ref 19.6–45.3)
LYMPHOCYTES NFR BLD AUTO: 17.2 % (ref 19.6–45.3)
LYMPHOCYTES NFR BLD AUTO: 17.7 % (ref 19.6–45.3)
LYMPHOCYTES NFR BLD AUTO: 18 % (ref 19.6–45.3)
LYMPHOCYTES NFR BLD AUTO: 19.3 % (ref 19.6–45.3)
LYMPHOCYTES NFR BLD AUTO: 6.7 % (ref 19.6–45.3)
M PNEUMO IGG SER IA-ACNC: NOT DETECTED
M PROTEIN SERPL ELPH-MCNC: NORMAL G/DL
MAGNESIUM SERPL-MCNC: 1.6 MG/DL (ref 1.6–2.4)
MAGNESIUM SERPL-MCNC: 1.7 MG/DL (ref 1.6–2.4)
MAGNESIUM SERPL-MCNC: 1.8 MG/DL (ref 1.6–2.4)
MAGNESIUM SERPL-MCNC: 1.8 MG/DL (ref 1.6–2.4)
MAGNESIUM SERPL-MCNC: 1.9 MG/DL (ref 1.6–2.4)
MAGNESIUM SERPL-MCNC: 1.9 MG/DL (ref 1.6–2.4)
MAXIMAL PREDICTED HEART RATE: 146 BPM
MCH RBC QN AUTO: 23.1 PG (ref 26.6–33)
MCH RBC QN AUTO: 23.1 PG (ref 26.6–33)
MCH RBC QN AUTO: 23.3 PG (ref 26.6–33)
MCH RBC QN AUTO: 23.3 PG (ref 26.6–33)
MCH RBC QN AUTO: 23.4 PG (ref 26.6–33)
MCH RBC QN AUTO: 23.4 PG (ref 26.6–33)
MCH RBC QN AUTO: 23.7 PG (ref 26.6–33)
MCH RBC QN AUTO: 24.5 PG (ref 26.6–33)
MCH RBC QN AUTO: 24.6 PG (ref 26.6–33)
MCH RBC QN AUTO: 25.4 PG (ref 26.6–33)
MCH RBC QN AUTO: 25.7 PG (ref 26.6–33)
MCH RBC QN AUTO: 25.7 PG (ref 26.6–33)
MCH RBC QN AUTO: 26.3 PG (ref 26.6–33)
MCH RBC QN AUTO: 27 PG (ref 26.6–33)
MCH RBC QN AUTO: 29 PG (ref 26.6–33)
MCHC RBC AUTO-ENTMCNC: 27.2 G/DL (ref 31.5–35.7)
MCHC RBC AUTO-ENTMCNC: 27.4 G/DL (ref 31.5–35.7)
MCHC RBC AUTO-ENTMCNC: 27.9 G/DL (ref 31.5–35.7)
MCHC RBC AUTO-ENTMCNC: 28.4 G/DL (ref 31.5–35.7)
MCHC RBC AUTO-ENTMCNC: 28.4 G/DL (ref 31.5–35.7)
MCHC RBC AUTO-ENTMCNC: 28.7 G/DL (ref 31.5–35.7)
MCHC RBC AUTO-ENTMCNC: 28.8 G/DL (ref 31.5–35.7)
MCHC RBC AUTO-ENTMCNC: 29 G/DL (ref 31.5–35.7)
MCHC RBC AUTO-ENTMCNC: 29.9 G/DL (ref 31.5–35.7)
MCHC RBC AUTO-ENTMCNC: 30.5 G/DL (ref 31.5–35.7)
MCHC RBC AUTO-ENTMCNC: 30.9 G/DL (ref 31.5–35.7)
MCHC RBC AUTO-ENTMCNC: 31 G/DL (ref 31.5–35.7)
MCHC RBC AUTO-ENTMCNC: 31.2 G/DL (ref 31.5–35.7)
MCHC RBC AUTO-ENTMCNC: 31.4 G/DL (ref 31.5–35.7)
MCHC RBC AUTO-ENTMCNC: 31.9 G/DL (ref 31.5–35.7)
MCV RBC AUTO: 80.8 FL (ref 79–97)
MCV RBC AUTO: 81.1 FL (ref 79–97)
MCV RBC AUTO: 82.3 FL (ref 79–97)
MCV RBC AUTO: 82.4 FL (ref 79–97)
MCV RBC AUTO: 82.8 FL (ref 79–97)
MCV RBC AUTO: 83 FL (ref 79–97)
MCV RBC AUTO: 83.4 FL (ref 79–97)
MCV RBC AUTO: 83.5 FL (ref 79–97)
MCV RBC AUTO: 83.8 FL (ref 79–97)
MCV RBC AUTO: 83.9 FL (ref 79–97)
MCV RBC AUTO: 84.3 FL (ref 79–97)
MCV RBC AUTO: 84.5 FL (ref 79–97)
MCV RBC AUTO: 84.6 FL (ref 79–97)
MCV RBC AUTO: 84.7 FL (ref 79–97)
MCV RBC AUTO: 93 FL (ref 79–97)
MODALITY: ABNORMAL
MONOCYTES # BLD AUTO: 0.3 10*3/MM3 (ref 0.1–0.9)
MONOCYTES # BLD AUTO: 0.6 10*3/MM3 (ref 0.1–0.9)
MONOCYTES # BLD AUTO: 0.6 10*3/MM3 (ref 0.1–0.9)
MONOCYTES # BLD AUTO: 0.7 10*3/MM3 (ref 0.1–0.9)
MONOCYTES # BLD AUTO: 0.8 10*3/MM3 (ref 0.1–0.9)
MONOCYTES # BLD AUTO: 0.82 10*3/MM3 (ref 0.1–0.9)
MONOCYTES # BLD AUTO: 0.83 10*3/MM3 (ref 0.1–0.9)
MONOCYTES # BLD AUTO: 0.83 10*3/MM3 (ref 0.1–0.9)
MONOCYTES # BLD AUTO: 0.87 10*3/MM3 (ref 0.1–0.9)
MONOCYTES # BLD AUTO: 0.88 10*3/MM3 (ref 0.1–0.9)
MONOCYTES # BLD AUTO: 0.88 10*3/MM3 (ref 0.1–0.9)
MONOCYTES NFR BLD AUTO: 10 % (ref 5–12)
MONOCYTES NFR BLD AUTO: 10.4 % (ref 5–12)
MONOCYTES NFR BLD AUTO: 10.6 % (ref 5–12)
MONOCYTES NFR BLD AUTO: 11.5 % (ref 5–12)
MONOCYTES NFR BLD AUTO: 12.1 % (ref 5–12)
MONOCYTES NFR BLD AUTO: 12.6 % (ref 5–12)
MONOCYTES NFR BLD AUTO: 12.8 % (ref 5–12)
MONOCYTES NFR BLD AUTO: 12.8 % (ref 5–12)
MONOCYTES NFR BLD AUTO: 13.3 % (ref 5–12)
MONOCYTES NFR BLD AUTO: 13.7 % (ref 5–12)
MONOCYTES NFR BLD AUTO: 4.4 % (ref 5–12)
NEUTROPHILS NFR BLD AUTO: 3.46 10*3/MM3 (ref 1.7–7)
NEUTROPHILS NFR BLD AUTO: 3.7 10*3/MM3 (ref 1.7–7)
NEUTROPHILS NFR BLD AUTO: 3.7 10*3/MM3 (ref 1.7–7)
NEUTROPHILS NFR BLD AUTO: 3.92 10*3/MM3 (ref 1.7–7)
NEUTROPHILS NFR BLD AUTO: 3.99 10*3/MM3 (ref 1.7–7)
NEUTROPHILS NFR BLD AUTO: 4.1 10*3/MM3 (ref 1.7–7)
NEUTROPHILS NFR BLD AUTO: 4.28 10*3/MM3 (ref 1.7–7)
NEUTROPHILS NFR BLD AUTO: 4.64 10*3/MM3 (ref 1.7–7)
NEUTROPHILS NFR BLD AUTO: 4.85 10*3/MM3 (ref 1.7–7)
NEUTROPHILS NFR BLD AUTO: 5.57 10*3/MM3 (ref 1.7–7)
NEUTROPHILS NFR BLD AUTO: 5.6 10*3/MM3 (ref 1.7–7)
NEUTROPHILS NFR BLD AUTO: 59.5 % (ref 42.7–76)
NEUTROPHILS NFR BLD AUTO: 60 % (ref 42.7–76)
NEUTROPHILS NFR BLD AUTO: 60.3 % (ref 42.7–76)
NEUTROPHILS NFR BLD AUTO: 62 % (ref 42.7–76)
NEUTROPHILS NFR BLD AUTO: 63.4 % (ref 42.7–76)
NEUTROPHILS NFR BLD AUTO: 64.2 % (ref 42.7–76)
NEUTROPHILS NFR BLD AUTO: 64.4 % (ref 42.7–76)
NEUTROPHILS NFR BLD AUTO: 66.7 % (ref 42.7–76)
NEUTROPHILS NFR BLD AUTO: 69.1 % (ref 42.7–76)
NEUTROPHILS NFR BLD AUTO: 71.8 % (ref 42.7–76)
NEUTROPHILS NFR BLD AUTO: 85.4 % (ref 42.7–76)
NITRITE UR QL STRIP: NEGATIVE
NRBC BLD AUTO-RTO: 0 /100 WBC (ref 0–0.2)
NRBC BLD AUTO-RTO: 0 /100 WBC (ref 0–0.2)
NRBC BLD AUTO-RTO: 0.1 /100 WBC (ref 0–0.2)
NRBC BLD AUTO-RTO: 0.2 /100 WBC (ref 0–0.2)
NRBC BLD AUTO-RTO: 0.3 /100 WBC (ref 0–0.2)
NT-PROBNP SERPL-MCNC: 1032 PG/ML (ref 0–900)
NT-PROBNP SERPL-MCNC: 1354 PG/ML (ref 0–900)
NT-PROBNP SERPL-MCNC: 971 PG/ML (ref 0–900)
PCO2 BLDA: 48.3 MM HG (ref 35–48)
PCO2 BLDA: 61.8 MM HG (ref 35–45)
PCO2 BLDA: 69.3 MM HG (ref 35–45)
PH BLDA: 7.28 PH UNITS (ref 7.35–7.6)
PH BLDA: 7.33 PH UNITS (ref 7.35–7.6)
PH BLDA: 7.37 PH UNITS (ref 7.35–7.45)
PH UR STRIP.AUTO: 6.5 [PH] (ref 5–8)
PH UR STRIP.AUTO: 6.5 [PH] (ref 5–8)
PH UR STRIP.AUTO: 7.5 [PH] (ref 5–8)
PHOSPHATE SERPL-MCNC: 3.6 MG/DL (ref 2.5–4.5)
PHOSPHATE SERPL-MCNC: 3.8 MG/DL (ref 2.5–4.5)
PHOSPHATE SERPL-MCNC: 3.9 MG/DL (ref 2.5–4.5)
PLATELET # BLD AUTO: 123 10*3/MM3 (ref 140–450)
PLATELET # BLD AUTO: 134 10*3/MM3 (ref 140–450)
PLATELET # BLD AUTO: 162 10*3/MM3 (ref 140–450)
PLATELET # BLD AUTO: 166 10*3/MM3 (ref 140–450)
PLATELET # BLD AUTO: 169 10*3/MM3 (ref 140–450)
PLATELET # BLD AUTO: 176 10*3/MM3 (ref 140–450)
PLATELET # BLD AUTO: 178 10*3/MM3 (ref 140–450)
PLATELET # BLD AUTO: 179 10*3/MM3 (ref 140–450)
PLATELET # BLD AUTO: 180 10*3/MM3 (ref 140–450)
PLATELET # BLD AUTO: 182 10*3/MM3 (ref 140–450)
PLATELET # BLD AUTO: 184 10*3/MM3 (ref 140–450)
PLATELET # BLD AUTO: 199 10*3/MM3 (ref 140–450)
PLATELET # BLD AUTO: 202 10*3/MM3 (ref 140–450)
PLATELET # BLD AUTO: 211 10*3/MM3 (ref 140–450)
PLATELET # BLD AUTO: 213 10*3/MM3 (ref 140–450)
PMV BLD AUTO: 10.1 FL (ref 6–12)
PMV BLD AUTO: 10.1 FL (ref 6–12)
PMV BLD AUTO: 10.3 FL (ref 6–12)
PMV BLD AUTO: 10.5 FL (ref 6–12)
PMV BLD AUTO: 10.8 FL (ref 6–12)
PMV BLD AUTO: 11.3 FL (ref 6–12)
PMV BLD AUTO: 7 FL (ref 6–12)
PMV BLD AUTO: 7.2 FL (ref 6–12)
PMV BLD AUTO: 7.5 FL (ref 6–12)
PMV BLD AUTO: 7.7 FL (ref 6–12)
PMV BLD AUTO: 8 FL (ref 6–12)
PMV BLD AUTO: 9.3 FL (ref 6–12)
PMV BLD AUTO: 9.7 FL (ref 6–12)
PMV BLD AUTO: 9.8 FL (ref 6–12)
PMV BLD AUTO: 9.8 FL (ref 6–12)
PO2 BLDA: 240 MMHG (ref 80–105)
PO2 BLDA: 264 MMHG (ref 80–105)
PO2 BLDA: 73.7 MM HG (ref 83–108)
POTASSIUM BLDA-SCNC: 3.4 MMOL/L (ref 3.5–4.9)
POTASSIUM BLDA-SCNC: 3.5 MMOL/L (ref 3.5–4.9)
POTASSIUM SERPL-SCNC: 3.5 MMOL/L (ref 3.5–5.2)
POTASSIUM SERPL-SCNC: 3.6 MMOL/L (ref 3.5–5.2)
POTASSIUM SERPL-SCNC: 3.7 MMOL/L (ref 3.5–5.2)
POTASSIUM SERPL-SCNC: 3.8 MMOL/L (ref 3.5–5.2)
POTASSIUM SERPL-SCNC: 3.9 MMOL/L (ref 3.5–5.2)
POTASSIUM SERPL-SCNC: 4 MMOL/L (ref 3.5–5.2)
POTASSIUM SERPL-SCNC: 4.1 MMOL/L (ref 3.5–5.2)
POTASSIUM SERPL-SCNC: 4.2 MMOL/L (ref 3.5–5.2)
POTASSIUM SERPL-SCNC: 4.3 MMOL/L (ref 3.5–5.2)
POTASSIUM SERPL-SCNC: 4.4 MMOL/L (ref 3.5–5.2)
POTASSIUM SERPL-SCNC: 4.9 MMOL/L (ref 3.5–5.2)
POTASSIUM SERPL-SCNC: 5 MMOL/L (ref 3.5–5.2)
PROT PATTERN SERPL ELPH-IMP: NORMAL
PROT SERPL-MCNC: 6.4 G/DL (ref 6–8.5)
PROT SERPL-MCNC: 6.4 G/DL (ref 6–8.5)
PROT SERPL-MCNC: 7 G/DL (ref 6–8.5)
PROT SERPL-MCNC: 7.2 G/DL (ref 6–8.5)
PROT UR QL STRIP: ABNORMAL
PROT UR QL STRIP: NEGATIVE
PROT UR QL STRIP: NEGATIVE
PROTHROMBIN TIME: 11.4 SECONDS (ref 9.6–11.7)
PROTHROMBIN TIME: 11.9 SECONDS (ref 9.6–11.7)
PROTHROMBIN TIME: 15.6 SECONDS (ref 11.7–14.2)
QT INTERVAL: 208 MS
QT INTERVAL: 367 MS
QT INTERVAL: 381 MS
QT INTERVAL: 400 MS
QT INTERVAL: 402 MS
QT INTERVAL: 433 MS
QT INTERVAL: 434 MS
QT INTERVAL: 439 MS
QT INTERVAL: 440 MS
QT INTERVAL: 447 MS
RBC # BLD AUTO: 3.34 10*6/MM3 (ref 4.14–5.8)
RBC # BLD AUTO: 3.46 10*6/MM3 (ref 4.14–5.8)
RBC # BLD AUTO: 3.65 10*6/MM3 (ref 4.14–5.8)
RBC # BLD AUTO: 3.65 10*6/MM3 (ref 4.14–5.8)
RBC # BLD AUTO: 3.72 10*6/MM3 (ref 4.14–5.8)
RBC # BLD AUTO: 3.75 10*6/MM3 (ref 4.14–5.8)
RBC # BLD AUTO: 3.76 10*6/MM3 (ref 4.14–5.8)
RBC # BLD AUTO: 3.84 10*6/MM3 (ref 4.14–5.8)
RBC # BLD AUTO: 3.85 10*6/MM3 (ref 4.14–5.8)
RBC # BLD AUTO: 3.86 10*6/MM3 (ref 4.14–5.8)
RBC # BLD AUTO: 3.98 10*6/MM3 (ref 4.14–5.8)
RBC # BLD AUTO: 3.98 10*6/MM3 (ref 4.14–5.8)
RBC # BLD AUTO: 4.13 10*6/MM3 (ref 4.14–5.8)
RBC # BLD AUTO: 4.31 10*6/MM3 (ref 4.14–5.8)
RBC # BLD AUTO: 4.48 10*6/MM3 (ref 4.14–5.8)
RBC # UR: ABNORMAL /HPF
REF LAB TEST METHOD: ABNORMAL
RH BLD: POSITIVE
RH BLD: POSITIVE
RHINOVIRUS RNA SPEC NAA+PROBE: NOT DETECTED
RSV RNA NPH QL NAA+NON-PROBE: NOT DETECTED
SAO2 % BLDA: 100 % (ref 95–98)
SAO2 % BLDA: 100 % (ref 95–98)
SAO2 % BLDA: 67 % (ref 95–98)
SAO2 % BLDA: 95 % (ref 95–98)
SAO2 % BLDCOA: 94 % (ref 94–98)
SARS-COV-2 ORF1AB RESP QL NAA+PROBE: NOT DETECTED
SARS-COV-2 ORF1AB RESP QL NAA+PROBE: NOT DETECTED
SARS-COV-2 RNA NPH QL NAA+NON-PROBE: NOT DETECTED
SINUS: 3.5 CM
SODIUM SERPL-SCNC: 136 MMOL/L (ref 136–145)
SODIUM SERPL-SCNC: 139 MMOL/L (ref 136–145)
SODIUM SERPL-SCNC: 140 MMOL/L (ref 136–145)
SODIUM SERPL-SCNC: 141 MMOL/L (ref 136–145)
SODIUM SERPL-SCNC: 141 MMOL/L (ref 136–145)
SODIUM SERPL-SCNC: 142 MMOL/L (ref 136–145)
SODIUM SERPL-SCNC: 143 MMOL/L (ref 136–145)
SODIUM SERPL-SCNC: 144 MMOL/L (ref 136–145)
SODIUM SERPL-SCNC: 146 MMOL/L (ref 136–145)
SP GR UR STRIP: 1.01 (ref 1–1.03)
SQUAMOUS #/AREA URNS HPF: ABNORMAL /HPF
STJ: 3.2 CM
STRESS TARGET HR: 124 BPM
T&S EXPIRATION DATE: NORMAL
T&S EXPIRATION DATE: NORMAL
TIBC SERPL-MCNC: 577 MCG/DL (ref 298–536)
TRANSFERRIN SERPL-MCNC: 387 MG/DL (ref 200–360)
TRIGL SERPL-MCNC: 64 MG/DL (ref 0–150)
TRIGL SERPL-MCNC: 78 MG/DL (ref 0–150)
TROPONIN T SERPL-MCNC: 0.01 NG/ML (ref 0–0.03)
TROPONIN T SERPL-MCNC: 0.12 NG/ML (ref 0–0.03)
TROPONIN T SERPL-MCNC: 0.16 NG/ML (ref 0–0.03)
TROPONIN T SERPL-MCNC: 0.17 NG/ML (ref 0–0.03)
TSH SERPL DL<=0.05 MIU/L-ACNC: 2.93 UIU/ML (ref 0.27–4.2)
UNIT  ABO: NORMAL
UNIT  RH: NORMAL
UROBILINOGEN UR QL STRIP: ABNORMAL
UROBILINOGEN UR QL STRIP: NORMAL
UROBILINOGEN UR QL STRIP: NORMAL
VIT B12 BLD-MCNC: 320 PG/ML (ref 211–946)
VLDLC SERPL-MCNC: 15 MG/DL (ref 5–40)
VLDLC SERPL-MCNC: 16 MG/DL (ref 5–40)
WBC # BLD AUTO: 4.9 10*3/MM3 (ref 3.4–10.8)
WBC # BLD AUTO: 5.8 10*3/MM3 (ref 3.4–10.8)
WBC # BLD AUTO: 5.8 10*3/MM3 (ref 3.4–10.8)
WBC # BLD AUTO: 5.82 10*3/MM3 (ref 3.4–10.8)
WBC # BLD AUTO: 5.9 10*3/MM3 (ref 3.4–10.8)
WBC # BLD AUTO: 6.54 10*3/MM3 (ref 3.4–10.8)
WBC # BLD AUTO: 6.6 10*3/MM3 (ref 3.4–10.8)
WBC # BLD AUTO: 6.61 10*3/MM3 (ref 3.4–10.8)
WBC # BLD AUTO: 6.73 10*3/MM3 (ref 3.4–10.8)
WBC # BLD AUTO: 6.9 10*3/MM3 (ref 3.4–10.8)
WBC # BLD AUTO: 7.2 10*3/MM3 (ref 3.4–10.8)
WBC # BLD AUTO: 7.27 10*3/MM3 (ref 3.4–10.8)
WBC # BLD AUTO: 7.73 10*3/MM3 (ref 3.4–10.8)
WBC # BLD AUTO: 7.75 10*3/MM3 (ref 3.4–10.8)
WBC # BLD AUTO: 9.24 10*3/MM3 (ref 3.4–10.8)
WBC UR QL AUTO: ABNORMAL /HPF
WHOLE BLOOD HOLD SPECIMEN: NORMAL

## 2021-01-01 PROCEDURE — 85730 THROMBOPLASTIN TIME PARTIAL: CPT | Performed by: INTERNAL MEDICINE

## 2021-01-01 PROCEDURE — C1769 GUIDE WIRE: HCPCS | Performed by: THORACIC SURGERY (CARDIOTHORACIC VASCULAR SURGERY)

## 2021-01-01 PROCEDURE — 83036 HEMOGLOBIN GLYCOSYLATED A1C: CPT | Performed by: NURSE PRACTITIONER

## 2021-01-01 PROCEDURE — 93454 CORONARY ARTERY ANGIO S&I: CPT | Performed by: INTERNAL MEDICINE

## 2021-01-01 PROCEDURE — 93005 ELECTROCARDIOGRAM TRACING: CPT | Performed by: INTERNAL MEDICINE

## 2021-01-01 PROCEDURE — G0378 HOSPITAL OBSERVATION PER HR: HCPCS

## 2021-01-01 PROCEDURE — 63710000001 DIPHENHYDRAMINE PER 50 MG: Performed by: INTERNAL MEDICINE

## 2021-01-01 PROCEDURE — 85025 COMPLETE CBC W/AUTO DIFF WBC: CPT | Performed by: EMERGENCY MEDICINE

## 2021-01-01 PROCEDURE — 25010000002 VANCOMYCIN 5 G RECONSTITUTED SOLUTION: Performed by: NURSE PRACTITIONER

## 2021-01-01 PROCEDURE — 83540 ASSAY OF IRON: CPT | Performed by: HOSPITALIST

## 2021-01-01 PROCEDURE — 84484 ASSAY OF TROPONIN QUANT: CPT | Performed by: INTERNAL MEDICINE

## 2021-01-01 PROCEDURE — 85025 COMPLETE CBC W/AUTO DIFF WBC: CPT | Performed by: INTERNAL MEDICINE

## 2021-01-01 PROCEDURE — 86900 BLOOD TYPING SEROLOGIC ABO: CPT

## 2021-01-01 PROCEDURE — 97162 PT EVAL MOD COMPLEX 30 MIN: CPT

## 2021-01-01 PROCEDURE — 80048 BASIC METABOLIC PNL TOTAL CA: CPT | Performed by: INTERNAL MEDICINE

## 2021-01-01 PROCEDURE — 93321 DOPPLER ECHO F-UP/LMTD STD: CPT

## 2021-01-01 PROCEDURE — 93010 ELECTROCARDIOGRAM REPORT: CPT | Performed by: INTERNAL MEDICINE

## 2021-01-01 PROCEDURE — 71045 X-RAY EXAM CHEST 1 VIEW: CPT

## 2021-01-01 PROCEDURE — 99214 OFFICE O/P EST MOD 30 MIN: CPT | Performed by: INTERNAL MEDICINE

## 2021-01-01 PROCEDURE — 94799 UNLISTED PULMONARY SVC/PX: CPT

## 2021-01-01 PROCEDURE — 85025 COMPLETE CBC W/AUTO DIFF WBC: CPT | Performed by: NURSE PRACTITIONER

## 2021-01-01 PROCEDURE — 36415 COLL VENOUS BLD VENIPUNCTURE: CPT | Performed by: NURSE PRACTITIONER

## 2021-01-01 PROCEDURE — 25010000002 PERFLUTREN (DEFINITY) 8.476 MG IN SODIUM CHLORIDE (PF) 0.9 % 10 ML INJECTION: Performed by: INTERNAL MEDICINE

## 2021-01-01 PROCEDURE — 85730 THROMBOPLASTIN TIME PARTIAL: CPT | Performed by: NURSE PRACTITIONER

## 2021-01-01 PROCEDURE — 25010000002 FENTANYL CITRATE (PF) 50 MCG/ML SOLUTION: Performed by: INTERNAL MEDICINE

## 2021-01-01 PROCEDURE — 99204 OFFICE O/P NEW MOD 45 MIN: CPT | Performed by: INTERNAL MEDICINE

## 2021-01-01 PROCEDURE — 25010000002 ENOXAPARIN PER 10 MG: Performed by: NURSE PRACTITIONER

## 2021-01-01 PROCEDURE — 84484 ASSAY OF TROPONIN QUANT: CPT | Performed by: NURSE PRACTITIONER

## 2021-01-01 PROCEDURE — 85610 PROTHROMBIN TIME: CPT | Performed by: NURSE PRACTITIONER

## 2021-01-01 PROCEDURE — 99232 SBSQ HOSP IP/OBS MODERATE 35: CPT | Performed by: INTERNAL MEDICINE

## 2021-01-01 PROCEDURE — 83883 ASSAY NEPHELOMETRY NOT SPEC: CPT | Performed by: INTERNAL MEDICINE

## 2021-01-01 PROCEDURE — 83735 ASSAY OF MAGNESIUM: CPT | Performed by: INTERNAL MEDICINE

## 2021-01-01 PROCEDURE — 63710000001 DIPHENHYDRAMINE PER 50 MG: Performed by: NURSE PRACTITIONER

## 2021-01-01 PROCEDURE — C1751 CATH, INF, PER/CENT/MIDLINE: HCPCS | Performed by: ANESTHESIOLOGY

## 2021-01-01 PROCEDURE — C1777 LEAD, AICD, ENDO SINGLE COIL: HCPCS | Performed by: INTERNAL MEDICINE

## 2021-01-01 PROCEDURE — 83036 HEMOGLOBIN GLYCOSYLATED A1C: CPT | Performed by: HOSPITALIST

## 2021-01-01 PROCEDURE — 99024 POSTOP FOLLOW-UP VISIT: CPT | Performed by: THORACIC SURGERY (CARDIOTHORACIC VASCULAR SURGERY)

## 2021-01-01 PROCEDURE — 81003 URINALYSIS AUTO W/O SCOPE: CPT | Performed by: INTERNAL MEDICINE

## 2021-01-01 PROCEDURE — 25010000002 FUROSEMIDE PER 20 MG: Performed by: NURSE PRACTITIONER

## 2021-01-01 PROCEDURE — 86900 BLOOD TYPING SEROLOGIC ABO: CPT | Performed by: NURSE PRACTITIONER

## 2021-01-01 PROCEDURE — 97530 THERAPEUTIC ACTIVITIES: CPT

## 2021-01-01 PROCEDURE — 99223 1ST HOSP IP/OBS HIGH 75: CPT | Performed by: INTERNAL MEDICINE

## 2021-01-01 PROCEDURE — 25010000002 HEPARIN (PORCINE) PER 1000 UNITS: Performed by: ANESTHESIOLOGY

## 2021-01-01 PROCEDURE — P9016 RBC LEUKOCYTES REDUCED: HCPCS

## 2021-01-01 PROCEDURE — 85014 HEMATOCRIT: CPT

## 2021-01-01 PROCEDURE — 83880 ASSAY OF NATRIURETIC PEPTIDE: CPT | Performed by: NURSE PRACTITIONER

## 2021-01-01 PROCEDURE — 25010000002 LORAZEPAM PER 2 MG: Performed by: HOSPITALIST

## 2021-01-01 PROCEDURE — 99239 HOSP IP/OBS DSCHRG MGMT >30: CPT | Performed by: NURSE PRACTITIONER

## 2021-01-01 PROCEDURE — C1889 IMPLANT/INSERT DEVICE, NOC: HCPCS | Performed by: INTERNAL MEDICINE

## 2021-01-01 PROCEDURE — C1894 INTRO/SHEATH, NON-LASER: HCPCS | Performed by: THORACIC SURGERY (CARDIOTHORACIC VASCULAR SURGERY)

## 2021-01-01 PROCEDURE — 027034Z DILATION OF CORONARY ARTERY, ONE ARTERY WITH DRUG-ELUTING INTRALUMINAL DEVICE, PERCUTANEOUS APPROACH: ICD-10-PCS | Performed by: INTERNAL MEDICINE

## 2021-01-01 PROCEDURE — C1769 GUIDE WIRE: HCPCS | Performed by: INTERNAL MEDICINE

## 2021-01-01 PROCEDURE — 93306 TTE W/DOPPLER COMPLETE: CPT | Performed by: INTERNAL MEDICINE

## 2021-01-01 PROCEDURE — 85347 COAGULATION TIME ACTIVATED: CPT

## 2021-01-01 PROCEDURE — 86901 BLOOD TYPING SEROLOGIC RH(D): CPT | Performed by: NURSE PRACTITIONER

## 2021-01-01 PROCEDURE — C1722 AICD, SINGLE CHAMBER: HCPCS | Performed by: INTERNAL MEDICINE

## 2021-01-01 PROCEDURE — 33361 REPLACE AORTIC VALVE PERQ: CPT | Performed by: INTERNAL MEDICINE

## 2021-01-01 PROCEDURE — C1760 CLOSURE DEV, VASC: HCPCS | Performed by: THORACIC SURGERY (CARDIOTHORACIC VASCULAR SURGERY)

## 2021-01-01 PROCEDURE — 92960 CARDIOVERSION ELECTRIC EXT: CPT

## 2021-01-01 PROCEDURE — 25010000002 HEPARIN (PORCINE) PER 1000 UNITS: Performed by: INTERNAL MEDICINE

## 2021-01-01 PROCEDURE — 93306 TTE W/DOPPLER COMPLETE: CPT

## 2021-01-01 PROCEDURE — 99152 MOD SED SAME PHYS/QHP 5/>YRS: CPT | Performed by: INTERNAL MEDICINE

## 2021-01-01 PROCEDURE — 36415 COLL VENOUS BLD VENIPUNCTURE: CPT | Performed by: INTERNAL MEDICINE

## 2021-01-01 PROCEDURE — 99222 1ST HOSP IP/OBS MODERATE 55: CPT | Performed by: PSYCHIATRY & NEUROLOGY

## 2021-01-01 PROCEDURE — 99231 SBSQ HOSP IP/OBS SF/LOW 25: CPT | Performed by: INTERNAL MEDICINE

## 2021-01-01 PROCEDURE — 33361 REPLACE AORTIC VALVE PERQ: CPT | Performed by: THORACIC SURGERY (CARDIOTHORACIC VASCULAR SURGERY)

## 2021-01-01 PROCEDURE — 71275 CT ANGIOGRAPHY CHEST: CPT

## 2021-01-01 PROCEDURE — 4A023N7 MEASUREMENT OF CARDIAC SAMPLING AND PRESSURE, LEFT HEART, PERCUTANEOUS APPROACH: ICD-10-PCS | Performed by: INTERNAL MEDICINE

## 2021-01-01 PROCEDURE — 94729 DIFFUSING CAPACITY: CPT

## 2021-01-01 PROCEDURE — 85027 COMPLETE CBC AUTOMATED: CPT | Performed by: INTERNAL MEDICINE

## 2021-01-01 PROCEDURE — 99205 OFFICE O/P NEW HI 60 MIN: CPT | Performed by: INTERNAL MEDICINE

## 2021-01-01 PROCEDURE — 0 IOPAMIDOL PER 1 ML: Performed by: INTERNAL MEDICINE

## 2021-01-01 PROCEDURE — 25010000002 PROTAMINE SULFATE PER 10 MG: Performed by: ANESTHESIOLOGY

## 2021-01-01 PROCEDURE — 83735 ASSAY OF MAGNESIUM: CPT | Performed by: NURSE PRACTITIONER

## 2021-01-01 PROCEDURE — 99233 SBSQ HOSP IP/OBS HIGH 50: CPT | Performed by: INTERNAL MEDICINE

## 2021-01-01 PROCEDURE — 0JH608Z INSERTION OF DEFIBRILLATOR GENERATOR INTO CHEST SUBCUTANEOUS TISSUE AND FASCIA, OPEN APPROACH: ICD-10-PCS | Performed by: INTERNAL MEDICINE

## 2021-01-01 PROCEDURE — C1725 CATH, TRANSLUMIN NON-LASER: HCPCS | Performed by: INTERNAL MEDICINE

## 2021-01-01 PROCEDURE — 33249 INSJ/RPLCMT DEFIB W/LEAD(S): CPT | Performed by: INTERNAL MEDICINE

## 2021-01-01 PROCEDURE — 85025 COMPLETE CBC W/AUTO DIFF WBC: CPT | Performed by: HOSPITALIST

## 2021-01-01 PROCEDURE — 85610 PROTHROMBIN TIME: CPT | Performed by: INTERNAL MEDICINE

## 2021-01-01 PROCEDURE — 80048 BASIC METABOLIC PNL TOTAL CA: CPT | Performed by: HOSPITALIST

## 2021-01-01 PROCEDURE — 25010000002 VANCOMYCIN 10 G RECONSTITUTED SOLUTION: Performed by: INTERNAL MEDICINE

## 2021-01-01 PROCEDURE — 82550 ASSAY OF CK (CPK): CPT | Performed by: NURSE PRACTITIONER

## 2021-01-01 PROCEDURE — C1887 CATHETER, GUIDING: HCPCS | Performed by: INTERNAL MEDICINE

## 2021-01-01 PROCEDURE — 25010000002 MAGNESIUM SULFATE IN D5W 1G/100ML (PREMIX) 1-5 GM/100ML-% SOLUTION: Performed by: INTERNAL MEDICINE

## 2021-01-01 PROCEDURE — C1894 INTRO/SHEATH, NON-LASER: HCPCS | Performed by: INTERNAL MEDICINE

## 2021-01-01 PROCEDURE — 82947 ASSAY GLUCOSE BLOOD QUANT: CPT

## 2021-01-01 PROCEDURE — 25010000002 IRON SUCROSE PER 1 MG: Performed by: INTERNAL MEDICINE

## 2021-01-01 PROCEDURE — 86901 BLOOD TYPING SEROLOGIC RH(D): CPT

## 2021-01-01 PROCEDURE — 25010000002 FENTANYL CITRATE (PF) 100 MCG/2ML SOLUTION: Performed by: INTERNAL MEDICINE

## 2021-01-01 PROCEDURE — 93000 ELECTROCARDIOGRAM COMPLETE: CPT | Performed by: NURSE PRACTITIONER

## 2021-01-01 PROCEDURE — 84165 PROTEIN E-PHORESIS SERUM: CPT | Performed by: INTERNAL MEDICINE

## 2021-01-01 PROCEDURE — 74174 CTA ABD&PLVS W/CONTRAST: CPT

## 2021-01-01 PROCEDURE — U0004 COV-19 TEST NON-CDC HGH THRU: HCPCS

## 2021-01-01 PROCEDURE — 25010000002 MIDAZOLAM PER 1 MG: Performed by: INTERNAL MEDICINE

## 2021-01-01 PROCEDURE — 84466 ASSAY OF TRANSFERRIN: CPT | Performed by: HOSPITALIST

## 2021-01-01 PROCEDURE — 93460 R&L HRT ART/VENTRICLE ANGIO: CPT | Performed by: INTERNAL MEDICINE

## 2021-01-01 PROCEDURE — 25010000002 MAGNESIUM SULFATE IN D5W 1G/100ML (PREMIX) 1-5 GM/100ML-% SOLUTION: Performed by: NURSE PRACTITIONER

## 2021-01-01 PROCEDURE — 93325 DOPPLER ECHO COLOR FLOW MAPG: CPT | Performed by: INTERNAL MEDICINE

## 2021-01-01 PROCEDURE — 92960 CARDIOVERSION ELECTRIC EXT: CPT | Performed by: INTERNAL MEDICINE

## 2021-01-01 PROCEDURE — 25010000002 MIDAZOLAM PER 1 MG: Performed by: ANESTHESIOLOGY

## 2021-01-01 PROCEDURE — 82607 VITAMIN B-12: CPT | Performed by: HOSPITALIST

## 2021-01-01 PROCEDURE — 25010000002 PROPOFOL 10 MG/ML EMULSION

## 2021-01-01 PROCEDURE — 93321 DOPPLER ECHO F-UP/LMTD STD: CPT | Performed by: INTERNAL MEDICINE

## 2021-01-01 PROCEDURE — 85018 HEMOGLOBIN: CPT

## 2021-01-01 PROCEDURE — 36430 TRANSFUSION BLD/BLD COMPNT: CPT

## 2021-01-01 PROCEDURE — C1874 STENT, COATED/COV W/DEL SYS: HCPCS | Performed by: INTERNAL MEDICINE

## 2021-01-01 PROCEDURE — 25010000002 MIDAZOLAM PER 1 MG

## 2021-01-01 PROCEDURE — 73560 X-RAY EXAM OF KNEE 1 OR 2: CPT

## 2021-01-01 PROCEDURE — 99214 OFFICE O/P EST MOD 30 MIN: CPT | Performed by: NURSE PRACTITIONER

## 2021-01-01 PROCEDURE — 82272 OCCULT BLD FECES 1-3 TESTS: CPT | Performed by: HOSPITALIST

## 2021-01-01 PROCEDURE — 99283 EMERGENCY DEPT VISIT LOW MDM: CPT

## 2021-01-01 PROCEDURE — 36415 COLL VENOUS BLD VENIPUNCTURE: CPT

## 2021-01-01 PROCEDURE — C9600 PERC DRUG-EL COR STENT SING: HCPCS | Performed by: INTERNAL MEDICINE

## 2021-01-01 PROCEDURE — 93005 ELECTROCARDIOGRAM TRACING: CPT | Performed by: HOSPITALIST

## 2021-01-01 PROCEDURE — 80069 RENAL FUNCTION PANEL: CPT | Performed by: INTERNAL MEDICINE

## 2021-01-01 PROCEDURE — 80048 BASIC METABOLIC PNL TOTAL CA: CPT | Performed by: NURSE PRACTITIONER

## 2021-01-01 PROCEDURE — 82803 BLOOD GASES ANY COMBINATION: CPT

## 2021-01-01 PROCEDURE — 63710000001 DIPHENHYDRAMINE PER 50 MG: Performed by: HOSPITALIST

## 2021-01-01 PROCEDURE — 86923 COMPATIBILITY TEST ELECTRIC: CPT

## 2021-01-01 PROCEDURE — B2111ZZ FLUOROSCOPY OF MULTIPLE CORONARY ARTERIES USING LOW OSMOLAR CONTRAST: ICD-10-PCS | Performed by: INTERNAL MEDICINE

## 2021-01-01 PROCEDURE — 0202U NFCT DS 22 TRGT SARS-COV-2: CPT | Performed by: NURSE PRACTITIONER

## 2021-01-01 PROCEDURE — 25010000002 ADENOSINE PER 6 MG: Performed by: INTERNAL MEDICINE

## 2021-01-01 PROCEDURE — 99024 POSTOP FOLLOW-UP VISIT: CPT | Performed by: PHYSICIAN ASSISTANT

## 2021-01-01 PROCEDURE — 99232 SBSQ HOSP IP/OBS MODERATE 35: CPT | Performed by: THORACIC SURGERY (CARDIOTHORACIC VASCULAR SURGERY)

## 2021-01-01 PROCEDURE — 5A2204Z RESTORATION OF CARDIAC RHYTHM, SINGLE: ICD-10-PCS | Performed by: INTERNAL MEDICINE

## 2021-01-01 PROCEDURE — U0005 INFEC AGEN DETEC AMPLI PROBE: HCPCS

## 2021-01-01 PROCEDURE — 25010000002 FENTANYL CITRATE (PF) 50 MCG/ML SOLUTION: Performed by: ANESTHESIOLOGY

## 2021-01-01 PROCEDURE — 0 IOPAMIDOL PER 1 ML: Performed by: NURSE PRACTITIONER

## 2021-01-01 PROCEDURE — 93000 ELECTROCARDIOGRAM COMPLETE: CPT | Performed by: INTERNAL MEDICINE

## 2021-01-01 PROCEDURE — 25010000002 MAGNESIUM SULFATE 2 GM/50ML SOLUTION: Performed by: INTERNAL MEDICINE

## 2021-01-01 PROCEDURE — 93308 TTE F-UP OR LMTD: CPT | Performed by: INTERNAL MEDICINE

## 2021-01-01 PROCEDURE — B41D1ZZ FLUOROSCOPY OF AORTA AND BILATERAL LOWER EXTREMITY ARTERIES USING LOW OSMOLAR CONTRAST: ICD-10-PCS | Performed by: INTERNAL MEDICINE

## 2021-01-01 PROCEDURE — 81001 URINALYSIS AUTO W/SCOPE: CPT | Performed by: NURSE PRACTITIONER

## 2021-01-01 PROCEDURE — 25010000002 PROPOFOL 10 MG/ML EMULSION: Performed by: ANESTHESIOLOGY

## 2021-01-01 PROCEDURE — 93005 ELECTROCARDIOGRAM TRACING: CPT | Performed by: NURSE PRACTITIONER

## 2021-01-01 PROCEDURE — 25010000002 AMIODARONE PER 30 MG

## 2021-01-01 PROCEDURE — 83880 ASSAY OF NATRIURETIC PEPTIDE: CPT | Performed by: HOSPITALIST

## 2021-01-01 PROCEDURE — C9803 HOPD COVID-19 SPEC COLLECT: HCPCS

## 2021-01-01 PROCEDURE — U0005 INFEC AGEN DETEC AMPLI PROBE: HCPCS | Performed by: INTERNAL MEDICINE

## 2021-01-01 PROCEDURE — 97116 GAIT TRAINING THERAPY: CPT

## 2021-01-01 PROCEDURE — 93282 PRGRMG EVAL IMPLANTABLE DFB: CPT | Performed by: INTERNAL MEDICINE

## 2021-01-01 PROCEDURE — 25010000002 AMIODARONE IN DEXTROSE 5% 150-4.21 MG/100ML-% SOLUTION

## 2021-01-01 PROCEDURE — 25010000002 AMIODARONE PER 30 MG: Performed by: INTERNAL MEDICINE

## 2021-01-01 PROCEDURE — 99239 HOSP IP/OBS DSCHRG MGMT >30: CPT | Performed by: HOSPITALIST

## 2021-01-01 PROCEDURE — 99233 SBSQ HOSP IP/OBS HIGH 50: CPT | Performed by: HOSPITALIST

## 2021-01-01 PROCEDURE — 74018 RADEX ABDOMEN 1 VIEW: CPT

## 2021-01-01 PROCEDURE — 80053 COMPREHEN METABOLIC PANEL: CPT | Performed by: HOSPITALIST

## 2021-01-01 PROCEDURE — 93325 DOPPLER ECHO COLOR FLOW MAPG: CPT

## 2021-01-01 PROCEDURE — 99214 OFFICE O/P EST MOD 30 MIN: CPT | Performed by: FAMILY MEDICINE

## 2021-01-01 PROCEDURE — 82962 GLUCOSE BLOOD TEST: CPT

## 2021-01-01 PROCEDURE — 25010000002 PHENYLEPHRINE PER 1 ML: Performed by: ANESTHESIOLOGY

## 2021-01-01 PROCEDURE — 25010000002 FUROSEMIDE PER 20 MG: Performed by: INTERNAL MEDICINE

## 2021-01-01 PROCEDURE — 93005 ELECTROCARDIOGRAM TRACING: CPT

## 2021-01-01 PROCEDURE — 80061 LIPID PANEL: CPT | Performed by: INTERNAL MEDICINE

## 2021-01-01 PROCEDURE — 80053 COMPREHEN METABOLIC PANEL: CPT | Performed by: NURSE PRACTITIONER

## 2021-01-01 PROCEDURE — 70450 CT HEAD/BRAIN W/O DYE: CPT

## 2021-01-01 PROCEDURE — 93308 TTE F-UP OR LMTD: CPT

## 2021-01-01 PROCEDURE — U0004 COV-19 TEST NON-CDC HGH THRU: HCPCS | Performed by: INTERNAL MEDICINE

## 2021-01-01 PROCEDURE — 94726 PLETHYSMOGRAPHY LUNG VOLUMES: CPT

## 2021-01-01 PROCEDURE — 86850 RBC ANTIBODY SCREEN: CPT | Performed by: NURSE PRACTITIONER

## 2021-01-01 PROCEDURE — 1111F DSCHRG MED/CURRENT MED MERGE: CPT | Performed by: FAMILY MEDICINE

## 2021-01-01 PROCEDURE — 99226 PR SBSQ OBSERVATION CARE/DAY 35 MINUTES: CPT | Performed by: HOSPITALIST

## 2021-01-01 PROCEDURE — 02RF38Z REPLACEMENT OF AORTIC VALVE WITH ZOOPLASTIC TISSUE, PERCUTANEOUS APPROACH: ICD-10-PCS | Performed by: THORACIC SURGERY (CARDIOTHORACIC VASCULAR SURGERY)

## 2021-01-01 PROCEDURE — 82728 ASSAY OF FERRITIN: CPT | Performed by: HOSPITALIST

## 2021-01-01 PROCEDURE — 82565 ASSAY OF CREATININE: CPT | Performed by: INTERNAL MEDICINE

## 2021-01-01 PROCEDURE — 85027 COMPLETE CBC AUTOMATED: CPT | Performed by: NURSE PRACTITIONER

## 2021-01-01 PROCEDURE — 84484 ASSAY OF TROPONIN QUANT: CPT | Performed by: HOSPITALIST

## 2021-01-01 PROCEDURE — 36600 WITHDRAWAL OF ARTERIAL BLOOD: CPT

## 2021-01-01 PROCEDURE — 94010 BREATHING CAPACITY TEST: CPT

## 2021-01-01 PROCEDURE — 99153 MOD SED SAME PHYS/QHP EA: CPT | Performed by: INTERNAL MEDICINE

## 2021-01-01 PROCEDURE — 02HK3KZ INSERTION OF DEFIBRILLATOR LEAD INTO RIGHT VENTRICLE, PERCUTANEOUS APPROACH: ICD-10-PCS | Performed by: INTERNAL MEDICINE

## 2021-01-01 PROCEDURE — 84443 ASSAY THYROID STIM HORMONE: CPT | Performed by: HOSPITALIST

## 2021-01-01 PROCEDURE — 82746 ASSAY OF FOLIC ACID SERUM: CPT | Performed by: HOSPITALIST

## 2021-01-01 PROCEDURE — 70486 CT MAXILLOFACIAL W/O DYE: CPT

## 2021-01-01 PROCEDURE — 81003 URINALYSIS AUTO W/O SCOPE: CPT | Performed by: NURSE PRACTITIONER

## 2021-01-01 PROCEDURE — 0 IOPAMIDOL PER 1 ML: Performed by: THORACIC SURGERY (CARDIOTHORACIC VASCULAR SURGERY)

## 2021-01-01 PROCEDURE — 99238 HOSP IP/OBS DSCHRG MGMT 30/<: CPT | Performed by: NURSE PRACTITIONER

## 2021-01-01 PROCEDURE — 80061 LIPID PANEL: CPT | Performed by: HOSPITALIST

## 2021-01-01 PROCEDURE — 99285 EMERGENCY DEPT VISIT HI MDM: CPT

## 2021-01-01 PROCEDURE — 92928 PRQ TCAT PLMT NTRAC ST 1 LES: CPT | Performed by: INTERNAL MEDICINE

## 2021-01-01 DEVICE — INTEGRATED BIPOLAR PACE/SENSE AND DEFIBRILLATION LEAD
Type: IMPLANTABLE DEVICE | Status: FUNCTIONAL
Brand: RELIANCE 4-FRONT™

## 2021-01-01 DEVICE — XIENCE SIERRA™ EVEROLIMUS ELUTING CORONARY STENT SYSTEM 3.00 MM X 33 MM / RAPID-EXCHANGE
Type: IMPLANTABLE DEVICE | Status: FUNCTIONAL
Brand: XIENCE SIERRA™

## 2021-01-01 DEVICE — SEAL HEMO SURG ARISTA/AH ABS/PWDR 3GM: Type: IMPLANTABLE DEVICE | Status: FUNCTIONAL

## 2021-01-01 DEVICE — IMPLANTABLE CARDIOVERTER DEFIBRILLATOR VR
Type: IMPLANTABLE DEVICE | Status: FUNCTIONAL
Brand: VIGILANT™ EL ICD VR

## 2021-01-01 DEVICE — VLV HEART TRNSCATH SAPIEN3 29MM: Type: IMPLANTABLE DEVICE | Site: HEART | Status: FUNCTIONAL

## 2021-01-01 RX ORDER — DIPHENOXYLATE HYDROCHLORIDE AND ATROPINE SULFATE 2.5; .025 MG/1; MG/1
1 TABLET ORAL DAILY
Status: DISCONTINUED | OUTPATIENT
Start: 2021-01-01 | End: 2021-01-01 | Stop reason: HOSPADM

## 2021-01-01 RX ORDER — LORAZEPAM 2 MG/ML
0.5 INJECTION INTRAMUSCULAR
Status: DISCONTINUED | OUTPATIENT
Start: 2021-01-01 | End: 2021-01-01 | Stop reason: HOSPADM

## 2021-01-01 RX ORDER — ATORVASTATIN CALCIUM 20 MG/1
20 TABLET, FILM COATED ORAL DAILY
Status: DISCONTINUED | OUTPATIENT
Start: 2021-01-01 | End: 2021-01-01 | Stop reason: HOSPADM

## 2021-01-01 RX ORDER — CHLORTHALIDONE 25 MG/1
TABLET ORAL
Qty: 90 TABLET | Refills: 1 | OUTPATIENT
Start: 2021-01-01

## 2021-01-01 RX ORDER — LIDOCAINE HYDROCHLORIDE 20 MG/ML
INJECTION, SOLUTION INFILTRATION; PERINEURAL AS NEEDED
Status: DISCONTINUED | OUTPATIENT
Start: 2021-01-01 | End: 2021-01-01 | Stop reason: HOSPADM

## 2021-01-01 RX ORDER — ONDANSETRON 2 MG/ML
4 INJECTION INTRAMUSCULAR; INTRAVENOUS EVERY 6 HOURS PRN
Status: DISCONTINUED | OUTPATIENT
Start: 2021-01-01 | End: 2021-01-01 | Stop reason: HOSPADM

## 2021-01-01 RX ORDER — CHOLECALCIFEROL (VITAMIN D3) 125 MCG
5 CAPSULE ORAL NIGHTLY PRN
Status: DISCONTINUED | OUTPATIENT
Start: 2021-01-01 | End: 2021-01-01 | Stop reason: HOSPADM

## 2021-01-01 RX ORDER — FUROSEMIDE 10 MG/ML
80 INJECTION INTRAMUSCULAR; INTRAVENOUS ONCE
Status: COMPLETED | OUTPATIENT
Start: 2021-01-01 | End: 2021-01-01

## 2021-01-01 RX ORDER — FENTANYL CITRATE 50 UG/ML
50 INJECTION, SOLUTION INTRAMUSCULAR; INTRAVENOUS
Status: DISCONTINUED | OUTPATIENT
Start: 2021-01-01 | End: 2021-01-01 | Stop reason: HOSPADM

## 2021-01-01 RX ORDER — SULFAMETHOXAZOLE AND TRIMETHOPRIM 400; 80 MG/1; MG/1
1 TABLET ORAL EVERY 12 HOURS SCHEDULED
Qty: 2 TABLET | Refills: 0 | Status: SHIPPED | OUTPATIENT
Start: 2021-01-01 | End: 2021-01-01

## 2021-01-01 RX ORDER — ADENOSINE 3 MG/ML
6 INJECTION, SOLUTION INTRAVENOUS ONCE
Status: COMPLETED | OUTPATIENT
Start: 2021-01-01 | End: 2021-01-01

## 2021-01-01 RX ORDER — SODIUM CHLORIDE 0.9 % (FLUSH) 0.9 %
3 SYRINGE (ML) INJECTION EVERY 12 HOURS SCHEDULED
Status: DISCONTINUED | OUTPATIENT
Start: 2021-01-01 | End: 2021-01-01 | Stop reason: HOSPADM

## 2021-01-01 RX ORDER — DIPHENHYDRAMINE HCL 25 MG
25 CAPSULE ORAL ONCE
Status: COMPLETED | OUTPATIENT
Start: 2021-01-01 | End: 2021-01-01

## 2021-01-01 RX ORDER — ALLOPURINOL 300 MG/1
300 TABLET ORAL DAILY
Status: DISCONTINUED | OUTPATIENT
Start: 2021-01-01 | End: 2021-01-01 | Stop reason: HOSPADM

## 2021-01-01 RX ORDER — ACETAMINOPHEN 160 MG/5ML
650 SOLUTION ORAL EVERY 4 HOURS PRN
Status: DISCONTINUED | OUTPATIENT
Start: 2021-01-01 | End: 2021-01-01 | Stop reason: HOSPADM

## 2021-01-01 RX ORDER — ATORVASTATIN CALCIUM 20 MG/1
10 TABLET, FILM COATED ORAL NIGHTLY
Status: DISCONTINUED | OUTPATIENT
Start: 2021-01-01 | End: 2021-01-01 | Stop reason: HOSPADM

## 2021-01-01 RX ORDER — MIDAZOLAM HYDROCHLORIDE 1 MG/ML
2 INJECTION INTRAMUSCULAR; INTRAVENOUS ONCE
Status: COMPLETED | OUTPATIENT
Start: 2021-01-01 | End: 2021-01-01

## 2021-01-01 RX ORDER — DIPHENHYDRAMINE HCL 25 MG
50 CAPSULE ORAL NIGHTLY PRN
Status: DISCONTINUED | OUTPATIENT
Start: 2021-01-01 | End: 2021-01-01 | Stop reason: HOSPADM

## 2021-01-01 RX ORDER — FENTANYL CITRATE 50 UG/ML
INJECTION, SOLUTION INTRAMUSCULAR; INTRAVENOUS
Status: COMPLETED | OUTPATIENT
Start: 2021-01-01 | End: 2021-01-01

## 2021-01-01 RX ORDER — LOSARTAN POTASSIUM 25 MG/1
25 TABLET ORAL DAILY
Qty: 30 TABLET | Refills: 12 | Status: SHIPPED | OUTPATIENT
Start: 2021-01-01 | End: 2021-01-01

## 2021-01-01 RX ORDER — ASPIRIN 81 MG/1
81 TABLET ORAL DAILY
Status: DISCONTINUED | OUTPATIENT
Start: 2021-01-01 | End: 2021-01-01 | Stop reason: HOSPADM

## 2021-01-01 RX ORDER — FUROSEMIDE 10 MG/ML
40 INJECTION INTRAMUSCULAR; INTRAVENOUS ONCE
Status: COMPLETED | OUTPATIENT
Start: 2021-01-01 | End: 2021-01-01

## 2021-01-01 RX ORDER — ONDANSETRON 4 MG/1
4 TABLET, FILM COATED ORAL EVERY 6 HOURS PRN
Status: DISCONTINUED | OUTPATIENT
Start: 2021-01-01 | End: 2021-01-01 | Stop reason: HOSPADM

## 2021-01-01 RX ORDER — LOSARTAN POTASSIUM 50 MG/1
50 TABLET ORAL DAILY
COMMUNITY
Start: 2021-01-01 | End: 2021-01-01 | Stop reason: HOSPADM

## 2021-01-01 RX ORDER — SODIUM CHLORIDE 0.9 % (FLUSH) 0.9 %
10 SYRINGE (ML) INJECTION AS NEEDED
Status: DISCONTINUED | OUTPATIENT
Start: 2021-01-01 | End: 2021-01-01 | Stop reason: HOSPADM

## 2021-01-01 RX ORDER — MIDAZOLAM HYDROCHLORIDE 1 MG/ML
0.5 INJECTION INTRAMUSCULAR; INTRAVENOUS
Status: DISCONTINUED | OUTPATIENT
Start: 2021-01-01 | End: 2021-01-01 | Stop reason: HOSPADM

## 2021-01-01 RX ORDER — SODIUM CHLORIDE 0.9 % (FLUSH) 0.9 %
3-10 SYRINGE (ML) INJECTION AS NEEDED
Status: DISCONTINUED | OUTPATIENT
Start: 2021-01-01 | End: 2021-01-01 | Stop reason: HOSPADM

## 2021-01-01 RX ORDER — SODIUM CHLORIDE 9 MG/ML
9 INJECTION, SOLUTION INTRAVENOUS CONTINUOUS
Status: DISCONTINUED | OUTPATIENT
Start: 2021-01-01 | End: 2021-01-01 | Stop reason: HOSPADM

## 2021-01-01 RX ORDER — CLOPIDOGREL BISULFATE 75 MG/1
75 TABLET ORAL DAILY
Status: DISCONTINUED | OUTPATIENT
Start: 2021-01-01 | End: 2021-01-01 | Stop reason: HOSPADM

## 2021-01-01 RX ORDER — FUROSEMIDE 20 MG/1
20 TABLET ORAL DAILY
Status: DISCONTINUED | OUTPATIENT
Start: 2021-01-01 | End: 2021-01-01 | Stop reason: HOSPADM

## 2021-01-01 RX ORDER — MAGNESIUM SULFATE 1 G/100ML
1 INJECTION INTRAVENOUS AS NEEDED
Status: DISCONTINUED | OUTPATIENT
Start: 2021-01-01 | End: 2021-01-01 | Stop reason: HOSPADM

## 2021-01-01 RX ORDER — FUROSEMIDE 40 MG/1
20 TABLET ORAL DAILY
Qty: 30 TABLET | Refills: 0 | Status: SHIPPED | OUTPATIENT
Start: 2021-01-01 | End: 2021-01-01

## 2021-01-01 RX ORDER — SODIUM CHLORIDE 9 MG/ML
75 INJECTION, SOLUTION INTRAVENOUS CONTINUOUS
Status: DISPENSED | OUTPATIENT
Start: 2021-01-01 | End: 2021-01-01

## 2021-01-01 RX ORDER — PROPRANOLOL HYDROCHLORIDE 10 MG/1
10 TABLET ORAL 2 TIMES DAILY
COMMUNITY
End: 2021-01-01 | Stop reason: SDUPTHER

## 2021-01-01 RX ORDER — DEXMEDETOMIDINE HYDROCHLORIDE 4 UG/ML
INJECTION, SOLUTION INTRAVENOUS CONTINUOUS PRN
Status: DISCONTINUED | OUTPATIENT
Start: 2021-01-01 | End: 2021-01-01 | Stop reason: SURG

## 2021-01-01 RX ORDER — ALUMINA, MAGNESIA, AND SIMETHICONE 2400; 2400; 240 MG/30ML; MG/30ML; MG/30ML
15 SUSPENSION ORAL EVERY 6 HOURS PRN
Status: DISCONTINUED | OUTPATIENT
Start: 2021-01-01 | End: 2021-01-01 | Stop reason: HOSPADM

## 2021-01-01 RX ORDER — ALUMINA, MAGNESIA, AND SIMETHICONE 2400; 2400; 240 MG/30ML; MG/30ML; MG/30ML
15 SUSPENSION ORAL EVERY 6 HOURS PRN
Status: DISCONTINUED | OUTPATIENT
Start: 2021-01-01 | End: 2021-01-01

## 2021-01-01 RX ORDER — FUROSEMIDE 40 MG/1
TABLET ORAL
Qty: 30 TABLET | Refills: 3 | Status: ON HOLD | OUTPATIENT
Start: 2021-01-01 | End: 2022-01-01

## 2021-01-01 RX ORDER — FUROSEMIDE 40 MG/1
40 TABLET ORAL DAILY
Qty: 30 TABLET | Refills: 3 | Status: ON HOLD | OUTPATIENT
Start: 2021-01-01 | End: 2021-01-01 | Stop reason: SDUPTHER

## 2021-01-01 RX ORDER — LIDOCAINE HYDROCHLORIDE 10 MG/ML
0.5 INJECTION, SOLUTION EPIDURAL; INFILTRATION; INTRACAUDAL; PERINEURAL ONCE AS NEEDED
Status: DISCONTINUED | OUTPATIENT
Start: 2021-01-01 | End: 2021-01-01 | Stop reason: HOSPADM

## 2021-01-01 RX ORDER — MIDAZOLAM HYDROCHLORIDE 1 MG/ML
INJECTION INTRAMUSCULAR; INTRAVENOUS
Status: COMPLETED
Start: 2021-01-01 | End: 2021-01-01

## 2021-01-01 RX ORDER — CLOPIDOGREL BISULFATE 75 MG/1
75 TABLET ORAL DAILY
Qty: 30 TABLET | Refills: 2 | Status: SHIPPED | OUTPATIENT
Start: 2021-01-01 | End: 2021-01-01 | Stop reason: SDUPTHER

## 2021-01-01 RX ORDER — ACETAMINOPHEN 325 MG/1
650 TABLET ORAL ONCE
Status: COMPLETED | OUTPATIENT
Start: 2021-01-01 | End: 2021-01-01

## 2021-01-01 RX ORDER — AMIODARONE HYDROCHLORIDE 200 MG/1
300 TABLET ORAL EVERY 12 HOURS SCHEDULED
Status: DISCONTINUED | OUTPATIENT
Start: 2021-01-01 | End: 2021-01-01

## 2021-01-01 RX ORDER — LORAZEPAM 2 MG/ML
1 INJECTION INTRAMUSCULAR
Status: DISCONTINUED | OUTPATIENT
Start: 2021-01-01 | End: 2021-01-01 | Stop reason: HOSPADM

## 2021-01-01 RX ORDER — AMIODARONE HCL/D5W 450 MG/250
PLASTIC BAG, INJECTION (ML) INTRAVENOUS
Status: COMPLETED
Start: 2021-01-01 | End: 2021-01-01

## 2021-01-01 RX ORDER — SODIUM CHLORIDE 9 MG/ML
75 INJECTION, SOLUTION INTRAVENOUS CONTINUOUS
Status: DISCONTINUED | OUTPATIENT
Start: 2021-01-01 | End: 2021-01-01

## 2021-01-01 RX ORDER — CLOPIDOGREL BISULFATE 75 MG/1
75 TABLET ORAL DAILY
Status: DISCONTINUED | OUTPATIENT
Start: 2021-01-01 | End: 2021-01-01

## 2021-01-01 RX ORDER — FOLIC ACID 1 MG/1
500 TABLET ORAL DAILY
Status: DISCONTINUED | OUTPATIENT
Start: 2021-01-01 | End: 2021-01-01 | Stop reason: HOSPADM

## 2021-01-01 RX ORDER — CLOPIDOGREL BISULFATE 75 MG/1
75 TABLET ORAL DAILY
Qty: 90 TABLET | Refills: 1 | Status: ON HOLD | OUTPATIENT
Start: 2021-01-01 | End: 2022-01-01

## 2021-01-01 RX ORDER — NYSTATIN 100000 U/G
1 CREAM TOPICAL EVERY 12 HOURS SCHEDULED
Status: DISCONTINUED | OUTPATIENT
Start: 2021-01-01 | End: 2021-01-01 | Stop reason: HOSPADM

## 2021-01-01 RX ORDER — ACETAMINOPHEN 325 MG/1
650 TABLET ORAL EVERY 4 HOURS PRN
Status: DISCONTINUED | OUTPATIENT
Start: 2021-01-01 | End: 2021-01-01 | Stop reason: HOSPADM

## 2021-01-01 RX ORDER — CHLORHEXIDINE GLUCONATE 0.12 MG/ML
RINSE ORAL
Qty: 30 ML | Refills: 0 | Status: SHIPPED | OUTPATIENT
Start: 2021-01-01 | End: 2021-01-01 | Stop reason: HOSPADM

## 2021-01-01 RX ORDER — CHLORHEXIDINE GLUCONATE 0.12 MG/ML
15 RINSE ORAL ONCE
Status: CANCELLED | OUTPATIENT
Start: 2021-01-01 | End: 2021-01-01

## 2021-01-01 RX ORDER — FUROSEMIDE 20 MG/1
20 TABLET ORAL DAILY
Qty: 90 TABLET | Refills: 3 | Status: SHIPPED | OUTPATIENT
Start: 2021-01-01 | End: 2021-01-01

## 2021-01-01 RX ORDER — IPRATROPIUM BROMIDE AND ALBUTEROL SULFATE 2.5; .5 MG/3ML; MG/3ML
3 SOLUTION RESPIRATORY (INHALATION)
Status: DISCONTINUED | OUTPATIENT
Start: 2021-01-01 | End: 2021-01-01

## 2021-01-01 RX ORDER — MIDAZOLAM HYDROCHLORIDE 1 MG/ML
INJECTION INTRAMUSCULAR; INTRAVENOUS AS NEEDED
Status: DISCONTINUED | OUTPATIENT
Start: 2021-01-01 | End: 2021-01-01 | Stop reason: HOSPADM

## 2021-01-01 RX ORDER — AMIODARONE HYDROCHLORIDE 200 MG/1
200 TABLET ORAL
Qty: 30 TABLET | Refills: 1 | Status: ON HOLD | OUTPATIENT
Start: 2021-01-01 | End: 2022-01-01

## 2021-01-01 RX ORDER — SODIUM CHLORIDE 9 MG/ML
50 INJECTION, SOLUTION INTRAVENOUS CONTINUOUS
Status: ACTIVE | OUTPATIENT
Start: 2021-01-01 | End: 2021-01-01

## 2021-01-01 RX ORDER — MIDAZOLAM HYDROCHLORIDE 1 MG/ML
INJECTION INTRAMUSCULAR; INTRAVENOUS AS NEEDED
Status: DISCONTINUED | OUTPATIENT
Start: 2021-01-01 | End: 2021-01-01 | Stop reason: SURG

## 2021-01-01 RX ORDER — AMIODARONE HYDROCHLORIDE 200 MG/1
200 TABLET ORAL
Status: DISCONTINUED | OUTPATIENT
Start: 2021-01-01 | End: 2021-01-01 | Stop reason: HOSPADM

## 2021-01-01 RX ORDER — LOSARTAN POTASSIUM 25 MG/1
25 TABLET ORAL DAILY
Status: DISCONTINUED | OUTPATIENT
Start: 2021-01-01 | End: 2021-01-01 | Stop reason: HOSPADM

## 2021-01-01 RX ORDER — SULFAMETHOXAZOLE AND TRIMETHOPRIM 400; 80 MG/1; MG/1
1 TABLET ORAL EVERY 12 HOURS SCHEDULED
Status: DISCONTINUED | OUTPATIENT
Start: 2021-01-01 | End: 2021-01-01 | Stop reason: HOSPADM

## 2021-01-01 RX ORDER — NITROGLYCERIN 0.4 MG/1
0.4 TABLET SUBLINGUAL
Status: DISCONTINUED | OUTPATIENT
Start: 2021-01-01 | End: 2021-01-01

## 2021-01-01 RX ORDER — FAMOTIDINE 10 MG/ML
20 INJECTION, SOLUTION INTRAVENOUS ONCE
Status: COMPLETED | OUTPATIENT
Start: 2021-01-01 | End: 2021-01-01

## 2021-01-01 RX ORDER — IPRATROPIUM BROMIDE AND ALBUTEROL SULFATE 2.5; .5 MG/3ML; MG/3ML
3 SOLUTION RESPIRATORY (INHALATION) EVERY 4 HOURS PRN
Status: DISCONTINUED | OUTPATIENT
Start: 2021-01-01 | End: 2021-01-01 | Stop reason: HOSPADM

## 2021-01-01 RX ORDER — ASPIRIN 81 MG/1
81 TABLET ORAL DAILY
Status: DISCONTINUED | OUTPATIENT
Start: 2021-01-01 | End: 2021-01-01 | Stop reason: SDUPTHER

## 2021-01-01 RX ORDER — SODIUM CHLORIDE 450 MG/100ML
75 INJECTION, SOLUTION INTRAVENOUS CONTINUOUS
Status: DISCONTINUED | OUTPATIENT
Start: 2021-01-01 | End: 2021-01-01 | Stop reason: HOSPADM

## 2021-01-01 RX ORDER — DIPHENHYDRAMINE HCL 50 MG
50 CAPSULE ORAL ONCE
Status: DISCONTINUED | OUTPATIENT
Start: 2021-01-01 | End: 2021-01-01

## 2021-01-01 RX ORDER — ACETAMINOPHEN 650 MG/1
650 SUPPOSITORY RECTAL EVERY 4 HOURS PRN
Status: DISCONTINUED | OUTPATIENT
Start: 2021-01-01 | End: 2021-01-01 | Stop reason: HOSPADM

## 2021-01-01 RX ORDER — FENTANYL CITRATE 50 UG/ML
50 INJECTION, SOLUTION INTRAMUSCULAR; INTRAVENOUS
Status: DISCONTINUED | OUTPATIENT
Start: 2021-01-01 | End: 2021-01-01

## 2021-01-01 RX ORDER — FENTANYL CITRATE 50 UG/ML
INJECTION, SOLUTION INTRAMUSCULAR; INTRAVENOUS AS NEEDED
Status: DISCONTINUED | OUTPATIENT
Start: 2021-01-01 | End: 2021-01-01 | Stop reason: HOSPADM

## 2021-01-01 RX ORDER — METOPROLOL TARTRATE 5 MG/5ML
5 INJECTION INTRAVENOUS ONCE
Status: COMPLETED | OUTPATIENT
Start: 2021-01-01 | End: 2021-01-01

## 2021-01-01 RX ORDER — FLUMAZENIL 0.1 MG/ML
0.5 INJECTION INTRAVENOUS ONCE
Status: COMPLETED | OUTPATIENT
Start: 2021-01-01 | End: 2021-01-01

## 2021-01-01 RX ORDER — POTASSIUM CHLORIDE 1.5 G/1.77G
40 POWDER, FOR SOLUTION ORAL AS NEEDED
Status: DISCONTINUED | OUTPATIENT
Start: 2021-01-01 | End: 2021-01-01 | Stop reason: HOSPADM

## 2021-01-01 RX ORDER — NYSTATIN 100000 U/G
1 CREAM TOPICAL EVERY 12 HOURS SCHEDULED
Status: DISCONTINUED | OUTPATIENT
Start: 2021-01-01 | End: 2021-01-01

## 2021-01-01 RX ORDER — SODIUM CHLORIDE 0.9 % (FLUSH) 0.9 %
10 SYRINGE (ML) INJECTION EVERY 12 HOURS SCHEDULED
Status: DISCONTINUED | OUTPATIENT
Start: 2021-01-01 | End: 2021-01-01 | Stop reason: HOSPADM

## 2021-01-01 RX ORDER — ALLOPURINOL 300 MG/1
300 TABLET ORAL DAILY
Qty: 90 TABLET | Refills: 1 | Status: SHIPPED | OUTPATIENT
Start: 2021-01-01

## 2021-01-01 RX ORDER — LOSARTAN POTASSIUM 50 MG/1
50 TABLET ORAL DAILY
Status: DISCONTINUED | OUTPATIENT
Start: 2021-01-01 | End: 2021-01-01

## 2021-01-01 RX ORDER — PROTAMINE SULFATE 10 MG/ML
INJECTION, SOLUTION INTRAVENOUS AS NEEDED
Status: DISCONTINUED | OUTPATIENT
Start: 2021-01-01 | End: 2021-01-01 | Stop reason: SURG

## 2021-01-01 RX ORDER — MELATONIN
500 DAILY
Status: DISCONTINUED | OUTPATIENT
Start: 2021-01-01 | End: 2021-01-01 | Stop reason: HOSPADM

## 2021-01-01 RX ORDER — PROPOFOL 10 MG/ML
VIAL (ML) INTRAVENOUS
Status: COMPLETED
Start: 2021-01-01 | End: 2021-01-01

## 2021-01-01 RX ORDER — CHLORHEXIDINE GLUCONATE 0.12 MG/ML
15 RINSE ORAL ONCE
Status: COMPLETED | OUTPATIENT
Start: 2021-01-01 | End: 2021-01-01

## 2021-01-01 RX ORDER — HEPARIN SODIUM 1000 [USP'U]/ML
INJECTION, SOLUTION INTRAVENOUS; SUBCUTANEOUS AS NEEDED
Status: DISCONTINUED | OUTPATIENT
Start: 2021-01-01 | End: 2021-01-01 | Stop reason: SURG

## 2021-01-01 RX ORDER — ASPIRIN 81 MG/1
81 TABLET ORAL DAILY
Qty: 14 TABLET | Refills: 0 | Status: SHIPPED | OUTPATIENT
Start: 2021-01-01 | End: 2021-01-01

## 2021-01-01 RX ORDER — PROPRANOLOL HYDROCHLORIDE 20 MG/1
10 TABLET ORAL DAILY
Status: DISCONTINUED | OUTPATIENT
Start: 2021-01-01 | End: 2021-01-01 | Stop reason: HOSPADM

## 2021-01-01 RX ORDER — LIDOCAINE HYDROCHLORIDE 10 MG/ML
0.1 INJECTION, SOLUTION EPIDURAL; INFILTRATION; INTRACAUDAL; PERINEURAL ONCE AS NEEDED
Status: DISCONTINUED | OUTPATIENT
Start: 2021-01-01 | End: 2021-01-01 | Stop reason: HOSPADM

## 2021-01-01 RX ORDER — LIDOCAINE HYDROCHLORIDE AND EPINEPHRINE BITARTRATE 20; .01 MG/ML; MG/ML
INJECTION, SOLUTION SUBCUTANEOUS AS NEEDED
Status: DISCONTINUED | OUTPATIENT
Start: 2021-01-01 | End: 2021-01-01 | Stop reason: HOSPADM

## 2021-01-01 RX ORDER — POTASSIUM CHLORIDE 750 MG/1
TABLET, FILM COATED, EXTENDED RELEASE ORAL
Qty: 30 TABLET | Refills: 11 | OUTPATIENT
Start: 2021-01-01

## 2021-01-01 RX ORDER — CLOPIDOGREL 300 MG/1
TABLET, FILM COATED ORAL AS NEEDED
Status: DISCONTINUED | OUTPATIENT
Start: 2021-01-01 | End: 2021-01-01 | Stop reason: HOSPADM

## 2021-01-01 RX ORDER — LORAZEPAM 1 MG/1
1 TABLET ORAL ONCE
Status: COMPLETED | OUTPATIENT
Start: 2021-01-01 | End: 2021-01-01

## 2021-01-01 RX ORDER — HEPARIN SODIUM 1000 [USP'U]/ML
INJECTION, SOLUTION INTRAVENOUS; SUBCUTANEOUS AS NEEDED
Status: DISCONTINUED | OUTPATIENT
Start: 2021-01-01 | End: 2021-01-01 | Stop reason: HOSPADM

## 2021-01-01 RX ORDER — POTASSIUM CHLORIDE 750 MG/1
10 TABLET, FILM COATED, EXTENDED RELEASE ORAL DAILY
Qty: 30 TABLET | Refills: 11 | Status: SHIPPED | OUTPATIENT
Start: 2021-01-01 | End: 2021-01-01

## 2021-01-01 RX ORDER — DEXMEDETOMIDINE HYDROCHLORIDE 100 UG/ML
INJECTION, SOLUTION INTRAVENOUS AS NEEDED
Status: DISCONTINUED | OUTPATIENT
Start: 2021-01-01 | End: 2021-01-01 | Stop reason: SURG

## 2021-01-01 RX ORDER — CLOPIDOGREL BISULFATE 75 MG/1
75 TABLET ORAL DAILY
Qty: 90 TABLET | Refills: 3 | Status: ON HOLD | OUTPATIENT
Start: 2021-01-01 | End: 2022-01-01 | Stop reason: SDUPTHER

## 2021-01-01 RX ORDER — ONDANSETRON 2 MG/ML
4 INJECTION INTRAMUSCULAR; INTRAVENOUS EVERY 6 HOURS PRN
Status: DISCONTINUED | OUTPATIENT
Start: 2021-01-01 | End: 2021-01-01

## 2021-01-01 RX ORDER — ATORVASTATIN CALCIUM 20 MG/1
20 TABLET, FILM COATED ORAL NIGHTLY
Status: DISCONTINUED | OUTPATIENT
Start: 2021-01-01 | End: 2021-01-01 | Stop reason: HOSPADM

## 2021-01-01 RX ORDER — ONDANSETRON 4 MG/1
4 TABLET, FILM COATED ORAL EVERY 6 HOURS PRN
Status: DISCONTINUED | OUTPATIENT
Start: 2021-01-01 | End: 2021-01-01

## 2021-01-01 RX ORDER — PROPOFOL 10 MG/ML
50 VIAL (ML) INTRAVENOUS ONCE
Status: COMPLETED | OUTPATIENT
Start: 2021-01-01 | End: 2021-01-01

## 2021-01-01 RX ORDER — ACETAMINOPHEN 325 MG/1
650 TABLET ORAL EVERY 4 HOURS PRN
Status: DISCONTINUED | OUTPATIENT
Start: 2021-01-01 | End: 2021-01-01

## 2021-01-01 RX ORDER — FLUMAZENIL 0.1 MG/ML
INJECTION INTRAVENOUS
Status: COMPLETED
Start: 2021-01-01 | End: 2021-01-01

## 2021-01-01 RX ORDER — PROPRANOLOL HYDROCHLORIDE 10 MG/1
10 TABLET ORAL DAILY
Qty: 90 TABLET | Refills: 1 | Status: SHIPPED | OUTPATIENT
Start: 2021-01-01 | End: 2021-01-01 | Stop reason: HOSPADM

## 2021-01-01 RX ORDER — ATORVASTATIN CALCIUM 20 MG/1
20 TABLET, FILM COATED ORAL DAILY
Status: DISCONTINUED | OUTPATIENT
Start: 2021-01-01 | End: 2021-01-01

## 2021-01-01 RX ORDER — FAMOTIDINE 20 MG/1
20 TABLET, FILM COATED ORAL 2 TIMES DAILY
Status: DISCONTINUED | OUTPATIENT
Start: 2021-01-01 | End: 2021-01-01 | Stop reason: HOSPADM

## 2021-01-01 RX ORDER — PROPRANOLOL HYDROCHLORIDE 20 MG/1
10 TABLET ORAL DAILY
Status: DISCONTINUED | OUTPATIENT
Start: 2021-01-01 | End: 2021-01-01

## 2021-01-01 RX ORDER — MIDAZOLAM HYDROCHLORIDE 1 MG/ML
INJECTION INTRAMUSCULAR; INTRAVENOUS
Status: COMPLETED | OUTPATIENT
Start: 2021-01-01 | End: 2021-01-01

## 2021-01-01 RX ORDER — MAGNESIUM SULFATE HEPTAHYDRATE 40 MG/ML
2 INJECTION, SOLUTION INTRAVENOUS AS NEEDED
Status: DISCONTINUED | OUTPATIENT
Start: 2021-01-01 | End: 2021-01-01 | Stop reason: HOSPADM

## 2021-01-01 RX ORDER — NITROGLYCERIN 0.4 MG/1
0.4 TABLET SUBLINGUAL
Status: DISCONTINUED | OUTPATIENT
Start: 2021-01-01 | End: 2021-01-01 | Stop reason: HOSPADM

## 2021-01-01 RX ORDER — POTASSIUM CHLORIDE 7.45 MG/ML
10 INJECTION INTRAVENOUS
Status: DISCONTINUED | OUTPATIENT
Start: 2021-01-01 | End: 2021-01-01 | Stop reason: HOSPADM

## 2021-01-01 RX ORDER — APIXABAN 5 MG/1
TABLET, FILM COATED ORAL
Qty: 180 TABLET | Refills: 3 | Status: SHIPPED | OUTPATIENT
Start: 2021-01-01 | End: 2021-01-01 | Stop reason: HOSPADM

## 2021-01-01 RX ORDER — SODIUM CHLORIDE 9 MG/ML
250 INJECTION, SOLUTION INTRAVENOUS ONCE AS NEEDED
Status: DISCONTINUED | OUTPATIENT
Start: 2021-01-01 | End: 2021-01-01 | Stop reason: HOSPADM

## 2021-01-01 RX ORDER — DIPHENHYDRAMINE HCL 25 MG
50 CAPSULE ORAL ONCE
Status: COMPLETED | OUTPATIENT
Start: 2021-01-01 | End: 2021-01-01

## 2021-01-01 RX ORDER — LORAZEPAM 0.5 MG/1
0.5 TABLET ORAL
Status: DISCONTINUED | OUTPATIENT
Start: 2021-01-01 | End: 2021-01-01 | Stop reason: HOSPADM

## 2021-01-01 RX ORDER — CHLORTHALIDONE 25 MG/1
25 TABLET ORAL DAILY
Status: DISCONTINUED | OUTPATIENT
Start: 2021-01-01 | End: 2021-01-01

## 2021-01-01 RX ORDER — AMIODARONE HCL/D5W 450 MG/250
0.5 PLASTIC BAG, INJECTION (ML) INTRAVENOUS CONTINUOUS
Status: DISCONTINUED | OUTPATIENT
Start: 2021-01-01 | End: 2021-01-01

## 2021-01-01 RX ORDER — METOPROLOL SUCCINATE 25 MG/1
25 TABLET, EXTENDED RELEASE ORAL
Status: DISCONTINUED | OUTPATIENT
Start: 2021-01-01 | End: 2021-01-01 | Stop reason: HOSPADM

## 2021-01-01 RX ORDER — AMIODARONE HCL/D5W 450 MG/250
1 PLASTIC BAG, INJECTION (ML) INTRAVENOUS CONTINUOUS
Status: ACTIVE | OUTPATIENT
Start: 2021-01-01 | End: 2021-01-01

## 2021-01-01 RX ORDER — KETAMINE HYDROCHLORIDE 10 MG/ML
INJECTION INTRAMUSCULAR; INTRAVENOUS AS NEEDED
Status: DISCONTINUED | OUTPATIENT
Start: 2021-01-01 | End: 2021-01-01 | Stop reason: SURG

## 2021-01-01 RX ORDER — SODIUM CHLORIDE, SODIUM LACTATE, POTASSIUM CHLORIDE, CALCIUM CHLORIDE 600; 310; 30; 20 MG/100ML; MG/100ML; MG/100ML; MG/100ML
9 INJECTION, SOLUTION INTRAVENOUS CONTINUOUS
Status: DISCONTINUED | OUTPATIENT
Start: 2021-01-01 | End: 2021-01-01

## 2021-01-01 RX ORDER — PROPRANOLOL HYDROCHLORIDE 10 MG/1
10 TABLET ORAL 2 TIMES DAILY
Qty: 180 TABLET | Refills: 3 | Status: SHIPPED | OUTPATIENT
Start: 2021-01-01

## 2021-01-01 RX ORDER — ATORVASTATIN CALCIUM 20 MG/1
20 TABLET, FILM COATED ORAL DAILY
Qty: 90 TABLET | Refills: 1 | Status: SHIPPED | OUTPATIENT
Start: 2021-01-01

## 2021-01-01 RX ORDER — LORAZEPAM 1 MG/1
1 TABLET ORAL
Status: DISCONTINUED | OUTPATIENT
Start: 2021-01-01 | End: 2021-01-01 | Stop reason: HOSPADM

## 2021-01-01 RX ORDER — MAGNESIUM SULFATE HEPTAHYDRATE 40 MG/ML
2 INJECTION, SOLUTION INTRAVENOUS ONCE
Status: COMPLETED | OUTPATIENT
Start: 2021-01-01 | End: 2021-01-01

## 2021-01-01 RX ORDER — DIPHENHYDRAMINE HCL 25 MG
25 CAPSULE ORAL EVERY 6 HOURS PRN
Status: DISCONTINUED | OUTPATIENT
Start: 2021-01-01 | End: 2021-01-01 | Stop reason: HOSPADM

## 2021-01-01 RX ORDER — POTASSIUM CHLORIDE 750 MG/1
40 TABLET, FILM COATED, EXTENDED RELEASE ORAL AS NEEDED
Status: DISCONTINUED | OUTPATIENT
Start: 2021-01-01 | End: 2021-01-01 | Stop reason: HOSPADM

## 2021-01-01 RX ORDER — POTASSIUM CHLORIDE 20 MEQ/1
40 TABLET, EXTENDED RELEASE ORAL AS NEEDED
Status: DISCONTINUED | OUTPATIENT
Start: 2021-01-01 | End: 2021-01-01 | Stop reason: HOSPADM

## 2021-01-01 RX ORDER — ERGOCALCIFEROL (VITAMIN D2) 10 MCG
400 TABLET ORAL DAILY
COMMUNITY

## 2021-01-01 RX ORDER — SODIUM CHLORIDE, SODIUM LACTATE, POTASSIUM CHLORIDE, CALCIUM CHLORIDE 600; 310; 30; 20 MG/100ML; MG/100ML; MG/100ML; MG/100ML
INJECTION, SOLUTION INTRAVENOUS CONTINUOUS PRN
Status: DISCONTINUED | OUTPATIENT
Start: 2021-01-01 | End: 2021-01-01 | Stop reason: SURG

## 2021-01-01 RX ADMIN — SULFAMETHOXAZOLE AND TRIMETHOPRIM 1 TABLET: 400; 80 TABLET ORAL at 08:05

## 2021-01-01 RX ADMIN — PROPRANOLOL HYDROCHLORIDE 10 MG: 20 TABLET ORAL at 08:06

## 2021-01-01 RX ADMIN — KETAMINE HYDROCHLORIDE 10 MG: 10 INJECTION INTRAMUSCULAR; INTRAVENOUS at 13:00

## 2021-01-01 RX ADMIN — LOSARTAN POTASSIUM 25 MG: 25 TABLET, FILM COATED ORAL at 08:02

## 2021-01-01 RX ADMIN — Medication 10 ML: at 09:08

## 2021-01-01 RX ADMIN — ALLOPURINOL 300 MG: 300 TABLET ORAL at 08:11

## 2021-01-01 RX ADMIN — Medication 1 TABLET: at 08:02

## 2021-01-01 RX ADMIN — CLOPIDOGREL BISULFATE 75 MG: 75 TABLET ORAL at 09:50

## 2021-01-01 RX ADMIN — Medication 3 ML: at 22:01

## 2021-01-01 RX ADMIN — NYSTATIN 1 APPLICATION: 100000 CREAM TOPICAL at 09:52

## 2021-01-01 RX ADMIN — IRON SUCROSE 300 MG: 20 INJECTION, SOLUTION INTRAVENOUS at 15:47

## 2021-01-01 RX ADMIN — ALLOPURINOL 300 MG: 300 TABLET ORAL at 08:04

## 2021-01-01 RX ADMIN — LOSARTAN POTASSIUM 25 MG: 25 TABLET, FILM COATED ORAL at 08:11

## 2021-01-01 RX ADMIN — Medication 10 ML: at 09:52

## 2021-01-01 RX ADMIN — ASPIRIN 81 MG: 81 TABLET, COATED ORAL at 08:23

## 2021-01-01 RX ADMIN — FAMOTIDINE 20 MG: 20 TABLET, FILM COATED ORAL at 21:13

## 2021-01-01 RX ADMIN — Medication 500 UNITS: at 08:58

## 2021-01-01 RX ADMIN — ACETAMINOPHEN 650 MG: 325 TABLET, FILM COATED ORAL at 22:01

## 2021-01-01 RX ADMIN — Medication 3 ML: at 09:00

## 2021-01-01 RX ADMIN — SULFAMETHOXAZOLE AND TRIMETHOPRIM 1 TABLET: 400; 80 TABLET ORAL at 08:34

## 2021-01-01 RX ADMIN — CLOPIDOGREL BISULFATE 75 MG: 75 TABLET ORAL at 18:13

## 2021-01-01 RX ADMIN — ASPIRIN 81 MG: 81 TABLET, COATED ORAL at 20:26

## 2021-01-01 RX ADMIN — PROPOFOL 10 MCG/KG/MIN: 10 INJECTION, EMULSION INTRAVENOUS at 12:40

## 2021-01-01 RX ADMIN — CHLORHEXIDINE GLUCONATE 15 ML: 1.2 RINSE ORAL at 09:34

## 2021-01-01 RX ADMIN — MIDAZOLAM 2 MG: 1 INJECTION INTRAMUSCULAR; INTRAVENOUS at 12:23

## 2021-01-01 RX ADMIN — ACETAMINOPHEN 650 MG: 325 TABLET, FILM COATED ORAL at 01:54

## 2021-01-01 RX ADMIN — DIPHENHYDRAMINE HYDROCHLORIDE 25 MG: 25 CAPSULE ORAL at 19:56

## 2021-01-01 RX ADMIN — ENOXAPARIN SODIUM 150 MG: 150 INJECTION, SOLUTION INTRAVENOUS; SUBCUTANEOUS at 20:04

## 2021-01-01 RX ADMIN — FLUMAZENIL 0.2 MG: 0.1 INJECTION INTRAVENOUS at 12:33

## 2021-01-01 RX ADMIN — SODIUM CHLORIDE 500 ML: 9 INJECTION, SOLUTION INTRAVENOUS at 10:10

## 2021-01-01 RX ADMIN — APIXABAN 5 MG: 5 TABLET, FILM COATED ORAL at 19:56

## 2021-01-01 RX ADMIN — SODIUM CHLORIDE 75 ML/HR: 9 INJECTION, SOLUTION INTRAVENOUS at 05:55

## 2021-01-01 RX ADMIN — NYSTATIN 1 APPLICATION: 100000 CREAM TOPICAL at 08:12

## 2021-01-01 RX ADMIN — Medication 600 MG: at 08:58

## 2021-01-01 RX ADMIN — Medication 3 ML: at 08:35

## 2021-01-01 RX ADMIN — SULFAMETHOXAZOLE AND TRIMETHOPRIM 1 TABLET: 400; 80 TABLET ORAL at 08:11

## 2021-01-01 RX ADMIN — Medication 2000 MG: at 15:45

## 2021-01-01 RX ADMIN — LOSARTAN POTASSIUM 25 MG: 25 TABLET, FILM COATED ORAL at 08:49

## 2021-01-01 RX ADMIN — MIDAZOLAM HYDROCHLORIDE 2 MG: 1 INJECTION INTRAMUSCULAR; INTRAVENOUS at 12:27

## 2021-01-01 RX ADMIN — FAMOTIDINE 20 MG: 10 INJECTION INTRAVENOUS at 10:18

## 2021-01-01 RX ADMIN — DIPHENHYDRAMINE HYDROCHLORIDE 25 MG: 25 CAPSULE ORAL at 14:53

## 2021-01-01 RX ADMIN — METOPROLOL SUCCINATE 25 MG: 25 TABLET, EXTENDED RELEASE ORAL at 15:22

## 2021-01-01 RX ADMIN — FENTANYL CITRATE 50 MCG: 50 INJECTION INTRAMUSCULAR; INTRAVENOUS at 10:45

## 2021-01-01 RX ADMIN — DIPHENHYDRAMINE HYDROCHLORIDE 50 MG: 25 CAPSULE ORAL at 20:10

## 2021-01-01 RX ADMIN — ATORVASTATIN CALCIUM 10 MG: 20 TABLET, FILM COATED ORAL at 20:49

## 2021-01-01 RX ADMIN — ACETAMINOPHEN 650 MG: 325 TABLET, FILM COATED ORAL at 20:05

## 2021-01-01 RX ADMIN — MIDAZOLAM 1 MG: 1 INJECTION INTRAMUSCULAR; INTRAVENOUS at 12:30

## 2021-01-01 RX ADMIN — SULFAMETHOXAZOLE AND TRIMETHOPRIM 1 TABLET: 400; 80 TABLET ORAL at 21:47

## 2021-01-01 RX ADMIN — DEXMEDETOMIDINE 72 MCG: 100 INJECTION, SOLUTION, CONCENTRATE INTRAVENOUS at 12:32

## 2021-01-01 RX ADMIN — Medication 10 ML: at 08:58

## 2021-01-01 RX ADMIN — ACETAMINOPHEN 650 MG: 325 TABLET, FILM COATED ORAL at 00:02

## 2021-01-01 RX ADMIN — Medication 500 UNITS: at 08:57

## 2021-01-01 RX ADMIN — Medication 100 MG: at 08:02

## 2021-01-01 RX ADMIN — Medication 500 UNITS: at 08:05

## 2021-01-01 RX ADMIN — ASPIRIN 81 MG: 81 TABLET, COATED ORAL at 08:02

## 2021-01-01 RX ADMIN — DIPHENHYDRAMINE HYDROCHLORIDE 25 MG: 25 CAPSULE ORAL at 05:48

## 2021-01-01 RX ADMIN — PHENYLEPHRINE HYDROCHLORIDE 0.4 MCG/KG/MIN: 10 INJECTION, SOLUTION INTRAMUSCULAR; INTRAVENOUS; SUBCUTANEOUS at 13:00

## 2021-01-01 RX ADMIN — CLOPIDOGREL BISULFATE 75 MG: 75 TABLET ORAL at 08:34

## 2021-01-01 RX ADMIN — NYSTATIN 1 APPLICATION: 100000 CREAM TOPICAL at 22:01

## 2021-01-01 RX ADMIN — NYSTATIN 1 APPLICATION: 100000 CREAM TOPICAL at 20:14

## 2021-01-01 RX ADMIN — ATORVASTATIN CALCIUM 10 MG: 20 TABLET, FILM COATED ORAL at 21:06

## 2021-01-01 RX ADMIN — LOSARTAN POTASSIUM 25 MG: 25 TABLET, FILM COATED ORAL at 08:23

## 2021-01-01 RX ADMIN — Medication 5 MG: at 21:00

## 2021-01-01 RX ADMIN — ASPIRIN 81 MG: 81 TABLET, COATED ORAL at 08:05

## 2021-01-01 RX ADMIN — ADENOSINE 6 MG: 3 INJECTION INTRAVENOUS at 12:19

## 2021-01-01 RX ADMIN — MAGNESIUM SULFATE HEPTAHYDRATE 2 G: 2 INJECTION, SOLUTION INTRAVENOUS at 12:41

## 2021-01-01 RX ADMIN — ACETAMINOPHEN 650 MG: 325 TABLET, FILM COATED ORAL at 13:48

## 2021-01-01 RX ADMIN — ASPIRIN 81 MG: 81 TABLET, COATED ORAL at 08:49

## 2021-01-01 RX ADMIN — ACETAMINOPHEN 650 MG: 325 TABLET, FILM COATED ORAL at 03:31

## 2021-01-01 RX ADMIN — ALLOPURINOL 300 MG: 300 TABLET ORAL at 09:50

## 2021-01-01 RX ADMIN — FENTANYL CITRATE 50 MCG: 50 INJECTION INTRAMUSCULAR; INTRAVENOUS at 10:24

## 2021-01-01 RX ADMIN — DIPHENHYDRAMINE HYDROCHLORIDE 25 MG: 25 CAPSULE ORAL at 21:00

## 2021-01-01 RX ADMIN — ASPIRIN 81 MG: 81 TABLET, COATED ORAL at 22:56

## 2021-01-01 RX ADMIN — MIDAZOLAM 1 MG: 1 INJECTION INTRAMUSCULAR; INTRAVENOUS at 10:45

## 2021-01-01 RX ADMIN — LORAZEPAM 1 MG: 1 TABLET ORAL at 13:43

## 2021-01-01 RX ADMIN — Medication 500 UNITS: at 09:50

## 2021-01-01 RX ADMIN — PROPRANOLOL HYDROCHLORIDE 10 MG: 20 TABLET ORAL at 08:23

## 2021-01-01 RX ADMIN — Medication 1200 MG: at 08:05

## 2021-01-01 RX ADMIN — NYSTATIN 1 APPLICATION: 100000 CREAM TOPICAL at 23:24

## 2021-01-01 RX ADMIN — AMIODARONE HYDROCHLORIDE 300 MG: 200 TABLET ORAL at 20:28

## 2021-01-01 RX ADMIN — ACETAMINOPHEN 650 MG: 325 TABLET, FILM COATED ORAL at 21:00

## 2021-01-01 RX ADMIN — SODIUM CHLORIDE 75 ML/HR: 9 INJECTION, SOLUTION INTRAVENOUS at 11:09

## 2021-01-01 RX ADMIN — ACETAMINOPHEN 650 MG: 325 TABLET, FILM COATED ORAL at 21:07

## 2021-01-01 RX ADMIN — Medication 10 ML: at 21:36

## 2021-01-01 RX ADMIN — ALLOPURINOL 300 MG: 300 TABLET ORAL at 09:35

## 2021-01-01 RX ADMIN — DIPHENHYDRAMINE HYDROCHLORIDE 25 MG: 25 CAPSULE ORAL at 20:27

## 2021-01-01 RX ADMIN — MIDAZOLAM 1 MG: 1 INJECTION INTRAMUSCULAR; INTRAVENOUS at 10:35

## 2021-01-01 RX ADMIN — AMIODARONE HYDROCHLORIDE 300 MG: 200 TABLET ORAL at 08:11

## 2021-01-01 RX ADMIN — METOPROLOL SUCCINATE 25 MG: 25 TABLET, EXTENDED RELEASE ORAL at 08:07

## 2021-01-01 RX ADMIN — Medication 35 MG: at 12:35

## 2021-01-01 RX ADMIN — Medication 10 ML: at 20:16

## 2021-01-01 RX ADMIN — ALLOPURINOL 300 MG: 300 TABLET ORAL at 08:02

## 2021-01-01 RX ADMIN — FAMOTIDINE 20 MG: 20 TABLET, FILM COATED ORAL at 08:04

## 2021-01-01 RX ADMIN — MIDAZOLAM 2 MG: 1 INJECTION INTRAMUSCULAR; INTRAVENOUS at 12:27

## 2021-01-01 RX ADMIN — CLOPIDOGREL BISULFATE 75 MG: 75 TABLET ORAL at 09:06

## 2021-01-01 RX ADMIN — FAMOTIDINE 20 MG: 20 TABLET, FILM COATED ORAL at 08:02

## 2021-01-01 RX ADMIN — SULFAMETHOXAZOLE AND TRIMETHOPRIM 1 TABLET: 400; 80 TABLET ORAL at 08:58

## 2021-01-01 RX ADMIN — Medication 5 MG: at 22:09

## 2021-01-01 RX ADMIN — NYSTATIN 1 APPLICATION: 100000 CREAM TOPICAL at 20:05

## 2021-01-01 RX ADMIN — ACETAMINOPHEN 650 MG: 325 TABLET, FILM COATED ORAL at 00:21

## 2021-01-01 RX ADMIN — PROPRANOLOL HYDROCHLORIDE 10 MG: 20 TABLET ORAL at 09:36

## 2021-01-01 RX ADMIN — LORAZEPAM 0.5 MG: 2 INJECTION INTRAMUSCULAR; INTRAVENOUS at 23:19

## 2021-01-01 RX ADMIN — FUROSEMIDE 80 MG: 10 INJECTION INTRAMUSCULAR; INTRAVENOUS at 12:29

## 2021-01-01 RX ADMIN — ALLOPURINOL 300 MG: 300 TABLET ORAL at 08:58

## 2021-01-01 RX ADMIN — SULFAMETHOXAZOLE AND TRIMETHOPRIM 1 TABLET: 400; 80 TABLET ORAL at 20:28

## 2021-01-01 RX ADMIN — ATORVASTATIN CALCIUM 10 MG: 20 TABLET, FILM COATED ORAL at 20:03

## 2021-01-01 RX ADMIN — ATORVASTATIN CALCIUM 20 MG: 20 TABLET, FILM COATED ORAL at 08:11

## 2021-01-01 RX ADMIN — PROPRANOLOL HYDROCHLORIDE 10 MG: 20 TABLET ORAL at 08:49

## 2021-01-01 RX ADMIN — Medication 3 ML: at 20:14

## 2021-01-01 RX ADMIN — DIPHENHYDRAMINE HYDROCHLORIDE 50 MG: 25 CAPSULE ORAL at 20:48

## 2021-01-01 RX ADMIN — FAMOTIDINE 20 MG: 20 TABLET, FILM COATED ORAL at 08:23

## 2021-01-01 RX ADMIN — DIPHENHYDRAMINE HYDROCHLORIDE 25 MG: 25 CAPSULE ORAL at 02:04

## 2021-01-01 RX ADMIN — ENOXAPARIN SODIUM 150 MG: 150 INJECTION, SOLUTION INTRAVENOUS; SUBCUTANEOUS at 06:09

## 2021-01-01 RX ADMIN — MAGNESIUM SULFATE IN DEXTROSE 1 G: 10 INJECTION, SOLUTION INTRAVENOUS at 05:30

## 2021-01-01 RX ADMIN — ACETAMINOPHEN 650 MG: 325 TABLET, FILM COATED ORAL at 04:02

## 2021-01-01 RX ADMIN — IOPAMIDOL 95 ML: 755 INJECTION, SOLUTION INTRAVENOUS at 11:59

## 2021-01-01 RX ADMIN — ALLOPURINOL 300 MG: 300 TABLET ORAL at 08:17

## 2021-01-01 RX ADMIN — DIPHENHYDRAMINE HYDROCHLORIDE 25 MG: 25 CAPSULE ORAL at 14:47

## 2021-01-01 RX ADMIN — ALLOPURINOL 300 MG: 300 TABLET ORAL at 08:49

## 2021-01-01 RX ADMIN — AMIODARONE HYDROCHLORIDE 1 MG/MIN: 50 INJECTION, SOLUTION INTRAVENOUS at 12:41

## 2021-01-01 RX ADMIN — ALLOPURINOL 300 MG: 300 TABLET ORAL at 08:34

## 2021-01-01 RX ADMIN — ATORVASTATIN CALCIUM 10 MG: 20 TABLET, FILM COATED ORAL at 21:13

## 2021-01-01 RX ADMIN — DIPHENHYDRAMINE HYDROCHLORIDE 50 MG: 25 CAPSULE ORAL at 13:48

## 2021-01-01 RX ADMIN — FAMOTIDINE 20 MG: 20 TABLET, FILM COATED ORAL at 20:49

## 2021-01-01 RX ADMIN — APIXABAN 5 MG: 5 TABLET, FILM COATED ORAL at 21:36

## 2021-01-01 RX ADMIN — LOSARTAN POTASSIUM 25 MG: 25 TABLET, FILM COATED ORAL at 08:06

## 2021-01-01 RX ADMIN — FUROSEMIDE 40 MG: 10 INJECTION, SOLUTION INTRAMUSCULAR; INTRAVENOUS at 14:47

## 2021-01-01 RX ADMIN — Medication 10 ML: at 08:35

## 2021-01-01 RX ADMIN — FAMOTIDINE 20 MG: 20 TABLET, FILM COATED ORAL at 08:49

## 2021-01-01 RX ADMIN — FAMOTIDINE 20 MG: 20 TABLET, FILM COATED ORAL at 08:17

## 2021-01-01 RX ADMIN — ALLOPURINOL 300 MG: 300 TABLET ORAL at 20:26

## 2021-01-01 RX ADMIN — ASPIRIN 81 MG: 81 TABLET, COATED ORAL at 09:40

## 2021-01-01 RX ADMIN — DIPHENHYDRAMINE HYDROCHLORIDE 25 MG: 25 CAPSULE ORAL at 22:01

## 2021-01-01 RX ADMIN — ACETAMINOPHEN 650 MG: 325 TABLET, FILM COATED ORAL at 20:48

## 2021-01-01 RX ADMIN — MIDAZOLAM 1 MG: 1 INJECTION INTRAMUSCULAR; INTRAVENOUS at 12:40

## 2021-01-01 RX ADMIN — ALLOPURINOL 300 MG: 300 TABLET ORAL at 08:23

## 2021-01-01 RX ADMIN — Medication 10 ML: at 20:30

## 2021-01-01 RX ADMIN — DIPHENHYDRAMINE HYDROCHLORIDE 25 MG: 25 CAPSULE ORAL at 03:31

## 2021-01-01 RX ADMIN — NYSTATIN 1 APPLICATION: 100000 CREAM TOPICAL at 09:08

## 2021-01-01 RX ADMIN — ATORVASTATIN CALCIUM 20 MG: 20 TABLET, FILM COATED ORAL at 09:06

## 2021-01-01 RX ADMIN — Medication 100 MG: at 21:07

## 2021-01-01 RX ADMIN — Medication 500 UNITS: at 09:06

## 2021-01-01 RX ADMIN — KETAMINE HYDROCHLORIDE 10 MG: 10 INJECTION INTRAMUSCULAR; INTRAVENOUS at 12:30

## 2021-01-01 RX ADMIN — Medication 600 MG: at 21:48

## 2021-01-01 RX ADMIN — ATORVASTATIN CALCIUM 20 MG: 20 TABLET, FILM COATED ORAL at 08:34

## 2021-01-01 RX ADMIN — NYSTATIN 1 APPLICATION: 100000 CREAM TOPICAL at 08:07

## 2021-01-01 RX ADMIN — IOPAMIDOL 100 ML: 755 INJECTION, SOLUTION INTRAVENOUS at 10:30

## 2021-01-01 RX ADMIN — POTASSIUM CHLORIDE 40 MEQ: 750 TABLET, EXTENDED RELEASE ORAL at 11:07

## 2021-01-01 RX ADMIN — ACETAMINOPHEN 650 MG: 325 TABLET, FILM COATED ORAL at 20:35

## 2021-01-01 RX ADMIN — ALLOPURINOL 300 MG: 300 TABLET ORAL at 09:06

## 2021-01-01 RX ADMIN — ADENOSINE 6 MG: 3 INJECTION INTRAVENOUS at 12:15

## 2021-01-01 RX ADMIN — ACETAMINOPHEN 650 MG: 325 TABLET, FILM COATED ORAL at 14:54

## 2021-01-01 RX ADMIN — APIXABAN 5 MG: 5 TABLET, FILM COATED ORAL at 22:09

## 2021-01-01 RX ADMIN — ALLOPURINOL 300 MG: 300 TABLET ORAL at 22:08

## 2021-01-01 RX ADMIN — IRON SUCROSE 300 MG: 20 INJECTION, SOLUTION INTRAVENOUS at 15:23

## 2021-01-01 RX ADMIN — Medication 5 MG: at 22:13

## 2021-01-01 RX ADMIN — ACETAMINOPHEN 650 MG: 325 TABLET, FILM COATED ORAL at 13:55

## 2021-01-01 RX ADMIN — DIPHENHYDRAMINE HYDROCHLORIDE 25 MG: 25 CAPSULE ORAL at 15:20

## 2021-01-01 RX ADMIN — CLOPIDOGREL 75 MG: 75 TABLET, FILM COATED ORAL at 08:11

## 2021-01-01 RX ADMIN — Medication 10 ML: at 20:28

## 2021-01-01 RX ADMIN — Medication 10 ML: at 08:59

## 2021-01-01 RX ADMIN — FUROSEMIDE 20 MG: 20 TABLET ORAL at 08:11

## 2021-01-01 RX ADMIN — NYSTATIN 1 APPLICATION: 100000 CREAM TOPICAL at 18:01

## 2021-01-01 RX ADMIN — SULFAMETHOXAZOLE AND TRIMETHOPRIM 1 TABLET: 400; 80 TABLET ORAL at 20:13

## 2021-01-01 RX ADMIN — TRANEXAMIC ACID 500 MG: 100 INJECTION, SOLUTION INTRAVENOUS at 17:16

## 2021-01-01 RX ADMIN — AMIODARONE HYDROCHLORIDE 300 MG: 200 TABLET ORAL at 08:57

## 2021-01-01 RX ADMIN — Medication 1 MG/MIN: at 12:41

## 2021-01-01 RX ADMIN — SULFAMETHOXAZOLE AND TRIMETHOPRIM 1 TABLET: 400; 80 TABLET ORAL at 21:00

## 2021-01-01 RX ADMIN — CLOPIDOGREL BISULFATE 75 MG: 75 TABLET ORAL at 08:58

## 2021-01-01 RX ADMIN — Medication 10 ML: at 19:58

## 2021-01-01 RX ADMIN — AMIODARONE HYDROCHLORIDE 300 MG: 200 TABLET ORAL at 08:05

## 2021-01-01 RX ADMIN — ATORVASTATIN CALCIUM 20 MG: 20 TABLET, FILM COATED ORAL at 09:49

## 2021-01-01 RX ADMIN — Medication 10 ML: at 14:04

## 2021-01-01 RX ADMIN — METOPROLOL TARTRATE 5 MG: 5 INJECTION, SOLUTION INTRAVENOUS at 12:06

## 2021-01-01 RX ADMIN — Medication 10 ML: at 08:07

## 2021-01-01 RX ADMIN — Medication 500 UNITS: at 08:34

## 2021-01-01 RX ADMIN — POTASSIUM CHLORIDE 40 MEQ: 750 TABLET, EXTENDED RELEASE ORAL at 16:39

## 2021-01-01 RX ADMIN — SODIUM CHLORIDE 75 ML/HR: 9 INJECTION, SOLUTION INTRAVENOUS at 17:51

## 2021-01-01 RX ADMIN — DEXMEDETOMIDINE HYDROCHLORIDE 1 MCG/KG/HR: 4 INJECTION, SOLUTION INTRAVENOUS at 12:45

## 2021-01-01 RX ADMIN — FUROSEMIDE 40 MG: 10 INJECTION, SOLUTION INTRAMUSCULAR; INTRAVENOUS at 11:07

## 2021-01-01 RX ADMIN — ATORVASTATIN CALCIUM 20 MG: 20 TABLET, FILM COATED ORAL at 08:58

## 2021-01-01 RX ADMIN — SODIUM CHLORIDE 9 ML/HR: 9 INJECTION, SOLUTION INTRAVENOUS at 11:15

## 2021-01-01 RX ADMIN — ASPIRIN 81 MG: 81 TABLET, COATED ORAL at 08:04

## 2021-01-01 RX ADMIN — FUROSEMIDE 40 MG: 10 INJECTION INTRAMUSCULAR; INTRAVENOUS at 11:44

## 2021-01-01 RX ADMIN — HEPARIN SODIUM 5000 UNITS: 1000 INJECTION INTRAVENOUS; SUBCUTANEOUS at 13:33

## 2021-01-01 RX ADMIN — PROPOFOL 35 MG: 10 INJECTION, EMULSION INTRAVENOUS at 12:35

## 2021-01-01 RX ADMIN — ASPIRIN 81 MG: 81 TABLET, COATED ORAL at 08:10

## 2021-01-01 RX ADMIN — METOPROLOL SUCCINATE 25 MG: 25 TABLET, EXTENDED RELEASE ORAL at 08:57

## 2021-01-01 RX ADMIN — Medication 1 TABLET: at 21:06

## 2021-01-01 RX ADMIN — PROTAMINE SULFATE 200 MG: 10 INJECTION, SOLUTION INTRAVENOUS at 13:37

## 2021-01-01 RX ADMIN — ALLOPURINOL 300 MG: 300 TABLET ORAL at 23:42

## 2021-01-01 RX ADMIN — FAMOTIDINE 20 MG: 20 TABLET, FILM COATED ORAL at 22:40

## 2021-01-01 RX ADMIN — CLOPIDOGREL BISULFATE 75 MG: 75 TABLET ORAL at 08:11

## 2021-01-01 RX ADMIN — ATORVASTATIN CALCIUM 20 MG: 20 TABLET, FILM COATED ORAL at 22:56

## 2021-01-01 RX ADMIN — PROPRANOLOL HYDROCHLORIDE 10 MG: 20 TABLET ORAL at 08:17

## 2021-01-01 RX ADMIN — DIPHENHYDRAMINE HYDROCHLORIDE 25 MG: 25 CAPSULE ORAL at 20:14

## 2021-01-01 RX ADMIN — LOSARTAN POTASSIUM 25 MG: 25 TABLET, FILM COATED ORAL at 08:17

## 2021-01-01 RX ADMIN — Medication 10 ML: at 22:01

## 2021-01-01 RX ADMIN — NYSTATIN 1 APPLICATION: 100000 CREAM TOPICAL at 08:34

## 2021-01-01 RX ADMIN — MAGNESIUM SULFATE IN DEXTROSE 1 G: 10 INJECTION, SOLUTION INTRAVENOUS at 05:25

## 2021-01-01 RX ADMIN — ATORVASTATIN CALCIUM 20 MG: 20 TABLET, FILM COATED ORAL at 18:13

## 2021-01-01 RX ADMIN — DIPHENHYDRAMINE HYDROCHLORIDE 50 MG: 25 CAPSULE ORAL at 21:07

## 2021-01-01 RX ADMIN — METOROPROLOL TARTRATE 5 MG: 5 INJECTION, SOLUTION INTRAVENOUS at 11:08

## 2021-01-01 RX ADMIN — FENTANYL CITRATE 50 MCG: 50 INJECTION INTRAMUSCULAR; INTRAVENOUS at 10:35

## 2021-01-01 RX ADMIN — KETAMINE HYDROCHLORIDE 10 MG: 10 INJECTION INTRAMUSCULAR; INTRAVENOUS at 12:40

## 2021-01-01 RX ADMIN — ASPIRIN 81 MG: 81 TABLET, COATED ORAL at 20:28

## 2021-01-01 RX ADMIN — FAMOTIDINE 20 MG: 20 TABLET, FILM COATED ORAL at 21:06

## 2021-01-01 RX ADMIN — APIXABAN 5 MG: 5 TABLET, FILM COATED ORAL at 09:50

## 2021-01-01 RX ADMIN — IRON SUCROSE 400 MG: 20 INJECTION, SOLUTION INTRAVENOUS at 14:33

## 2021-01-01 RX ADMIN — FENTANYL CITRATE 50 MCG: 50 INJECTION INTRAMUSCULAR; INTRAVENOUS at 12:23

## 2021-01-01 RX ADMIN — ACETAMINOPHEN 650 MG: 325 TABLET, FILM COATED ORAL at 15:21

## 2021-01-01 RX ADMIN — ACETAMINOPHEN 650 MG: 325 TABLET, FILM COATED ORAL at 03:40

## 2021-01-01 RX ADMIN — LOSARTAN POTASSIUM 50 MG: 50 TABLET, FILM COATED ORAL at 09:35

## 2021-01-01 RX ADMIN — Medication 1200 MG: at 07:57

## 2021-01-01 RX ADMIN — FOLIC ACID 500 MCG: 1 TABLET ORAL at 21:06

## 2021-01-01 RX ADMIN — SODIUM CHLORIDE, POTASSIUM CHLORIDE, SODIUM LACTATE AND CALCIUM CHLORIDE: 600; 310; 30; 20 INJECTION, SOLUTION INTRAVENOUS at 12:34

## 2021-01-01 RX ADMIN — ALLOPURINOL 300 MG: 300 TABLET ORAL at 08:05

## 2021-01-01 RX ADMIN — AMIODARONE HYDROCHLORIDE 0.5 MG/MIN: 50 INJECTION, SOLUTION INTRAVENOUS at 18:57

## 2021-01-01 RX ADMIN — APIXABAN 5 MG: 5 TABLET, FILM COATED ORAL at 09:05

## 2021-01-01 RX ADMIN — LOSARTAN POTASSIUM 25 MG: 25 TABLET, FILM COATED ORAL at 23:42

## 2021-01-01 RX ADMIN — Medication 500 UNITS: at 08:11

## 2021-01-01 RX ADMIN — DIPHENHYDRAMINE HYDROCHLORIDE 25 MG: 25 CAPSULE ORAL at 04:02

## 2021-01-01 RX ADMIN — CLOPIDOGREL BISULFATE 75 MG: 75 TABLET ORAL at 08:05

## 2021-01-01 RX ADMIN — FAMOTIDINE 20 MG: 20 TABLET, FILM COATED ORAL at 20:03

## 2021-01-01 RX ADMIN — LOSARTAN POTASSIUM 50 MG: 50 TABLET, FILM COATED ORAL at 20:25

## 2021-01-01 RX ADMIN — CHLORTHALIDONE 25 MG: 25 TABLET ORAL at 09:35

## 2021-01-01 RX ADMIN — MAGNESIUM SULFATE IN DEXTROSE 1 G: 10 INJECTION, SOLUTION INTRAVENOUS at 08:06

## 2021-01-01 RX ADMIN — Medication 500 UNITS: at 18:14

## 2021-01-01 RX ADMIN — Medication 10 ML: at 22:09

## 2021-01-01 RX ADMIN — DIPHENHYDRAMINE HYDROCHLORIDE 50 MG: 25 CAPSULE ORAL at 00:21

## 2021-01-01 RX ADMIN — ATORVASTATIN CALCIUM 20 MG: 20 TABLET, FILM COATED ORAL at 08:05

## 2021-01-01 RX ADMIN — CLOPIDOGREL BISULFATE 75 MG: 75 TABLET ORAL at 08:59

## 2021-01-01 RX ADMIN — AMIODARONE HYDROCHLORIDE 300 MG: 200 TABLET ORAL at 15:22

## 2021-01-01 RX ADMIN — APIXABAN 5 MG: 5 TABLET, FILM COATED ORAL at 08:59

## 2021-01-01 RX ADMIN — AMIODARONE HYDROCHLORIDE 300 MG: 200 TABLET ORAL at 21:48

## 2021-01-01 RX ADMIN — PROPRANOLOL HYDROCHLORIDE 10 MG: 20 TABLET ORAL at 20:25

## 2021-01-01 RX ADMIN — CHLORTHALIDONE 25 MG: 25 TABLET ORAL at 20:26

## 2021-01-01 RX ADMIN — ACETAMINOPHEN 650 MG: 325 TABLET, FILM COATED ORAL at 20:16

## 2021-01-01 RX ADMIN — METOPROLOL SUCCINATE 25 MG: 25 TABLET, EXTENDED RELEASE ORAL at 08:11

## 2021-01-01 RX ADMIN — AMIODARONE HYDROCHLORIDE 150 MG: 1.5 INJECTION, SOLUTION INTRAVENOUS at 12:41

## 2021-01-01 RX ADMIN — ATORVASTATIN CALCIUM 20 MG: 20 TABLET, FILM COATED ORAL at 20:26

## 2021-01-01 RX ADMIN — NYSTATIN 1 APPLICATION: 100000 CREAM TOPICAL at 09:01

## 2021-01-01 RX ADMIN — IRON SUCROSE 300 MG: 20 INJECTION, SOLUTION INTRAVENOUS at 16:16

## 2021-01-01 RX ADMIN — PERFLUTREN 2 ML: 6.52 INJECTION, SUSPENSION INTRAVENOUS at 10:06

## 2021-01-01 RX ADMIN — Medication 2000 MG: at 12:40

## 2021-01-01 RX ADMIN — MIDAZOLAM 1 MG: 1 INJECTION INTRAMUSCULAR; INTRAVENOUS at 10:24

## 2021-01-01 RX ADMIN — PROPRANOLOL HYDROCHLORIDE 10 MG: 20 TABLET ORAL at 08:01

## 2021-01-01 RX ADMIN — ACETAMINOPHEN 650 MG: 325 TABLET, FILM COATED ORAL at 20:10

## 2021-01-01 RX ADMIN — ASPIRIN 81 MG: 81 TABLET, COATED ORAL at 08:17

## 2021-01-01 RX ADMIN — HEPARIN SODIUM 20000 UNITS: 1000 INJECTION INTRAVENOUS; SUBCUTANEOUS at 13:21

## 2021-01-01 RX ADMIN — Medication 1200 MG: at 20:28

## 2021-01-01 RX ADMIN — FOLIC ACID 500 MCG: 1 TABLET ORAL at 08:02

## 2021-01-01 RX ADMIN — ALLOPURINOL 300 MG: 300 TABLET ORAL at 08:59

## 2021-01-01 RX ADMIN — AMIODARONE HYDROCHLORIDE 300 MG: 200 TABLET ORAL at 20:13

## 2021-01-01 RX ADMIN — ATORVASTATIN CALCIUM 10 MG: 20 TABLET, FILM COATED ORAL at 22:40

## 2021-01-25 ENCOUNTER — TELEPHONE (OUTPATIENT)
Dept: FAMILY MEDICINE CLINIC | Facility: CLINIC | Age: 76
End: 2021-01-25

## 2021-01-25 NOTE — TELEPHONE ENCOUNTER
PATIENT STATES THAT HE IS EXPERIENCING SHOULDER PAIN FROM SLEEPING.  HE STATES THAT IT'S BEEN HURTING FOR 4-5 DAYS AND STATES THAT HE IS UNABLE TO LIFT RIGHT ARM.    PLEASE ADVISE    PATIENT CAN BE REACHED AT  [phone]    [facility]  [id number] [place] [place] - [address] [place] [address] - [phone] [facility] [phone]   [phone]

## 2021-01-27 DIAGNOSIS — E78.00 PURE HYPERCHOLESTEROLEMIA: ICD-10-CM

## 2021-01-28 ENCOUNTER — HOSPITAL ENCOUNTER (OUTPATIENT)
Dept: CARDIOLOGY | Facility: HOSPITAL | Age: 76
Discharge: HOME | End: 2021-01-28
Admitting: INTERNAL MEDICINE

## 2021-01-28 ENCOUNTER — OFFICE VISIT (OUTPATIENT)
Dept: CARDIOLOGY | Facility: CLINIC | Age: 76
End: 2021-01-28

## 2021-01-28 VITALS
HEART RATE: 64 BPM | HEIGHT: 72 IN | WEIGHT: 307 LBS | BODY MASS INDEX: 41.58 KG/M2 | SYSTOLIC BLOOD PRESSURE: 189 MMHG | DIASTOLIC BLOOD PRESSURE: 110 MMHG

## 2021-01-28 VITALS
BODY MASS INDEX: 42.66 KG/M2 | DIASTOLIC BLOOD PRESSURE: 83 MMHG | HEART RATE: 87 BPM | SYSTOLIC BLOOD PRESSURE: 143 MMHG | WEIGHT: 315 LBS | HEIGHT: 72 IN

## 2021-01-28 DIAGNOSIS — I34.0 NON-RHEUMATIC MITRAL REGURGITATION: ICD-10-CM

## 2021-01-28 DIAGNOSIS — I35.0 NONRHEUMATIC AORTIC VALVE STENOSIS: ICD-10-CM

## 2021-01-28 DIAGNOSIS — Z79.01 ANTICOAGULATED: ICD-10-CM

## 2021-01-28 DIAGNOSIS — I48.0 PAROXYSMAL ATRIAL FIBRILLATION (HCC): Primary | ICD-10-CM

## 2021-01-28 DIAGNOSIS — I10 ESSENTIAL HYPERTENSION: ICD-10-CM

## 2021-01-28 LAB
AORTIC DIMENSIONLESS INDEX: 0.2 (DI)
BH CV ECHO MEAS - ACS: 1.6 CM
BH CV ECHO MEAS - AO MAX PG (FULL): 37.3 MMHG
BH CV ECHO MEAS - AO MAX PG: 39.4 MMHG
BH CV ECHO MEAS - AO MEAN PG (FULL): 21.3 MMHG
BH CV ECHO MEAS - AO MEAN PG: 22.6 MMHG
BH CV ECHO MEAS - AO ROOT AREA (BSA CORRECTED): 1.5
BH CV ECHO MEAS - AO ROOT AREA: 10.8 CM^2
BH CV ECHO MEAS - AO ROOT DIAM: 3.7 CM
BH CV ECHO MEAS - AO V2 MAX: 314 CM/SEC
BH CV ECHO MEAS - AO V2 MEAN: 219.7 CM/SEC
BH CV ECHO MEAS - AO V2 VTI: 67.3 CM
BH CV ECHO MEAS - AVA(I,A): 0.86 CM^2
BH CV ECHO MEAS - AVA(I,D): 0.86 CM^2
BH CV ECHO MEAS - AVA(V,A): 1 CM^2
BH CV ECHO MEAS - AVA(V,D): 1 CM^2
BH CV ECHO MEAS - BSA(HAYCOCK): 2.7 M^2
BH CV ECHO MEAS - BSA: 2.6 M^2
BH CV ECHO MEAS - BZI_BMI: 41.6 KILOGRAMS/M^2
BH CV ECHO MEAS - BZI_METRIC_HEIGHT: 182.9 CM
BH CV ECHO MEAS - BZI_METRIC_WEIGHT: 139.3 KG
BH CV ECHO MEAS - EDV(MOD-SP2): 95 ML
BH CV ECHO MEAS - EDV(MOD-SP4): 88 ML
BH CV ECHO MEAS - EF(MOD-BP): 59.9 %
BH CV ECHO MEAS - EF(MOD-SP2): 65.3 %
BH CV ECHO MEAS - EF(MOD-SP4): 52.3 %
BH CV ECHO MEAS - ESV(MOD-SP2): 33 ML
BH CV ECHO MEAS - ESV(MOD-SP4): 42 ML
BH CV ECHO MEAS - LAT PEAK E' VEL: 10.2 CM/SEC
BH CV ECHO MEAS - LV DIASTOLIC VOL/BSA (35-75): 34.4 ML/M^2
BH CV ECHO MEAS - LV MAX PG: 2.1 MMHG
BH CV ECHO MEAS - LV MEAN PG: 1.2 MMHG
BH CV ECHO MEAS - LV SYSTOLIC VOL/BSA (12-30): 16.4 ML/M^2
BH CV ECHO MEAS - LV V1 MAX: 72.8 CM/SEC
BH CV ECHO MEAS - LV V1 MEAN: 52.7 CM/SEC
BH CV ECHO MEAS - LV V1 VTI: 13.1 CM
BH CV ECHO MEAS - LVLD AP2: 8.4 CM
BH CV ECHO MEAS - LVLD AP4: 8 CM
BH CV ECHO MEAS - LVLS AP2: 7.1 CM
BH CV ECHO MEAS - LVLS AP4: 7.2 CM
BH CV ECHO MEAS - LVOT AREA (M): 4.5 CM^2
BH CV ECHO MEAS - LVOT AREA: 4.4 CM^2
BH CV ECHO MEAS - LVOT DIAM: 2.4 CM
BH CV ECHO MEAS - MED PEAK E' VEL: 6.6 CM/SEC
BH CV ECHO MEAS - MR MAX PG: 128.8 MMHG
BH CV ECHO MEAS - MR MAX VEL: 567.4 CM/SEC
BH CV ECHO MEAS - MV DEC SLOPE: 824.5 CM/SEC^2
BH CV ECHO MEAS - MV DEC TIME: 161 SEC
BH CV ECHO MEAS - MV E MAX VEL: 133.4 CM/SEC
BH CV ECHO MEAS - MV MAX PG: 6.1 MMHG
BH CV ECHO MEAS - MV MEAN PG: 2.7 MMHG
BH CV ECHO MEAS - MV P1/2T MAX VEL: 141.8 CM/SEC
BH CV ECHO MEAS - MV P1/2T: 50.4 MSEC
BH CV ECHO MEAS - MV V2 MAX: 122.9 CM/SEC
BH CV ECHO MEAS - MV V2 MEAN: 75.3 CM/SEC
BH CV ECHO MEAS - MV V2 VTI: 20.5 CM
BH CV ECHO MEAS - MVA P1/2T LCG: 1.6 CM^2
BH CV ECHO MEAS - MVA(P1/2T): 4.4 CM^2
BH CV ECHO MEAS - MVA(VTI): 2.8 CM^2
BH CV ECHO MEAS - PA ACC TIME: 0.06 SEC
BH CV ECHO MEAS - PA MAX PG (FULL): 0.44 MMHG
BH CV ECHO MEAS - PA MAX PG: 2.1 MMHG
BH CV ECHO MEAS - PA PR(ACCEL): 52.9 MMHG
BH CV ECHO MEAS - PA V2 MAX: 72.9 CM/SEC
BH CV ECHO MEAS - PI END-D VEL: 57.4 CM/SEC
BH CV ECHO MEAS - PULM DIAS VEL: 38.6 CM/SEC
BH CV ECHO MEAS - PULM S/D: 1.1
BH CV ECHO MEAS - PULM SYS VEL: 41.2 CM/SEC
BH CV ECHO MEAS - PVA(V,A): 6.6 CM^2
BH CV ECHO MEAS - PVA(V,D): 6.6 CM^2
BH CV ECHO MEAS - QP/QS: 1.5
BH CV ECHO MEAS - RAP SYSTOLE: 3 MMHG
BH CV ECHO MEAS - RV MAX PG: 1.7 MMHG
BH CV ECHO MEAS - RV MEAN PG: 0.89 MMHG
BH CV ECHO MEAS - RV V1 MAX: 65 CM/SEC
BH CV ECHO MEAS - RV V1 MEAN: 43.2 CM/SEC
BH CV ECHO MEAS - RV V1 VTI: 12 CM
BH CV ECHO MEAS - RVOT AREA: 7.3 CM^2
BH CV ECHO MEAS - RVOT DIAM: 3.1 CM
BH CV ECHO MEAS - RVSP: 38 MMHG
BH CV ECHO MEAS - SI(AO): 284.9 ML/M^2
BH CV ECHO MEAS - SI(LVOT): 22.7 ML/M^2
BH CV ECHO MEAS - SI(MOD-SP2): 24.3 ML/M^2
BH CV ECHO MEAS - SI(MOD-SP4): 18 ML/M^2
BH CV ECHO MEAS - SV(AO): 728.2 ML
BH CV ECHO MEAS - SV(LVOT): 57.9 ML
BH CV ECHO MEAS - SV(MOD-SP2): 62 ML
BH CV ECHO MEAS - SV(MOD-SP4): 46 ML
BH CV ECHO MEAS - SV(RVOT): 88 ML
BH CV ECHO MEAS - TAPSE (>1.6): 2.6 CM
BH CV ECHO MEAS - TR MAX PG: 35 MMHG
BH CV ECHO MEAS - TR MAX VEL: 297.1 CM/SEC
BH CV ECHO MEASUREMENTS AVERAGE E/E' RATIO: 15.88
BH CV XLRA - TDI S': 14.6 CM/SEC
LEFT ATRIUM VOLUME INDEX: 40.2 ML/M2
MAXIMAL PREDICTED HEART RATE: 147 BPM
STRESS TARGET HR: 125 BPM

## 2021-01-28 PROCEDURE — 93306 TTE W/DOPPLER COMPLETE: CPT

## 2021-01-28 PROCEDURE — 93000 ELECTROCARDIOGRAM COMPLETE: CPT | Performed by: INTERNAL MEDICINE

## 2021-01-28 PROCEDURE — 93306 TTE W/DOPPLER COMPLETE: CPT | Performed by: INTERNAL MEDICINE

## 2021-01-28 PROCEDURE — 99213 OFFICE O/P EST LOW 20 MIN: CPT | Performed by: INTERNAL MEDICINE

## 2021-01-28 RX ORDER — ATORVASTATIN CALCIUM 20 MG/1
TABLET, FILM COATED ORAL
Qty: 90 TABLET | Refills: 0 | Status: SHIPPED | OUTPATIENT
Start: 2021-01-28 | End: 2021-03-24 | Stop reason: SDUPTHER

## 2021-01-28 NOTE — PROGRESS NOTES
Subjective:        Agustín Willett is a 75 y.o. male who here for follow up    CC  A fib  HPI  75-year-old male with known history of the benign essential arterial hypertension atrial fibrillation and chronic anticoagulation here for the follow-up denies any chest pains or tightness in the chest     Problems Addressed this Visit        Other    Hypertension    Relevant Orders    ECG 12 Lead    Adult Transthoracic Echo Complete W/ Cont if Necessary Per Protocol    A-fib (CMS/HCC) - Primary    Relevant Orders    Adult Transthoracic Echo Complete W/ Cont if Necessary Per Protocol    Anticoagulated      Diagnoses       Codes Comments    Paroxysmal atrial fibrillation (CMS/HCC)    -  Primary ICD-10-CM: I48.0  ICD-9-CM: 427.31     Essential hypertension     ICD-10-CM: I10  ICD-9-CM: 401.9     Anticoagulated     ICD-10-CM: Z79.01  ICD-9-CM: V58.61         .    The following portions of the patient's history were reviewed and updated as appropriate: allergies, current medications, past family history, past medical history, past social history, past surgical history and problem list.    Past Medical History:   Diagnosis Date   • Atrial fibrillation (CMS/HCC)    • Dyslipidemia    • Erectile dysfunction    • Hx of complete eye exam 2016    UNKNOWN PER PT    • Hyperlipidemia    • Hypertension    • Hypogonadism male    • ARIANA (obstructive sleep apnea)    • PONV (postoperative nausea and vomiting)    • Type 2 diabetes mellitus (CMS/HCC)    • Vitamin D deficiency      reports that he quit smoking about 51 years ago. His smoking use included cigarettes. He has a 30.00 pack-year smoking history. He has never used smokeless tobacco. He reports current alcohol use. He reports that he does not use drugs.   Family History   Problem Relation Age of Onset   • ALS Mother    • Hypertension Father        Review of Systems  Constitutional: No wt loss, fever, fatigue  Gastrointestinal: No nausea, abdominal pain  Behavioral/Psych: No insomnia  "or anxiety   Cardiovascular no chest pains or tightness in chest  Objective:       Physical Exam  /83 (BP Location: Left arm, Patient Position: Sitting)   Pulse 87   Ht 182.9 cm (72\")   Wt (!) 149 kg (328 lb)   BMI 44.48 kg/m²   General appearance: No acute changes   Neck: Trachea midline; NECK, supple, no thyromegaly or lymphadenopathy   Lungs: Normal size and shape, normal breath sounds, equal distribution of air, no rales and rhonchi   CV: S1-S2 regular, no murmurs, no rub, no gallop   Abdomen: Soft, non-tender; no masses , no abnormal abdominal sounds   Extremities: No deformity , normal color , no peripheral edema   Skin: Normal temperature, turgor and texture; no rash, ulcers            ECG 12 Lead    Date/Time: 1/28/2021 11:40 AM  Performed by: Rosemarie Murray MD  Authorized by: Rosemarie Murray MD   Comparison: compared with previous ECG   Similar to previous ECG  Rhythm: atrial fibrillation  ST Flattening: all    Clinical impression: abnormal EKG              Echocardiogram:        Current Outpatient Medications:   •  allopurinol (ZYLOPRIM) 300 MG tablet, TAKE 1 TABLET BY MOUTH EVERY DAY, Disp: 90 tablet, Rfl: 1  •  apixaban (ELIQUIS) 5 MG tablet tablet, Take 1 tablet by mouth Every 12 (Twelve) Hours., Disp: 180 tablet, Rfl: 1  •  aspirin 81 MG EC tablet, Take 81 mg by mouth Daily., Disp: , Rfl:   •  atorvastatin (LIPITOR) 20 MG tablet, Take 1 tablet by mouth Daily., Disp: 90 tablet, Rfl: 1  •  chlorthalidone (HYGROTON) 25 MG tablet, TAKE 1 TABLET BY MOUTH EVERY DAY, Disp: 90 tablet, Rfl: 1  •  Cholecalciferol (VITAMIN D PO), Take 1 tablet by mouth Daily., Disp: , Rfl:   •  propranolol (INDERAL) 10 MG tablet, TAKE 1 TABLET BY MOUTH EVERY DAY, Disp: 90 tablet, Rfl: 1   Assessment:        Patient Active Problem List   Diagnosis   • Benign non-nodular prostatic hyperplasia without lower urinary tract symptoms   • Diabetes mellitus type 2, controlled, without complications (CMS/HCC)   • " Vitamin D deficiency   • Hyperuricemia   • Low testosterone   • Dyslipidemia   • Hypertension   • Acanthosis nigricans   • Gout   • Diabetes mellitus (CMS/HCC)   • Impotence of organic origin   • Hypogonadism in male   • Class 1 obesity due to excess calories without serious comorbidity with body mass index (BMI) of 30.0 to 30.9 in adult   • Diverticulitis of large intestine without perforation or abscess without bleeding   • Abdominal pain   • A-fib (CMS/HCC)   • Nonrheumatic aortic valve stenosis   • Non-rheumatic mitral regurgitation   • Anticoagulated   • Alcohol abuse   • Angioedema   • Wheezes   • Diarrhea of presumed infectious origin   • Sigmoid polyp   • Sleep apnea   • Trigger finger, acquired   • Pure hypercholesterolemia   • Microalbuminuria               Plan:            ICD-10-CM ICD-9-CM   1. Paroxysmal atrial fibrillation (CMS/HCC)  I48.0 427.31   2. Essential hypertension  I10 401.9   3. Anticoagulated  Z79.01 V58.61     1. Essential hypertension  Blood pressure under control, much better at home  - ECG 12 Lead  - Adult Transthoracic Echo Complete W/ Cont if Necessary Per Protocol; Future    2. Paroxysmal atrial fibrillation (CMS/HCC)  Under control  - Adult Transthoracic Echo Complete W/ Cont if Necessary Per Protocol; Future    3. Anticoagulated  Counseling done       bp normal at home    See in 6 months with echo    Sob due to weight andno excercise  COUNSELING:    Agustín Restrepo was given to patient for the following topics: diagnostic results, risk factor reductions, impressions, risks and benefits of treatment options and importance of treatment compliance .       SMOKING COUNSELING:    [unfilled]    Dictated using Dragon dictation

## 2021-03-19 ENCOUNTER — TELEPHONE (OUTPATIENT)
Dept: FAMILY MEDICINE CLINIC | Facility: CLINIC | Age: 76
End: 2021-03-19

## 2021-03-19 ENCOUNTER — HOSPITAL ENCOUNTER (OUTPATIENT)
Dept: CARDIOLOGY | Facility: HOSPITAL | Age: 76
Discharge: HOME | End: 2021-03-19
Admitting: FAMILY MEDICINE

## 2021-03-19 ENCOUNTER — OFFICE VISIT (OUTPATIENT)
Dept: FAMILY MEDICINE CLINIC | Facility: CLINIC | Age: 76
End: 2021-03-19

## 2021-03-19 VITALS
TEMPERATURE: 97.7 F | BODY MASS INDEX: 42.66 KG/M2 | WEIGHT: 315 LBS | HEIGHT: 72 IN | OXYGEN SATURATION: 94 % | SYSTOLIC BLOOD PRESSURE: 160 MMHG | RESPIRATION RATE: 16 BRPM | DIASTOLIC BLOOD PRESSURE: 100 MMHG | HEART RATE: 89 BPM

## 2021-03-19 DIAGNOSIS — E11.9 CONTROLLED TYPE 2 DIABETES MELLITUS WITHOUT COMPLICATION, UNSPECIFIED WHETHER LONG TERM INSULIN USE (HCC): ICD-10-CM

## 2021-03-19 DIAGNOSIS — M79.605 PAIN OF LEFT LOWER EXTREMITY: Primary | ICD-10-CM

## 2021-03-19 DIAGNOSIS — M79.89 PAIN AND SWELLING OF LEFT LOWER LEG: ICD-10-CM

## 2021-03-19 DIAGNOSIS — M79.662 PAIN AND SWELLING OF LEFT LOWER LEG: ICD-10-CM

## 2021-03-19 LAB
BH CV LOWER VASCULAR LEFT COMMON FEMORAL AUGMENT: NORMAL
BH CV LOWER VASCULAR LEFT COMMON FEMORAL COMPETENT: NORMAL
BH CV LOWER VASCULAR LEFT COMMON FEMORAL COMPRESS: NORMAL
BH CV LOWER VASCULAR LEFT COMMON FEMORAL PHASIC: NORMAL
BH CV LOWER VASCULAR LEFT COMMON FEMORAL SPONT: NORMAL
BH CV LOWER VASCULAR LEFT DISTAL FEMORAL COMPRESS: NORMAL
BH CV LOWER VASCULAR LEFT GASTRONEMIUS COMPRESS: NORMAL
BH CV LOWER VASCULAR LEFT GREATER SAPH AK COMPRESS: NORMAL
BH CV LOWER VASCULAR LEFT GREATER SAPH BK COMPRESS: NORMAL
BH CV LOWER VASCULAR LEFT LESSER SAPH COMPRESS: NORMAL
BH CV LOWER VASCULAR LEFT MID FEMORAL AUGMENT: NORMAL
BH CV LOWER VASCULAR LEFT MID FEMORAL COMPETENT: NORMAL
BH CV LOWER VASCULAR LEFT MID FEMORAL COMPRESS: NORMAL
BH CV LOWER VASCULAR LEFT MID FEMORAL PHASIC: NORMAL
BH CV LOWER VASCULAR LEFT MID FEMORAL SPONT: NORMAL
BH CV LOWER VASCULAR LEFT PERONEAL COMPRESS: NORMAL
BH CV LOWER VASCULAR LEFT POPLITEAL AUGMENT: NORMAL
BH CV LOWER VASCULAR LEFT POPLITEAL COMPETENT: NORMAL
BH CV LOWER VASCULAR LEFT POPLITEAL COMPRESS: NORMAL
BH CV LOWER VASCULAR LEFT POPLITEAL PHASIC: NORMAL
BH CV LOWER VASCULAR LEFT POPLITEAL SPONT: NORMAL
BH CV LOWER VASCULAR LEFT POSTERIOR TIBIAL COMPRESS: NORMAL
BH CV LOWER VASCULAR LEFT PROFUNDA FEMORAL COMPRESS: NORMAL
BH CV LOWER VASCULAR LEFT PROXIMAL FEMORAL COMPRESS: NORMAL
BH CV LOWER VASCULAR LEFT SAPHENOFEMORAL JUNCTION COMPRESS: NORMAL
BH CV LOWER VASCULAR RIGHT COMMON FEMORAL AUGMENT: NORMAL
BH CV LOWER VASCULAR RIGHT COMMON FEMORAL COMPETENT: NORMAL
BH CV LOWER VASCULAR RIGHT COMMON FEMORAL COMPRESS: NORMAL
BH CV LOWER VASCULAR RIGHT COMMON FEMORAL PHASIC: NORMAL
BH CV LOWER VASCULAR RIGHT COMMON FEMORAL SPONT: NORMAL

## 2021-03-19 PROCEDURE — 73610 X-RAY EXAM OF ANKLE: CPT | Performed by: FAMILY MEDICINE

## 2021-03-19 PROCEDURE — 73560 X-RAY EXAM OF KNEE 1 OR 2: CPT | Performed by: FAMILY MEDICINE

## 2021-03-19 PROCEDURE — 99214 OFFICE O/P EST MOD 30 MIN: CPT | Performed by: FAMILY MEDICINE

## 2021-03-19 PROCEDURE — 73630 X-RAY EXAM OF FOOT: CPT | Performed by: FAMILY MEDICINE

## 2021-03-19 PROCEDURE — 73590 X-RAY EXAM OF LOWER LEG: CPT | Performed by: FAMILY MEDICINE

## 2021-03-19 PROCEDURE — 93971 EXTREMITY STUDY: CPT

## 2021-03-19 NOTE — PROGRESS NOTES
Subjective   Chief Complaint:   Chief Complaint   Patient presents with   • Pain and swelling in left leg due to fall       History of Present Illness This patient tripped and fell going to the bathroom 4 days ago and fell on his left lower extremity.  And the swelling is very much in his left lower extremity is from his mid leg down to his ankle down to his foot and has had some white lesions on his legs and is got swelling going all the way up to the knee so it is in his left knee left leg left ankle left foot some an x-ray of the whole area make sure he did not have a fracture and he might want to get a Doppler of his left lower extremity rule out a DVT gets really tight and swollen.  Still the fall her trauma went to his left lateral knee and left upper lateral calf.  And it very well might have to get a Doppler of his left lower extremity 5 these x-rays are negative.  And it looks like he is probably going to maybe need an Ace bandage on there and maybe even some antibiotics if the DVT and the x-ray is negative could be just cellulitis of her left lower extremity from the swelling.  Again he probably will need some antibiotics but will get x-ray first and then I am sure I may have to get a Doppler of his left lower extremity to but we will see what the x-ray show. So I am going to x-ray his left femur his left knee and his left lower extremity to the x-rays and also get x-rays left foot may need to get a Doppler of his left lower extremity rule out a DVT.    Plan:  1. I called in Keflex 500mg QID for seven days to his pharmacy. See me next week.       Encounter Date that the following test/s were ordered and reviewed by Stuart Emery MD: 03/19/2021     Relevant Clinical Issues/Diagnoses/Indications for XRay: see HPI    Left Foot, Left Ankle, Left Knee, Left Tib/Fib    Views: lateral and posterior-anteriorLeft Foot, Left Ankle, Left Knee, Left Tib/Fib    Clinical Findings: negative for fracture    Compared with  previous XRay? no      Past Medical History:   Diagnosis Date   • Atrial fibrillation (CMS/HCC)    • Dyslipidemia    • Erectile dysfunction    • Hx of complete eye exam 2016    UNKNOWN PER PT    • Hyperlipidemia    • Hypertension    • Hypogonadism male    • ARIANA (obstructive sleep apnea)    • PONV (postoperative nausea and vomiting)    • Type 2 diabetes mellitus (CMS/HCC)    • Vitamin D deficiency         Agustín Willett 75 y.o. male who presents today for Medical Management of pain and swelling in left leg due to fall.    ICD-10-CM ICD-9-CM   1. Pain of left lower extremity  M79.605 729.5   2. Pain and swelling of left lower leg  M79.662 729.5    M79.89 729.81        he has a problem list of   Patient Active Problem List   Diagnosis   • Benign non-nodular prostatic hyperplasia without lower urinary tract symptoms   • Diabetes mellitus type 2, controlled, without complications (CMS/HCC)   • Vitamin D deficiency   • Hyperuricemia   • Low testosterone   • Dyslipidemia   • Hypertension   • Acanthosis nigricans   • Gout   • Diabetes mellitus (CMS/HCC)   • Impotence of organic origin   • Hypogonadism in male   • Class 1 obesity due to excess calories without serious comorbidity with body mass index (BMI) of 30.0 to 30.9 in adult   • Diverticulitis of large intestine without perforation or abscess without bleeding   • Abdominal pain   • A-fib (CMS/HCC)   • Nonrheumatic aortic valve stenosis   • Non-rheumatic mitral regurgitation   • Anticoagulated   • Alcohol abuse   • Angioedema   • Wheezes   • Diarrhea of presumed infectious origin   • Sigmoid polyp   • Sleep apnea   • Trigger finger, acquired   • Pure hypercholesterolemia   • Microalbuminuria   .    he has been compliant with   Current Outpatient Medications:   •  Acetaminophen (TYLENOL 8 HOUR ARTHRITIS PAIN PO), Take  by mouth., Disp: , Rfl:   •  allopurinol (ZYLOPRIM) 300 MG tablet, TAKE 1 TABLET BY MOUTH EVERY DAY, Disp: 90 tablet, Rfl: 1  •  apixaban (ELIQUIS) 5 MG  "tablet tablet, Take 1 tablet by mouth Every 12 (Twelve) Hours., Disp: 180 tablet, Rfl: 1  •  aspirin 81 MG EC tablet, Take 81 mg by mouth Daily., Disp: , Rfl:   •  atorvastatin (LIPITOR) 20 MG tablet, TAKE 1 TABLET BY MOUTH EVERY DAY, Disp: 90 tablet, Rfl: 0  •  chlorthalidone (HYGROTON) 25 MG tablet, TAKE 1 TABLET BY MOUTH EVERY DAY, Disp: 90 tablet, Rfl: 1  •  Cholecalciferol (VITAMIN D PO), Take 1 tablet by mouth Daily., Disp: , Rfl:   •  propranolol (INDERAL) 10 MG tablet, TAKE 1 TABLET BY MOUTH EVERY DAY, Disp: 90 tablet, Rfl: 1.  he denies medication side effects.        /100   Pulse 89   Temp 97.7 °F (36.5 °C)   Resp 16   Ht 182.9 cm (72\")   Wt (!) 152 kg (334 lb)   SpO2 94%   BMI 45.30 kg/m²     Results for orders placed or performed in visit on 03/19/21   Duplex Venous Lower Extremity - Left CAR   Result Value Ref Range    Right Common Femoral Spont Y     Right Common Femoral Phasic Y     Right Common Femoral Augment Y     Right Common Femoral Competent Y     Right Common Femoral Compress C     Left Common Femoral Spont Y     Left Common Femoral Phasic Y     Left Common Femoral Augment Y     Left Common Femoral Competent Y     Left Common Femoral Compress C     Left Saphenofemoral Junction Compress C     Left Profunda Femoral Compress C     Left Proximal Femoral Compress C     Left Mid Femoral Spont Y     Left Mid Femoral Phasic Y     Left Mid Femoral Augment Y     Left Mid Femoral Competent Y     Left Mid Femoral Compress C     Left Distal Femoral Compress C     Left Popliteal Spont Y     Left Popliteal Phasic Y     Left Popliteal Augment Y     Left Popliteal Competent Y     Left Popliteal Compress C     Left Posterior Tibial Compress C     Left Peroneal Compress C     Left GastronemiusSoleal Compress C     Left Greater Saph AK Compress C     Left Greater Saph BK Compress C     Left Lesser Saph Compress C        The following portions of the patient's history were reviewed and updated as " appropriate: allergies, current medications, past family history, past medical history, past social history, past surgical history and problem list.      he has a history of   Patient Active Problem List   Diagnosis   • Benign non-nodular prostatic hyperplasia without lower urinary tract symptoms   • Diabetes mellitus type 2, controlled, without complications (CMS/[facility])   • Vitamin D deficiency   • Hyperuricemia   • Low testosterone   • Dyslipidemia   • Hypertension   • Acanthosis nigricans   • Gout   • Diabetes mellitus (CMS/[facility])   • Impotence of organic origin   • Hypogonadism in male   • Class 1 obesity due to excess calories without serious comorbidity with body mass index (BMI) of 30.0 to 30.9 in adult   • Diverticulitis of large intestine without perforation or abscess without bleeding   • Abdominal pain   • A-fib (CMS/[facility])   • Nonrheumatic aortic valve stenosis   • Non-rheumatic mitral regurgitation   • Anticoagulated   • Alcohol abuse   • Angioedema   • Wheezes   • Diarrhea of presumed infectious origin   • Sigmoid polyp   • Sleep apnea   • Trigger finger, acquired   • Pure hypercholesterolemia   • Microalbuminuria       Review of Systems   Constitutional: Negative for fatigue and fever.   Cardiovascular: Negative for chest pain.   Gastrointestinal: Negative for abdominal pain.   Musculoskeletal: Positive for arthralgias, gait problem and joint swelling. Negative for back pain.   Skin: Positive for wound.   Neurological: Negative for dizziness.   Psychiatric/Behavioral: Negative for confusion.       Objective   Physical Exam  Vitals and nursing note reviewed.   Constitutional:       Appearance: He is well-developed.   HENT:      Head: Atraumatic.   Eyes:      Pupils: Pupils are equal, round, and reactive to light.   Neck:      Thyroid: No thyromegaly.   Cardiovascular:      Rate and Rhythm: Normal rate and regular rhythm.   Pulmonary:      Effort: Pulmonary effort is normal.      Breath sounds: Normal breath  sounds.   Abdominal:      General: Bowel sounds are normal.      Palpations: Abdomen is soft.   Musculoskeletal:         General: Normal range of motion.      Cervical back: Normal range of motion and neck supple.      Comments: Pain and swelling and redness in his left knee and his left lower extremity and his left ankle and his left foot and x-ray these and then go from there may need a Doppler for DVT   Skin:     General: Skin is warm and dry.      Findings: Erythema present.   Neurological:      Mental Status: He is alert and oriented to person, place, and time.   Psychiatric:         Behavior: Behavior normal.         Assessment/Plan   Diagnoses and all orders for this visit:    1. Pain of left lower extremity (Primary)  -     XR Tibia Fibula 2 View Left (In Office)  -     XR Knee 1 or 2 View Left (In Office)  -     XR Ankle 3+ View Left (In Office)  -     XR Foot 3+ View Left (In Office)    2. Pain and swelling of left lower leg  -     Duplex Venous Lower Extremity - Left CAR        Labs results discussed in detail with the patient, if no recent labs were done, order placed today.  Plan to update vaccines if needed today.  I  reviewed health maintenance with the patient as part of my preventative care plan. I discussed preventative counseling with the patient in detail.      XRays negative for acute fracture, doppler negative for DVT, will start keflex 500mg QID for cellulitis. Will recheck in one week.

## 2021-03-20 NOTE — TELEPHONE ENCOUNTER
Calling to make sure patient picked up Keflex. LVM. Will call again tomorrow    XR normal, Doppler negative for DVT

## 2021-03-22 ENCOUNTER — TELEPHONE (OUTPATIENT)
Dept: FAMILY MEDICINE CLINIC | Facility: CLINIC | Age: 76
End: 2021-03-22

## 2021-03-22 NOTE — TELEPHONE ENCOUNTER
Caller: Stacey Willett    Relationship: Emergency Contact    Best call back number: [phone]    What are your current symptoms: DIZZINESS AND NAUSEA, AND BLACKISH BLUE BRUISING FROM SHIN TO TOES WITH SOFTBALL SIZED KNOT ON LEFT LEG    How long have you been experiencing symptoms: SINCE 03/12/2021    Have you had these symptoms before:    [] Yes  [x] No    Have you been treated for these symptoms before:   [] Yes  [x] No    If a prescription is needed, what is your preferred pharmacy and phone number: [facility] #[id number] - [place], [place] - [address] [place] [address] [phone] [facility] [phone]      Additional notes: PATIENT'S DAUGHTER STATES THE PATIENT WAS SEEN ON Friday 03/19/2021 BUT WAS UNHAPPY WITH VISIT AND DOES NOT FEEL LIKE ANY OF HIS CONCERNS WERE ADDRESSED. PATIENT'S OXYGEN LEVELS ARE LOWER THAN NORMAL AS WELL BUT PULMONOLOGIST DIDN'T SEE ANYTHING OUT OF THE ORDINARY. PATIENT IS CURRENTLY USING DAUGHTER'S ALBUTEROL INHALER TO MAKE IT THROUGH THE DAY.    PATIENT DOES NOT THINK SYMPTOMS ARE SEVERE ENOUGH FOR EMERGENCY CARE. [facility] SCHEDULED APPT FOR 03/24/2021 PER DAUGHTER'S REQUEST BUT PATIENT IS APPREHENSIVE TO COME INTO OFFICE AFTER VISIT ON 03/19/2021. CALLBACK REQUESTED.

## 2021-03-22 NOTE — TELEPHONE ENCOUNTER
I talked to pt . Pt went to ER to r/o DVT . Pt told to keep scheduled appointment with dr bautista . Dr bautista advised

## 2021-03-23 NOTE — PROGRESS NOTES
Subjective   Agustín Willett is a 75 y.o. male.     CC: Dizziness/Nausea    History of Present Illness     Pt comes in today reporting some issues with dizziness with nausea.       The following portions of the patient's history were reviewed and updated as appropriate: allergies, current medications, past family history, past medical history, past social history, past surgical history and problem list.    Review of Systems   Constitutional: Negative for activity change, chills and fever.   HENT: Negative for hearing loss and tinnitus.    Respiratory: Negative for cough.    Cardiovascular: Negative for chest pain.   Neurological: Positive for dizziness.   Psychiatric/Behavioral: Negative for dysphoric mood.       There were no vitals taken for this visit.    Objective   Physical Exam  Constitutional:       General: He is not in acute distress.     Appearance: He is well-developed.   Cardiovascular:      Rate and Rhythm: Normal rate. Rhythm irregularly irregular.   Pulmonary:      Effort: Pulmonary effort is normal.      Breath sounds: Normal breath sounds.   Neurological:      Mental Status: He is alert and oriented to person, place, and time.   Psychiatric:         Behavior: Behavior normal.         Thought Content: Thought content normal.         Assessment/Plan   There are no diagnoses linked to this encounter.

## 2021-03-24 ENCOUNTER — OFFICE VISIT (OUTPATIENT)
Dept: FAMILY MEDICINE CLINIC | Facility: CLINIC | Age: 76
End: 2021-03-24

## 2021-03-24 VITALS
HEART RATE: 94 BPM | BODY MASS INDEX: 42.66 KG/M2 | TEMPERATURE: 98.1 F | RESPIRATION RATE: 18 BRPM | HEIGHT: 72 IN | DIASTOLIC BLOOD PRESSURE: 94 MMHG | SYSTOLIC BLOOD PRESSURE: 132 MMHG | WEIGHT: 315 LBS | OXYGEN SATURATION: 90 %

## 2021-03-24 DIAGNOSIS — M10.9 GOUT, UNSPECIFIED CAUSE, UNSPECIFIED CHRONICITY, UNSPECIFIED SITE: ICD-10-CM

## 2021-03-24 DIAGNOSIS — Z12.5 SCREENING FOR PROSTATE CANCER: ICD-10-CM

## 2021-03-24 DIAGNOSIS — E11.9 CONTROLLED TYPE 2 DIABETES MELLITUS WITHOUT COMPLICATION, UNSPECIFIED WHETHER LONG TERM INSULIN USE (HCC): Primary | ICD-10-CM

## 2021-03-24 DIAGNOSIS — E78.5 DYSLIPIDEMIA: ICD-10-CM

## 2021-03-24 DIAGNOSIS — L03.116 CELLULITIS OF LEFT LOWER EXTREMITY: ICD-10-CM

## 2021-03-24 DIAGNOSIS — E78.00 PURE HYPERCHOLESTEROLEMIA: ICD-10-CM

## 2021-03-24 DIAGNOSIS — I10 ESSENTIAL HYPERTENSION: ICD-10-CM

## 2021-03-24 PROCEDURE — 99214 OFFICE O/P EST MOD 30 MIN: CPT | Performed by: FAMILY MEDICINE

## 2021-03-24 RX ORDER — DOXYCYCLINE HYCLATE 100 MG
100 TABLET ORAL 2 TIMES DAILY
Qty: 20 TABLET | Refills: 0 | Status: ON HOLD | OUTPATIENT
Start: 2021-03-24 | End: 2021-01-01

## 2021-03-24 RX ORDER — ALLOPURINOL 300 MG/1
300 TABLET ORAL DAILY
Qty: 90 TABLET | Refills: 1 | Status: SHIPPED | OUTPATIENT
Start: 2021-03-24 | End: 2021-01-01 | Stop reason: SDUPTHER

## 2021-03-24 RX ORDER — CHLORTHALIDONE 25 MG/1
25 TABLET ORAL DAILY
Qty: 90 TABLET | Refills: 1 | Status: SHIPPED | OUTPATIENT
Start: 2021-03-24 | End: 2021-01-01 | Stop reason: HOSPADM

## 2021-03-24 RX ORDER — PROPRANOLOL HYDROCHLORIDE 10 MG/1
10 TABLET ORAL DAILY
Qty: 90 TABLET | Refills: 1 | Status: SHIPPED | OUTPATIENT
Start: 2021-03-24 | End: 2021-01-01 | Stop reason: SDUPTHER

## 2021-03-24 RX ORDER — ATORVASTATIN CALCIUM 20 MG/1
20 TABLET, FILM COATED ORAL DAILY
Qty: 90 TABLET | Refills: 1 | Status: SHIPPED | OUTPATIENT
Start: 2021-03-24 | End: 2021-01-01 | Stop reason: SDUPTHER

## 2021-03-24 RX ORDER — CEPHALEXIN 500 MG/1
CAPSULE ORAL
COMMUNITY
Start: 2021-03-21 | End: 2021-03-24

## 2021-03-24 NOTE — PROGRESS NOTES
Subjective   Agustín Willett is a 75 y.o. male.     History of Present Illness     Chief Complaint:   Chief Complaint   Patient presents with   • Diabetes     MED REFILL    • Hypertension   • Hyperlipidemia       Agustín Willett 75 y.o. male who presents today for Medical Management of the below listed issues and medication refills.  he has a problem list of   Patient Active Problem List   Diagnosis   • Benign non-nodular prostatic hyperplasia without lower urinary tract symptoms   • Diabetes mellitus type 2, controlled, without complications (CMS/HCC)   • Vitamin D deficiency   • Hyperuricemia   • Low testosterone   • Dyslipidemia   • Hypertension   • Acanthosis nigricans   • Gout   • Diabetes mellitus (CMS/HCC)   • Impotence of organic origin   • Hypogonadism in male   • Class 1 obesity due to excess calories without serious comorbidity with body mass index (BMI) of 30.0 to 30.9 in adult   • Diverticulitis of large intestine without perforation or abscess without bleeding   • Abdominal pain   • A-fib (CMS/HCC)   • Nonrheumatic aortic valve stenosis   • Non-rheumatic mitral regurgitation   • Anticoagulated   • Alcohol abuse   • Angioedema   • Wheezes   • Diarrhea of presumed infectious origin   • Sigmoid polyp   • Sleep apnea   • Trigger finger, acquired   • Pure hypercholesterolemia   • Microalbuminuria   .  Since the last visit, he has been seen last week here by Dr Emery and was placed on Keflex for LE cellulitis and sent for a doppler (NEGATIVE)  due to the pain/swelling (pt fell and injured himself while drinking). Reports to be a little better yet still red and swollen.  he has been compliant with   Current Outpatient Medications:   •  allopurinol (ZYLOPRIM) 300 MG tablet, Take 1 tablet by mouth Daily., Disp: 90 tablet, Rfl: 1  •  atorvastatin (LIPITOR) 20 MG tablet, Take 1 tablet by mouth Daily., Disp: 90 tablet, Rfl: 1  •  chlorthalidone (HYGROTON) 25 MG tablet, Take 1 tablet by mouth Daily., Disp: 90  "tablet, Rfl: 1  •  propranolol (INDERAL) 10 MG tablet, Take 1 tablet by mouth Daily., Disp: 90 tablet, Rfl: 1  •  Acetaminophen (TYLENOL 8 HOUR ARTHRITIS PAIN PO), Take  by mouth., Disp: , Rfl:   •  apixaban (ELIQUIS) 5 MG tablet tablet, Take 1 tablet by mouth Every 12 (Twelve) Hours., Disp: 180 tablet, Rfl: 1  •  aspirin 81 MG EC tablet, Take 81 mg by mouth Daily., Disp: , Rfl:   •  Cholecalciferol (VITAMIN D PO), Take 1 tablet by mouth Daily., Disp: , Rfl:   •  doxycycline (VIBRAMYICN) 100 MG tablet, Take 1 tablet by mouth 2 (Two) Times a Day., Disp: 20 tablet, Rfl: 0.  he denies medication side effects.    All of the other chronic condition(s) listed above are stable w/o issues.    /94   Pulse 94   Temp 98.1 °F (36.7 °C) (Oral)   Resp 18   Ht 182.9 cm (72\")   Wt (!) 152 kg (334 lb)   SpO2 90%   BMI 45.30 kg/m²     Results for orders placed or performed in visit on 03/19/21   Duplex Venous Lower Extremity - Left CAR   Result Value Ref Range    Right Common Femoral Spont Y     Right Common Femoral Phasic Y     Right Common Femoral Augment Y     Right Common Femoral Competent Y     Right Common Femoral Compress C     Left Common Femoral Spont Y     Left Common Femoral Phasic Y     Left Common Femoral Augment Y     Left Common Femoral Competent Y     Left Common Femoral Compress C     Left Saphenofemoral Junction Compress C     Left Profunda Femoral Compress C     Left Proximal Femoral Compress C     Left Mid Femoral Spont Y     Left Mid Femoral Phasic Y     Left Mid Femoral Augment Y     Left Mid Femoral Competent Y     Left Mid Femoral Compress C     Left Distal Femoral Compress C     Left Popliteal Spont Y     Left Popliteal Phasic Y     Left Popliteal Augment Y     Left Popliteal Competent Y     Left Popliteal Compress C     Left Posterior Tibial Compress C     Left Peroneal Compress C     Left GastronemiusSoleal Compress C     Left Greater Saph AK Compress C     Left Greater Saph BK Compress C     " Left Lesser Saph Compress C            The following portions of the patient's history were reviewed and updated as appropriate: allergies, current medications, past family history, past medical history, past social history, past surgical history and problem list.    Review of Systems   Constitutional: Negative for activity change, chills and fever.   Respiratory: Negative for cough.    Cardiovascular: Negative for chest pain.   Psychiatric/Behavioral: Negative for dysphoric mood.       Objective   Physical Exam  Constitutional:       General: He is not in acute distress.     Appearance: He is well-developed.   Cardiovascular:      Rate and Rhythm: Normal rate. Rhythm irregular.      Pulses:           Dorsalis pedis pulses are 2+ on the right side and 2+ on the left side.        Posterior tibial pulses are 2+ on the right side and 2+ on the left side.      Comments: Extremity is warm to touch; sensation intact.  Pulmonary:      Effort: Pulmonary effort is normal.      Breath sounds: Normal breath sounds.   Neurological:      Mental Status: He is alert and oriented to person, place, and time.   Psychiatric:         Behavior: Behavior normal.         Thought Content: Thought content normal.                   Assessment/Plan   Diagnoses and all orders for this visit:    1. Controlled type 2 diabetes mellitus without complication, unspecified whether long term insulin use (CMS/[facility]) (Primary)  -     Comprehensive metabolic panel  -     Lipid panel  -     Hemoglobin A1c  -     MicroAlbumin, Urine, Random - Urine, Clean Catch    2. Dyslipidemia  -     Lipid panel    3. Essential hypertension  -     Comprehensive metabolic panel  -     Lipid panel  -     CBC and Differential  -     TSH  -     chlorthalidone (HYGROTON) 25 MG tablet; Take 1 tablet by mouth Daily.  Dispense: 90 tablet; Refill: 1  -     propranolol (INDERAL) 10 MG tablet; Take 1 tablet by mouth Daily.  Dispense: 90 tablet; Refill: 1    4. Screening for prostate  cancer  -     PSA    5. Pure hypercholesterolemia  -     atorvastatin (LIPITOR) 20 MG tablet; Take 1 tablet by mouth Daily.  Dispense: 90 tablet; Refill: 1    6. Gout, unspecified cause, unspecified chronicity, unspecified site  -     allopurinol (ZYLOPRIM) 300 MG tablet; Take 1 tablet by mouth Daily.  Dispense: 90 tablet; Refill: 1    7. Cellulitis of left lower extremity  -     doxycycline (VIBRAMYICN) 100 MG tablet; Take 1 tablet by mouth 2 (Two) Times a Day.  Dispense: 20 tablet; Refill: 0    Ice/evelation discussed.

## 2021-03-25 LAB
ALBUMIN SERPL-MCNC: 3.7 G/DL (ref 3.5–5.2)
ALBUMIN/GLOB SERPL: 1.4 G/DL
ALP SERPL-CCNC: 63 U/L (ref 39–117)
ALT SERPL-CCNC: 20 U/L (ref 1–41)
AST SERPL-CCNC: 24 U/L (ref 1–40)
BASOPHILS # BLD AUTO: 0.03 10*3/MM3 (ref 0–0.2)
BASOPHILS NFR BLD AUTO: 0.4 % (ref 0–1.5)
BILIRUB SERPL-MCNC: 0.4 MG/DL (ref 0–1.2)
BUN SERPL-MCNC: 25 MG/DL (ref 8–23)
BUN/CREAT SERPL: 18 (ref 7–25)
CALCIUM SERPL-MCNC: 9.1 MG/DL (ref 8.6–10.5)
CHLORIDE SERPL-SCNC: 100 MMOL/L (ref 98–107)
CHOLEST SERPL-MCNC: 104 MG/DL (ref 0–200)
CO2 SERPL-SCNC: 29.9 MMOL/L (ref 22–29)
CREAT SERPL-MCNC: 1.39 MG/DL (ref 0.76–1.27)
EOSINOPHIL # BLD AUTO: 0.44 10*3/MM3 (ref 0–0.4)
EOSINOPHIL NFR BLD AUTO: 6 % (ref 0.3–6.2)
ERYTHROCYTE [DISTWIDTH] IN BLOOD BY AUTOMATED COUNT: 13.9 % (ref 12.3–15.4)
GLOBULIN SER CALC-MCNC: 2.6 GM/DL
GLUCOSE SERPL-MCNC: 125 MG/DL (ref 65–99)
HBA1C MFR BLD: 6.4 % (ref 4.8–5.6)
HCT VFR BLD AUTO: 37.9 % (ref 37.5–51)
HDLC SERPL-MCNC: 40 MG/DL (ref 40–60)
HGB BLD-MCNC: 12.1 G/DL (ref 13–17.7)
IMM GRANULOCYTES # BLD AUTO: 0.04 10*3/MM3 (ref 0–0.05)
IMM GRANULOCYTES NFR BLD AUTO: 0.5 % (ref 0–0.5)
LDLC SERPL CALC-MCNC: 51 MG/DL (ref 0–100)
LYMPHOCYTES # BLD AUTO: 0.63 10*3/MM3 (ref 0.7–3.1)
LYMPHOCYTES NFR BLD AUTO: 8.6 % (ref 19.6–45.3)
MCH RBC QN AUTO: 29.5 PG (ref 26.6–33)
MCHC RBC AUTO-ENTMCNC: 31.9 G/DL (ref 31.5–35.7)
MCV RBC AUTO: 92.4 FL (ref 79–97)
MICROALBUMIN UR-MCNC: 64.6 UG/ML
MONOCYTES # BLD AUTO: 0.7 10*3/MM3 (ref 0.1–0.9)
MONOCYTES NFR BLD AUTO: 9.6 % (ref 5–12)
NEUTROPHILS # BLD AUTO: 5.45 10*3/MM3 (ref 1.7–7)
NEUTROPHILS NFR BLD AUTO: 74.9 % (ref 42.7–76)
NRBC BLD AUTO-RTO: 0.1 /100 WBC (ref 0–0.2)
PLATELET # BLD AUTO: 217 10*3/MM3 (ref 140–450)
POTASSIUM SERPL-SCNC: 4.9 MMOL/L (ref 3.5–5.2)
PROT SERPL-MCNC: 6.3 G/DL (ref 6–8.5)
PSA SERPL-MCNC: 0.79 NG/ML (ref 0–4)
RBC # BLD AUTO: 4.1 10*6/MM3 (ref 4.14–5.8)
SODIUM SERPL-SCNC: 139 MMOL/L (ref 136–145)
TRIGL SERPL-MCNC: 57 MG/DL (ref 0–150)
TSH SERPL DL<=0.005 MIU/L-ACNC: 2.07 UIU/ML (ref 0.27–4.2)
VLDLC SERPL CALC-MCNC: 13 MG/DL (ref 5–40)
WBC # BLD AUTO: 7.29 10*3/MM3 (ref 3.4–10.8)

## 2021-06-15 NOTE — TELEPHONE ENCOUNTER
DAUGHTER CALLED CONCERNED ABOUT INCREASING SHORT OF BREATH O2 86-87 % . (GRUNTING NOISE ) PT IS ON OXYGEN. . PULMONARY PHYS IS [place] OF TOWN.  DAUGHTER ADVISED TO TAKE PATIENT TO ER FOR FURTHER EVALUATION  DR MCGINNIS ADVISED

## 2021-08-11 NOTE — TELEPHONE ENCOUNTER
Requested Prescriptions     Pending Prescriptions Disp Refills   • Eliquis 5 MG tablet tablet [Pharmacy Med Name: ELIQUIS 5 MG TABLET] 180 tablet 3     Sig: TAKE 1 TABLET BY MOUTH EVERY 12 HOURS ($360 COPAY)

## 2021-08-27 PROBLEM — I35.0 AORTIC STENOSIS, SEVERE: Status: ACTIVE | Noted: 2021-01-01

## 2021-08-30 NOTE — TELEPHONE ENCOUNTER
----- Message from Adria Lowe MD sent at 8/29/2021  1:28 PM EDT -----  I saw this patient in hospital.He may get discharged in 2-3 days. He needs f/u with me in 2-3 weeks. 1 unit.    Thanks

## 2021-09-01 NOTE — PROGRESS NOTES
" LOS: 1 day   Patient Care Team:  Stuart Shah MD as PCP - General (Family Medicine)  Nancy Leone APRN as Nurse Practitioner (Endocrinology)  Merlyn Hernández MD as Consulting Physician (Gastroenterology)  Rosemarie Murray MD as Consulting Physician (Cardiology)    Chief Complaint: aortic stenosis    Subjective:  Symptoms:  He reports shortness of breath and cough.  No chest pain.    Diet:  Adequate intake.  No nausea or vomiting.    Activity level: Impaired due to weakness.    Pain:  He reports no pain.      Vital Signs  Temp:  [97.9 °F (36.6 °C)-98.7 °F (37.1 °C)] 98.3 °F (36.8 °C)  Heart Rate:  [78-97] 97  Resp:  [18] 18  BP: (100-133)/(55-87) 110/70  Body mass index is 43.83 kg/m².    Intake/Output Summary (Last 24 hours) at 9/1/2021 0903  Last data filed at 9/1/2021 0800  Gross per 24 hour   Intake 1260 ml   Output --   Net 1260 ml     I/O this shift:  In: 780 [P.O.:780]  Out: -           08/31/21  0355 08/31/21  1031 09/01/21  0630   Weight: (!) 153 kg (337 lb 4.9 oz) (!) 151 kg (333 lb) (!) 147 kg (323 lb 3.2 oz)       Objective:  General Appearance:  Comfortable and in no acute distress.    Vital signs: (most recent): Blood pressure 110/70, pulse 97, temperature 98.3 °F (36.8 °C), temperature source Oral, resp. rate 18, height 182.9 cm (72\"), weight (!) 147 kg (323 lb 3.2 oz), SpO2 94 %.  Vital signs are normal.  No fever.    Output: Producing urine.    Lungs:  Normal effort and normal respiratory rate.  There are rales and decreased breath sounds.    Heart: Normal rate.  Regular rhythm.    Abdomen: Abdomen is soft.  Bowel sounds are normal.     Extremities: There is no dependent edema.    Pulses: Distal pulses are intact.    Neurological: Patient is alert and oriented to person, place and time.    Skin:  Warm and dry.          Results Review:        WBC WBC   Date Value Ref Range Status   09/01/2021 6.61 3.40 - 10.80 10*3/mm3 Final   08/31/2021 6.90 3.40 - 10.80 10*3/mm3 Final   08/30/2021 " 5.82 3.40 - 10.80 10*3/mm3 Final      HGB Hemoglobin   Date Value Ref Range Status   09/01/2021 8.5 (L) 13.0 - 17.7 g/dL Final   08/31/2021 8.5 (L) 13.0 - 17.7 g/dL Final   08/30/2021 8.2 (L) 13.0 - 17.7 g/dL Final      HCT Hematocrit   Date Value Ref Range Status   09/01/2021 29.5 (L) 37.5 - 51.0 % Final   08/31/2021 29.6 (L) 37.5 - 51.0 % Final   08/30/2021 28.9 (L) 37.5 - 51.0 % Final      Platelets Platelets   Date Value Ref Range Status   09/01/2021 180 140 - 450 10*3/mm3 Final   08/31/2021 178 140 - 450 10*3/mm3 Final   08/30/2021 166 140 - 450 10*3/mm3 Final        PT/INR:  No results found for: PROTIME/No results found for: INR    Sodium Sodium   Date Value Ref Range Status   09/01/2021 140 136 - 145 mmol/L Final   08/31/2021 139 136 - 145 mmol/L Final   08/30/2021 144 136 - 145 mmol/L Final      Potassium Potassium   Date Value Ref Range Status   09/01/2021 3.5 3.5 - 5.2 mmol/L Final   08/31/2021 3.8 3.5 - 5.2 mmol/L Final   08/30/2021 4.0 3.5 - 5.2 mmol/L Final      Chloride Chloride   Date Value Ref Range Status   09/01/2021 98 98 - 107 mmol/L Final   08/31/2021 97 (L) 98 - 107 mmol/L Final   08/30/2021 102 98 - 107 mmol/L Final      Bicarbonate CO2   Date Value Ref Range Status   09/01/2021 32.9 (H) 22.0 - 29.0 mmol/L Final   08/31/2021 30.6 (H) 22.0 - 29.0 mmol/L Final   08/30/2021 32.7 (H) 22.0 - 29.0 mmol/L Final      BUN BUN   Date Value Ref Range Status   09/01/2021 16 8 - 23 mg/dL Final   08/31/2021 20 8 - 23 mg/dL Final   08/30/2021 21 8 - 23 mg/dL Final      Creatinine Creatinine   Date Value Ref Range Status   09/01/2021 1.19 0.76 - 1.27 mg/dL Final   08/31/2021 1.23 0.76 - 1.27 mg/dL Final   08/30/2021 1.35 (H) 0.76 - 1.27 mg/dL Final      Calcium Calcium   Date Value Ref Range Status   09/01/2021 9.3 8.6 - 10.5 mg/dL Final   08/31/2021 8.9 8.6 - 10.5 mg/dL Final   08/30/2021 9.0 8.6 - 10.5 mg/dL Final      Magnesium No results found for: MG       allopurinol, 300 mg, Oral, Daily  aspirin, 81  mg, Oral, Daily  atorvastatin, 10 mg, Oral, Nightly  enoxaparin, 1 mg/kg, Subcutaneous, BID  famotidine, 20 mg, Oral, BID  losartan, 25 mg, Oral, Daily  propranolol, 10 mg, Oral, Daily         Patient Active Problem List   Diagnosis Code   • Benign non-nodular prostatic hyperplasia without lower urinary tract symptoms N40.0   • Diabetes mellitus type 2, controlled, without complications (CMS/Formerly [facility]) E11.9   • Vitamin D deficiency E55.9   • Hyperuricemia E79.0   • Low testosterone R79.89   • Dyslipidemia E78.5   • Hypertension I10   • Acanthosis nigricans L83   • Gout M10.9   • Diabetes mellitus (CMS/[facility]) E11.9   • Impotence of organic origin N52.9   • Hypogonadism in male E29.1   • Class 1 obesity due to excess calories without serious comorbidity with body mass index (BMI) of 30.0 to 30.9 in adult E66.09, Z68.30   • Diverticulitis of large intestine without perforation or abscess without bleeding K57.32   • Abdominal pain R10.9   • A-fib (CMS/[facility]) I48.91   • Nonrheumatic aortic valve stenosis I35.0   • Non-rheumatic mitral regurgitation I34.0   • Anticoagulated Z79.01   • Alcohol abuse F10.10   • Angioedema T78.3XXA   • Wheezes R06.2   • Diarrhea of presumed infectious origin R19.7   • Sigmoid polyp K63.5   • Sleep apnea G47.30   • Trigger finger, acquired M65.30   • Pure hypercholesterolemia E78.00   • Microalbuminuria R80.9   • Aortic stenosis, severe I35.0     Assessment & Plan   · Severe aortic stenosis  · Normal LV systolic function---EF 55%  · Normal coronary arteries---s/p cath 8/27  · Atrial fibrillation---last dose eliquis 8/25  · HTN---losartan/propranolol  · HLD---lipitor  · CKD stage 3  · Severe ARIANA---BiPAP  · COPD---on 5L home O2, pulm following   · Anemia---received 1 unit PRBC 8/28, venofer, Heme following, GI following    · Morbid obesity---BMI 43.09  · GERD---pepcid     Agree with additional diuresis  CTA completed yesterday. Dr. Fuller has reviewed and recommends proceeding with TAVR  Plan for  continued optimization while in the hospital and plan for return for TAVR as an outpatient    GLEN Aponte  09/01/21  09:03 EDT

## 2021-09-01 NOTE — PROGRESS NOTES
"Daily progress note    Chief complaint  Doing better this morning  No new complaints  Family at bedside    History of present illness  76-year-old white male with very complex past medical history including chronic hypoxic respiratory failure on home oxygen 5 L chronic atrial fibrillation hypertension hyperlipidemia and obstructive sleep apnea who also has severe aortic stenosis admitted under cardiology with increased shortness of breath and underwent right and left cardiac catheterization which showed severe aortic stenosis and pulmonary hypertension but no significant coronary artery disease and I am asked to follow the patient for medical problems.  At the time of interview he still baseline shortness of breath but no chest pain palpitation fever cough congestion dizziness nausea vomiting diarrhea.      Review of Systems   As above    Physical Exam  Blood pressure 130/71, pulse 81, temperature 98.8 °F (37.1 °C), temperature source Oral, resp. rate 18, height 182.9 cm (72\"), weight (!) 147 kg (323 lb 3.2 oz), SpO2 94 %.    Constitutional:       General: He is not in acute distress.     Appearance: He is not toxic-appearing or diaphoretic.   HENT:      Head: Normocephalic and atraumatic.      Right Ear: External ear normal.      Left Ear: External ear normal.      Nose: Nose normal. No congestion or rhinorrhea.      Mouth/Throat:      Mouth: Mucous membranes are moist.      Pharynx: Oropharynx is clear. No oropharyngeal exudate or posterior oropharyngeal erythema.   Eyes:      General: No scleral icterus.        Right eye: No discharge.         Left eye: No discharge.      Extraocular Movements: Extraocular movements intact.      Conjunctiva/sclera: Conjunctivae normal.      Pupils: Pupils are equal, round, and reactive to light.   Cardiovascular:      Rate and Rhythm: Normal rate and regular rhythm.      Pulses: Normal pulses.      Heart sounds: Normal heart sounds. No murmur heard.   No friction rub. No gallop.  "   Pulmonary:      Effort: Pulmonary effort is normal. No respiratory distress.      Breath sounds: Normal breath sounds. No wheezing or rales.   Chest:      Chest wall: No tenderness.   Abdominal:      General: Abdomen is flat. Bowel sounds are normal. There is no distension.      Palpations: Abdomen is soft.      Tenderness: There is no abdominal tenderness. There is no right CVA tenderness, left CVA tenderness, guarding or rebound.   Musculoskeletal:         General: No swelling, tenderness, deformity or signs of injury. Normal range of motion.      Cervical back: Normal range of motion and neck supple. No rigidity or tenderness.   Skin:     General: Skin is warm and dry.      Capillary Refill: Capillary refill takes less than 2 seconds.      Coloration: Skin is not jaundiced or pale.   Neurological:      General: No focal deficit present.      Mental Status: He is alert and oriented to person, place, and time.      Sensory: No sensory deficit.      Motor: No weakness.   Psychiatric:         Mood and Affect: Mood normal.     LABS  Lab Results (last 24 hours)     Procedure Component Value Units Date/Time    Basic Metabolic Panel [[id number]]  (Abnormal) Collected: 09/01/21 0356    Specimen: Blood Updated: 09/01/21 0453     Glucose 102 mg/dL      BUN 16 mg/dL      Creatinine 1.19 mg/dL      Sodium 140 mmol/L      Potassium 3.5 mmol/L      Chloride 98 mmol/L      CO2 32.9 mmol/L      Calcium 9.3 mg/dL      eGFR Non African Amer 60 mL/min/1.73      BUN/Creatinine Ratio 13.4     Anion Gap 9.1 mmol/L     Narrative:      GFR Normal >60  Chronic Kidney Disease <60  Kidney Failure <15      CBC & Differential [[id number]]  (Abnormal) Collected: 09/01/21 0356    Specimen: Blood Updated: 09/01/21 0431    Narrative:      The following orders were created for panel order CBC & Differential.  Procedure                               Abnormality         Status                     ---------                               -----------          ------                     CBC Auto Differential[[id number]]        Abnormal            Final result                 Please view results for these tests on the individual orders.    CBC Auto Differential [[id number]]  (Abnormal) Collected: 09/01/21 0356    Specimen: Blood Updated: 09/01/21 0431     WBC 6.61 10*3/mm3      RBC 3.65 10*6/mm3      Hemoglobin 8.5 g/dL      Hematocrit 29.5 %      MCV 80.8 fL      MCH 23.3 pg      MCHC 28.8 g/dL      RDW 15.9 %      RDW-SD 46.2 fl      MPV 9.8 fL      Platelets 180 10*3/mm3      Neutrophil % 60.3 %      Lymphocyte % 17.2 %      Monocyte % 12.6 %      Eosinophil % 8.5 %      Basophil % 0.8 %      Immature Grans % 0.6 %      Neutrophils, Absolute 3.99 10*3/mm3      Lymphocytes, Absolute 1.14 10*3/mm3      Monocytes, Absolute 0.83 10*3/mm3      Eosinophils, Absolute 0.56 10*3/mm3      Basophils, Absolute 0.05 10*3/mm3      Immature Grans, Absolute 0.04 10*3/mm3      nRBC 0.2 /100 WBC         Imaging Results (Last 24 Hours)     ** No results found for the last 24 hours. **           ECG 12 Lead         ECG 12 Lead   Date/Time: 7/29/2021 11:52 AM   Performed by: Rosemarie Murray MD   Authorized by: Rosemarie Murray MD   Comparison: compared with previous ECG   Similar to previous ECG   Rhythm: atrial fibrillation   ST Flattening: all              Current Facility-Administered Medications:   •  acetaminophen (TYLENOL) tablet 650 mg, 650 mg, Oral, Q4H PRN, Rosemarie Murray MD, 650 mg at 09/01/21 0340  •  acetaminophen (TYLENOL) tablet 650 mg, 650 mg, Oral, Once **AND** diphenhydrAMINE (BENADRYL) capsule 25 mg, 25 mg, Oral, Once **AND** iron sucrose (VENOFER) 300 mg in sodium chloride 0.9 % 250 mL IVPB, 300 mg, Intravenous, Once, Stuart Harp MD  •  allopurinol (ZYLOPRIM) tablet 300 mg, 300 mg, Oral, Daily, Rosemarie Murray MD, 300 mg at 09/01/21 0804  •  aspirin EC tablet 81 mg, 81 mg, Oral, Daily, Rosemarie Murray MD, 81 mg at 09/01/21  0804  •  atorvastatin (LIPITOR) tablet 10 mg, 10 mg, Oral, Nightly, Omi Umana MD, 10 mg at 08/31/21 2003  •  diphenhydrAMINE (BENADRYL) capsule 50 mg, 50 mg, Oral, Nightly PRN, Veena Chamberlain APRN, 50 mg at 08/31/21 2010  •  enoxaparin (LOVENOX) injection 150 mg, 1 mg/kg, Subcutaneous, BID, Magdalena Ingram APRN, 150 mg at 09/01/21 0609  •  famotidine (PEPCID) tablet 20 mg, 20 mg, Oral, BID, Omi Umana MD, 20 mg at 09/01/21 0804  •  ipratropium-albuterol (DUO-NEB) nebulizer solution 3 mL, 3 mL, Nebulization, Q4H PRN, Omi Umana MD  •  losartan (COZAAR) tablet 25 mg, 25 mg, Oral, Daily, Omi Umana MD, 25 mg at 09/01/21 0806  •  potassium chloride (K-DUR,KLOR-CON) ER tablet 40 mEq, 40 mEq, Oral, PRN, 40 mEq at 09/01/21 1107 **OR** potassium chloride (KLOR-CON) packet 40 mEq, 40 mEq, Oral, PRN **OR** potassium chloride 10 mEq in 100 mL IVPB, 10 mEq, Intravenous, Q1H PRN, Rosemarie Murray MD  •  propranolol (INDERAL) tablet 10 mg, 10 mg, Oral, Daily, Rosemarie Murray MD, 10 mg at 09/01/21 0806     ASSESSMENT  Severe aortic stenosis  Severe pulmonary hypertension  Chronic hypoxic respiratory failure  COPD  Morbidly obese  Obstructive sleep apnea  Hypertension  Hyperlipidemia  Osteoarthritis  Chronic anemia with drop in H&H secondary to hydration  Gastroesophageal reflux disease    PLAN  CPM  TAVR per cardiology  Adjust home medications  Supplement oxygen and nebulizer as needed  Anemia work-up in progress  No plan for endoscopy  IV Venofer  Transfuse PRN  Stress ulcer DVT prophylaxis  Supportive care  Discussed with nursing staff  Discharge planning with return for TAVR as an outpatient    OMI UMANA MD

## 2021-09-01 NOTE — PROGRESS NOTES
Subjective     HISTORY OF PRESENT ILLNESS:   No acute issues today. Doing about the same.  Note plan for TAVR next week.  Tolerating IV iron.    Past Medical History, Past Surgical History, Social History, Family History have been reviewed and are without significant changes except as mentioned.    Review of Systems   Constitutional: Negative for fever.   Respiratory: Positive for shortness of breath.    Genitourinary: Positive for scrotal swelling.   Musculoskeletal: Joint swelling:    Allergic/Immunologic: Negative for immunocompromised state.   Neurological: Negative for weakness.   Hematological: Does not bruise/bleed easily.        Medications:  The current medication list was reviewed in the EMR    ALLERGIES:    Allergies   Allergen Reactions   • Amoxil [Amoxicillin] Hives   • Clindamycin/Lincomycin Hives   • Ace Inhibitors Angioedema       Objective      Vitals:    09/01/21 0630 09/01/21 0700 09/01/21 0804 09/01/21 1100   BP:  105/63 110/70 116/74   BP Location:  Left arm  Left arm   Patient Position:  Lying  Sitting   Pulse:  84 97 83   Resp:  18  18   Temp:  98.3 °F (36.8 °C)  97.8 °F (36.6 °C)   TempSrc:  Oral  Oral   SpO2:  94%  94%   Weight: (!) 147 kg (323 lb 3.2 oz)      Height:         No flowsheet data found.    Physical Exam    CONSTITUTIONAL:  Vital signs reviewed.  up to chair  EYES:  Conjunctiva and lids unremarkable.   RESPIRATORY:  Normal respiratory effort.  Nasal canula oxygen  CARDIOVASCULAR:   No significant lower extremity edema. irreg  GASTROINTESTINAL: Abdomen appears unremarkable.  Nontender.   LYMPHATIC:  No cervical, supraclavicular lymphadenopathy.  SKIN:  Warm.  No rashes.  PSYCHIATRIC:  Normal judgment and insight.  Normal mood and affect.  Unchanged 9/1/21  RECENT LABS:  Hematology WBC   Date Value Ref Range Status   09/01/2021 6.61 3.40 - 10.80 10*3/mm3 Final     RBC   Date Value Ref Range Status   09/01/2021 3.65 (L) 4.14 - 5.80 10*6/mm3 Final     Hemoglobin   Date Value Ref  Range Status   09/01/2021 8.5 (L) 13.0 - 17.7 g/dL Final     Hematocrit   Date Value Ref Range Status   09/01/2021 29.5 (L) 37.5 - 51.0 % Final     Platelets   Date Value Ref Range Status   09/01/2021 180 140 - 450 10*3/mm3 Final     Lab Results   Component Value Date    GLUCOSE 102 (H) 09/01/2021    BUN 16 09/01/2021    CREATININE 1.19 09/01/2021    EGFRIFNONA 60 (L) 09/01/2021    EGFRIFAFRI 61 03/24/2021    BCR 13.4 09/01/2021    K 3.5 09/01/2021    CO2 32.9 (H) 09/01/2021    CALCIUM 9.3 09/01/2021    PROTENTOTREF 6.4 08/29/2021    ALBUMIN 3.90 08/29/2021    ALBUMIN 3.4 08/29/2021    LABIL2 1.1 08/29/2021    AST 14 08/29/2021    ALT 14 08/29/2021              Assessment/Plan     # Anemia, normocytic:  · Appears acute. Had normal Hb (12-13 gm/dl) range in feb-march 2021.   · Patient reports of having dark stools. He was on eliquis prior to admission which has been now held.  · Work up shows significant iron deficiency with iron saturation of just 4%. Vit b12, folate levels and hemolysis panel unremarkable.  SPEP/MEGAN & FLC no M spike, normal light chain ratio  · Received 1 U PRBC transfusion on 8/28.   · venofer 400 mg x 1 given 8/29/21; 300 mg IV 8/30/21; 300 mg IV 8/31/2021  · hgb today stable 8.5      # Severe aortic stenosis:  · Note plan for TAVR next week    Hematology Plan:  1.  Additional IV Venofer today 300 mg IV-this will complete the patient's iron replacement  2.  Monitor CBC; transfuse Hgb less 7.0  3.  GI following for eval JERED--probable scopes after valve addressed  4.  Agree with Dr. Juárez that it is risky to give anticoagulation given the patient's unexplained iron deficiency anemia and unevaluated digestive tract        9/1/2021      CC:

## 2021-09-01 NOTE — PROGRESS NOTES
[facility]  Cardiology Progress Note    Patient Identification:  Name: Agustín Willett  Age: 76 y.o.  Sex: male  :  1945  MRN: [id number]                 Follow Up / Chief Complaint: Follow-up on aortic stenosis    Interval History:  TAVR CT planned for yesterday.  Possible TAVR on Tuesday.  Restart anticoagulation once able.       Subjective::       Objective:    Past Medical History:  Past Medical History:   Diagnosis Date   • Atrial fibrillation (CMS/HCC)    • Dyslipidemia    • Erectile dysfunction    • Hx of complete eye exam 2016    UNKNOWN PER PT    • Hyperlipidemia    • Hypertension    • Hypogonadism male    • ARIANA (obstructive sleep apnea)    • PONV (postoperative nausea and vomiting)    • Type 2 diabetes mellitus (CMS/HCC)    • Vitamin D deficiency      Past Surgical History:  Past Surgical History:   Procedure Laterality Date   • ADENOIDECTOMY     • CARDIAC CATHETERIZATION N/A 2021    Procedure: Right Heart Cath;  Surgeon: Rosemarie Murray MD;  Location: [facility] CATH INVASIVE LOCATION;  Service: Cardiology;  Laterality: N/A;   • CARDIAC CATHETERIZATION N/A 2021    Procedure: Left Heart Cath;  Surgeon: Rosemarie Murray MD;  Location: [facility] CATH INVASIVE LOCATION;  Service: Cardiology;  Laterality: N/A;   • CARDIAC CATHETERIZATION N/A 2021    Procedure: Coronary angiography;  Surgeon: Rosemarie Murray MD;  [facility];  Service: Cardiology;  Laterality: N/A;   • COLONOSCOPY N/A 2018    Procedure: COLONOSCOPY TO CECUM WITH COLD BIOPSY POLYPECTOMY;  Surgeon: Dave Elizabeth MD;  Location: [facility] ENDOSCOPY;  Service: Gastroenterology   • TONSILLECTOMY     • TOTAL KNEE ARTHROPLASTY Left    • VASECTOMY          Social History:   Social History     Tobacco Use   • Smoking status: Former Smoker     Packs/day: 2.00     Years: 15.00     Pack years: 30.00     Types: Cigarettes     Quit date: 1970     Years since quitting:  "51.7   • Smokeless tobacco: Never Used   Substance Use Topics   • Alcohol use: Yes     Comment: 1-2 DRINKS/DAY      Family History:  Family History   Problem Relation Age of Onset   • ALS Mother    • Hypertension Father           Allergies:  Allergies   Allergen Reactions   • Amoxil [Amoxicillin] Hives   • Clindamycin/Lincomycin Hives   • Ace Inhibitors Angioedema     Scheduled Meds:  allopurinol, 300 mg, Daily  aspirin, 81 mg, Daily  atorvastatin, 10 mg, Nightly  enoxaparin, 1 mg/kg, BID  famotidine, 20 mg, BID  iron sucrose, 300 mg, Once  losartan, 25 mg, Daily  propranolol, 10 mg, Daily            INTAKE AND OUTPUT:    Intake/Output Summary (Last 24 hours) at 9/1/2021 1517  Last data filed at 9/1/2021 1435  Gross per 24 hour   Intake 1560 ml   Output 2050 ml   Net -490 ml       Review of Systems              Physical Exam  /74 (BP Location: Left arm, Patient Position: Sitting)   Pulse 88   Temp 98.6 °F (37 °C) (Oral)   Resp 18   Ht 182.9 cm (72\")   Wt (!) 147 kg (323 lb 3.2 oz)   SpO2 95%   BMI 43.83 kg/m²                 Cardiographics  Telemetry:       afib    ECG:     Echocardiogram:   Interpretation Summary    · Estimated right ventricular systolic pressure from tricuspid regurgitation is moderately elevated (45-55 mmHg). Calculated right ventricular systolic pressure from tricuspid regurgitation is 55 mmHg.  · Calculated left ventricular EF = 55% Estimated left ventricular EF was in agreement with the calculated left ventricular EF.  · Left ventricular diastolic function was indeterminate.  · Severe aortic valve stenosis is present.  · There is no evidence of pericardial effusion. .        Interpretation Summary    · Findings consistent with a normal ECG stress test.  · Left ventricular ejection fraction is normal (Calculated EF = 54%).  · Myocardial perfusion imaging indicates a normal myocardial perfusion study with no evidence of ischemia.  · Impressions are consistent with a low risk " "study.        HEMODYNAMIC / ANGIOGRAPHIC DATA:    1. Pulmonary capillary wedge pressure was 10.   2. Pulmonary artery systolic pressure was 63/20.  3. Right atrial pressure was 10.  4. Cardiac index was 3.44.  5. Left ventricular end diastolic pressure was 10 mmHg.  6. The left main is normal left main.  7. The LAD is early atherosclerotic plaque including the diagonal and  branches.  8. Circumflex artery nondominant with early atherosclerotic plaque  9. The right coronary artery is dominant with early atherosclerotic plaque  10. Gradient across aortic valve 63 mmHg  11. Severe aortic stenosis.     RECOMMENDATIONS:  Post-procedure care will focus on prevention of any ischemic events and congestive complications.          Lab Review           Results from last 7 days   Lab Units 09/01/21  0356   SODIUM mmol/L 140   POTASSIUM mmol/L 3.5   BUN mg/dL 16   CREATININE mg/dL 1.19   CALCIUM mg/dL 9.3        Results from last 7 days   Lab Units 09/01/21  0356 08/31/21  0344 08/30/21  0345   WBC 10*3/mm3 6.61 6.90 5.82   HEMOGLOBIN g/dL 8.5* 8.5* 8.2*   HEMATOCRIT % 29.5* 29.6* 28.9*   PLATELETS 10*3/mm3 180 178 166         The following medical decision was discussed in detail with Dr. Murray      Assessment/Plan:  1. Atrial fibrillation: Eliquis on hold due to anemia  2. Severe nonrheumatic aortic valve stenosis: TAVR for possible Tuesday     3.  Iron deficiency anemia: GI and hematology following.  Hemoglobin stable  4.  Hypertension: Stable   5.  Dyslipidemia. Continue statin   6.  ARIANA: BiPAP compliant      Confused and neuro will be consulted.  We will discontinue Lovenox and once okay with all we will resume.   Possible TAVR on Tuesday.    )9/1/2021            EMR Dragon/Transcription:   \"Dictated utilizing Dragon dictation\".     "

## 2021-09-01 NOTE — NURSING NOTE
Upon shift change family reports increased confusion throughout the day. Family is concerned that there may something going on. Pt is alert and moving all extremities. Pt knows he is in the hospital but unable to name the which hospital. Will notify MD.

## 2021-09-01 NOTE — PROGRESS NOTES
"Patient Care Team:  Stuart Shah MD as PCP - General (Family Medicine)  Nancy Leone APRN as Nurse Practitioner (Endocrinology)  Merlyn Hernández MD as Consulting Physician (Gastroenterology)  Rosemarie Murray MD as Consulting Physician (Cardiology)    Structural heart team follow-up    Chief Complaint:  Severe degenerative aortic valve stenosis    Interval History:       Objective   Vital Signs  Temp:  [97.9 °F (36.6 °C)-98.7 °F (37.1 °C)] 98.3 °F (36.8 °C)  Heart Rate:  [78-97] 97  Resp:  [18] 18  BP: (100-133)/(55-87) 110/70    Intake/Output Summary (Last 24 hours) at 9/1/2021 0827  Last data filed at 9/1/2021 0800  Gross per 24 hour   Intake 1260 ml   Output --   Net 1260 ml     Flowsheet Rows      First Filed Value   Admission Height  182.9 cm (72\") Documented at 08/27/2021 1105   Admission Weight  (!) 153 kg (338 lb 6.4 oz) Documented at 08/27/2021 1105          Temp:  [97.9 °F (36.6 °C)-98.7 °F (37.1 °C)] 98.3 °F (36.8 °C)  Heart Rate:  [78-97] 97  Resp:  [18] 18  BP: (100-133)/(55-87) 110/70    Intake/Output Summary (Last 24 hours) at 9/1/2021 0827  Last data filed at 9/1/2021 0800  Gross per 24 hour   Intake 1260 ml   Output --   Net 1260 ml     Flowsheet Rows      First Filed Value   Admission Height  182.9 cm (72\") Documented at 08/27/2021 1105   Admission Weight  (!) 153 kg (338 lb 6.4 oz) Documented at 08/27/2021 1105          General Appearance:    Alert, cooperative, in no acute distress   Head:    Normocephalic, without obvious abnormality, atraumatic       Neck/Lymph   No adenopathy, supple, no thyromegaly, no carotid bruit, no    JVD   Lungs:     Clear to auscultation bilaterally, no wheezes, rales, or     rhonchi    Cardiac:    Normal rate, regular rhythm, no murmur, no rub, no gallop   Chest Wall:    No abnormalities observed   GI:     Normal bowel sounds, soft, nontender, nondistended,            no rebound tenderness   Extremities:   No cyanosis, clubbing, or edema "   Circulatory/Peripheral Vascular :   Pulses palpable and equal bilaterally   Integumentary:   No bleeding or rash. Normal temperature       Neurologic:   Cranial nerves 2 - 12 grossly intact, sensation intact              allopurinol, 300 mg, Oral, Daily  aspirin, 81 mg, Oral, Daily  atorvastatin, 10 mg, Oral, Nightly  enoxaparin, 1 mg/kg, Subcutaneous, BID  famotidine, 20 mg, Oral, BID  losartan, 25 mg, Oral, Daily  propranolol, 10 mg, Oral, Daily             Results Review:    Results from last 7 days   Lab Units 09/01/21  0356   SODIUM mmol/L 140   POTASSIUM mmol/L 3.5   CHLORIDE mmol/L 98   CO2 mmol/L 32.9*   BUN mg/dL 16   CREATININE mg/dL 1.19   GLUCOSE mg/dL 102*   CALCIUM mg/dL 9.3         Results from last 7 days   Lab Units 09/01/21  0356   WBC 10*3/mm3 6.61   HEMOGLOBIN g/dL 8.5*   HEMATOCRIT % 29.5*   PLATELETS 10*3/mm3 180     Results from last 7 days   Lab Units 08/25/21  1051   INR  1.26*   APTT seconds 35.6*     Results from last 7 days   Lab Units 08/29/21  0221   CHOLESTEROL mg/dL 106         Results from last 7 days   Lab Units 08/29/21  0221   CHOLESTEROL mg/dL 106   TRIGLYCERIDES mg/dL 78   HDL CHOL mg/dL 44   LDL CHOL mg/dL 46     @LABRCNT(bnp)@  I reviewed the patient's new clinical results.  I personally viewed and interpreted the patient's EKG/Telemetry data       HEMODYNAMIC / ANGIOGRAPHIC DATA:    1. Pulmonary capillary wedge pressure was 10.   2. Pulmonary artery systolic pressure was 63/20.  3. Right atrial pressure was 10.  4. Cardiac index was 3.44.  5. Left ventricular end diastolic pressure was 10 mmHg.  6. The left main is normal left main.  7. The LAD is early atherosclerotic plaque including the diagonal and  branches.  8. Circumflex artery nondominant with early atherosclerotic plaque  9. The right coronary artery is dominant with early atherosclerotic plaque  Peak gradient across aortic valve 63 mmHg. Mean gradient approximately 48 mmHg.  Severe aortic  stenosis.    TTE  · Estimated right ventricular systolic pressure from tricuspid regurgitation is moderately elevated (45-55 mmHg). Calculated right ventricular systolic pressure from tricuspid regurgitation is 55 mmHg.  · Calculated left ventricular EF = 55% Estimated left ventricular EF was in agreement with the calculated left ventricular EF.  · Left ventricular diastolic function was indeterminate.  · Severe aortic valve stenosis is present.  · There is no evidence of pericardial effusion. .         Assessment/Plan   1.  Severe degenerative aortic valve stenosis  2.  Chronic hypoxic respiratory failure  3.  GOMEZ  4.  Partially paralyzed hemidiaphragm  5.  Morbid obesity  6.  Chronic kidney disease  7.  COPD  8.  Anemia  9.  Chronic atrial fibrillation  10.  Obstructive sleep apnea on BiPAP    -Patient's volume status is difficult to figure out. He looks to be volume overloaded and on his CT of the chest he has evidence of fluid accumulation including fluid in the fissures. I do think he could benefit from additional diuresis. That being said his right atrial pressure and wedge pressure were not significantly elevated on initial catheterization. LVEDP was also only 10. I am not sure how much diuresis he will tolerate but I think we should keep him at least for today and try. IV Lasix 40 mg once given today.    -I have reviewed his echocardiogram and catheterization images along with hemodynamics. He does have severe aortic valve stenosis, however I think this is only one of the contributors to his dyspnea. I do not think he will need valvuloplasty.    -Although I do appreciate that he does have a risk of stroke in the setting of his atrial fibrillation and age, I do not think continuing anticoagulant therapy would be ideal considering a significant drop in his hematocrit since his labs in March. Defer discontinuation to the primary team    -I do think he is an adequate candidate for transcatheter aortic valve  replacement with a transfemoral approach. He is aware. We will try this done in the next couple of weeks. He should be able to go home either tomorrow or the next day depending on how he diuresis.

## 2021-09-01 NOTE — CONSULTS
Neurology Consult Note    Consult Date: 9/1/2021    Referring MD: Rosemarie Murray, *    Reason for Consult I have been asked to see the patient in neurological consultation to render advice and opinion regarding encephalopathy    Agustín Willett is a 76 y.o. male with past medical history of atrial fibrillation on anticoagulation, hypertension, hyperlipidemia, obstructive sleep apnea, insulin-dependent diabetes.  He was originally presenting to the hospital for heart cath but was admitted for severe anemia and shortness of breath.  Ultimately recommendation was made to do a TAVR which will probably be done next Tuesday.  He has overall been stable but has had some fluctuating disorientation and confusion for the past several days.  This was abruptly worse today according to his daughters at the bedside.  No associated unilateral weakness or facial droop.  They deny any associated tremor.  He has not had symptoms like this in the past and has no prior diagnosis of dementia.  He is still working full-time as a .    Past Medical/Surgical Hx:  Past Medical History:   Diagnosis Date   • Atrial fibrillation (CMS/HCC)    • Dyslipidemia    • Erectile dysfunction    • Hx of complete eye exam 2016    UNKNOWN PER PT    • Hyperlipidemia    • Hypertension    • Hypogonadism male    • ARIANA (obstructive sleep apnea)    • PONV (postoperative nausea and vomiting)    • Type 2 diabetes mellitus (CMS/HCC)    • Vitamin D deficiency      Past Surgical History:   Procedure Laterality Date   • ADENOIDECTOMY     • CARDIAC CATHETERIZATION N/A 8/27/2021    Procedure: Right Heart Cath;  Surgeon: Rosemarie Murray MD;  Location:  [facility] CATH INVASIVE [facility]ATION;  Service: Cardiology;  Laterality: N/A;   • CARDIAC CATHETERIZATION N/A 8/27/2021    Procedure: Left Heart Cath;  Surgeon: Rosemarie Murray MD;  Location:  [facility] CATH INVASIVE [facility];  Service: Cardiology;  Laterality: N/A;   • CARDIAC CATHETERIZATION N/A  2021    Procedure: Coronary angiography;  Surgeon: Rosemarie Murray MD;  Location: [facility] CATH INVASIVE LOCATION;  Service: Cardiology;  Laterality: N/A;   • COLONOSCOPY N/A 2018    Procedure: COLONOSCOPY TO CECUM WITH COLD BIOPSY POLYPECTOMY;  Surgeon: Dave Elizabeth MD;  Location: [facility] ENDOSCOPY;  Service: Gastroenterology   • TONSILLECTOMY     • TOTAL KNEE ARTHROPLASTY Left    • VASECTOMY         Medications On Admission  Medications Prior to Admission   Medication Sig Dispense Refill Last Dose   • allopurinol (ZYLOPRIM) 300 MG tablet Take 1 tablet by mouth Daily. 90 tablet 1 2021 at Unknown time   • aspirin 81 MG EC tablet Take 81 mg by mouth Daily.   2021 at Unknown time   • atorvastatin (LIPITOR) 20 MG tablet Take 1 tablet by mouth Daily. 90 tablet 1 2021 at Unknown time   • chlorthalidone (HYGROTON) 25 MG tablet Take 1 tablet by mouth Daily. 90 tablet 1 2021 at Unknown time   • Cholecalciferol (VITAMIN D PO) Take 1 tablet by mouth Daily.   2021 at Unknown time   • losartan (COZAAR) 50 MG tablet Take 50 mg by mouth Daily.   2021 at Unknown time   • propranolol (INDERAL) 10 MG tablet Take 1 tablet by mouth Daily. 90 tablet 1 2021 at Unknown time   • Eliquis 5 MG tablet tablet TAKE 1 TABLET BY MOUTH EVERY 12 HOURS ($360 COPAY) 180 tablet 3 2021       Allergies:  Allergies   Allergen Reactions   • Amoxil [Amoxicillin] Hives   • Clindamycin/Lincomycin Hives   • Ace Inhibitors Angioedema       Social Hx:  Social History     Socioeconomic History   • Marital status:      Spouse name: Not on file   • Number of children: Not on file   • Years of education: Not on file   • Highest education level: Not on file   Tobacco Use   • Smoking status: Former Smoker     Packs/day: 2.00     Years: 15.00     Pack years: 30.00     Types: Cigarettes     Quit date: 1970     Years since quittin.7   • Smokeless tobacco: Never Used   Vaping Use   • Vaping Use:  "Never used   Substance and Sexual Activity   • Alcohol use: Yes     Comment: 1-2 DRINKS/DAY   • Drug use: No   • Sexual activity: Defer       Family Hx:  Family History   Problem Relation Age of Onset   • ALS Mother    • Hypertension Father        Review of systems  Constitutional: [No fevers, chills]  Eye: [No vision loss, diplopia]  HEENT: [no hearing loss, vertigo]  Respiratory: + Shortness of air, cough  Cardiovascular: [No Chest pain, palpitations]  Neurologic: [No weakness, numbness, + confusion]  Psychiatric: [No anxiety, + depression]    All other systems reviewed and are negative    Exam    /74 (BP Location: Left arm, Patient Position: Sitting)   Pulse 88   Temp 98.6 °F (37 °C) (Oral)   Resp 18   Ht 182.9 cm (72\")   Wt (!) 147 kg (323 lb 3.2 oz)   SpO2 95%   BMI 43.83 kg/m²   gen: NAD, vitals reviewed  Eyes: fundus sharp with no papilledema or retinal hemorrhages  HEENT: no nuchal rigidity  CVS: Irregular, S1, S2  MS: oriented x3, recent/remote memory intact, cannot recall any out of 3 words after 5 minutes, normal attention/concentration, normal clock draw, normal verbal fluency, language intact, no neglect, normal fund of knowledge   CN: visual acuity grossly normal, visual fields full, PERRL, EOMI, facial sensation equal, no facial droop, hearing symmetric, palate elevates symmetrically, shoulder shrug equal, tongue midline  Motor: 5/5 throughout upper and lower extremities, normal tone  Sensation: Markedly diminished to vibratory sensation distal lower extremities  Reflexes: Absent throughout  Coordination: no dysmetria with finger to nose bilaterally  Gait: Deferred due to shortness of breath    DATA:    Lab Results   Component Value Date    GLUCOSE 102 (H) 09/01/2021    CALCIUM 9.3 09/01/2021     09/01/2021    K 3.5 09/01/2021    CO2 32.9 (H) 09/01/2021    CL 98 09/01/2021    BUN 16 09/01/2021    CREATININE 1.19 09/01/2021    EGFRIFAFRI 61 03/24/2021    EGFRIFNONA 60 (L) 09/01/2021 "    BCR 13.4 09/01/2021    ANIONGAP 9.1 09/01/2021     Lab Results   Component Value Date    WBC 6.61 09/01/2021    HGB 8.5 (L) 09/01/2021    HCT 29.5 (L) 09/01/2021    MCV 80.8 09/01/2021     09/01/2021     Lab Results   Component Value Date    LDL 46 08/29/2021    LDL 51 03/24/2021    LDL 47 11/07/2019     Lab Results   Component Value Date    HGBA1C 5.90 (H) 08/29/2021     Lab Results   Component Value Date    INR 1.26 (H) 08/25/2021    INR 1.37 (H) 11/28/2019    PROTIME 15.6 (H) 08/25/2021    PROTIME 13.7 (H) 11/28/2019       Lab review: Hemoglobin 8.5     Diagnoses:  Encephalopathy  Aortic stenosis  Iron deficiency anemia  Obstructive sleep apnea    Comment: 76-year-old male with multifactorial encephalopathy over the past several days.  I think this probably correlates to his underlying aortic stenosis and anemia and overall medical condition.  He does not have signs or symptoms to suggest a diagnosis of underlying dementia but I did warn his family that patients with cardiac conditions including aortic stenosis have a high probability of developing dementia in the future.  I expect after his TAVR he will ultimately improve.  In the short-term I recommended to his daughters they just continue to reorient and reassure him.    PLAN:  No further neurologic work-up needed  Recommend frequent reorientation by family and nursing staff    Recommendations discussed with patient, family, nurse.  I will see him as needed if there is anything further we can add.

## 2021-09-01 NOTE — NURSING NOTE
"Patient's family and patient are again mentioning noticeable increased confusion. On assessment, pt is unable to recall the day of the week and thinks he is in the hospital in [place]. He also keeps trying to \"go upstairs\" and has asked his daughters why the IV's are in his arm. Prior to this stay, patient does work full-time as a  and has not experienced this before. UA was ordered, pending collection. Neuro consulted. WCTM.  "

## 2021-09-01 NOTE — PLAN OF CARE
Goal Outcome Evaluation:  Plan of Care Reviewed With: patient        Progress: no change  Outcome Summary: Pain meds given for pain control. VSS Cpap worn overnight. Currently resting in bed. Daughter at bedside. Will continue to monitor.

## 2021-09-01 NOTE — PROGRESS NOTES
[facility]  Inpatient Progress Note    Reason for Follow Up:  anemia    Subjective     Interval History:   Recommendations and plans for outpatient TAVR and holding anticoagulation noted    No visualized GI bleeding.  His biggest complaint is dyspnea with exertion.  No abdominal pain.    Current Facility-Administered Medications:   •  acetaminophen (TYLENOL) tablet 650 mg, 650 mg, Oral, Q4H PRN, Rosemarie Murray MD, 650 mg at 09/01/21 0340  •  allopurinol (ZYLOPRIM) tablet 300 mg, 300 mg, Oral, Daily, Rosemarie Murray MD, 300 mg at 09/01/21 0804  •  aspirin EC tablet 81 mg, 81 mg, Oral, Daily, Rosemarie Murray MD, 81 mg at 09/01/21 0804  •  atorvastatin (LIPITOR) tablet 10 mg, 10 mg, Oral, Nightly, Arnoldo Umana MD, 10 mg at 08/31/21 2003  •  diphenhydrAMINE (BENADRYL) capsule 50 mg, 50 mg, Oral, Nightly PRN, Veena Chamberlain APRN, 50 mg at 08/31/21 2010  •  enoxaparin (LOVENOX) injection 150 mg, 1 mg/kg, Subcutaneous, BID, Magdalena Ingram APRN, 150 mg at 09/01/21 0609  •  famotidine (PEPCID) tablet 20 mg, 20 mg, Oral, BID, Arnoldo Umana MD, 20 mg at 09/01/21 0804  •  ipratropium-albuterol (DUO-NEB) nebulizer solution 3 mL, 3 mL, Nebulization, Q4H PRN, Arnoldo Umana MD  •  losartan (COZAAR) tablet 25 mg, 25 mg, Oral, Daily, Arnoldo Umana MD, 25 mg at 09/01/21 0806  •  potassium chloride (K-DUR,KLOR-CON) ER tablet 40 mEq, 40 mEq, Oral, PRN, 40 mEq at 09/01/21 1107 **OR** potassium chloride (KLOR-CON) packet 40 mEq, 40 mEq, Oral, PRN **OR** potassium chloride 10 mEq in 100 mL IVPB, 10 mEq, Intravenous, Q1H PRN, Rosemarie Murray MD  •  propranolol (INDERAL) tablet 10 mg, 10 mg, Oral, Daily, Rosemarie Murray MD, 10 mg at 09/01/21 0806  Review of Systems:    The following systems were reviewed and negative;  constitution and gastrointestinal    Objective     Vital Signs  Temp:  [97.8 °F (36.6 °C)-98.7 °F (37.1 °C)] 97.8 °F (36.6 °C)  Heart Rate:  [78-97] 83  Resp:   [18] 18  BP: (100-133)/(55-87) 116/74  Body mass index is 43.83 kg/m².    Intake/Output Summary (Last 24 hours) at 9/1/2021 1251  Last data filed at 9/1/2021 1143  Gross per 24 hour   Intake 1440 ml   Output 700 ml   Net 740 ml     I/O this shift:  In: 1200 [P.O.:1200]  Out: 350 [Urine:350]     Physical Exam:   General: patient awake, alert and cooperative   Eyes: Normal lids and lashes, no scleral icterus   Neck: supple, normal ROM   Skin: warm and dry, not jaundiced   Pulm:  regular and unlabored   Abdomen: soft, nontender, nondistended    Extremities: no rash or edema   Psychiatric: Normal mood and behavior; memory intact     Results Review:     I reviewed the patient's new clinical results.    Results from last 7 days   Lab Units 09/01/21  0356 08/31/21  0344 08/30/21  0345   WBC 10*3/mm3 6.61 6.90 5.82   HEMOGLOBIN g/dL 8.5* 8.5* 8.2*   HEMATOCRIT % 29.5* 29.6* 28.9*   PLATELETS 10*3/mm3 180 178 166     Results from last 7 days   Lab Units 09/01/21  0356 08/31/21  0344 08/30/21  0345 08/29/21  0221 08/29/21  0221   SODIUM mmol/L 140 139 144   < > 141   POTASSIUM mmol/L 3.5 3.8 4.0   < > 3.9   CHLORIDE mmol/L 98 97* 102   < > 99   CO2 mmol/L 32.9* 30.6* 32.7*   < > 31.7*   BUN mg/dL 16 20 21   < > 21   CREATININE mg/dL 1.19 1.23 1.35*   < > 1.24   CALCIUM mg/dL 9.3 8.9 9.0   < > 9.0   BILIRUBIN mg/dL  --   --   --   --  0.4   ALK PHOS U/L  --   --   --   --  52   ALT (SGPT) U/L  --   --   --   --  14   AST (SGOT) U/L  --   --   --   --  14   GLUCOSE mg/dL 102* 102* 98   < > 99    < > = values in this interval not displayed.         Lab Results   Lab Value Date/Time    LIPASE 21 05/08/2018 0837       Radiology:  CT Angio TAVR Chest Abdomen Pelvis   Final Result   Moderate calcification within the aortic valve leaflets.   Mildly dilated aortic root and ascending thoracic aorta. No NASCET   significant stenoses are identified in the chest, abdomen or pelvis.   There is moderate colonic diverticulosis without  evidence of   diverticulitis.       This report was finalized on 8/31/2021 1:36 PM by Dr. Wiliam Irene M.D.          XR Chest 1 View   Final Result   Minimal atelectasis or infiltrate the bases. Cardiomegaly.   Tortuous aorta.       This report was finalized on 8/28/2021 2:12 PM by Dr. Agustín Amin M.D.              Assessment/Plan     Patient Active Problem List   Diagnosis   • Benign non-nodular prostatic hyperplasia without lower urinary tract symptoms   • Diabetes mellitus type 2, controlled, without complications (CMS/HCC)   • Vitamin D deficiency   • Hyperuricemia   • Low testosterone   • Dyslipidemia   • Hypertension   • Acanthosis nigricans   • Gout   • Diabetes mellitus (CMS/HCC)   • Impotence of organic origin   • Hypogonadism in male   • Class 1 obesity due to excess calories without serious comorbidity with body mass index (BMI) of 30.0 to 30.9 in adult   • Diverticulitis of large intestine without perforation or abscess without bleeding   • Abdominal pain   • A-fib (CMS/HCC)   • Nonrheumatic aortic valve stenosis   • Non-rheumatic mitral regurgitation   • Anticoagulated   • Alcohol abuse   • Angioedema   • Wheezes   • Diarrhea of presumed infectious origin   • Sigmoid polyp   • Sleep apnea   • Trigger finger, acquired   • Pure hypercholesterolemia   • Microalbuminuria   • Aortic stenosis, severe       Assessment:  1. Anemia, iron deficiency  2. Dark stools a couple weeks ago, denies black tarry stools  3. Aortic stenosis  4. Atrial fibrillation, Eliquis on hold 3 days  5. COPD  6. Pulmonary hypertension, severe  7. Obstructive sleep apnea  8. Osteoarthritis  9. GERD      Plan:  · Hemoglobin has been stable-there has been no overt bleeding.  Would recommend deferring endoscopic evaluation until he is cardiac issues have been addressed as an outpatient.  · No further recommendations at this time-we will sign off but available as needed.  Patient given office contact information to schedule an  appointment once he is recovered from his TAVR    I discussed the patients findings and my recommendations with patient and family.    Merlyn Hernández MD

## 2021-09-02 NOTE — PROGRESS NOTES
"Daily progress note    Chief complaint  Doing better   No new complaints  Family at bedside    History of present illness  76-year-old white male with very complex past medical history including chronic hypoxic respiratory failure on home oxygen 5 L chronic atrial fibrillation hypertension hyperlipidemia and obstructive sleep apnea who also has severe aortic stenosis admitted under cardiology with increased shortness of breath and underwent right and left cardiac catheterization which showed severe aortic stenosis and pulmonary hypertension but no significant coronary artery disease and I am asked to follow the patient for medical problems.  At the time of interview he still baseline shortness of breath but no chest pain palpitation fever cough congestion dizziness nausea vomiting diarrhea.      Review of Systems   As above    Physical Exam  Blood pressure 126/78, pulse 86, temperature 98.1 °F (36.7 °C), temperature source Oral, resp. rate 18, height 182.9 cm (72\"), weight (!) 150 kg (330 lb 6.4 oz), SpO2 97 %.    Constitutional:       General: He is not in acute distress.     Appearance: He is not toxic-appearing or diaphoretic.   HENT:      Head: Normocephalic and atraumatic.      Right Ear: External ear normal.      Left Ear: External ear normal.      Nose: Nose normal. No congestion or rhinorrhea.      Mouth/Throat:      Mouth: Mucous membranes are moist.      Pharynx: Oropharynx is clear. No oropharyngeal exudate or posterior oropharyngeal erythema.   Eyes:      General: No scleral icterus.        Right eye: No discharge.         Left eye: No discharge.      Extraocular Movements: Extraocular movements intact.      Conjunctiva/sclera: Conjunctivae normal.      Pupils: Pupils are equal, round, and reactive to light.   Cardiovascular:      Rate and Rhythm: Normal rate and regular rhythm.      Pulses: Normal pulses.      Heart sounds: Normal heart sounds. No murmur heard.   No friction rub. No gallop.  "   Pulmonary:      Effort: Pulmonary effort is normal. No respiratory distress.      Breath sounds: Normal breath sounds. No wheezing or rales.   Chest:      Chest wall: No tenderness.   Abdominal:      General: Abdomen is flat. Bowel sounds are normal. There is no distension.      Palpations: Abdomen is soft.      Tenderness: There is no abdominal tenderness. There is no right CVA tenderness, left CVA tenderness, guarding or rebound.   Musculoskeletal:         General: No swelling, tenderness, deformity or signs of injury. Normal range of motion.      Cervical back: Normal range of motion and neck supple. No rigidity or tenderness.   Skin:     General: Skin is warm and dry.      Capillary Refill: Capillary refill takes less than 2 seconds.      Coloration: Skin is not jaundiced or pale.   Neurological:      General: No focal deficit present.      Mental Status: He is alert and oriented to person, place, and time.      Sensory: No sensory deficit.      Motor: No weakness.   Psychiatric:         Mood and Affect: Mood normal.     LABS  Lab Results (last 24 hours)     Procedure Component Value Units Date/Time    CBC & Differential [[id number]]  (Abnormal) Collected: 09/02/21 0400    Specimen: Blood Updated: 09/02/21 0609    Narrative:      The following orders were created for panel order CBC & Differential.  Procedure                               Abnormality         Status                     ---------                               -----------         ------                     CBC Auto Differential[[id number]]        Abnormal            Final result                 Please view results for these tests on the individual orders.    CBC Auto Differential [[id number]]  (Abnormal) Collected: 09/02/21 0400    Specimen: Blood Updated: 09/02/21 0609     WBC 6.54 10*3/mm3      RBC 3.86 10*6/mm3      Hemoglobin 8.9 g/dL      Hematocrit 32.7 %      MCV 84.7 fL      MCH 23.1 pg      MCHC 27.2 g/dL      RDW 16.4 %      RDW-SD 50.0 fl       MPV 9.7 fL      Platelets 182 10*3/mm3      Neutrophil % 60.0 %      Lymphocyte % 18.0 %      Monocyte % 13.3 %      Eosinophil % 7.6 %      Basophil % 0.6 %      Immature Grans % 0.5 %      Neutrophils, Absolute 3.92 10*3/mm3      Lymphocytes, Absolute 1.18 10*3/mm3      Monocytes, Absolute 0.87 10*3/mm3      Eosinophils, Absolute 0.50 10*3/mm3      Basophils, Absolute 0.04 10*3/mm3      Immature Grans, Absolute 0.03 10*3/mm3      nRBC 0.2 /100 WBC     Basic Metabolic Panel [[id number]]  (Abnormal) Collected: 09/02/21 0400    Specimen: Blood Updated: 09/02/21 0555     Glucose 83 mg/dL      BUN 14 mg/dL      Creatinine 1.38 mg/dL      Sodium 142 mmol/L      Potassium 4.1 mmol/L      Chloride 100 mmol/L      CO2 31.7 mmol/L      Calcium 9.2 mg/dL      eGFR Non African Amer 50 mL/min/1.73      BUN/Creatinine Ratio 10.1     Anion Gap 10.3 mmol/L     Narrative:      GFR Normal >60  Chronic Kidney Disease <60  Kidney Failure <15          Imaging Results (Last 24 Hours)     ** No results found for the last 24 hours. **           ECG 12 Lead         ECG 12 Lead   Date/Time: 7/29/2021 11:52 AM   Performed by: Rosemarie Murray MD   Authorized by: Rosemarie Murray MD   Comparison: compared with previous ECG   Similar to previous ECG   Rhythm: atrial fibrillation   ST Flattening: all              Current Facility-Administered Medications:   •  acetaminophen (TYLENOL) tablet 650 mg, 650 mg, Oral, Q4H PRN, Rosemarie Murray MD, 650 mg at 09/01/21 2107  •  allopurinol (ZYLOPRIM) tablet 300 mg, 300 mg, Oral, Daily, Rosemarie Murray MD, 300 mg at 09/02/21 0802  •  aspirin EC tablet 81 mg, 81 mg, Oral, Daily, Rosemarie Murray MD, 81 mg at 09/02/21 0802  •  atorvastatin (LIPITOR) tablet 10 mg, 10 mg, Oral, Nightly, Arnoldo Umana MD, 10 mg at 09/01/21 2106  •  diphenhydrAMINE (BENADRYL) capsule 50 mg, 50 mg, Oral, Nightly PRN, Veena Chamberlain, APRN, 50 mg at 09/01/21 2107  •  famotidine (PEPCID) tablet  20 mg, 20 mg, Oral, BID, Arnoldo Umana MD, 20 mg at 09/02/21 0802  •  folic acid (FOLVITE) tablet 500 mcg, 500 mcg, Oral, Daily, Arnoldo Umana MD, 500 mcg at 09/02/21 0802  •  ipratropium-albuterol (DUO-NEB) nebulizer solution 3 mL, 3 mL, Nebulization, Q4H PRN, Arnoldo Umana MD  •  LORazepam (ATIVAN) tablet 0.5 mg, 0.5 mg, Oral, Q2H PRN **OR** LORazepam (ATIVAN) injection 0.5 mg, 0.5 mg, Intravenous, Q2H PRN, 0.5 mg at 09/01/21 2319 **OR** LORazepam (ATIVAN) tablet 1 mg, 1 mg, Oral, Q1H PRN **OR** LORazepam (ATIVAN) injection 1 mg, 1 mg, Intravenous, Q1H PRN **OR** LORazepam (ATIVAN) injection 1 mg, 1 mg, Intravenous, Q15 Min PRN **OR** LORazepam (ATIVAN) injection 1 mg, 1 mg, Intramuscular, Q15 Min PRN, Arnoldo Umana MD  •  losartan (COZAAR) tablet 25 mg, 25 mg, Oral, Daily, Arnoldo Umana MD, 25 mg at 09/02/21 0802  •  multivitamin (THERAGRAN) tablet 1 tablet, 1 tablet, Oral, Daily, Arnoldo Umana MD, 1 tablet at 09/02/21 0802  •  potassium chloride (K-DUR,KLOR-CON) ER tablet 40 mEq, 40 mEq, Oral, PRN, 40 mEq at 09/01/21 1639 **OR** potassium chloride (KLOR-CON) packet 40 mEq, 40 mEq, Oral, PRN **OR** potassium chloride 10 mEq in 100 mL IVPB, 10 mEq, Intravenous, Q1H PRN, Rosemarie Murray MD  •  propranolol (INDERAL) tablet 10 mg, 10 mg, Oral, Daily, Rosemarie Murray MD, 10 mg at 09/02/21 0801  •  thiamine (VITAMIN B-1) tablet 100 mg, 100 mg, Oral, Daily, Arnoldo Umana MD, 100 mg at 09/02/21 0802     ASSESSMENT  Severe aortic stenosis  Severe pulmonary hypertension  Chronic hypoxic respiratory failure  COPD  Morbidly obese  Obstructive sleep apnea  Hypertension  Hyperlipidemia  Osteoarthritis  History of daily alcohol use  Chronic anemia   Gastroesophageal reflux disease    PLAN  CPM  TAVR per cardiology  Adjust home medications  Supplement oxygen and nebulizer as needed  Anemia work-up in progress  No plan for endoscopy  Transfuse PRN  Stress ulcer DVT prophylaxis  Supportive care  Discussed with  nursing staff  Discharge planning with return for TAVR as an outpatient    OMI ROBERT MD

## 2021-09-02 NOTE — NURSING NOTE
Currently inpatient.  Introduced myself and spoke with the patient and his daughter about his upcoming TAVR procedure.  Patient scheduled for TAVR On 9/7 with Dr. Araiza and Dr. Juárez. Procedure reviewed, questions answered, and KCCQ-12 completed.  Patient unable to complete 15-foot walk test d/t trouble breathing.  Patient states he lives at home alone.  Patient states he does note currently use home health..  Patient has two sets of stairs in his home.  Contact information given to patient and his daughter.  Instructions given to patient regarding COVID-19 screening requirements and arrival time for procedure.  Patient and family verbalized understanding

## 2021-09-02 NOTE — DISCHARGE SUMMARY
[facility]  Physician Discharge Summary    Patient Identification:  Name: Agustín Willett  Age: 76 y.o.  Sex: male  :  1945  MRN: [id number]    Admit date: 2021    Discharge date and time: 2021 at 1428        Admitting Physician: Rosemarie Murray MD     Discharge Physician: GLEN Greco  Discharge Diagnoses:   Severe aortic stenosis  Deficiency anemia  Atrial fibrillation  Chronic hypoxic respiratory failure  Hypertension   Hyperlipidemia  CKD stage III severe   ARIANA  COPD  Obesity  GERD      Patient Active Problem List   Diagnosis   • Benign non-nodular prostatic hyperplasia without lower urinary tract symptoms   • Diabetes mellitus type 2, controlled, without complications (CMS/[facility])   • Vitamin D deficiency   • Hyperuricemia   • Low testosterone   • Dyslipidemia   • Hypertension   • Acanthosis nigricans   • Gout   • Diabetes mellitus (CMS/[facility])   • Impotence of organic origin   • Hypogonadism in male   • Class 1 obesity due to excess calories without serious comorbidity with body mass index (BMI) of 30.0 to 30.9 in adult   • Diverticulitis of large intestine without perforation or abscess without bleeding   • Abdominal pain   • A-fib (CMS/[facility])   • Nonrheumatic aortic valve stenosis   • Non-rheumatic mitral regurgitation   • Anticoagulated   • Alcohol abuse   • Angioedema   • Wheezes   • Diarrhea of presumed infectious origin   • Sigmoid polyp   • Sleep apnea   • Trigger finger, acquired   • Pure hypercholesterolemia   • Microalbuminuria   • Aortic stenosis, severe       Discharged Condition: stable    Hospital Course:    This is a 76-year-old male, who is well-known to our service.  He presented to the office of Dr. Murray with complaints of shortness of breath and severe aortic stenosis.  At that time, it was decided to do a right and left heart cath.  His heart cath on  revealed capillary wedge pressure was 10, pulmonary artery systolic pressure was  63/20.  Cardiac index was 3.44.  LV end-diastolic pressure was 10 mmHg.  Left main is normal.  LAD is early plaque including the diagonal and  branches.  Circumflex artery nondominant with early vascular low plaque.  RCA is dominant with early vascular plaque.  Gradient across aortic valve 63 mmHg.  Severe aortic stenosis.  TAVR team was consulted for his aortic valve stenosis.  The plan is plan for him to go home today and return on Tuesday for aortic valve replacement.  Hematology has been following due to iron deficiency anemia.  He has had replacement of iron. At this time, he is not a good candidate for full anticoagulation due to significant drop in hemaocrit and bleeding.  He and his family are aware of increased chance for stroke. They verbalized understanding.  He will follow GI as outpatient for endoscopic procedure in the future.  All providers are in agreement with discharge.  He is cardiovascular stable for discharge.      Consults:   IP CONSULT TO CARDIOTHORACIC SURGERY  IP CONSULT TO INTERNAL MEDICINE  IP CONSULT TO PULMONOLOGY  IP CONSULT TO HEMATOLOGY AND ONCOLOGY  IP CONSULT TO GASTROENTEROLOGY  IP CONSULT TO TAVR NURSE  IP CONSULT TO ADVANCE CARE PLANNING  IP CONSULT TO NEUROLOGY  IP CONSULT TO ANESTHESIOLOGY    Discharge Exam:  General: No acute distress, resting in bed   Skin: Warm and dry, no diaphoresis noted.  No hematoma noted   EYES:  EOM normal no conjunctival drainage   HEENT: external ear and nose normal; oral mucosa moist   Neck: Supple; no carotid bruits; no JVD   Heart: S1S2 regular rate and rhythm; no murmurs; no gallop or rub appreciated   Pulmonary: Respirations regular, unlabored at rest, bilateral breath sounds have good air entry throughout all lung fields; no crackles, rubs or wheezes auscultated.     GI: Soft, non-tender, non-distended, positive bowel sounds  No hepatosplenomegaly   Extremities: Bilateral lower extremities have no pre-tibial pitting edema; DP/PT  pulses are palpable   Neurological: Alert and oriented x 3; no neuro deficits          LABS:          Results from last 7 days   Lab Units 09/02/21  0400   SODIUM mmol/L 142   POTASSIUM mmol/L 4.1   BUN mg/dL 14   CREATININE mg/dL 1.38*   CALCIUM mg/dL 9.2        Results from last 7 days   Lab Units 09/02/21  0400 09/01/21  0356 08/31/21  0344   WBC 10*3/mm3 6.54 6.61 6.90   HEMOGLOBIN g/dL 8.9* 8.5* 8.5*   HEMATOCRIT % 32.7* 29.5* 29.6*   PLATELETS 10*3/mm3 182 180 178         Results from last 7 days   Lab Units 08/29/21  0221   CHOLESTEROL mg/dL 106   TRIGLYCERIDES mg/dL 78   HDL CHOL mg/dL 44   LDL CHOL mg/dL 46     Disposition:  Home    Discharge Medications:      Discharge Medications      New Medications      Instructions Start Date   chlorhexidine 0.12 % solution  Commonly known as: PERIDEX   Swish and Spit 15mL the night before surgery and the morning of surgery      furosemide 20 MG tablet  Commonly known as: LASIX   20 mg, Oral, Daily      mupirocin 2 % nasal ointment  Commonly known as: BACTROBAN   Apply 1 application into both nostrils the night before and the morning of surgery      potassium chloride 10 MEQ CR tablet   10 mEq, Oral, Daily         Changes to Medications      Instructions Start Date   losartan 25 MG tablet  Commonly known as: COZAAR  What changed:   · medication strength  · how much to take   25 mg, Oral, Daily   Start Date: September 3, 2021        Continue These Medications      Instructions Start Date   allopurinol 300 MG tablet  Commonly known as: ZYLOPRIM   300 mg, Oral, Daily      aspirin 81 MG EC tablet   81 mg, Oral, Daily      atorvastatin 20 MG tablet  Commonly known as: LIPITOR   20 mg, Oral, Daily      propranolol 10 MG tablet  Commonly known as: INDERAL   10 mg, Oral, Daily      VITAMIN D PO   1 tablet, Oral, Daily         Stop These Medications    chlorthalidone 25 MG tablet  Commonly known as: HYGROTON     Eliquis 5 MG tablet tablet  Generic drug: apixaban          The  "following medical decision was discussed in detail with Dr. Murray      Discharge Home Instructions:   1. Follow-up with Dr. Hernández after your TAVR.  She will need to call for an appointment.  2. You will need to follow-up with hematology.  Please call for an appointment.  3.  He will follow-up with Dr. Murray in 2 weeks or after your TAVR.  4.  Plan is to come back next week for a TAVR.  5.  Monitor sodium intake as well as fluid intake.  Please call the office with any weight gain of 4 pounds in 2-3 days.  Please call the office if with any concerns.  6. You will stop taking your Eliquis until further notice.      7. Routine post cardiac catheterization/PCI discharge home care instructions:    1. No submerging procedure site below water for 7-10 days.  2. No lifting objects greater than 1 lbs for 3 days.  3. If groin site used, avoid climbing several flights of stairs or sitting for longer than 2 hours at a time for the next 24 hours.   4. Monitor puncture site for bleeding and/or knots;. If bleeding should occur at the groin site: lie flat, apply pressure and return to the ER. If bleeding should occur at the wrist site, apply pressure and return to the ER.  5.  You may apply a DRY Band-Aid over the puncture site if needed. Do not apply any lotions, salves or ointments to site.  6. No driving for 3 days.  7. Return to ER for recurrent symptoms.  8. No smoking.  9. Take all medications as prescribed.       Signed:  GLEN Greco  9/2/2021  14:26 EDT      35 min spent in total at bedside for teaching and in coordination of care with other team members.     EMR Dragon/Transcription:   \"Dictated utilizing Dragon dictation\".     "

## 2021-09-02 NOTE — OUTREACH NOTE
Prep Survey      Responses   [facility] facility patient discharged from?  [place]   Is LACE score < 7 ?  No   Emergency Room discharge w/ pulse ox?  No   Eligibility  [facility]   Date of Admission  08/27/21   Date of Discharge  09/02/21   Discharge Disposition  Home or Self Care   Discharge diagnosis  Afib   Does the patient have one of the following disease processes/diagnoses(primary or secondary)?  Other   Does the patient have Home health ordered?  No   Is there a DME ordered?  No   Prep survey completed?  Yes          Dana Veliz RN

## 2021-09-02 NOTE — PROGRESS NOTES
" LOS: 2 days   Patient Care Team:  Stuart Shah MD as PCP - General (Family Medicine)  Nancy Leone APRN as Nurse Practitioner (Endocrinology)  Merlyn Hernández MD as Consulting Physician (Gastroenterology)  Rosemarie Murray MD as Consulting Physician (Cardiology)    Chief Complaint: aortic stenosis    Subjective:  Symptoms:  He reports shortness of breath and cough.  No chest pain.    Diet:  Adequate intake.  No nausea or vomiting.    Activity level: Impaired due to weakness.    Pain:  He reports no pain.      Vital Signs  Temp:  [97.8 °F (36.6 °C)-98.8 °F (37.1 °C)] 97.8 °F (36.6 °C)  Heart Rate:  [] 101  Resp:  [18] 18  BP: ()/(59-88) 136/88  Body mass index is 44.81 kg/m².    Intake/Output Summary (Last 24 hours) at 9/2/2021 0907  Last data filed at 9/2/2021 0827  Gross per 24 hour   Intake 1852 ml   Output 3100 ml   Net -1248 ml     I/O this shift:  In: 480 [P.O.:480]  Out: 250 [Urine:250]          08/31/21  1031 09/01/21  0630 09/02/21  0831   Weight: (!) 151 kg (333 lb) (!) 147 kg (323 lb 3.2 oz) (!) 150 kg (330 lb 6.4 oz)       Objective:  General Appearance:  Comfortable and in no acute distress.    Vital signs: (most recent): Blood pressure 136/88, pulse 101, temperature 97.8 °F (36.6 °C), temperature source Oral, resp. rate 18, height 182.9 cm (72\"), weight (!) 150 kg (330 lb 6.4 oz), SpO2 95 %.  Vital signs are normal.  No fever.    Output: Producing urine.    Lungs:  Normal effort and normal respiratory rate.  There are rales and decreased breath sounds.    Heart: Normal rate.  Regular rhythm.    Abdomen: Abdomen is soft.  Bowel sounds are normal.     Extremities: There is dependent edema.    Pulses: Distal pulses are intact.    Neurological: Patient is alert and oriented to person, place and time.    Skin:  Warm and dry.        Results Review:        WBC WBC   Date Value Ref Range Status   09/02/2021 6.54 3.40 - 10.80 10*3/mm3 Final   09/01/2021 6.61 3.40 - 10.80 10*3/mm3 " Final   08/31/2021 6.90 3.40 - 10.80 10*3/mm3 Final      HGB Hemoglobin   Date Value Ref Range Status   09/02/2021 8.9 (L) 13.0 - 17.7 g/dL Final   09/01/2021 8.5 (L) 13.0 - 17.7 g/dL Final   08/31/2021 8.5 (L) 13.0 - 17.7 g/dL Final      HCT Hematocrit   Date Value Ref Range Status   09/02/2021 32.7 (L) 37.5 - 51.0 % Final   09/01/2021 29.5 (L) 37.5 - 51.0 % Final   08/31/2021 29.6 (L) 37.5 - 51.0 % Final      Platelets Platelets   Date Value Ref Range Status   09/02/2021 182 140 - 450 10*3/mm3 Final   09/01/2021 180 140 - 450 10*3/mm3 Final   08/31/2021 178 140 - 450 10*3/mm3 Final        PT/INR:  No results found for: PROTIME/No results found for: INR    Sodium Sodium   Date Value Ref Range Status   09/02/2021 142 136 - 145 mmol/L Final   09/01/2021 140 136 - 145 mmol/L Final   08/31/2021 139 136 - 145 mmol/L Final      Potassium Potassium   Date Value Ref Range Status   09/02/2021 4.1 3.5 - 5.2 mmol/L Final   09/01/2021 3.5 3.5 - 5.2 mmol/L Final   08/31/2021 3.8 3.5 - 5.2 mmol/L Final      Chloride Chloride   Date Value Ref Range Status   09/02/2021 100 98 - 107 mmol/L Final   09/01/2021 98 98 - 107 mmol/L Final   08/31/2021 97 (L) 98 - 107 mmol/L Final      Bicarbonate CO2   Date Value Ref Range Status   09/02/2021 31.7 (H) 22.0 - 29.0 mmol/L Final   09/01/2021 32.9 (H) 22.0 - 29.0 mmol/L Final   08/31/2021 30.6 (H) 22.0 - 29.0 mmol/L Final      BUN BUN   Date Value Ref Range Status   09/02/2021 14 8 - 23 mg/dL Final   09/01/2021 16 8 - 23 mg/dL Final   08/31/2021 20 8 - 23 mg/dL Final      Creatinine Creatinine   Date Value Ref Range Status   09/02/2021 1.38 (H) 0.76 - 1.27 mg/dL Final   09/01/2021 1.19 0.76 - 1.27 mg/dL Final   08/31/2021 1.23 0.76 - 1.27 mg/dL Final      Calcium Calcium   Date Value Ref Range Status   09/02/2021 9.2 8.6 - 10.5 mg/dL Final   09/01/2021 9.3 8.6 - 10.5 mg/dL Final   08/31/2021 8.9 8.6 - 10.5 mg/dL Final      Magnesium No results found for: MG       allopurinol, 300 mg, Oral,  Daily  aspirin, 81 mg, Oral, Daily  atorvastatin, 10 mg, Oral, Nightly  famotidine, 20 mg, Oral, BID  folic acid, 500 mcg, Oral, Daily  losartan, 25 mg, Oral, Daily  multivitamin, 1 tablet, Oral, Daily  propranolol, 10 mg, Oral, Daily  thiamine, 100 mg, Oral, Daily         Patient Active Problem List   Diagnosis Code    Benign non-nodular prostatic hyperplasia without lower urinary tract symptoms N40.0    Diabetes mellitus type 2, controlled, without complications (CMS/[facility]) E11.9    Vitamin D deficiency E55.9    Hyperuricemia E79.0    Low testosterone R79.89    Dyslipidemia E78.5    Hypertension I10    Acanthosis nigricans L83    Gout M10.9    Diabetes mellitus (CMS/[facility]) E11.9    Impotence of organic origin N52.9    Hypogonadism in male E29.1    Class 1 obesity due to excess calories without serious comorbidity with body mass index (BMI) of 30.0 to 30.9 in adult E66.09, Z68.30    Diverticulitis of large intestine without perforation or abscess without bleeding K57.32    Abdominal pain R10.9    A-fib (CMS/[facility]) I48.91    Nonrheumatic aortic valve stenosis I35.0    Non-rheumatic mitral regurgitation I34.0    Anticoagulated Z79.01    Alcohol abuse F10.10    Angioedema T78.3XXA    Wheezes R06.2    Diarrhea of presumed infectious origin R19.7    Sigmoid polyp K63.5    Sleep apnea G47.30    Trigger finger, acquired M65.30    Pure hypercholesterolemia E78.00    Microalbuminuria R80.9    Aortic stenosis, severe I35.0     Assessment & Plan   Severe aortic stenosis  Normal LV systolic function---EF 55%  Normal coronary arteries---s/p cath 8/27  Atrial fibrillation---last dose eliquis 8/25  HTN---losartan/propranolol  HLD---lipitor  CKD stage 3  Severe ARIANA---BiPAP  COPD---on 5L home O2, pulm following   Anemia---received 1 unit PRBC 8/28, venofer, Heme following, GI following    Morbid obesity---BMI 43.09  GERD---pepcid     Agree with additional diuresis today. Creatinine up slightly today-- continue to monitor renal function  closely  Plan for continued optimization while in the hospital  Tentative plan for TAVR in the next 1-2 weeks    GLEN Aponte  09/02/21  09:07 EDT  Addendum  Patient was seen and examined by me, agree with findings above.  I reviewed all the studies myself and interpreted them.  Plan is for TAVR soon.  Preoperative work-up ongoing  Jonah Fuller MD

## 2021-09-02 NOTE — PROGRESS NOTES
"Patient Care Team:  Stuart Shah MD as PCP - General (Family Medicine)  Nancy Leone APRN as Nurse Practitioner (Endocrinology)  Merlyn Hernández MD as Consulting Physician (Gastroenterology)  Rosemarie Murray MD as Consulting Physician (Cardiology)    Chief Complaint:    Interval History:       Objective   Vital Signs  Temp:  [97.8 °F (36.6 °C)-98.8 °F (37.1 °C)] 97.8 °F (36.6 °C)  Heart Rate:  [] 101  Resp:  [18] 18  BP: ()/(59-88) 136/88    Intake/Output Summary (Last 24 hours) at 9/2/2021 1212  Last data filed at 9/2/2021 1017  Gross per 24 hour   Intake 1552 ml   Output 3500 ml   Net -1948 ml     Flowsheet Rows      First Filed Value   Admission Height  182.9 cm (72\") Documented at 08/27/2021 1105   Admission Weight  (!) 153 kg (338 lb 6.4 oz) Documented at 08/27/2021 1105          Temp:  [97.8 °F (36.6 °C)-98.8 °F (37.1 °C)] 97.8 °F (36.6 °C)  Heart Rate:  [] 101  Resp:  [18] 18  BP: ()/(59-88) 136/88    Intake/Output Summary (Last 24 hours) at 9/2/2021 1212  Last data filed at 9/2/2021 1017  Gross per 24 hour   Intake 1552 ml   Output 3500 ml   Net -1948 ml     Flowsheet Rows      First Filed Value   Admission Height  182.9 cm (72\") Documented at 08/27/2021 1105   Admission Weight  (!) 153 kg (338 lb 6.4 oz) Documented at 08/27/2021 1105          General Appearance:    Alert, cooperative, in no acute distress.  Obese.   Head:    Normocephalic, without obvious abnormality, atraumatic       Neck/Lymph   No adenopathy, supple, no thyromegaly, no carotid bruit, no    JVD   Lungs:     Clear to auscultation bilaterally, no wheezes, rales, or     rhonchi    Cardiac:    Normal rate, regular rhythm, 3 out of 6 systolic murmur peak late in systole.  no rub, no gallop   Chest Wall:    No abnormalities observed   GI:     Normal bowel sounds, soft, nontender, nondistended,            no rebound tenderness   Extremities:   No cyanosis, clubbing, or edema   Circulatory/Peripheral " Vascular :   Pulses palpable and equal bilaterally   Integumentary:   No bleeding or rash. Normal temperature       Neurologic:   Cranial nerves 2 - 12 grossly intact, sensation intact              allopurinol, 300 mg, Oral, Daily  aspirin, 81 mg, Oral, Daily  atorvastatin, 10 mg, Oral, Nightly  famotidine, 20 mg, Oral, BID  folic acid, 500 mcg, Oral, Daily  losartan, 25 mg, Oral, Daily  multivitamin, 1 tablet, Oral, Daily  propranolol, 10 mg, Oral, Daily  thiamine, 100 mg, Oral, Daily             Results Review:    Results from last 7 days   Lab Units 09/02/21  0400   SODIUM mmol/L 142   POTASSIUM mmol/L 4.1   CHLORIDE mmol/L 100   CO2 mmol/L 31.7*   BUN mg/dL 14   CREATININE mg/dL 1.38*   GLUCOSE mg/dL 83   CALCIUM mg/dL 9.2         Results from last 7 days   Lab Units 09/02/21  0400   WBC 10*3/mm3 6.54   HEMOGLOBIN g/dL 8.9*   HEMATOCRIT % 32.7*   PLATELETS 10*3/mm3 182         Results from last 7 days   Lab Units 08/29/21  0221   CHOLESTEROL mg/dL 106         Results from last 7 days   Lab Units 08/29/21  0221   CHOLESTEROL mg/dL 106   TRIGLYCERIDES mg/dL 78   HDL CHOL mg/dL 44   LDL CHOL mg/dL 46     @LABRCNT(bnp)@  I reviewed the patient's new clinical results.  I personally viewed and interpreted the patient's EKG/Telemetry data            Assessment/Plan   1.  Severe degenerative aortic valve stenosis  2.  Chronic hypoxic respiratory failure  3.  GOMEZ  4.  Partially paralyzed hemidiaphragm  5.  Morbid obesity  6.  Chronic kidney disease  7.  COPD  8.  Anemia  9.  Chronic atrial fibrillation  10.  Obstructive sleep apnea on BiPAP     -Diuresed very well overnight.  He does not feel that it made a significant change in his breathing.  -Creatinine is slightly elevated with this diuresis.  Recommend moving to p.o. diuretic tomorrow as outpatient.     -I have reviewed his echocardiogram and catheterization images along with hemodynamics. He does have severe aortic valve stenosis, however I think this is only one  of the contributors to his dyspnea. I do not think he will need valvuloplasty.     -Although I do appreciate that he does have a risk of stroke in the setting of his atrial fibrillation and age, I do not think continuing anticoagulant therapy would be ideal considering a significant drop in his hematocrit since his labs in March.     -I do think he is an adequate candidate for transcatheter aortic valve replacement with a transfemoral approach. He is aware. We will try this done in the next couple of weeks.

## 2021-09-02 NOTE — PROGRESS NOTES
Subjective     HISTORY OF PRESENT ILLNESS:   No acute issues today. Doing about the same.  Note plan for TAVR next week.  S/P 1300 MG IV Venofer.      Past Medical History, Past Surgical History, Social History, Family History have been reviewed and are without significant changes except as mentioned.    Review of Systems   Constitutional: Negative for fever.   Respiratory: Positive for shortness of breath.    Genitourinary: Positive for scrotal swelling.   Musculoskeletal: Joint swelling:    Allergic/Immunologic: Negative for immunocompromised state.   Neurological: Negative for weakness.   Hematological: Does not bruise/bleed easily.        Medications:  The current medication list was reviewed in the EMR    ALLERGIES:    Allergies   Allergen Reactions   • Amoxil [Amoxicillin] Hives   • Clindamycin/Lincomycin Hives   • Ace Inhibitors Angioedema       Objective      Vitals:    09/02/21 0401 09/02/21 0727 09/02/21 0801 09/02/21 0831   BP: 105/63 136/88 136/88    BP Location: Left arm Left arm     Patient Position: Lying Sitting     Pulse: 102 97 101    Resp: 18 18     Temp: 98.5 °F (36.9 °C) 97.8 °F (36.6 °C)     TempSrc: Oral Oral     SpO2: 95%      Weight:    (!) 150 kg (330 lb 6.4 oz)   Height:         No flowsheet data found.    Physical Exam    CONSTITUTIONAL:  Vital signs reviewed.  up to chair  RESPIRATORY:  Normal respiratory effort.    SKIN:  Warm.  No rashes.  PSYCHIATRIC:  Normal judgment and insight.  Normal mood and affect.    RECENT LABS:  Hematology WBC   Date Value Ref Range Status   09/02/2021 6.54 3.40 - 10.80 10*3/mm3 Final     RBC   Date Value Ref Range Status   09/02/2021 3.86 (L) 4.14 - 5.80 10*6/mm3 Final     Hemoglobin   Date Value Ref Range Status   09/02/2021 8.9 (L) 13.0 - 17.7 g/dL Final     Hematocrit   Date Value Ref Range Status   09/02/2021 32.7 (L) 37.5 - 51.0 % Final     Platelets   Date Value Ref Range Status   09/02/2021 182 140 - 450 10*3/mm3 Final     Lab Results   Component  Value Date    GLUCOSE 83 09/02/2021    BUN 14 09/02/2021    CREATININE 1.38 (H) 09/02/2021    EGFRIFNONA 50 (L) 09/02/2021    EGFRIFAFRI 61 03/24/2021    BCR 10.1 09/02/2021    K 4.1 09/02/2021    CO2 31.7 (H) 09/02/2021    CALCIUM 9.2 09/02/2021    PROTENTOTREF 6.4 08/29/2021    ALBUMIN 3.90 08/29/2021    ALBUMIN 3.4 08/29/2021    LABIL2 1.1 08/29/2021    AST 14 08/29/2021    ALT 14 08/29/2021              Assessment/Plan     # Anemia, normocytic:  · Appears acute. Had normal Hb (12-13 gm/dl) range in feb-march 2021.   · Patient reports of having dark stools. He was on eliquis prior to admission which has been now held.  · Work up shows significant iron deficiency with iron saturation of just 4%. Vit b12, folate levels and hemolysis panel unremarkable.  SPEP/MEGAN & FLC no M spike, normal light chain ratio  · Received 1 U PRBC transfusion on 8/28.   · venofer 400 mg x 1 given 8/29/21; 300 mg IV 8/30/21; 300 mg IV 8/31/2021  · hgb today stable 8.9      # Severe aortic stenosis:  · Note plan for TAVR next week    Hematology Plan:  1.  The patient has completed 1300 mg IV Venofer-no further IV iron planned at this point  2.  Monitor CBC; transfuse Hgb less 7.0  3.  GI following for eval JERED--probable scopes after valve addressed  4.  Agree with Dr. Juárez that it is risky to give anticoagulation given the patient's unexplained iron deficiency anemia and unevaluated digestive tract  5.  TAVR planned next week  6.  No opposition to discharge from a hematology perspective; please call if further needed during the hospital stay        9/2/2021      CC:

## 2021-09-02 NOTE — ANESTHESIA PREPROCEDURE EVALUATION
Anesthesia Evaluation     Patient summary reviewed and Nursing notes reviewed   history of anesthetic complications: PONV               Airway   Mallampati: III  TM distance: >3 FB  Neck ROM: limited  Large neck circumference and Possible difficult intubation  Dental - normal exam     Pulmonary    (+) home oxygen, shortness of breath, sleep apnea on CPAP,   Cardiovascular   Exercise tolerance: poor (<4 METS)    ECG reviewed    (+) hypertension, valvular problems/murmurs AS, dysrhythmias Atrial Fib, hyperlipidemia,     ROS comment: 7/29/2021 LHC/RHC:  1. Pulmonary capillary wedge pressure was 10.   2. Pulmonary artery systolic pressure was 63/20.  3. Right atrial pressure was 10.  4. Cardiac index was 3.44.  5. Left ventricular end diastolic pressure was 10 mmHg.  6. The left main is normal left main.  7. The LAD is early atherosclerotic plaque including the diagonal and  branches.  8. Circumflex artery nondominant with early atherosclerotic plaque  9. The right coronary artery is dominant with early atherosclerotic plaque  10. Gradient across aortic valve 63 mmHg  11. Severe aortic stenosis.    Neuro/Psych  (+) psychiatric history,     GI/Hepatic/Renal/Endo    (+) morbid obesity,      Musculoskeletal     Abdominal    Substance History   (+) alcohol use (several drinks nightly),      OB/GYN          Other                        Anesthesia Plan    ASA 4     general and MAC   (I have reviewed the patient's history with the patient and the chart, including all pertinent laboratory results and imaging. I have explained the risks of anesthesia including but not limited to dental damage, corneal abrasion, nerve injury, MI, stroke, and death. Questions asked and answered. Anesthetic plan discussed with patient and team as indicated. Patient expressed understanding of the above.    Discussed GETA vs MAC but given the patient's significant GOMEZ and orthopnea, suspect GETA will be better suited for him. Discussed  arterial line, CVC and CAMILO.)  intravenous induction     Anesthetic plan, all risks, benefits, and alternatives have been provided, discussed and informed consent has been obtained with: patient and child.  Use of blood products discussed with patient  Consented to blood products.

## 2021-09-03 NOTE — OUTREACH NOTE
Call Center TCM Note      Responses   [facility] patient discharged from?  [place]   Does the patient have one of the following disease processes/diagnoses(primary or secondary)?  Other   TCM attempt successful?  Yes   Call start time  1516   Call end time  1519   Discharge diagnosis  Afib   Meds reviewed with patient/caregiver?  Yes   Is the patient having any side effects they believe may be caused by any medication additions or changes?  No   Does the patient have all medications ordered at discharge?  Yes   Is the patient taking all medications as directed (includes completed medication regime)?  Yes   Does the patient have a primary care provider?   Yes   Does the patient have an appointment with their PCP within 7 days of discharge?  No   Comments regarding PCP  Patient will have surgery 9/7/21 - will route message to group   Psychosocial issues?  No   Did the patient receive a copy of their discharge instructions?  Yes   Nursing interventions  Reviewed instructions with patient   What is the patient's perception of their health status since discharge?  Improving [just a little weak ]   Is the patient/caregiver able to teach back signs and symptoms related to disease process for when to call PCP?  Yes   Is the patient/caregiver able to teach back signs and symptoms related to disease process for when to call 911?  Yes   Is the patient/caregiver able to teach back the hierarchy of who to call/visit for symptoms/problems? PCP, Specialist, Home health nurse, Urgent Care, ED, 911  Yes   If the patient is a current smoker, are they able to teach back resources for cessation?  Not a smoker   TCM call completed?  Yes          Yoselin Ribeiro RN    9/3/2021, 15:19 EDT

## 2021-09-07 NOTE — ANESTHESIA PROCEDURE NOTES
Central Line      Patient location during procedure: holding area  Start time: 9/7/2021 10:30 AM  Stop Time:9/7/2021 11:00 AM  Staff  Anesthesiologist: Cary Cordoba MD  Preanesthetic Checklist  Completed: patient identified, IV checked, site marked, risks and benefits discussed, surgical consent and pre-op evaluation  Central Line Prep  Sterile Tech:cap, gloves, gown, mask and sterile barriers  Prep: chloraprep  Patient monitoring: continuous pulse oximetry and EKG  Central Line Procedure  Laterality:right  Location:internal jugular  Catheter Type:single lumen and Cordis  Catheter Size:6 Fr  Guidance:ultrasound guided  PROCEDURE NOTE/ULTRASOUND INTERPRETATION.  Using ultrasound guidance the potential vascular sites for insertion of the catheter were visualized to determine the patency of the vessel to be used for vascular access.  After selecting the appropriate site for insertion, the needle was visualized under ultrasound being inserted into the internal jugular vein, followed by ultrasound confirmation of wire and catheter placement. There were no abnormalities seen on ultrasound; an image was taken; and the patient tolerated the procedure with no complications. Images: still images obtained, printed/placed on chart  Assessment  Post procedure:biopatch applied, line sutured and occlusive dressing applied  Assessement:chest x-ray ordered, blood return through all ports and free fluid flow  Complications:no  Patient Tolerance:patient tolerated the procedure well with no apparent complications  Additional Notes  ultrasound used to visualize int jug and image taken

## 2021-09-07 NOTE — ANESTHESIA POSTPROCEDURE EVALUATION
Patient: Agustín Willett    Procedure Summary     Date: 09/07/21 [facility] / [facility]    Anesthesia Start: 1221 Anesthesia Stop: 1415    Procedures:       TTE TRANSFEMORAL TRANSCATHETER AORTIC VALVE REPLACEMENT PERCUTANEOUS APPROACH (N/A Chest)      Transfemoral Transcatheter Aortic Valve Replacement with intra-op tte and possible open surgical rescue (N/A ) Diagnosis:       Nonrheumatic aortic valve stenosis      (Nonrheumatic aortic valve stenosis [I35.0])    Surgeons: Jose Araiza MD; Maynor Juárez MD Provider: Jose Kaiser MD    Anesthesia Type: general, MAC ASA Status: 4          Anesthesia Type: general, MAC    Vitals  Vitals Value Taken Time   /79 09/07/21 1601   Temp 34.3 °C (93.8 °F) 09/07/21 1409   Pulse 83 09/07/21 1624   Resp     SpO2 92 % 09/07/21 1624   Vitals shown include unvalidated device data.        Post Anesthesia Care and Evaluation    Patient location during evaluation: bedside  Patient participation: complete - patient participated  Level of consciousness: awake  Pain management: adequate  Airway patency: patent  Anesthetic complications: No anesthetic complications  PONV Status: none  Cardiovascular status: acceptable  Respiratory status: acceptable  Hydration status: acceptable  Post Neuraxial Block status: Motor and sensory function returned to baseline

## 2021-09-07 NOTE — ANESTHESIA PROCEDURE NOTES
Arterial Line      Patient location during procedure: holding area  Start time: 9/7/2021 10:23 AM  Stop Time:9/7/2021 10:30 AM       Line placed for hemodynamic monitoring.  Performed By   Anesthesiologist: Cary Cordoba MD  Preanesthetic Checklist  Completed: patient identified, IV checked, site marked, risks and benefits discussed, monitors and equipment checked and pre-op evaluation  Arterial Line Prep   Sterile Tech: gloves and mask  Prep: ChloraPrep  Patient monitoring: continuous pulse oximetry, blood pressure monitoring and EKG  Arterial Line Procedure   Laterality:left  Location:  radial artery  Catheter size: 20 G   Guidance: ultrasound guided  PROCEDURE NOTE/ULTRASOUND INTERPRETATION.  Using ultrasound guidance the potential vascular sites for insertion of the catheter were visualized to determine the patency of the vessel to be used for vascular access.  After selecting the appropriate site for insertion, the needle was visualized under ultrasound being inserted into the radial artery, followed by ultrasound confirmation of wire and catheter placement. There were no abnormalities seen on ultrasound; an image was taken; and the patient tolerated the procedure with no complications.   Number of attempts: 1  Successful placement: yes  Post Assessment   Dressing Type: occlusive dressing applied.   Complications no  Circ/Move/Sens Assessment: normal.   Patient Tolerance: patient tolerated the procedure well with no apparent complications  Additional Notes  ultradound used to visualize catheter going into artery

## 2021-09-07 NOTE — CONSULTS
CONSULT NOTE    Patient Identification:  Agustín Willett  76 y.o.  male  1945  [id number]            Requesting physician: Dr. Araiza    Reason for Consultation: ARIANA COPD    CC:     History of Present Illness:  76-year-old gentleman known history of severe ARIANA follows my partner Dr. Jacobs.  He has chronic respiratory failure uses BiPAP with 5 L oxygen bled into it.  Apparently his dyspnea is multifactorial with last PFTs showing combined obstructive and restrictive lung disease.  He also has significant cardiac history and just underwent TAVR for his severe aortic stenosis and pulmonary hypertension.  Currently he is postop and resting comfortably.  Due to the procedure he has to lay flat but he denies any chest pain shortness of breath or cough.  His home BiPAP is at bedside.  He tells me he is compliant with his BiPAP uses full facemask.  No issues with the BiPAP machine.      Review of Systems  As above rest is negative  Past Medical History:  Past Medical History:   Diagnosis Date   • Atrial fibrillation (CMS/HCC)    • Dyslipidemia    • Erectile dysfunction    • Hx of complete eye exam 2016    UNKNOWN PER PT    • Hyperlipidemia    • Hypertension    • Hypogonadism male    • ARIANA (obstructive sleep apnea)    • PONV (postoperative nausea and vomiting)    • Type 2 diabetes mellitus (CMS/HCC)    • Vitamin D deficiency        Past Surgical History:  Past Surgical History:   Procedure Laterality Date   • ADENOIDECTOMY     • CARDIAC CATHETERIZATION N/A 8/27/2021    Procedure: Right Heart Cath;  Surgeon: Rosemarie Murray MD;  Location: [facility] CATH INVASIVE LOCATION;  Service: Cardiology;  Laterality: N/A;   • CARDIAC CATHETERIZATION N/A 8/27/2021    Procedure: Left Heart Cath;  Surgeon: Rosemarie Murray MD;  Location: [facility] CATH INVASIVE LOCATION;  Service: Cardiology;  Laterality: N/A;   • CARDIAC CATHETERIZATION N/A 8/27/2021    Procedure: Coronary angiography;  Surgeon: Rosemarie Murray MD;   Location: [facility] CATH INVASIVE LOCATION;  Service: Cardiology;  Laterality: N/A;   • COLONOSCOPY N/A 7/2/2018    Procedure: COLONOSCOPY TO CECUM WITH COLD BIOPSY POLYPECTOMY;  Surgeon: Dave Elizabeth MD;  Location: [facility] ENDOSCOPY;  Service: Gastroenterology   • TONSILLECTOMY     • TOTAL KNEE ARTHROPLASTY Left    • VASECTOMY          Home Meds:  Medications Prior to Admission   Medication Sig Dispense Refill Last Dose   • allopurinol (ZYLOPRIM) 300 MG tablet Take 1 tablet by mouth Daily. 90 tablet 1 9/6/2021 at 0700   • atorvastatin (LIPITOR) 20 MG tablet Take 1 tablet by mouth Daily. 90 tablet 1 Past Week at Unknown time   • chlorhexidine (PERIDEX) 0.12 % solution Swish and Spit 15mL the night before surgery and the morning of surgery (Patient taking differently: Apply 15 mL to the mouth or throat 1 (One) Time. Swish and Spit 15mL the night before surgery and the morning of surgery) 30 mL 0 9/7/2021 at 0600   • Cholecalciferol (VITAMIN D PO) Take 1 tablet by mouth Daily.   9/6/2021 at 0700   • furosemide (LASIX) 20 MG tablet Take 1 tablet by mouth Daily. 90 tablet 3 9/6/2021 at 0700   • losartan (COZAAR) 25 MG tablet Take 1 tablet by mouth Daily. 30 tablet 12 9/6/2021 at 0700   • mupirocin (BACTROBAN) 2 % nasal ointment Apply 1 application into both nostrils the night before and the morning of surgery (Patient taking differently: 1 application into the nostril(s) as directed by provider 1 (One) Time. Apply 1 application into both nostrils the night before and the morning of surgery) 2 g 0 9/7/2021 at 0600   • potassium chloride 10 MEQ CR tablet Take 1 tablet by mouth Daily. 30 tablet 11 9/6/2021 at 0700   • propranolol (INDERAL) 10 MG tablet Take 1 tablet by mouth Daily. 90 tablet 1 9/7/2021 at 0700   • aspirin 81 MG EC tablet Take 81 mg by mouth Daily.   9/5/2021 at 0700       Allergies:  Allergies   Allergen Reactions   • Amoxil [Amoxicillin] Hives   • Clindamycin/Lincomycin Hives   • Ace Inhibitors  "Angioedema       Social History:   Social History     Socioeconomic History   • Marital status:      Spouse name: Not on file   • Number of children: Not on file   • Years of education: Not on file   • Highest education level: Not on file   Tobacco Use   • Smoking status: Former Smoker     Packs/day: 2.00     Years: 15.00     Pack years: 30.00     Types: Cigarettes     Quit date: 1970     Years since quittin.7   • Smokeless tobacco: Never Used   Vaping Use   • Vaping Use: Never used   Substance and Sexual Activity   • Alcohol use: Yes     Comment: 1-2 DRINKS/DAY   • Drug use: No   • Sexual activity: Defer       Family History:  Family History   Problem Relation Age of Onset   • ALS Mother    • Hypertension Father    • Malig Hyperthermia Neg Hx        Physical Exam:  /71   Pulse 103   Temp 93.8 °F (34.3 °C) (Oral)   Resp 20   Ht 182.9 cm (72\")   Wt (!) 144 kg (318 lb 6.4 oz)   SpO2 97%   BMI 43.18 kg/m²  Body mass index is 43.18 kg/m². 97% (!) 144 kg (318 lb 6.4 oz)  Physical Exam  Patient is examined using the personal protective equipment as per guidelines from infection control for this particular patient as enacted.  Hand hygiene was performed before and after patient interaction.  Well-developed obese body habitus  Eyes normal conjunctivae and pupils reactive to light  ENT Mallampati between 3 and 4 normal nasal exam  Neck midline trachea no thyromegaly  Chest diminished breath sounds no wheezing or crackles  CVS regular rate and rhythm no lower extremity edema  Abdomen soft nontender no hepatosplenomegaly  CNS intact normal sensory exam  Skin no rashes no nodules  Psych oriented to time place and person normal memory  Musculoskeletal no cyanosis no clubbing normal range of motion        LABS:  Lab Results   Component Value Date    CALCIUM 9.2 2021    PHOS 3.0 2019     Results from last 7 days   Lab Units 21  0400 21  0356 21  0356   SODIUM mmol/L 142  --  " 140   POTASSIUM mmol/L 4.1  --  3.5   CHLORIDE mmol/L 100  --  98   CO2 mmol/L 31.7*  --  32.9*   BUN mg/dL 14  --  16   CREATININE mg/dL 1.38*  --  1.19   GLUCOSE mg/dL 83   < > 102*   CALCIUM mg/dL 9.2  --  9.3   WBC 10*3/mm3 6.54  --  6.61   HEMOGLOBIN g/dL 8.9*  --  8.5*   PLATELETS 10*3/mm3 182  --  180    < > = values in this interval not displayed.     Lab Results   Component Value Date    TROPONINT <0.010 05/08/2018                                 Lab Results   Component Value Date    TSH 2.930 08/29/2021     Estimated Creatinine Clearance: 69.1 mL/min (A) (by C-G formula based on SCr of 1.38 mg/dL (H)).  Results from last 7 days   Lab Units 09/01/21  1611   NITRITE UA  Negative        Imaging: I personally visualized the images of scans/x-rays performed within last 3 days.      Assessment:  Nonrheumatic aortic valve stenosis  Chronic hypoxemic respiratory failure  Severe ARIANA on BiPAP auto 12-20 with a delta of 8  Combined obstructive and restrictive lung disease  Partially paralyzed hemidiaphragm  Morbid obesity  Aortic stenosis status post TAVR  Mild renal insufficiency      Recommendations:  I would recommend resuming home BiPAP which is at bedside.  We will use 5 L oxygen which is his baseline to be bled by the BiPAP.  Postprocedure patient is stable.  Currently not wheezing no need for any bronchodilators as such.  Recommend mobilizing and ambulating as soon as possible post procedure  Monitor creatinine closely  We will monitor clinical progress during hospitalization            Eboni Smith MD  9/7/2021  14:46 EDT      Much of this encounter note is an electronic transcription/translation of spoken language to printed text using Dragon Software.

## 2021-09-08 NOTE — CONSULTS
Attempted to meet with patient several times over last 24 hours to discuss the benefits of cardiac rehab. Provided phase II information along with the contact information for cardiac rehab here at [facility]. Will call patient this week to discuss program.

## 2021-09-08 NOTE — PROGRESS NOTES
Clinical Pharmacy Services-Discharge Medication Education    Agustín Willett was started on clopidogrel and back on apixaban s/p TAVR. Counseling points included the followin) Explained indication of each new medication, and patient's need for these medications.  2) Went over dosing and frequency of each new medication.  3) Discussed any special administration, storage, or monitoring instructions with medications.  4) Discussed all important drug interactions, including over-the-counter medications and supplements.  5) Explained possible side effects for each new medication.  6) Instructed the patient not to begin or discontinue any medications without informing his/her physician/pharmacist.    Patient expressed understanding and had no further questions.      Merry Rosenberg, Pharm.D., [facility]   Clinical Staff Pharmacist   Phone Extension #[phone]

## 2021-09-08 NOTE — PROGRESS NOTES
"Patient Care Team:  Stuart Shah MD as PCP - General (Family Medicine)  Nancy Leone APRN as Nurse Practitioner (Endocrinology)  Merlyn Hernández MD as Consulting Physician (Gastroenterology)  Rosemarie Murray MD as Consulting Physician (Cardiology)  Veena Chamberlain APRN as Referring Physician (Cardiology)    Chief Complaint: Follow-up transcatheter aortic valve replacement, chronic diastolic heart    Interval History: Doing well.  Still complains of shortness of breath.      Objective   Vital Signs  Temp:  [93.8 °F (34.3 °C)-98 °F (36.7 °C)] 98 °F (36.7 °C)  Heart Rate:  [] 94  Resp:  [18-22] 22  BP: ()/(49-88) 104/50    Intake/Output Summary (Last 24 hours) at 9/8/2021 0909  Last data filed at 9/8/2021 0744  Gross per 24 hour   Intake 1100 ml   Output 650 ml   Net 450 ml     Flowsheet Rows      First Filed Value   Admission Height  182.9 cm (72\") Documented at 09/07/2021 0819   Admission Weight  (!) 144 kg (318 lb 6.4 oz) Documented at 09/07/2021 0819          Temp:  [93.8 °F (34.3 °C)-98 °F (36.7 °C)] 98 °F (36.7 °C)  Heart Rate:  [] 94  Resp:  [18-22] 22  BP: ()/(49-88) 104/50    Intake/Output Summary (Last 24 hours) at 9/8/2021 0909  Last data filed at 9/8/2021 0744  Gross per 24 hour   Intake 1100 ml   Output 650 ml   Net 450 ml     Flowsheet Rows      First Filed Value   Admission Height  182.9 cm (72\") Documented at 09/07/2021 0819   Admission Weight  (!) 144 kg (318 lb 6.4 oz) Documented at 09/07/2021 0819          General Appearance:    Alert, cooperative, in no acute distress   Head:    Normocephalic, without obvious abnormality, atraumatic       Neck/Lymph   No adenopathy, supple, no thyromegaly, no carotid bruit, no    JVD   Lungs:     Clear to auscultation bilaterally, no wheezes, rales, or     rhonchi    Cardiac:    Normal rate, regular rhythm, no murmur, no rub, no gallop   Chest Wall:    No abnormalities observed   GI:     Normal bowel sounds, soft, nontender, " nondistended,            no rebound tenderness   Extremities:   No cyanosis, clubbing, or edema   Circulatory/Peripheral Vascular :   Pulses palpable and equal bilaterally   Integumentary:   No bleeding or rash. Normal temperature       Neurologic:   Cranial nerves 2 - 12 grossly intact, sensation intact              allopurinol, 300 mg, Oral, Daily  aspirin, 81 mg, Oral, Daily  atorvastatin, 20 mg, Oral, Nightly  clopidogrel, 75 mg, Oral, Daily  furosemide, 80 mg, Intravenous, Once  furosemide, 20 mg, Oral, Daily  losartan, 25 mg, Oral, Daily        sodium chloride, 9 mL/hr, Last Rate: 9 mL/hr (09/07/21 1115)        Results Review:    Results from last 7 days   Lab Units 09/08/21  0449   SODIUM mmol/L 139   POTASSIUM mmol/L 3.6   CHLORIDE mmol/L 100   CO2 mmol/L 27.5   BUN mg/dL 22   CREATININE mg/dL 1.30*   GLUCOSE mg/dL 96   CALCIUM mg/dL 8.9         Results from last 7 days   Lab Units 09/08/21  0449   WBC 10*3/mm3 9.24   HEMOGLOBIN g/dL 10.1*   HEMATOCRIT % 34.8*   PLATELETS 10*3/mm3 176                     @LABRCNT(bnp)@  I reviewed the patient's new clinical results.  I personally viewed and interpreted the patient's EKG/Telemetry data            Assessment/Plan   1.  Severe degenerative aortic valve stenosis: Postoperative day 1.  2.  Chronic diastolic heart failure  3.  Chronic atrial fibrillation  4.  Chronic kidney disease  5.  COPD on home O2  6.  Morbid obesity      -Overall he is doing very well today.  He is hypoxic but this is chronic for him.  Still complains of shortness of breath.  -One-time dose of Lasix today IV.  -Follow-up transthoracic echocardiogram.  -I would not send home on triple therapy.  I would recommend that he be restarted on Eliquis on 9/10 and continue Plavix.  We can discontinue the aspirin upon his discharge.

## 2021-09-08 NOTE — DISCHARGE SUMMARY
Date of Admission: 9/7/2021  Date of Discharge:  9/8/2021    Discharge Diagnosis:   · Severe aortic stenosis s/p TAVR  · Normal LV systolic function---EF 55%  · Normal coronary arteries---s/p cath 8/27  · Atrial fibrillation  · HTN  · HLD  · CKD stage 3  · Severe ARIANA---BiPAP  · COPD---on home O2  · Chronic anemia-stable  · Morbid obesity---BMI 43.09  · GERD    Presenting Problem/History of Present Illness  Nonrheumatic aortic valve stenosis [I35.0]     Hospital Course  Patient is a 76 y.o. male who presented for previously scheduled cardiac surgery. On 9/7/21 he underwent percutaneous transfemoral access transcatheter aortic valve replacement with Dr. Araiza and Dr. Juárez. Post-operatively he did well. Repeat echocardiogram showed well-seated TAVR valve with mean and peak gradients within defined limits. He remains on 3-5 L NC of oxygen, which is what he is on at home. His hemoglobin remains stable, and he is to be discharge on Plavix and Eliquis. His mobility is at baseline, and he is tolerating the current medication regimen. He is urinating without issue, and is eating and drinking sufficiently. On POD#1 he was deemed ready for discharge home. He is to follow up with Hazel Wiley/GLEN Decker in 1 week, and Dr. Juárez in 1 month.     Procedures Performed  Procedure(s):  TTE TRANSFEMORAL TRANSCATHETER AORTIC VALVE REPLACEMENT PERCUTANEOUS APPROACH  Transfemoral Transcatheter Aortic Valve Replacement with intra-op tte and possible open surgical rescue       Consults:   Consults     Date and Time Order Name Status Description    9/7/2021  2:31 PM Inpatient Pulmonology Consult Completed     9/1/2021  3:27 PM Inpatient Neurology Consult General Completed     8/28/2021  9:05 AM Inpatient Gastroenterology Consult Completed     8/28/2021  8:41 AM Hematology & Oncology Inpatient Consult Completed     8/27/2021  9:18 PM Inpatient Pulmonology Consult Completed     8/27/2021  3:42 PM Inpatient Internal Medicine Consult  Completed     8/27/2021  3:08 PM Inpatient Cardiothoracic Surgery Consult Completed           Pertinent Test Results:    Lab Results   Component Value Date    WBC 9.24 09/08/2021    HGB 10.1 (L) 09/08/2021    HCT 34.8 (L) 09/08/2021    MCV 84.3 09/08/2021     09/08/2021      Lab Results   Component Value Date    GLUCOSE 96 09/08/2021    CALCIUM 8.9 09/08/2021     09/08/2021    K 3.6 09/08/2021    CO2 27.5 09/08/2021     09/08/2021    BUN 22 09/08/2021    CREATININE 1.30 (H) 09/08/2021    EGFRIFAFRI 61 03/24/2021    EGFRIFNONA 54 (L) 09/08/2021    BCR 16.9 09/08/2021    ANIONGAP 11.5 09/08/2021     Lab Results   Component Value Date    INR 1.26 (H) 08/25/2021    PROTIME 15.6 (H) 08/25/2021         Condition on Discharge:  Stable    Vital Signs  Temp:  [97.3 °F (36.3 °C)-98.3 °F (36.8 °C)] 98.3 °F (36.8 °C)  Heart Rate:  [] 90  Resp:  [18-22] 20  BP: ()/(32-88) 99/38      Discharge Disposition  Home or Self Care    Discharge Medications     Discharge Medications      New Medications      Instructions Start Date   apixaban 5 MG tablet tablet  Commonly known as: ELIQUIS   5 mg, Oral, Every 12 Hours Scheduled   Start Date: September 10, 2021     clopidogrel 75 MG tablet  Commonly known as: PLAVIX   75 mg, Oral, Daily   Start Date: September 9, 2021        Continue These Medications      Instructions Start Date   allopurinol 300 MG tablet  Commonly known as: ZYLOPRIM   300 mg, Oral, Daily      atorvastatin 20 MG tablet  Commonly known as: LIPITOR   20 mg, Oral, Daily      furosemide 20 MG tablet  Commonly known as: LASIX   20 mg, Oral, Daily      losartan 25 MG tablet  Commonly known as: COZAAR   25 mg, Oral, Daily      potassium chloride 10 MEQ CR tablet   10 mEq, Oral, Daily      propranolol 10 MG tablet  Commonly known as: INDERAL   10 mg, Oral, Daily      VITAMIN D PO   1 tablet, Oral, Daily         Stop These Medications    aspirin 81 MG EC tablet     chlorhexidine 0.12 %  solution  Commonly known as: PERIDEX     mupirocin 2 % nasal ointment  Commonly known as: BACTROBAN            Discharge Diet:   Diet Instructions         Please follow a heart healthy diet            Activity at Discharge:   Activity Instructions     Activity as Tolerated      Measure Weight      Daily weight, notify if over 3 lbs in one day   Additional Activity Instructions:         Activity as tolerated            Follow-up Appointments  Future Appointments   Date Time Provider Department Center   9/14/2021 11:20 AM LAB CHAIR 1 [facility]  [facility] [place]   9/14/2021  1:00 PM Adria Lowe MD [facility]   9/30/2021  1:15 PM Stephanie Barr APRN [facility]     Additional Instructions for the Follow-ups that You Need to Schedule     Ambulatory Referral to Cardiac Rehab   As directed      Discharge Follow-up with Specialty: Dr. Juárez; 1 Month   As directed      Specialty: Dr. Juárez    Follow Up: 1 Month         Discharge Follow-up with Specified Provider: GLEN Decker/GLEN Wu; 1 Week   As directed      To: GLEN Decker/GLEN Wu    Follow Up: 1 Week               Test Results Pending at Discharge       GLEN Aponte  09/08/21  14:14 EDT    Doing well.  Discharge home.

## 2021-09-08 NOTE — DISCHARGE INSTRUCTIONS
Wound Care: Call office or valve coordinator for any drainage, increased redness at procedure sites, swelling at site, or fever over 100.5

## 2021-09-08 NOTE — OUTREACH NOTE
Medical Week 2 Survey      Responses   [facility] patient discharged from?  [place]   Does the patient have one of the following disease processes/diagnoses(primary or secondary)?  Other   Week 2 attempt successful?  No   Unsuccessful attempts  Attempt 1   Revoke  Readmitted          Dari Siddiqui RN

## 2021-09-08 NOTE — PROGRESS NOTES
" LOS: 1 day   Patient Care Team:  Stuart Shah MD as PCP - General (Family Medicine)  Nancy Leone APRN as Nurse Practitioner (Endocrinology)  Merlyn Hernández MD as Consulting Physician (Gastroenterology)  Rosemarie Murray MD as Consulting Physician (Cardiology)  Veena Chamberlain APRN as Referring Physician (Cardiology)    Chief Complaint: post op TAVR    Subjective:  Symptoms:  He reports shortness of breath.  No cough or chest pain.    Diet:  Adequate intake.  No nausea or vomiting.    Activity level: Returning to normal.    Pain:  He reports no pain.      He reports not sleeping well    Vital Signs  Temp:  [93.8 °F (34.3 °C)-98 °F (36.7 °C)] 98 °F (36.7 °C)  Heart Rate:  [] 94  Resp:  [18-22] 22  BP: ()/(49-88) 104/50  Body mass index is 43.18 kg/m².    Intake/Output Summary (Last 24 hours) at 9/8/2021 0821  Last data filed at 9/8/2021 0744  Gross per 24 hour   Intake 1100 ml   Output 650 ml   Net 450 ml     I/O this shift:  In: 360 [P.O.:360]  Out: -           09/07/21  0819   Weight: (!) 144 kg (318 lb 6.4 oz)         Objective:  General Appearance:  Comfortable and in no acute distress.    Vital signs: (most recent): Blood pressure 104/50, pulse 94, temperature 98 °F (36.7 °C), temperature source Oral, resp. rate 22, height 182.9 cm (72\"), weight (!) 144 kg (318 lb 6.4 oz), SpO2 94 %.  Vital signs are normal.  No fever.    Lungs:  Normal effort and normal respiratory rate.  There are decreased breath sounds and wheezes.    Heart: Normal rate.  Irregular rhythm.  (A.fib on tele monitor)  Abdomen: Abdomen is soft.  Bowel sounds are normal.     Extremities: There is dependent edema.    Pulses: Distal pulses are intact.    Neurological: Patient is alert and oriented to person, place and time.    Skin:  Warm and dry.  (Bilateral groin sites clean and dry with ecchymosis bilaterally)        Results Review:        WBC WBC   Date Value Ref Range Status   09/08/2021 9.24 3.40 - 10.80 10*3/mm3 " Final      HGB Hemoglobin   Date Value Ref Range Status   09/08/2021 10.1 (L) 13.0 - 17.7 g/dL Final      HCT Hematocrit   Date Value Ref Range Status   09/08/2021 34.8 (L) 37.5 - 51.0 % Final      Platelets Platelets   Date Value Ref Range Status   09/08/2021 176 140 - 450 10*3/mm3 Final        PT/INR:  No results found for: PROTIME/No results found for: INR    Sodium Sodium   Date Value Ref Range Status   09/08/2021 139 136 - 145 mmol/L Final      Potassium Potassium   Date Value Ref Range Status   09/08/2021 3.6 3.5 - 5.2 mmol/L Final      Chloride Chloride   Date Value Ref Range Status   09/08/2021 100 98 - 107 mmol/L Final      Bicarbonate CO2   Date Value Ref Range Status   09/08/2021 27.5 22.0 - 29.0 mmol/L Final      BUN BUN   Date Value Ref Range Status   09/08/2021 22 8 - 23 mg/dL Final      Creatinine Creatinine   Date Value Ref Range Status   09/08/2021 1.30 (H) 0.76 - 1.27 mg/dL Final      Calcium Calcium   Date Value Ref Range Status   09/08/2021 8.9 8.6 - 10.5 mg/dL Final      Magnesium No results found for: MG       allopurinol, 300 mg, Oral, Daily  aspirin, 81 mg, Oral, Daily  atorvastatin, 20 mg, Oral, Nightly  clopidogrel, 75 mg, Oral, Daily  furosemide, 20 mg, Oral, Daily  losartan, 25 mg, Oral, Daily      sodium chloride, 9 mL/hr, Last Rate: 9 mL/hr (09/07/21 1115)            Patient Active Problem List   Diagnosis Code   • Benign non-nodular prostatic hyperplasia without lower urinary tract symptoms N40.0   • Diabetes mellitus type 2, controlled, without complications (CMS/HCC) E11.9   • Vitamin D deficiency E55.9   • Hyperuricemia E79.0   • Low testosterone R79.89   • Dyslipidemia E78.5   • Hypertension I10   • Acanthosis nigricans L83   • Gout M10.9   • Diabetes mellitus (CMS/HCC) E11.9   • Impotence of organic origin N52.9   • Hypogonadism in male E29.1   • Class 1 obesity due to excess calories without serious comorbidity with body mass index (BMI) of 30.0 to 30.9 in adult E66.09, Z68.30    • Diverticulitis of large intestine without perforation or abscess without bleeding K57.32   • Abdominal pain R10.9   • A-fib (CMS/HCC) I48.91   • Nonrheumatic aortic valve stenosis I35.0   • Non-rheumatic mitral regurgitation I34.0   • Anticoagulated Z79.01   • Alcohol abuse F10.10   • Angioedema T78.3XXA   • Wheezes R06.2   • Diarrhea of presumed infectious origin R19.7   • Sigmoid polyp K63.5   • Sleep apnea G47.30   • Trigger finger, acquired M65.30   • Pure hypercholesterolemia E78.00   • Microalbuminuria R80.9   • Aortic stenosis, severe I35.0       Assessment & Plan   · Severe aortic stenosis s/p TAVR POD#1 Jose G/Franck  · Normal LV systolic function---EF 55%  · Normal coronary arteries---s/p cath 8/27  · Atrial fibrillation---last dose eliquis 8/25  · HTN---losartan/propranolol  · HLD---lipitor  · CKD stage 3  · Severe ARIANA---BiPAP  · COPD---on 5L home O2, pulm following   · Chronic anemia-stable  · Morbid obesity---BMI 43.09  · GERD    Looks good this morning  Plavix/ASA initiated per cardiology--he was on Eliquis prior to his last admission for his atrial fibrillation. This will likely need to be resumed at discharge in lieu of ASA or Plavix  EKG this morning shows A.fib with LBBB  On 4L NC--reports that this is what he is normally on at home  Post-operative echocardiogram pending  Mobilize  Possible discharge home later today pending echo results and clearance from cardiology    GLEN Aponte  09/08/21  08:21 EDT

## 2021-09-08 NOTE — OUTREACH NOTE
Prep Survey      Responses   [facility] patient discharged from?  [place]   Is LACE score < 7 ?  No   Emergency Room discharge w/ pulse ox?  No   Eligibility  [facility]   Date of Admission  09/07/21   Date of Discharge  09/08/21   Discharge Disposition  Home or Self Care   Discharge diagnosis  TTE transfemoral transcatheter aortic valve replacement   Does the patient have one of the following disease processes/diagnoses(primary or secondary)?  Other   Does the patient have Home health ordered?  No   Is there a DME ordered?  No   Prep survey completed?  Yes          Dana Veliz RN

## 2021-09-08 NOTE — PROGRESS NOTES
"      [facility]         Dr Lyn Sayied   LOS: 1 day   Patient Care Team:  Stuart Shah MD as PCP - General (Family Medicine)  Nancy Leone APRN as Nurse Practitioner (Endocrinology)  Merlyn Hernández MD as Consulting Physician (Gastroenterology)  Rosemarie Murray MD as Consulting Physician (Cardiology)  Veena Chamberlain APRN as Referring Physician (Cardiology)    Chief Complaint: Nonrheumatic aortic valve stenosis status post TAVR severe ARIANA chronic hypoxemic respiratory failure    Interval History: Patient currently is ambulatory and reports not having any improvement in his shortness of breath post TAVR.  Used his home BiPAP last night    REVIEW OF SYSTEMS:   CARDIOVASCULAR: No chest pain, chest pressure or chest discomfort. No palpitations or edema.   RESPIRATORY: Short of breath with activity  GASTROINTESTINAL: No anorexia, nausea, vomiting or diarrhea. No abdominal pain or blood.   HEMATOLOGIC: No bleeding or bruising.     Ventilator/Non-Invasive Ventilation Settings (From admission, onward)    None            Vital Signs  Temp:  [97.3 °F (36.3 °C)-98.3 °F (36.8 °C)] 98.3 °F (36.8 °C)  Heart Rate:  [] 90  Resp:  [18-22] 20  BP: ()/(32-88) 99/38    Intake/Output Summary (Last 24 hours) at 9/8/2021 1513  Last data filed at 9/8/2021 1208  Gross per 24 hour   Intake 900 ml   Output 650 ml   Net 250 ml     Flowsheet Rows      First Filed Value   Admission Height  182.9 cm (72\") Documented at 09/07/2021 0819   Admission Weight  (!) 144 kg (318 lb 6.4 oz) Documented at 09/07/2021 0819          Physical Exam:  Patient is examined using the personal protective equipment as per guidelines from infection control for this particular patient as enacted.  Hand hygiene was performed before and after patient interaction.   General Appearance:    Alert, cooperative, in no acute distress.  Following simple commands  Neck midline trachea, no thyromegaly   Lungs:    Diminished breath " sounds bilaterally    Heart:    Regular rhythm and normal rate, normal S1 and S2, no            murmur, no gallop, no rub, no click   Chest Wall:    No abnormalities observed   Abdomen:     Normal bowel sounds, no masses, no organomegaly, soft        nontender, nondistended, no guarding, no rebound                tenderness   Extremities:   Moves all extremities well, no edema, no cyanosis, no             redness  CNS no focal neurological deficits normal sensory exam  Skin no rashes no nodules  Musculoskeletal no cyanosis no clubbing normal range of motion     Results Review:        Results from last 7 days   Lab Units 09/08/21 0449 09/02/21  0400 09/02/21  0400   SODIUM mmol/L 139  --  142   POTASSIUM mmol/L 3.6  --  4.1   CHLORIDE mmol/L 100  --  100   CO2 mmol/L 27.5  --  31.7*   BUN mg/dL 22  --  14   CREATININE mg/dL 1.30*  --  1.38*   GLUCOSE mg/dL 96   < > 83   CALCIUM mg/dL 8.9  --  9.2    < > = values in this interval not displayed.         Results from last 7 days   Lab Units 09/08/21 0449 09/02/21  0400   WBC 10*3/mm3 9.24 6.54   HEMOGLOBIN g/dL 10.1* 8.9*   HEMATOCRIT % 34.8* 32.7*   PLATELETS 10*3/mm3 176 182                           I reviewed the patient's new clinical results.  I personally viewed and interpreted the patient's chest x-ray.        Medication Review:   allopurinol, 300 mg, Oral, Daily  aspirin, 81 mg, Oral, Daily  atorvastatin, 20 mg, Oral, Nightly  clopidogrel, 75 mg, Oral, Daily  furosemide, 20 mg, Oral, Daily  losartan, 25 mg, Oral, Daily        sodium chloride, 9 mL/hr, Last Rate: 9 mL/hr (09/07/21 1115)        ASSESSMENT:   Nonrheumatic aortic valve stenosis  Chronic hypoxemic respiratory failure  Severe ARIANA on BiPAP auto 12-20 with a delta of 8  Combined obstructive and restrictive lung disease  Partially paralyzed hemidiaphragm  Morbid obesity  Aortic stenosis status post TAVR  Mild renal insufficiency    PLAN:  Home BiPAP to be continued with oxygen 5 L with BiPAP.  P.o.  procedures stable  No need for any bronchodilators  Mobilizing ambulating  No objections to discharge  Follow-up with Dr. Jacobs in 6 to 8 weeks.      Eboni Smith MD  09/08/21  15:13 EDT

## 2021-09-09 NOTE — TELEPHONE ENCOUNTER
"Called l/m to r/c      Per discharge note from Dr. Araiza:\" His hemoglobin remains stable, and he is to be discharge on Plavix and Eliquis. \"   Informed pt daughter and informed of how each eliquis and plavix work.     Pt daughter verbalized understanding  "

## 2021-09-09 NOTE — OUTREACH NOTE
Call Center TCM Note      Responses   [facility] patient discharged from?  [place]   Does the patient have one of the following disease processes/diagnoses(primary or secondary)?  Other   TCM attempt successful?  No   Unsuccessful attempts  Attempt 1          Bela Tracy RN    9/9/2021, 10:56 EDT

## 2021-09-09 NOTE — TELEPHONE ENCOUNTER
----- Message from Connie Linn sent at 9/9/2021  8:23 AM EDT -----  Regarding: blood thinners  Contact: [phone]  Daughter calling saying he was sent home with eliquis and plavix.  Daughter thought he was only to take plavix

## 2021-09-09 NOTE — OUTREACH NOTE
Call Center TCM Note      Responses   [facility] patient discharged from?  [place]   Does the patient have one of the following disease processes/diagnoses(primary or secondary)?  Other   TCM attempt successful?  Yes   Call start time  1452   Call end time  1459   Discharge diagnosis  TTE transfemoral transcatheter aortic valve replacement   Person spoke with today (if not patient) and relationship  patient   Meds reviewed with patient/caregiver?  Yes   Is the patient having any side effects they believe may be caused by any medication additions or changes?  No   Does the patient have all medications ordered at discharge?  Yes   Is the patient taking all medications as directed (includes completed medication regime)?  Yes   Does the patient have a primary care provider?   Yes   Does the patient have an appointment with their PCP within 7 days of discharge?  Yes   Comments regarding PCP  hospital dc fu appt on 9/15/21 at 1:30 PM   Has the patient kept scheduled appointments due by today?  N/A   Psychosocial issues?  No   Did the patient receive a copy of their discharge instructions?  Yes   Nursing interventions  Reviewed instructions with patient   What is the patient's perception of their health status since discharge?  Same   Is the patient/caregiver able to teach back signs and symptoms related to disease process for when to call PCP?  Yes   Is the patient/caregiver able to teach back signs and symptoms related to disease process for when to call 911?  Yes   Is the patient/caregiver able to teach back the hierarchy of who to call/visit for symptoms/problems? PCP, Specialist, Home health nurse, Urgent Care, ED, 911  Yes   TCM call completed?  Yes   Wrap up additional comments  Patient states he just doesn't feel good since surgery,  pt was reminded that it will take a little bit of time before feeling better. Pt encouraged to walk a little daily, and then rest when tired. Questions/concerns addressed.           Bela Tracy RN    9/9/2021, 15:07 EDT

## 2021-09-14 NOTE — H&P
Mr. Willett was originally evaluated by our service on 8/28/2021.  Following evaluation he was worked up for possible TAVR.  Imaging studies suggested he is excessive by the transfemoral route.    He is being mated today electively on 9/7/2021 for a transfemoral TAVR.  There have been no changes to his clinical status since his initial evaluation.

## 2021-09-15 NOTE — PROGRESS NOTES
Transitional Care Follow Up Visit  Subjective     Agustín Willett is a 76 y.o. male who presents for a transitional care management visit.    Within 48 business hours after discharge our office contacted him via telephone to coordinate his care and needs.      I reviewed and discussed the details of that call along with the discharge summary, hospital problems, inpatient lab results, inpatient diagnostic studies, and consultation reports with Agustín.     Current outpatient and discharge medications have been reconciled for the patient.  Reviewed by: Stuart Shah MD      Date of TCM Phone Call 9/8/2021   [facility]   Date of Admission 9/7/2021   Date of Discharge 9/8/2021   Discharge Disposition Home or Self Care     Risk for Readmission (LACE) Score: 8 (9/8/2021  6:02 AM)      History of Present Illness   Course During Hospital Stay:      Date of Admission: 9/7/2021  Date of Discharge:  9/8/2021     Discharge Diagnosis:   · Severe aortic stenosis s/p TAVR  · Normal LV systolic function---EF 55%  · Normal coronary arteries---s/p cath 8/27  · Atrial fibrillation  · HTN  · HLD  · CKD stage 3  · Severe ARIANA---BiPAP  · COPD---on home O2  · Chronic anemia-stable  · Morbid obesity---BMI 43.09  · GERD     Presenting Problem/History of Present Illness  Nonrheumatic aortic valve stenosis [I35.0]         Hospital Course  Patient is a 76 y.o. male who presented for previously scheduled cardiac surgery. On 9/7/21 he underwent percutaneous transfemoral access transcatheter aortic valve replacement with Dr. Araiza and Dr. Juárez. Post-operatively he did well. Repeat echocardiogram showed well-seated TAVR valve with mean and peak gradients within defined limits. He remains on 3-5 L NC of oxygen, which is what he is on at home. His hemoglobin remains stable, and he is to be discharge on Plavix and Eliquis. His mobility is at baseline, and he is tolerating the current medication regimen. He is urinating without issue, and is  "eating and drinking sufficiently. On POD#1 he was deemed ready for discharge home. He is to follow up with Hazel Wiley/GLEN Decker in 1 week, and Dr. Juárez in 1 month.     Current outpatient and discharge medications have been reconciled for the patient.  Reviewed by: Stuart Shah MD    Ancillary, pt is doing well with his chronic medications given by me, no SEs, and needs a refill today.     The following portions of the patient's history were reviewed and updated as appropriate: allergies, current medications, past family history, past medical history, past social history, past surgical history and problem list.    Review of Systems   Constitutional: Negative for activity change, chills and fever.   Respiratory: Negative for cough.    Cardiovascular: Negative for chest pain.   Psychiatric/Behavioral: Negative for dysphoric mood.       /84   Pulse 88   Temp 97 °F (36.1 °C) (Oral)   Resp 16   Ht 182.9 cm (72\")   Wt (!) 144 kg (318 lb)   SpO2 91%   BMI 43.13 kg/m²     Objective   Physical Exam  Constitutional:       General: He is not in acute distress.     Appearance: He is well-developed.   Cardiovascular:      Rate and Rhythm: Normal rate and regular rhythm.   Pulmonary:      Effort: Pulmonary effort is normal.      Breath sounds: Normal breath sounds.   Neurological:      Mental Status: He is alert and oriented to person, place, and time.   Psychiatric:         Behavior: Behavior normal.         Thought Content: Thought content normal.     Hospital records reviewed with pt confirming HPI.  Labs reviewed with pt today during visit. All questions answered.      Assessment/Plan   Diagnoses and all orders for this visit:    1. Aortic stenosis, severe (Primary)  -     apixaban (ELIQUIS) 5 MG tablet tablet; Take 1 tablet by mouth Every 12 (Twelve) Hours.  Dispense: 180 tablet; Refill: 1  -     clopidogrel (PLAVIX) 75 MG tablet; Take 1 tablet by mouth Daily.  Dispense: 90 tablet; Refill: 1    2. " Hospital discharge follow-up    3. Essential hypertension  -     propranolol (INDERAL) 10 MG tablet; Take 1 tablet by mouth Daily.  Dispense: 90 tablet; Refill: 1    4. Pure hypercholesterolemia  -     atorvastatin (LIPITOR) 20 MG tablet; Take 1 tablet by mouth Daily.  Dispense: 90 tablet; Refill: 1    5. Gout, unspecified cause, unspecified chronicity, unspecified site  -     allopurinol (ZYLOPRIM) 300 MG tablet; Take 1 tablet by mouth Daily.  Dispense: 90 tablet; Refill: 1    Pt encouraged to keep all specialist appts and take meds as prescribed.

## 2021-09-16 PROBLEM — Z95.2 S/P TAVR (TRANSCATHETER AORTIC VALVE REPLACEMENT): Status: ACTIVE | Noted: 2021-01-01

## 2021-09-16 NOTE — PROGRESS NOTES
Date of Office Visit: 2021  Encounter Provider: GLEN Guadarrama  Place of Service: [facility] CARDIOLOGY  Patient Name: Agustín Willett  :1945    Chief Complaint   Patient presents with   • Aortic Stenosis   :     HPI: Agustín Willett is a 76 y.o. male who presents today for follow-up. Old records have been obtained and reviewed by me.  He is a patient of Dr. Murrya's with a past cardiac history significant for atrial fibrillation and aortic stenosis. Due to worsening shortness of breath, he underwent a repeat echocardiogram demonstrating severe aortic stenosis. On 2021, he underwent right and left cardiac catheterization demonstrating essentially normal coronaries, severe aortic stenosis, and severe pulmonary hypertension. GI and hematology were consulted due to iron deficiency anemia. Pulmonary was consulted for the pulmonary hypertension. Cardiac surgery and Dr. Stein were consulted for the aortic valve.  Ultimately, on 2021, he underwent a TAVR with deployment of a 29 mm CPN ultra transcatheter aortic heart valve. The following day, echocardiogram demonstrated normal LV systolic function and a well-seated TAVR valve with peak and mean gradients within defined limits. He was discharged. He is here today for follow-up.   He feels okay.  He has really just felt off ever since the procedure.  He has a hard time describing how he feels.  He is always fatigued and short of breath with only mild exertion.  He denies any PND orthopnea and sleeps with a BiPAP.  Evidently he has not been taking Lasix or potassium.  In fact, his wife said they gave him a dose of IV Lasix prior to discharge and he went all day and night without even urinating.  Then he felt really dizzy and they thought that maybe the dizziness was related to the Lasix.    Past Medical History:   Diagnosis Date   • Atrial fibrillation (CMS/HCC)    • Dyslipidemia    • Erectile dysfunction    •  Hx of complete eye exam 2016    UNKNOWN PER PT    • Hyperlipidemia    • Hypertension    • Hypogonadism male    • ARIANA (obstructive sleep apnea)    • PONV (postoperative nausea and vomiting)    • Type 2 diabetes mellitus (CMS/HCC)    • Vitamin D deficiency        Past Surgical History:   Procedure Laterality Date   • ADENOIDECTOMY     • AORTIC VALVE REPAIR/REPLACEMENT N/A 9/7/2021    Procedure: TTE TRANSFEMORAL TRANSCATHETER AORTIC VALVE REPLACEMENT PERCUTANEOUS APPROACH;  Surgeon: Jose Araiza MD;  Location: [facility] OR 18/19;  Service: Cardiothoracic;  Laterality: N/A;   • AORTIC VALVE REPAIR/REPLACEMENT N/A 9/7/2021    Procedure: Transfemoral Transcatheter Aortic Valve Replacement with intra-op tte and possible open surgical rescue;  Surgeon: Maynor Juárez MD;  Location: [facility] OR 18/19;  Service: Cardiovascular;  Laterality: N/A;   • CARDIAC CATHETERIZATION N/A 8/27/2021    Procedure: Right Heart Cath;  Surgeon: Rosemarie [facility] MD;  Location: [facility] CATH INVASIVE LOCATION;  Service: Cardiology;  Laterality: N/A;   • CARDIAC CATHETERIZATION N/A 8/27/2021    Procedure: Left Heart Cath;  Surgeon: Rosemarie [facility] MD;  Location: [facility] CATH INVASIVE LOCATION;  Service: Cardiology;  Laterality: N/A;   • CARDIAC CATHETERIZATION N/A 8/27/2021    Procedure: Coronary angiography;  Surgeon: Rosemarie [facility] MD;  Location: [facility] CATH INVASIVE LOCATION;  Service: Cardiology;  Laterality: N/A;   • COLONOSCOPY N/A 7/2/2018    Procedure: COLONOSCOPY TO CECUM WITH COLD BIOPSY POLYPECTOMY;  Surgeon: Dave Elizabeth MD;  Location: [facility] ENDOSCOPY;  Service: Gastroenterology   • TONSILLECTOMY     • TOTAL KNEE ARTHROPLASTY Left    • VASECTOMY         Social History     Socioeconomic History   • Marital status:      Spouse name: Not on file   • Number of children: Not on file   • Years of education: Not on file   • Highest education level: Not on file   Tobacco Use   •  Smoking status: Former Smoker     Packs/day: 2.00     Years: 15.00     Pack years: 30.00     Types: Cigarettes     Quit date: 1970     Years since quittin.7   • Smokeless tobacco: Never Used   Vaping Use   • Vaping Use: Never used   Substance and Sexual Activity   • Alcohol use: Yes     Comment: 1-2 DRINKS/DAY   • Drug use: No   • Sexual activity: Defer       Family History   Problem Relation Age of Onset   • ALS Mother    • Hypertension Father    • Malig Hyperthermia Neg Hx        Review of Systems   Constitutional: Positive for malaise/fatigue.   Cardiovascular: Positive for dyspnea on exertion. Negative for chest pain, leg swelling, orthopnea, paroxysmal nocturnal dyspnea and syncope.   Respiratory: Negative.    Hematologic/Lymphatic: Negative for bleeding problem.   Musculoskeletal: Negative for falls.   Gastrointestinal: Negative for melena.   Neurological: Positive for dizziness and weakness. Negative for light-headedness.       Allergies   Allergen Reactions   • Amoxil [Amoxicillin] Hives   • Clindamycin/Lincomycin Hives   • Ace Inhibitors Angioedema         Current Outpatient Medications:   •  allopurinol (ZYLOPRIM) 300 MG tablet, Take 1 tablet by mouth Daily., Disp: 90 tablet, Rfl: 1  •  apixaban (ELIQUIS) 5 MG tablet tablet, Take 1 tablet by mouth Every 12 (Twelve) Hours., Disp: 180 tablet, Rfl: 1  •  atorvastatin (LIPITOR) 20 MG tablet, Take 1 tablet by mouth Daily., Disp: 90 tablet, Rfl: 1  •  Cholecalciferol (VITAMIN D PO), Take 1 tablet by mouth Daily., Disp: , Rfl:   •  clopidogrel (PLAVIX) 75 MG tablet, Take 1 tablet by mouth Daily., Disp: 90 tablet, Rfl: 1  •  furosemide (LASIX) 20 MG tablet, Take 1 tablet by mouth Daily., Disp: 90 tablet, Rfl: 3  •  losartan (COZAAR) 25 MG tablet, Take 1 tablet by mouth Daily., Disp: 30 tablet, Rfl: 12  •  potassium chloride 10 MEQ CR tablet, Take 1 tablet by mouth Daily., Disp: 30 tablet, Rfl: 11  •  propranolol (INDERAL) 10 MG tablet, Take 1 tablet by mouth  "Daily., Disp: 90 tablet, Rfl: 1      Objective:     Vitals:    09/16/21 1424   BP: 132/80   Pulse: 85   Weight: (!) 151 kg (332 lb 12.8 oz)   Height: 182.9 cm (72\")     Body mass index is 45.14 kg/m².    PHYSICAL EXAM:    Neck:      Vascular: No JVD.   Pulmonary:      Effort: Pulmonary effort is normal.      Breath sounds: Normal breath sounds.   Cardiovascular:      Normal rate. Irregular rhythm.      Murmurs: There is a harsh midsystolic murmur at the URSB, radiating to the neck.      No gallop. No click. No rub.   Pulses:     Intact distal pulses.   Edema:     Peripheral edema present.  Skin:               ECG 12 Lead    Date/Time: 9/16/2021 2:40 PM  Performed by: Ruthy Lopez APRN  Authorized by: Ruthy Lopez APRN   Comparison: compared with previous ECG from 9/8/2021  Similar to previous ECG  Rhythm: atrial fibrillation  Rate: normal  BPM: 85  Conduction: non-specific intraventricular conduction delay    Clinical impression: abnormal EKG  Comments: Indication: S/p TAVR              Assessment:       Diagnosis Plan   1. Nonrheumatic aortic valve stenosis     2. S/P TAVR (transcatheter aortic valve replacement)  ECG 12 Lead   3. Longstanding persistent atrial fibrillation (CMS/HCC)     4. Shortness of breath  proBNP    CBC (No Diff)    Comprehensive Metabolic Panel     Orders Placed This Encounter   Procedures   • proBNP     Standing Status:   Future     Number of Occurrences:   1     Standing Expiration Date:   9/16/2022     Order Specific Question:   Release to patient     Answer:   Immediate   • CBC (No Diff)     Standing Status:   Future     Number of Occurrences:   1     Standing Expiration Date:   9/16/2022     Order Specific Question:   Release to patient     Answer:   Immediate   • Comprehensive Metabolic Panel     Standing Status:   Future     Number of Occurrences:   1     Standing Expiration Date:   9/16/2022     Order Specific Question:   Release to patient     Answer:   Immediate   • " ECG 12 Lead     This order was created via procedure documentation     Order Specific Question:   Release to patient     Answer:   Immediate          Plan:       1.  Aortic stenosis.  Status post TAVR.  Postoperative echocardiogram demonstrated a well-seated TAVR valve with peak and mean gradients within defined limits.  He is on Plavix and Eliquis.      2.  Atrial fibrillation.  He is in atrial fibrillation with a controlled ventricular rate.  He is anticoagulated on Eliquis.       3.  Shortness of breath.  Well, he has not been taking Lasix.  Certainly this is problematic.  There is also a chance he could be anemic again.  I will send him for labs.  I told him to go ahead and take 40 mg of Lasix tonight.  I will call him in the morning once I have his lab results.      Further recommendations will be made pending the results of his labs.  Otherwise, he will follow-up with Dr. Juárez on 10/4/2021.      As always, it has been a pleasure to participate in your patient's care.      Sincerely,         GLEN Decker

## 2021-09-16 NOTE — OUTREACH NOTE
Medical Week 2 Survey      Responses   [facility] patient discharged from?  [place]   Does the patient have one of the following disease processes/diagnoses(primary or secondary)?  Other   Week 2 attempt successful?  No   Unsuccessful attempts  Attempt 1          Carol Nunez RN

## 2021-09-17 NOTE — TELEPHONE ENCOUNTER
I left him a voicemail to call me regarding his labs.  He needs daily Lasix.  Given his potassium, I would not recommend potassium replacement.

## 2021-09-17 NOTE — TELEPHONE ENCOUNTER
I spoke with him.  He will start taking 40 mg of Lasix daily.  Given his potassium of 5, I will hold off on potassium replacement.  He will return for a BMP in 1 week.

## 2021-09-21 PROBLEM — E66.01 OBESITY, CLASS III, BMI 40-49.9 (MORBID OBESITY) (HCC): Chronic | Status: ACTIVE | Noted: 2021-01-01

## 2021-09-21 PROBLEM — R79.89 ELEVATED SERUM CREATININE: Status: ACTIVE | Noted: 2021-01-01

## 2021-09-21 PROBLEM — S00.31XA ABRASION OF NOSE: Status: ACTIVE | Noted: 2021-01-01

## 2021-09-21 PROBLEM — T78.3XXA ANGIOEDEMA: Status: RESOLVED | Noted: 2019-11-07 | Resolved: 2021-01-01

## 2021-09-21 PROBLEM — R10.9 ABDOMINAL PAIN: Status: RESOLVED | Noted: 2018-05-08 | Resolved: 2021-01-01

## 2021-09-21 PROBLEM — R19.7 DIARRHEA OF PRESUMED INFECTIOUS ORIGIN: Status: RESOLVED | Noted: 2019-11-28 | Resolved: 2021-01-01

## 2021-09-21 PROBLEM — R25.1 TREMORS OF NERVOUS SYSTEM: Chronic | Status: ACTIVE | Noted: 2021-01-01

## 2021-09-21 PROBLEM — I95.1 ORTHOSTATIC HYPOTENSION: Status: ACTIVE | Noted: 2021-01-01

## 2021-09-21 PROBLEM — I48.91 A-FIB (HCC): Chronic | Status: ACTIVE | Noted: 2018-05-08

## 2021-09-21 PROBLEM — K57.32 DIVERTICULITIS OF LARGE INTESTINE WITHOUT PERFORATION OR ABSCESS WITHOUT BLEEDING: Status: RESOLVED | Noted: 2018-05-08 | Resolved: 2021-01-01

## 2021-09-21 PROBLEM — G47.30 SLEEP APNEA: Chronic | Status: ACTIVE | Noted: 2019-12-05

## 2021-09-21 PROBLEM — R55 SYNCOPE: Status: ACTIVE | Noted: 2021-01-01

## 2021-09-21 NOTE — OUTREACH NOTE
Medical Week 2 Survey      Responses   [facility] patient discharged from?  [place]   Does the patient have one of the following disease processes/diagnoses(primary or secondary)?  Other   Week 2 attempt successful?  No   Unsuccessful attempts  Attempt 2   Revoke  Readmitted          Valery Pérez RN

## 2021-09-21 NOTE — TELEPHONE ENCOUNTER
Pt's daughter Stacey called.   He is 2 weeks post TAVR. They called for an ambulance due to a syncopal episode and he was transported to [facility] this morning. She said his BP was 66/42 and .  She wanted you to be aware since he recently had his TAVR.  She can be reached at #[phone] if need be.    Thanks,  Zahra

## 2021-09-24 PROBLEM — R55 SYNCOPE: Status: RESOLVED | Noted: 2021-01-01 | Resolved: 2021-01-01

## 2021-09-24 PROBLEM — I47.20 VT (VENTRICULAR TACHYCARDIA): Status: ACTIVE | Noted: 2021-01-01

## 2021-09-28 NOTE — OUTREACH NOTE
Prep Survey      Responses   [facility] patient discharged from?  [place]   Is LACE score < 7 ?  No   Emergency Room discharge w/ pulse ox?  No   Eligibility  [facility]   Date of Admission  09/21/21   Date of Discharge  09/28/21   Discharge Disposition  Home or Self Care   Discharge diagnosis  Syncopal episodes, likely secondary to cardiac dysrhythmias,    synchronized cardioversion,   ICD implantation,    cardiac cath     Does the patient have one of the following disease processes/diagnoses(primary or secondary)?  Other   Does the patient have Home health ordered?  No   Is there a DME ordered?  No   Prep survey completed?  Yes          Grace Solo RN

## 2021-09-29 NOTE — OUTREACH NOTE
Call Center TCM Note      Responses   [facility] patient discharged from?  [facility]   Does the patient have one of the following disease processes/diagnoses(primary or secondary)?  Other   TCM attempt successful?  Yes   Discharge diagnosis  Syncopal episodes, likely secondary to cardiac dysrhythmias,    synchronized cardioversion,   ICD implantation,    cardiac cath     Meds reviewed with patient/caregiver?  Yes   Is the patient having any side effects they believe may be caused by any medication additions or changes?  No   Does the patient have all medications ordered at discharge?  Yes   Is the patient taking all medications as directed (includes completed medication regime)?  Yes   Does the patient have a primary care provider?   Yes   Does the patient have an appointment with their PCP within 7 days of discharge?  No   Comments regarding PCP  Pt has fwp with Cardio MD on 10/28/2021, but wishes to wait to sched with Dr Shah until he feels more recovered from this recent heart trauma and hospital stay. Very much wants Dr Shah updated though.    Has the patient kept scheduled appointments due by today?  N/A   Has home health visited the patient within 72 hours of discharge?  N/A   Psychosocial issues?  No   Did the patient receive a copy of their discharge instructions?  Yes   Nursing interventions  Reviewed instructions with patient   What is the patient's perception of their health status since discharge?  Improving   Is the patient/caregiver able to teach back signs and symptoms related to disease process for when to call PCP?  Yes   Is the patient/caregiver able to teach back signs and symptoms related to disease process for when to call 911?  Yes   Is the patient/caregiver able to teach back the hierarchy of who to call/visit for symptoms/problems? PCP, Specialist, Home health nurse, Urgent Care, ED, 911  Yes   TCM call completed?  Yes   Wrap up additional comments  Pt is feeling better, far from 100%  but did rest well at home last night. All new meds Amiodarone, ASA, Bactrim) and med changes will be in place today. No questions at this time. Pt dtr keeps close eye on pt. Pt has fwp with Cardio MD on 10/28/2021, but wishes to wait to sched with Dr Shah until he feels more recovered from this recent heart trauma and hospital stay. Very much wants Dr Shah updated though.           Irena Talbert MA    9/29/2021, 10:33 EDT

## 2021-10-01 NOTE — TELEPHONE ENCOUNTER
I spoke with patient's daughter.  Went over the medications.  Patient will take propranolol 20 mg 3 times a day.  Continue aspirin Plavix amiodarone Eliquis.  Patient is not taking metoprolol.  Patient is off losartan.  Follow-up in the office today scheduled appointment.

## 2021-10-06 PROBLEM — Z95.810 PRESENCE OF AUTOMATIC CARDIOVERTER/DEFIBRILLATOR (AICD): Status: ACTIVE | Noted: 2021-01-01

## 2021-10-06 NOTE — OUTREACH NOTE
Medical Week 2 Survey      Responses   [facility] patient discharged from?  [facility]   Does the patient have one of the following disease processes/diagnoses(primary or secondary)?  Other   Week 2 attempt successful?  Yes   Call start time  1042   Call end time  1045   Meds reviewed with patient/caregiver?  Yes   Is the patient having any side effects they believe may be caused by any medication additions or changes?  No   Does the patient have all medications ordered at discharge?  Yes   Is the patient taking all medications as directed (includes completed medication regime)?  Yes   Does the patient have a primary care provider?   Yes   Does the patient have an appointment with their PCP within 7 days of discharge?  No   Comments regarding PCP  PATIENT HAS NOT YET MADE FOLLOW UP APPOINTMENT WITH PCP YET   What is preventing the patient from scheduling follow up appointments within 7 days of discharge?  Haven't had time   Nursing Interventions  Educated patient on importance of making appointment, Advised patient to make appointment   Has the patient kept scheduled appointments due by today?  N/A   Has home health visited the patient within 72 hours of discharge?  N/A   Psychosocial issues?  No   Did the patient receive a copy of their discharge instructions?  Yes   Nursing interventions  Reviewed instructions with patient   What is the patient's perception of their health status since discharge?  Improving   Is the patient/caregiver able to teach back signs and symptoms related to disease process for when to call PCP?  Yes   Is the patient/caregiver able to teach back signs and symptoms related to disease process for when to call 911?  Yes   Is the patient/caregiver able to teach back the hierarchy of who to call/visit for symptoms/problems? PCP, Specialist, Home health nurse, Urgent Care, ED, 911  Yes   If the patient is a current smoker, are they able to teach back resources for cessation?  Not a smoker   Week  2 Call Completed?  Yes          Chelsey Omalley LPN

## 2021-10-06 NOTE — TELEPHONE ENCOUNTER
Rx Refill Note  Requested Prescriptions      No prescriptions requested or ordered in this encounter      Last office visit with prescribing clinician: 10/6/2021      Next office visit with prescribing clinician: 11/23/2021            Sarita Enriquez MA  10/06/21, 16:12 EDT

## 2021-10-06 NOTE — PROGRESS NOTES
Encounter Date:10/06/2021      Patient ID: Agustín Willett is a 76 y.o. male.    Chief Complaint:  Status post stent  History of ventricular tachycardia  Status post ICD  Chronic atrial fibrillation  Anticoagulation management  Hypertension  Dyslipidemia  @@@@Patient was seen for follow-up of stent and not necessarily recent ICD placement @@@@@@@@@@@@@    History of Present Illness    Patient recently was admitted to [facility] with history of syncope.  While he was in the hospital patient had a rapid ventricular tachycardia at a rate of 290/min.  Electrophysiology consultation was requested and suggested placing ICD.  Films from [facility] were reviewed and decided to repeat cardiac catheterization that revealed critical left anterior descending artery disease and patient had stent placement.  Patient was discharged home.    Since I have last seen, the patient has been without any chest discomfort ,shortness of breath, palpitations, dizziness or syncope.  Denies having any headache ,abdominal pain ,nausea, vomiting , diarrhea constipation, loss of weight or loss of appetite.  Denies having any excessive bruising ,hematuria or blood in the stool.    Review of all systems negative except as indicated.    Reviewed ROS.      Assessment and Plan     ]]]]]]]]]]]]]]]]]  Impression  ==============  -Recent syncope on admission.     -Fast ventricular tachycardia and syncope 9/24/2021.  Status post cardioversion for ventricular tachycardia with conversion to atrial fibrillation (chronic rhythm) 9/24/2021     -Status post single-chamber ICD ([employer])--9/24/2021     -Status post TAVR 9/7/2021 for severe aortic valve stenosis at [facility].  (Dr. Juárez/)     Echocardiogram 9/21/2021.  Normal left ventricular function with normally functioning TAVR valve     -Status post stent to LAD 9/27/2021     Cardiac catheterization 9/27/2021 revealed.  Left ventricle angiogram was  not performed due to pre-existing renal dysfunction.  Left main coronary artery normal.  Left anterior descending artery has a lengthy disease 70 to 99% proximal to and distal to diagonal branch.  Diagonal branch has proximal 60 to 70% disease.  Circumflex coronary artery is a large and codominant vessel and is normal.  Right coronary artery is a large and dominant vessel and is normal.     Cardiac catheterization by Dr. Murray 8/27/2021  · Successful right and left coronary angiogram, LV pressures  · Successful right heart catheter  · Moderate pulmonary hypertension 63/20 PA pressures  · Early atherosclerotic plaque of coronary arteries otherwise normal  · Severe aortic stenosis     -Chronic atrial fibrillation     -Anticoagulation.  Patient is on Eliquis.     -Obstructive sleep apnea on BiPAP.     -Dyslipidemia hypertension     -Acute renal dysfunction  BUN 25 creatinine 1.37 9/22/2021 9/16/2020 21/1.2  21/1.67-9/21/2021     -Chronic hypoxic respiratory failure on home oxygen.     -Exogenous obesity.     -History of tremors-patient is on propranolol.     -Allergic to Amoxil clindamycin ACE inhibitor's  ===========  Plan  =========  Status post stent  Patient is not having any angina pectoris or congestive heart failure.    History of syncope  Fast ventricular tachycardia probably ischemic.    Status post single-chamber ICD ([employer]) 9/24/2021.  ICD site looks normal.  Interrogation of the ICD revealed excellent pacing parameters.    Chronic atrial fibrillation-rate is well controlled.    Anticoagulation  Continue Eliquis.  Observe for toxic effects.    Acute renal dysfunction probably due to hypovolemia and syncope.  BUN/creatinine-better at 18/1.3-9/23/2021  BUN/creatinine-better at 15/1.25-9/24/2021  21/1.54-9/27/2021  20/1.39-9/28/2021    Medications were reviewed and updated.  Patient is on aspirin Plavix and Eliquis.  Likely will discontinue aspirin when seen back in 6 weeks.    Amiodarone  likely will be discontinued in 6 weeks with recent revascularization with LAD stenting.  Continue propranolol 10 mg twice a day.     Schedule TAVR follow-up with Dr. Juárez.     Follow-up in the office in 6 weeks with EKG.    Further plan will depend on patient's progress.  ]]]]]]]]]]]]]]]]]            Diagnosis Plan   1. VT (ventricular tachycardia) (HCC)     2. Presence of automatic cardioverter/defibrillator (AICD)     3. Shortness of breath     4. Nonrheumatic aortic valve stenosis     5. S/P TAVR (transcatheter aortic valve replacement)     6. Aortic stenosis, severe     7. Essential hypertension     8. Dyslipidemia     9. Anticoagulated     LAB RESULTS (LAST 7 DAYS)    CBC        BMP        CMP         BNP        TROPONIN        CoAg        Creatinine Clearance  Estimated Creatinine Clearance: 69.2 mL/min (A) (by C-G formula based on SCr of 1.39 mg/dL (H)).    ABG        Radiology  No radiology results for the last day                The following portions of the patient's history were reviewed and updated as appropriate: allergies, current medications, past family history, past medical history, past social history, past surgical history and problem list.    ROS      Current Outpatient Medications:   •  allopurinol (ZYLOPRIM) 300 MG tablet, Take 1 tablet by mouth Daily., Disp: 90 tablet, Rfl: 1  •  amiodarone (PACERONE) 200 MG tablet, Take 1 tablet by mouth Daily., Disp: 30 tablet, Rfl: 1  •  apixaban (ELIQUIS) 5 MG tablet tablet, Take 1 tablet by mouth Every 12 (Twelve) Hours., Disp: 180 tablet, Rfl: 1  •  aspirin 81 MG EC tablet, Take 1 tablet by mouth Daily for 14 days., Disp: 14 tablet, Rfl: 0  •  atorvastatin (LIPITOR) 20 MG tablet, Take 1 tablet by mouth Daily., Disp: 90 tablet, Rfl: 1  •  clopidogrel (PLAVIX) 75 MG tablet, Take 1 tablet by mouth Daily., Disp: 90 tablet, Rfl: 1  •  furosemide (LASIX) 40 MG tablet, Take 0.5 tablets by mouth Daily., Disp: 30 tablet, Rfl: 0  •  propranolol (INDERAL) 10 MG  tablet, Take 10 mg by mouth 2 (Two) Times a Day., Disp: , Rfl:   •  Vitamin D, Cholecalciferol, (CHOLECALCIFEROL) 10 MCG (400 UNIT) tablet, Take 400 Units by mouth Daily., Disp: , Rfl:     Allergies   Allergen Reactions   • Amoxil [Amoxicillin] Hives   • Clindamycin/Lincomycin Hives   • Ace Inhibitors Angioedema       Family History   Problem Relation Age of Onset   • ALS Mother    • Hypertension Father    • Malig Hyperthermia Neg Hx        Past Surgical History:   Procedure Laterality Date   • ADENOIDECTOMY     • AORTIC VALVE REPAIR/REPLACEMENT N/A 9/7/2021    Procedure: TTE TRANSFEMORAL TRANSCATHETER AORTIC VALVE REPLACEMENT PERCUTANEOUS APPROACH;  Surgeon: Jose Araiza MD;  Location: [facility] OR 18/19;  Service: Cardiothoracic;  Laterality: N/A;   • AORTIC VALVE REPAIR/REPLACEMENT N/A 9/7/2021    Procedure: Transfemoral Transcatheter Aortic Valve Replacement with intra-op tte and possible open surgical rescue;  Surgeon: Maynor Juárez MD;  Location: [facility] OR 18/19;  Service: Cardiovascular;  Laterality: N/A;   • CARDIAC CATHETERIZATION N/A 8/27/2021    Procedure: Right Heart Cath;  Surgeon: Rosemarie Murray MD;  Location: [facility] CATH INVASIVE LOCATION;  Service: Cardiology;  Laterality: N/A;   • CARDIAC CATHETERIZATION N/A 8/27/2021    Procedure: Left Heart Cath;  Surgeon: Rosemarie Murray MD;  Location: [facility] CATH INVASIVE LOCATION;  Service: Cardiology;  Laterality: N/A;   • CARDIAC CATHETERIZATION N/A 8/27/2021    Procedure: Coronary angiography;  Surgeon: Rosemarie Murray MD;  Location: [facility] CATH INVASIVE LOCATION;  Service: Cardiology;  Laterality: N/A;   • CARDIAC CATHETERIZATION N/A 9/27/2021    Procedure: Left Heart Cath;  Surgeon: Jeremiah Talley MD;  Location: [facility] CATH INVASIVE LOCATION;  Service: Cardiovascular;  Laterality: N/A;   • CARDIAC CATHETERIZATION N/A 9/27/2021    Procedure: Stent BRANDEN coronary;  Surgeon: Que Beltran MD;   "Location:  [facility] CATH INVASIVE LOCATION;  Service: Cardiovascular;  Laterality: N/A;   • CARDIAC ELECTROPHYSIOLOGY PROCEDURE N/A 2021    Procedure: ICD implant;  Surgeon: Richard Chen MD;  Location: [facility] CATH INVASIVE LOCATION;  Service: Cardiology;  Laterality: N/A;   • COLONOSCOPY N/A 2018    Procedure: COLONOSCOPY TO CECUM WITH COLD BIOPSY POLYPECTOMY;  Surgeon: Dave Elizabeth MD;  Location:  [facility];  Service: Gastroenterology   • TONSILLECTOMY     • TOTAL KNEE ARTHROPLASTY Left    • VASECTOMY         Past Medical History:   Diagnosis Date   • Atrial fibrillation (HCC)    • Chronic respiratory failure with hypoxia (HCC)     continuous home O2 @ 5 L per NC   • Dyslipidemia    • Erectile dysfunction    • Hx of complete eye exam     UNKNOWN PER PT    • Hyperlipidemia    • Hypertension    • Hypogonadism male    • ARIANA (obstructive sleep apnea)    • PONV (postoperative nausea and vomiting)    • Type 2 diabetes mellitus (HCC)    • Vitamin D deficiency        Family History   Problem Relation Age of Onset   • ALS Mother    • Hypertension Father    • Malig Hyperthermia Neg Hx        Social History     Socioeconomic History   • Marital status:      Spouse name: Not on file   • Number of children: Not on file   • Years of education: Not on file   • Highest education level: Not on file   Tobacco Use   • Smoking status: Former Smoker     Packs/day: 2.00     Years: 15.00     Pack years: 30.00     Types: Cigarettes     Quit date: 1970     Years since quittin.7   • Smokeless tobacco: Never Used   Vaping Use   • Vaping Use: Never used   Substance and Sexual Activity   • Alcohol use: Yes     Comment: 1-2 DRINKS/DAY   • Drug use: No   • Sexual activity: Defer         Procedures      Objective:       Physical Exam    /81 (BP Location: Left arm, Patient Position: Sitting, Cuff Size: Large Adult)   Pulse 86   Ht 182.9 cm (72\")   Wt (!) 147 kg (324 lb)   SpO2 90%   BMI 43.94 " kg/m²   The patient is alert, oriented and in no distress.    Vital signs as noted above.    Head and neck revealed no carotid bruits or jugular venous distension.  No thyromegaly or lymphadenopathy is present.    Lungs clear.  No wheezing.  Breath sounds are normal bilaterally.    Heart normal first and second heart sounds.  No murmur..  No pericardial rub is present.  No gallop is present.    Abdomen soft and nontender.  No organomegaly is present.    Extremities revealed good peripheral pulses without any pedal edema.    Skin warm and dry.  ICD site looks normal.    Musculoskeletal system is grossly normal.    CNS grossly normal.

## 2021-10-28 NOTE — PROGRESS NOTES
Date of Office Visit: 10/28/21  Encounter Provider: Maynor Juárez MD  Place of Service: [facility] CARDIOLOGY  Patient Name: Agustín Willett  :1945      Chief complaint  Severe degenerative aortic valve stenosis: Status post transcatheter aortic valve replacement ventricular tachycardia status post ICD  Ventricular tachycardia status post ICD      HPI:   Agustín Willett is a 76 y.o. male who presents today for follow-up. Old records have been obtained and reviewed by me.  He is a patient of Dr. Murray's with a past cardiac history significant for atrial fibrillation and aortic stenosis. Due to worsening shortness of breath, he underwent a repeat echocardiogram demonstrating severe aortic stenosis.  Ultimately, on 2021, he underwent a TAVR with deployment of a 29 mm DEE DEE ultra transcatheter aortic heart valve. The following day, echocardiogram demonstrated normal LV systolic function and a well-seated TAVR valve with peak and mean gradients within defined limits. He was discharged. He is here today for follow-up.  Unfortunately he had a syncopal episode and was noted to be in a wide-complex tachycardia with a rate of about 280 bpm consistent with ventricular tachycardia.  He had a catheterization at that time    Secondary to the ventricular tachycardia he underwent ICD placement.  He was seen by Dr. Ashley while he was over there and was referred for coronary angiography and actually had a PCI to the mid LAD performed by Dr. Dunn.  He did well with that.  He states that his shortness of breath has continued.  He denied any improvement with any of these procedures.  His valve is functioning well on transthoracic echocardiogram today     Past Medical History:   Diagnosis Date   • Atrial fibrillation (HCC)    • Chronic respiratory failure with hypoxia (HCC)     continuous home O2 @ 5 L per NC   • Dyslipidemia    • Erectile dysfunction    • Hx of complete eye exam  2016    UNKNOWN PER PT    • Hyperlipidemia    • Hypertension    • Hypogonadism male    • ARIANA (obstructive sleep apnea)    • PONV (postoperative nausea and vomiting)    • Type 2 diabetes mellitus (HCC)    • Vitamin D deficiency        Past Surgical History:   Procedure Laterality Date   • ADENOIDECTOMY     • AORTIC VALVE REPAIR/REPLACEMENT N/A 9/7/2021    Procedure: TTE TRANSFEMORAL TRANSCATHETER AORTIC VALVE REPLACEMENT PERCUTANEOUS APPROACH;  Surgeon: Jose Araiza MD;  Location: [facility] OR 18/19;  Service: Cardiothoracic;  Laterality: N/A;   • AORTIC VALVE REPAIR/REPLACEMENT N/A 9/7/2021    Procedure: Transfemoral Transcatheter Aortic Valve Replacement with intra-op tte and possible open surgical rescue;  Surgeon: Maynor Juárez MD;  Location: [facility] OR 18/19;  Service: Cardiovascular;  Laterality: N/A;   • CARDIAC CATHETERIZATION N/A 8/27/2021    Procedure: Right Heart Cath;  Surgeon: Rosemarie Murray MD;  Location:  INVASIVE LOCATION;  Service: Cardiology;  Laterality: N/A;   • CARDIAC CATHETERIZATION N/A 8/27/2021    Procedure: Left Heart Cath;  Surgeon: Rosemarie Murray MD;  Location: [facility] CATH INVASIVE LOCATION;  Service: Cardiology;  Laterality: N/A;   • CARDIAC CATHETERIZATION N/A 8/27/2021    Procedure: Coronary angiography;  Surgeon: Rosemarie Murray MD;  Location: [facility] CATH INVASIVE LOCATION;  Service: Cardiology;  Laterality: N/A;   • CARDIAC CATHETERIZATION N/A 9/27/2021    Procedure: Left Heart Cath;  Surgeon: Jeremiah Talley MD;  Location: [facility] CATH INVASIVE LOCATION;  Service: Cardiovascular;  Laterality: N/A;   • CARDIAC CATHETERIZATION N/A 9/27/2021    Procedure: Stent BRANDEN coronary;  Surgeon: Que Beltran MD;  Location: [facility] CATH INVASIVE LOCATION;  Service: Cardiovascular;  Laterality: N/A;   • CARDIAC ELECTROPHYSIOLOGY PROCEDURE N/A 9/24/2021    Procedure: ICD implant;  Surgeon: Richard Chen MD;  Location: [facility]  CATH INVASIVE LOCATION;  Service: Cardiology;  Laterality: N/A;   • COLONOSCOPY N/A 2018    Procedure: COLONOSCOPY TO CECUM WITH COLD BIOPSY POLYPECTOMY;  Surgeon: Dave Elizabeth MD;  Location: [facility] ENDOSCOPY;  Service: Gastroenterology   • TONSILLECTOMY     • TOTAL KNEE ARTHROPLASTY Left    • VASECTOMY         Social History     Socioeconomic History   • Marital status:    Tobacco Use   • Smoking status: Former Smoker     Packs/day: 2.00     Years: 15.00     Pack years: 30.00     Types: Cigarettes     Quit date: 1970     Years since quittin.8   • Smokeless tobacco: Never Used   • Tobacco comment: caffeine 1 cup day   Vaping Use   • Vaping Use: Never used   Substance and Sexual Activity   • Alcohol use: Yes     Comment: 1-2 DRINKS/DAY   • Drug use: No   • Sexual activity: Defer       Family History   Problem Relation Age of Onset   • ALS Mother    • Hypertension Father    • Malig Hyperthermia Neg Hx        Review of Systems   Constitutional: Positive for malaise/fatigue. Negative for fever.   HENT: Negative for nosebleeds and sore throat.    Eyes: Negative for blurred vision and double vision.   Cardiovascular: Positive for dyspnea on exertion. Negative for chest pain, claudication, leg swelling, orthopnea, palpitations, paroxysmal nocturnal dyspnea and syncope.   Respiratory: Negative.  Negative for cough, shortness of breath and snoring.    Endocrine: Negative for cold intolerance, heat intolerance and polydipsia.   Hematologic/Lymphatic: Negative for bleeding problem.   Skin: Negative for itching, poor wound healing and rash.   Musculoskeletal: Negative for falls, joint pain, joint swelling, muscle weakness and myalgias.   Gastrointestinal: Negative for abdominal pain, melena, nausea and vomiting.   Neurological: Positive for dizziness and weakness. Negative for light-headedness, loss of balance, seizures and vertigo.   Psychiatric/Behavioral: Negative for altered mental status and  "depression.       Allergies   Allergen Reactions   • Amoxil [Amoxicillin] Hives   • Clindamycin/Lincomycin Hives   • Ace Inhibitors Angioedema         Current Outpatient Medications:   •  allopurinol (ZYLOPRIM) 300 MG tablet, Take 1 tablet by mouth Daily., Disp: 90 tablet, Rfl: 1  •  amiodarone (PACERONE) 200 MG tablet, Take 1 tablet by mouth Daily., Disp: 30 tablet, Rfl: 1  •  apixaban (ELIQUIS) 5 MG tablet tablet, Take 1 tablet by mouth Every 12 (Twelve) Hours., Disp: 180 tablet, Rfl: 1  •  atorvastatin (LIPITOR) 20 MG tablet, Take 1 tablet by mouth Daily., Disp: 90 tablet, Rfl: 1  •  clopidogrel (PLAVIX) 75 MG tablet, Take 1 tablet by mouth Daily., Disp: 90 tablet, Rfl: 1  •  furosemide (LASIX) 40 MG tablet, TAKE 1 TABLET BY MOUTH EVERY DAY (Patient taking differently: Take 20 mg by mouth As Needed.), Disp: 30 tablet, Rfl: 3  •  propranolol (INDERAL) 10 MG tablet, Take 1 tablet by mouth 2 (Two) Times a Day., Disp: 180 tablet, Rfl: 3  •  Vitamin D, Cholecalciferol, (CHOLECALCIFEROL) 10 MCG (400 UNIT) tablet, Take 400 Units by mouth Daily., Disp: , Rfl:   •  clopidogrel (PLAVIX) 75 MG tablet, Take 1 tablet by mouth Daily., Disp: 90 tablet, Rfl: 3      Objective:     Vitals:    10/28/21 0902   BP: 142/84   BP Location: Left arm   Patient Position: Sitting   Cuff Size: Large Adult   Pulse: 77   SpO2: 96%   Weight: (!) 152 kg (335 lb 9.6 oz)   Height: 182.9 cm (72\")     Body mass index is 45.52 kg/m².    PHYSICAL EXAM:  Constitutional:       Appearance: Well-developed.      Comments: Obese   Eyes:      General: No scleral icterus.     Conjunctiva/sclera: Conjunctivae normal.   HENT:      Head: Normocephalic and atraumatic.   Neck:      Thyroid: No thyromegaly.      Vascular: Normal carotid pulses. No carotid bruit, hepatojugular reflux or JVD.      Trachea: No tracheal deviation.   Pulmonary:      Effort: No respiratory distress.      Breath sounds: Normal breath sounds.   Chest:      Chest wall: Not tender to " palpatation.   Cardiovascular:      Normal rate. Regular rhythm.      No gallop.   Pulses:     Carotid: 2+ bilaterally.     Radial: 2+ bilaterally.     Femoral: 2+ bilaterally.     Dorsalis pedis: 2+ bilaterally.     Posterior tibial: 2+ bilaterally.  Abdominal:      General: Bowel sounds are normal. There is no distension.      Palpations: Abdomen is soft.      Tenderness: There is no abdominal tenderness.   Musculoskeletal:         General: No deformity.      Cervical back: Normal range of motion and neck supple. Skin:     Findings: No erythema or rash.   Neurological:      Mental Status: Alert and oriented to person, place, and time.      Sensory: No sensory deficit.   Psychiatric:         Behavior: Behavior normal.             ECG 12 Lead    Date/Time: 10/28/2021 10:14 AM  Performed by: Maynor Juárez MD  Authorized by: Maynor Juárez MD   Comparison: compared with previous ECG from 10/6/2021  Similar to previous ECG  Rhythm: atrial fibrillation  Rate: normal  QRS axis: normal                Assessment:      No diagnosis found.  No orders of the defined types were placed in this encounter.    Plan:       1.  Aortic stenosis.  Status post TAVR.  Postoperative echocardiogram demonstrated a well-seated TAVR valve with peak and mean gradients within defined limits.  He is on Plavix and Eliquis.  -Repeat echocardiogram images from today have been reviewed.  Valve is still functioning very well.  Repeat echo in 1 year.    2.  Atrial fibrillation.  He is in atrial fibrillation with a controlled ventricular rate.  He is anticoagulated on Eliquis.     3.  Shortness of breath.  Noncardiac.    4.  History of ventricular tachycardia and defibrillator.  Amiodarone per primary cardiology team.    I have recommended that he stop his aspirin and just continue on Plavix monotherapy along with his direct oral anticoagulant.  He will be back to see us in 1 year.  Continue to follow with Dr. Talley

## 2022-01-01 ENCOUNTER — APPOINTMENT (OUTPATIENT)
Dept: GENERAL RADIOLOGY | Facility: HOSPITAL | Age: 77
End: 2022-01-01

## 2022-01-01 ENCOUNTER — HOSPITAL ENCOUNTER (INPATIENT)
Facility: HOSPITAL | Age: 77
LOS: 4 days | End: 2022-03-26
Attending: EMERGENCY MEDICINE | Admitting: HOSPITALIST

## 2022-01-01 ENCOUNTER — INPATIENT HOSPITAL (OUTPATIENT)
Dept: URBAN - METROPOLITAN AREA HOSPITAL 84 | Facility: HOSPITAL | Age: 77
End: 2022-01-01
Payer: COMMERCIAL

## 2022-01-01 ENCOUNTER — TELEPHONE (OUTPATIENT)
Dept: FAMILY MEDICINE CLINIC | Facility: CLINIC | Age: 77
End: 2022-01-01

## 2022-01-01 ENCOUNTER — TELEPHONE (OUTPATIENT)
Dept: CARDIOLOGY | Facility: CLINIC | Age: 77
End: 2022-01-01

## 2022-01-01 VITALS
HEART RATE: 57 BPM | WEIGHT: 315 LBS | RESPIRATION RATE: 16 BRPM | DIASTOLIC BLOOD PRESSURE: 60 MMHG | HEIGHT: 72 IN | SYSTOLIC BLOOD PRESSURE: 116 MMHG | BODY MASS INDEX: 42.66 KG/M2 | TEMPERATURE: 98.3 F

## 2022-01-01 DIAGNOSIS — D50.9 IRON DEFICIENCY ANEMIA, UNSPECIFIED: ICD-10-CM

## 2022-01-01 DIAGNOSIS — Z86.010 PERSONAL HISTORY OF COLONIC POLYPS: ICD-10-CM

## 2022-01-01 DIAGNOSIS — I50.9 ACUTE ON CHRONIC CONGESTIVE HEART FAILURE, UNSPECIFIED HEART FAILURE TYPE: ICD-10-CM

## 2022-01-01 DIAGNOSIS — I46.9 CARDIOPULMONARY ARREST WITH SUCCESSFUL RESUSCITATION: ICD-10-CM

## 2022-01-01 DIAGNOSIS — R50.9 FEVER, UNSPECIFIED: ICD-10-CM

## 2022-01-01 DIAGNOSIS — J10.1 INFLUENZA DUE TO OTHER IDENTIFIED INFLUENZA VIRUS WITH OTHER: ICD-10-CM

## 2022-01-01 DIAGNOSIS — D64.9 ANEMIA, UNSPECIFIED TYPE: ICD-10-CM

## 2022-01-01 DIAGNOSIS — I48.91 ATRIAL FIBRILLATION WITH RVR: ICD-10-CM

## 2022-01-01 DIAGNOSIS — R06.00 DYSPNEA, UNSPECIFIED: ICD-10-CM

## 2022-01-01 DIAGNOSIS — R06.03 ACUTE RESPIRATORY DISTRESS: Primary | ICD-10-CM

## 2022-01-01 DIAGNOSIS — D50.9 IRON DEFICIENCY ANEMIA, UNSPECIFIED IRON DEFICIENCY ANEMIA TYPE: ICD-10-CM

## 2022-01-01 LAB
ALBUMIN SERPL-MCNC: 3.5 G/DL (ref 3.5–5.2)
ALBUMIN SERPL-MCNC: 4 G/DL (ref 3.5–5.2)
ALBUMIN SERPL-MCNC: 4.1 G/DL (ref 3.5–5.2)
ALBUMIN/GLOB SERPL: 1.3 G/DL
ALBUMIN/GLOB SERPL: 1.4 G/DL
ALBUMIN/GLOB SERPL: 1.5 G/DL
ALP SERPL-CCNC: 78 U/L (ref 39–117)
ALP SERPL-CCNC: 86 U/L (ref 39–117)
ALP SERPL-CCNC: 92 U/L (ref 39–117)
ALT SERPL W P-5'-P-CCNC: 15 U/L (ref 1–41)
ALT SERPL W P-5'-P-CCNC: 16 U/L (ref 1–41)
ALT SERPL W P-5'-P-CCNC: 17 U/L (ref 1–41)
ANION GAP SERPL CALCULATED.3IONS-SCNC: 10 MMOL/L (ref 5–15)
ANION GAP SERPL CALCULATED.3IONS-SCNC: 11 MMOL/L (ref 5–15)
ANION GAP SERPL CALCULATED.3IONS-SCNC: 8 MMOL/L (ref 5–15)
ANION GAP SERPL CALCULATED.3IONS-SCNC: 9 MMOL/L (ref 5–15)
ANION GAP SERPL CALCULATED.3IONS-SCNC: 9 MMOL/L (ref 5–15)
ANISOCYTOSIS BLD QL: ABNORMAL
APTT PPP: 29.6 SECONDS (ref 61–76.5)
ARTERIAL PATENCY WRIST A: ABNORMAL
ARTERIAL PATENCY WRIST A: POSITIVE
ARTERIAL PATENCY WRIST A: POSITIVE
AST SERPL-CCNC: 20 U/L (ref 1–40)
AST SERPL-CCNC: 29 U/L (ref 1–40)
AST SERPL-CCNC: 29 U/L (ref 1–40)
ATMOSPHERIC PRESS: ABNORMAL MM[HG]
B PARAPERT DNA SPEC QL NAA+PROBE: NOT DETECTED
B PERT DNA SPEC QL NAA+PROBE: NOT DETECTED
BASE EXCESS BLDA CALC-SCNC: 6.4 MMOL/L (ref 0–3)
BASE EXCESS BLDA CALC-SCNC: 6.5 MMOL/L (ref 0–3)
BASE EXCESS BLDA CALC-SCNC: 7.9 MMOL/L (ref 0–3)
BASOPHILS # BLD AUTO: 0 10*3/MM3 (ref 0–0.2)
BASOPHILS NFR BLD AUTO: 0.4 % (ref 0–1.5)
BASOPHILS NFR BLD AUTO: 0.4 % (ref 0–1.5)
BASOPHILS NFR BLD AUTO: 0.8 % (ref 0–1.5)
BASOPHILS NFR BLD AUTO: 0.8 % (ref 0–1.5)
BDY SITE: ABNORMAL
BILIRUB SERPL-MCNC: 0.5 MG/DL (ref 0–1.2)
BILIRUB SERPL-MCNC: 0.5 MG/DL (ref 0–1.2)
BILIRUB SERPL-MCNC: 0.9 MG/DL (ref 0–1.2)
BUN SERPL-MCNC: 18 MG/DL (ref 8–23)
BUN SERPL-MCNC: 20 MG/DL (ref 8–23)
BUN SERPL-MCNC: 23 MG/DL (ref 8–23)
BUN SERPL-MCNC: 24 MG/DL (ref 8–23)
BUN SERPL-MCNC: 27 MG/DL (ref 8–23)
BUN/CREAT SERPL: 11.8 (ref 7–25)
BUN/CREAT SERPL: 12.1 (ref 7–25)
BUN/CREAT SERPL: 16.3 (ref 7–25)
BUN/CREAT SERPL: 19.3 (ref 7–25)
BUN/CREAT SERPL: 19.4 (ref 7–25)
C PNEUM DNA NPH QL NAA+NON-PROBE: NOT DETECTED
CA-I BLDA-SCNC: 1.08 MMOL/L (ref 1.15–1.33)
CALCIUM SPEC-SCNC: 7.9 MG/DL (ref 8.6–10.5)
CALCIUM SPEC-SCNC: 8.5 MG/DL (ref 8.6–10.5)
CALCIUM SPEC-SCNC: 9 MG/DL (ref 8.6–10.5)
CHLORIDE SERPL-SCNC: 100 MMOL/L (ref 98–107)
CHLORIDE SERPL-SCNC: 100 MMOL/L (ref 98–107)
CHLORIDE SERPL-SCNC: 101 MMOL/L (ref 98–107)
CHLORIDE SERPL-SCNC: 96 MMOL/L (ref 98–107)
CHLORIDE SERPL-SCNC: 96 MMOL/L (ref 98–107)
CO2 BLDA-SCNC: 34.7 MMOL/L (ref 22–29)
CO2 BLDA-SCNC: 36.1 MMOL/L (ref 22–29)
CO2 BLDA-SCNC: 36.5 MMOL/L (ref 22–29)
CO2 SERPL-SCNC: 29 MMOL/L (ref 22–29)
CO2 SERPL-SCNC: 30 MMOL/L (ref 22–29)
CO2 SERPL-SCNC: 30 MMOL/L (ref 22–29)
CO2 SERPL-SCNC: 32 MMOL/L (ref 22–29)
CO2 SERPL-SCNC: 36 MMOL/L (ref 22–29)
CREAT SERPL-MCNC: 1.24 MG/DL (ref 0.76–1.27)
CREAT SERPL-MCNC: 1.4 MG/DL (ref 0.76–1.27)
CREAT SERPL-MCNC: 1.41 MG/DL (ref 0.76–1.27)
CREAT SERPL-MCNC: 1.52 MG/DL (ref 0.76–1.27)
CREAT SERPL-MCNC: 1.65 MG/DL (ref 0.76–1.27)
D-LACTATE SERPL-SCNC: 0.8 MMOL/L (ref 0.5–2)
D-LACTATE SERPL-SCNC: 2.7 MMOL/L (ref 0.5–2)
DEPRECATED RDW RBC AUTO: 51.6 FL (ref 37–54)
DEPRECATED RDW RBC AUTO: 51.6 FL (ref 37–54)
DEPRECATED RDW RBC AUTO: 52.1 FL (ref 37–54)
DEPRECATED RDW RBC AUTO: 53.8 FL (ref 37–54)
DEPRECATED RDW RBC AUTO: 56 FL (ref 37–54)
EGFRCR SERPLBLD CKD-EPI 2021: 43.3 ML/MIN/1.73
EGFRCR SERPLBLD CKD-EPI 2021: 47.8 ML/MIN/1.73
EGFRCR SERPLBLD CKD-EPI 2021: 52.3 ML/MIN/1.73
EGFRCR SERPLBLD CKD-EPI 2021: 52.7 ML/MIN/1.73
EGFRCR SERPLBLD CKD-EPI 2021: 61 ML/MIN/1.73
EOSINOPHIL # BLD AUTO: 0.2 10*3/MM3 (ref 0–0.4)
EOSINOPHIL # BLD AUTO: 0.3 10*3/MM3 (ref 0–0.4)
EOSINOPHIL NFR BLD AUTO: 4.1 % (ref 0.3–6.2)
EOSINOPHIL NFR BLD AUTO: 4.4 % (ref 0.3–6.2)
EOSINOPHIL NFR BLD AUTO: 4.5 % (ref 0.3–6.2)
EOSINOPHIL NFR BLD AUTO: 7.3 % (ref 0.3–6.2)
ERYTHROCYTE [DISTWIDTH] IN BLOOD BY AUTOMATED COUNT: 20 % (ref 12.3–15.4)
ERYTHROCYTE [DISTWIDTH] IN BLOOD BY AUTOMATED COUNT: 20.2 % (ref 12.3–15.4)
ERYTHROCYTE [DISTWIDTH] IN BLOOD BY AUTOMATED COUNT: 20.3 % (ref 12.3–15.4)
ERYTHROCYTE [DISTWIDTH] IN BLOOD BY AUTOMATED COUNT: 20.8 % (ref 12.3–15.4)
ERYTHROCYTE [DISTWIDTH] IN BLOOD BY AUTOMATED COUNT: 20.9 % (ref 12.3–15.4)
FERRITIN SERPL-MCNC: 18.93 NG/ML (ref 30–400)
FLUAV H3 RNA NPH QL NAA+PROBE: DETECTED
FLUBV RNA ISLT QL NAA+PROBE: NOT DETECTED
FOLATE SERPL-MCNC: 13 NG/ML (ref 4.78–24.2)
GLOBULIN UR ELPH-MCNC: 2.4 GM/DL
GLOBULIN UR ELPH-MCNC: 3 GM/DL
GLOBULIN UR ELPH-MCNC: 3.1 GM/DL
GLUCOSE BLDC GLUCOMTR-MCNC: 108 MG/DL (ref 70–105)
GLUCOSE BLDC GLUCOMTR-MCNC: 114 MG/DL (ref 70–105)
GLUCOSE BLDC GLUCOMTR-MCNC: 123 MG/DL (ref 70–105)
GLUCOSE BLDC GLUCOMTR-MCNC: 131 MG/DL (ref 70–105)
GLUCOSE BLDC GLUCOMTR-MCNC: 132 MG/DL (ref 70–105)
GLUCOSE BLDC GLUCOMTR-MCNC: 139 MG/DL (ref 74–100)
GLUCOSE BLDC GLUCOMTR-MCNC: 143 MG/DL (ref 70–105)
GLUCOSE BLDC GLUCOMTR-MCNC: 153 MG/DL (ref 74–100)
GLUCOSE BLDC GLUCOMTR-MCNC: 153 MG/DL (ref 74–100)
GLUCOSE SERPL-MCNC: 100 MG/DL (ref 65–99)
GLUCOSE SERPL-MCNC: 108 MG/DL (ref 65–99)
GLUCOSE SERPL-MCNC: 114 MG/DL (ref 65–99)
GLUCOSE SERPL-MCNC: 129 MG/DL (ref 65–99)
GLUCOSE SERPL-MCNC: 135 MG/DL (ref 65–99)
HADV DNA SPEC NAA+PROBE: NOT DETECTED
HAPTOGLOB SERPL-MCNC: 73 MG/DL (ref 30–200)
HCO3 BLDA-SCNC: 32.9 MMOL/L (ref 21–28)
HCO3 BLDA-SCNC: 34 MMOL/L (ref 21–28)
HCO3 BLDA-SCNC: 34.6 MMOL/L (ref 21–28)
HCOV 229E RNA SPEC QL NAA+PROBE: NOT DETECTED
HCOV HKU1 RNA SPEC QL NAA+PROBE: NOT DETECTED
HCOV NL63 RNA SPEC QL NAA+PROBE: NOT DETECTED
HCOV OC43 RNA SPEC QL NAA+PROBE: NOT DETECTED
HCT VFR BLD AUTO: 23.4 % (ref 37.5–51)
HCT VFR BLD AUTO: 24.6 % (ref 37.5–51)
HCT VFR BLD AUTO: 24.6 % (ref 37.5–51)
HCT VFR BLD AUTO: 25.4 % (ref 37.5–51)
HCT VFR BLD AUTO: 26.3 % (ref 37.5–51)
HCT VFR BLD AUTO: 26.8 % (ref 37.5–51)
HCT VFR BLD AUTO: 27.5 % (ref 37.5–51)
HCT VFR BLDA CALC: 27 % (ref 38–51)
HEMODILUTION: NO
HGB BLD-MCNC: 7.1 G/DL (ref 13–17.7)
HGB BLD-MCNC: 7.2 G/DL (ref 13–17.7)
HGB BLD-MCNC: 7.3 G/DL (ref 13–17.7)
HGB BLD-MCNC: 7.6 G/DL (ref 13–17.7)
HGB BLD-MCNC: 7.7 G/DL (ref 13–17.7)
HGB BLD-MCNC: 7.7 G/DL (ref 13–17.7)
HGB BLD-MCNC: 8.1 G/DL (ref 13–17.7)
HGB BLDA-MCNC: 9.3 G/DL (ref 12–17)
HMPV RNA NPH QL NAA+NON-PROBE: NOT DETECTED
HPIV1 RNA ISLT QL NAA+PROBE: NOT DETECTED
HPIV2 RNA SPEC QL NAA+PROBE: NOT DETECTED
HPIV3 RNA NPH QL NAA+PROBE: NOT DETECTED
HPIV4 P GENE NPH QL NAA+PROBE: NOT DETECTED
HYPOCHROMIA BLD QL: ABNORMAL
INHALED O2 CONCENTRATION: 100 %
INHALED O2 CONCENTRATION: 100 %
INHALED O2 CONCENTRATION: 44 %
INR PPP: 1.09 (ref 0.93–1.1)
INR PPP: 1.15 (ref 0.93–1.1)
IRON 24H UR-MRATE: 29 MCG/DL (ref 59–158)
IRON SATN MFR SERPL: 6 % (ref 20–50)
LAB AP CASE REPORT: NORMAL
LDH SERPL-CCNC: 269 U/L (ref 135–225)
LYMPHOCYTES # BLD AUTO: 0.1 10*3/MM3 (ref 0.7–3.1)
LYMPHOCYTES # BLD AUTO: 0.3 10*3/MM3 (ref 0.7–3.1)
LYMPHOCYTES # BLD AUTO: 0.4 10*3/MM3 (ref 0.7–3.1)
LYMPHOCYTES # BLD AUTO: 0.5 10*3/MM3 (ref 0.7–3.1)
LYMPHOCYTES # BLD MANUAL: 0.77 10*3/MM3 (ref 0.7–3.1)
LYMPHOCYTES NFR BLD AUTO: 10.4 % (ref 19.6–45.3)
LYMPHOCYTES NFR BLD AUTO: 2.3 % (ref 19.6–45.3)
LYMPHOCYTES NFR BLD AUTO: 6.9 % (ref 19.6–45.3)
LYMPHOCYTES NFR BLD AUTO: 9 % (ref 19.6–45.3)
LYMPHOCYTES NFR BLD MANUAL: 6 % (ref 5–12)
M PNEUMO IGG SER IA-ACNC: NOT DETECTED
MCH RBC QN AUTO: 21 PG (ref 26.6–33)
MCH RBC QN AUTO: 21.1 PG (ref 26.6–33)
MCH RBC QN AUTO: 21.4 PG (ref 26.6–33)
MCH RBC QN AUTO: 21.6 PG (ref 26.6–33)
MCH RBC QN AUTO: 21.6 PG (ref 26.6–33)
MCHC RBC AUTO-ENTMCNC: 28.6 G/DL (ref 31.5–35.7)
MCHC RBC AUTO-ENTMCNC: 29.1 G/DL (ref 31.5–35.7)
MCHC RBC AUTO-ENTMCNC: 29.3 G/DL (ref 31.5–35.7)
MCHC RBC AUTO-ENTMCNC: 29.3 G/DL (ref 31.5–35.7)
MCHC RBC AUTO-ENTMCNC: 30.4 G/DL (ref 31.5–35.7)
MCV RBC AUTO: 71.2 FL (ref 79–97)
MCV RBC AUTO: 72.1 FL (ref 79–97)
MCV RBC AUTO: 72.1 FL (ref 79–97)
MCV RBC AUTO: 73.1 FL (ref 79–97)
MCV RBC AUTO: 75.5 FL (ref 79–97)
METAMYELOCYTES NFR BLD MANUAL: 5 % (ref 0–0)
MODALITY: ABNORMAL
MONOCYTES # BLD AUTO: 0.3 10*3/MM3 (ref 0.1–0.9)
MONOCYTES # BLD AUTO: 0.7 10*3/MM3 (ref 0.1–0.9)
MONOCYTES # BLD AUTO: 0.8 10*3/MM3 (ref 0.1–0.9)
MONOCYTES # BLD AUTO: 0.9 10*3/MM3 (ref 0.1–0.9)
MONOCYTES # BLD: 0.38 10*3/MM3 (ref 0.1–0.9)
MONOCYTES NFR BLD AUTO: 14.9 % (ref 5–12)
MONOCYTES NFR BLD AUTO: 16.5 % (ref 5–12)
MONOCYTES NFR BLD AUTO: 18 % (ref 5–12)
MONOCYTES NFR BLD AUTO: 5.1 % (ref 5–12)
NEUTROPHILS # BLD AUTO: 4.93 10*3/MM3 (ref 1.7–7)
NEUTROPHILS NFR BLD AUTO: 3.1 10*3/MM3 (ref 1.7–7)
NEUTROPHILS NFR BLD AUTO: 3.3 10*3/MM3 (ref 1.7–7)
NEUTROPHILS NFR BLD AUTO: 3.5 10*3/MM3 (ref 1.7–7)
NEUTROPHILS NFR BLD AUTO: 5 10*3/MM3 (ref 1.7–7)
NEUTROPHILS NFR BLD AUTO: 66.8 % (ref 42.7–76)
NEUTROPHILS NFR BLD AUTO: 69.8 % (ref 42.7–76)
NEUTROPHILS NFR BLD AUTO: 69.9 % (ref 42.7–76)
NEUTROPHILS NFR BLD AUTO: 87.7 % (ref 42.7–76)
NEUTROPHILS NFR BLD MANUAL: 69 % (ref 42.7–76)
NEUTS BAND NFR BLD MANUAL: 8 % (ref 0–5)
NRBC BLD AUTO-RTO: 0.2 /100 WBC (ref 0–0.2)
NRBC BLD AUTO-RTO: 0.9 /100 WBC (ref 0–0.2)
NRBC SPEC MANUAL: 2 /100 WBC (ref 0–0.2)
NT-PROBNP SERPL-MCNC: 1223 PG/ML (ref 0–900)
PATH REPORT.FINAL DX SPEC: NORMAL
PATHOLOGY REVIEW: YES
PCO2 BLDA: 57 MM HG (ref 35–48)
PCO2 BLDA: 61.8 MM HG (ref 35–48)
PCO2 BLDA: 66.2 MM HG (ref 35–48)
PEEP RESPIRATORY: 5 CM[H2O]
PEEP RESPIRATORY: 7 CM[H2O]
PH BLDA: 7.32 PH UNITS (ref 7.35–7.45)
PH BLDA: 7.36 PH UNITS (ref 7.35–7.45)
PH BLDA: 7.37 PH UNITS (ref 7.35–7.45)
PLATELET # BLD AUTO: 118 10*3/MM3 (ref 140–450)
PLATELET # BLD AUTO: 120 10*3/MM3 (ref 140–450)
PLATELET # BLD AUTO: 128 10*3/MM3 (ref 140–450)
PLATELET # BLD AUTO: 131 10*3/MM3 (ref 140–450)
PLATELET # BLD AUTO: 133 10*3/MM3 (ref 140–450)
PMV BLD AUTO: 7.1 FL (ref 6–12)
PMV BLD AUTO: 8.2 FL (ref 6–12)
PMV BLD AUTO: 8.3 FL (ref 6–12)
PMV BLD AUTO: 8.4 FL (ref 6–12)
PMV BLD AUTO: 8.4 FL (ref 6–12)
PO2 BLDA: 166.6 MM HG (ref 83–108)
PO2 BLDA: 313.1 MM HG (ref 83–108)
PO2 BLDA: 85.3 MM HG (ref 83–108)
POIKILOCYTOSIS BLD QL SMEAR: ABNORMAL
POTASSIUM BLDA-SCNC: 3.4 MMOL/L (ref 3.5–4.5)
POTASSIUM SERPL-SCNC: 3.7 MMOL/L (ref 3.5–5.2)
POTASSIUM SERPL-SCNC: 4.2 MMOL/L (ref 3.5–5.2)
POTASSIUM SERPL-SCNC: 4.3 MMOL/L (ref 3.5–5.2)
PROT SERPL-MCNC: 5.9 G/DL (ref 6–8.5)
PROT SERPL-MCNC: 7.1 G/DL (ref 6–8.5)
PROT SERPL-MCNC: 7.1 G/DL (ref 6–8.5)
PROTHROMBIN TIME: 12 SECONDS (ref 9.6–11.7)
PROTHROMBIN TIME: 12.6 SECONDS (ref 9.6–11.7)
QT INTERVAL: 317 MS
QT INTERVAL: 414 MS
RBC # BLD AUTO: 3.29 10*6/MM3 (ref 4.14–5.8)
RBC # BLD AUTO: 3.36 10*6/MM3 (ref 4.14–5.8)
RBC # BLD AUTO: 3.55 10*6/MM3 (ref 4.14–5.8)
RBC # BLD AUTO: 3.64 10*6/MM3 (ref 4.14–5.8)
RBC # BLD AUTO: 3.82 10*6/MM3 (ref 4.14–5.8)
RESPIRATORY RATE: 16
RESPIRATORY RATE: 16
RETICS # AUTO: 0.09 10*6/MM3 (ref 0.02–0.13)
RETICS/RBC NFR AUTO: 2.57 % (ref 0.7–1.9)
RHINOVIRUS RNA SPEC NAA+PROBE: NOT DETECTED
RSV RNA NPH QL NAA+NON-PROBE: NOT DETECTED
SAO2 % BLDCOA: 95.8 % (ref 94–98)
SAO2 % BLDCOA: 99.4 % (ref 94–98)
SAO2 % BLDCOA: 99.9 % (ref 94–98)
SARS-COV-2 RNA NPH QL NAA+NON-PROBE: NOT DETECTED
SARS-COV-2 RNA PNL SPEC NAA+PROBE: NOT DETECTED
SCAN SLIDE: NORMAL
SMALL PLATELETS BLD QL SMEAR: ADEQUATE
SODIUM BLD-SCNC: 138 MMOL/L (ref 138–146)
SODIUM SERPL-SCNC: 138 MMOL/L (ref 136–145)
SODIUM SERPL-SCNC: 139 MMOL/L (ref 136–145)
SODIUM SERPL-SCNC: 139 MMOL/L (ref 136–145)
SODIUM SERPL-SCNC: 140 MMOL/L (ref 136–145)
SODIUM SERPL-SCNC: 141 MMOL/L (ref 136–145)
TIBC SERPL-MCNC: 510 MCG/DL (ref 298–536)
TRANSFERRIN SERPL-MCNC: 342 MG/DL (ref 200–360)
TROPONIN T SERPL-MCNC: <0.01 NG/ML (ref 0–0.03)
TROPONIN T SERPL-MCNC: <0.01 NG/ML (ref 0–0.03)
VARIANT LYMPHS NFR BLD MANUAL: 12 % (ref 19.6–45.3)
VENTILATOR MODE: ABNORMAL
VENTILATOR MODE: ABNORMAL
VIT B12 BLD-MCNC: 395 PG/ML (ref 211–946)
VT ON VENT VENT: 650 ML
VT ON VENT VENT: 650 ML
WBC MORPH BLD: NORMAL
WBC NRBC COR # BLD: 4.6 10*3/MM3 (ref 3.4–10.8)
WBC NRBC COR # BLD: 4.8 10*3/MM3 (ref 3.4–10.8)
WBC NRBC COR # BLD: 5 10*3/MM3 (ref 3.4–10.8)
WBC NRBC COR # BLD: 5.7 10*3/MM3 (ref 3.4–10.8)
WBC NRBC COR # BLD: 6.4 10*3/MM3 (ref 3.4–10.8)

## 2022-01-01 PROCEDURE — 94003 VENT MGMT INPAT SUBQ DAY: CPT

## 2022-01-01 PROCEDURE — 94799 UNLISTED PULMONARY SVC/PX: CPT

## 2022-01-01 PROCEDURE — 82607 VITAMIN B-12: CPT | Performed by: HOSPITALIST

## 2022-01-01 PROCEDURE — 36600 WITHDRAWAL OF ARTERIAL BLOOD: CPT

## 2022-01-01 PROCEDURE — 0202U NFCT DS 22 TRGT SARS-COV-2: CPT | Performed by: HOSPITALIST

## 2022-01-01 PROCEDURE — 82803 BLOOD GASES ANY COMBINATION: CPT

## 2022-01-01 PROCEDURE — 99231 SBSQ HOSP IP/OBS SF/LOW 25: CPT | Performed by: INTERNAL MEDICINE

## 2022-01-01 PROCEDURE — 97166 OT EVAL MOD COMPLEX 45 MIN: CPT

## 2022-01-01 PROCEDURE — 93295 DEV INTERROG REMOTE 1/2/MLT: CPT | Performed by: INTERNAL MEDICINE

## 2022-01-01 PROCEDURE — 99233 SBSQ HOSP IP/OBS HIGH 50: CPT | Performed by: INTERNAL MEDICINE

## 2022-01-01 PROCEDURE — 83010 ASSAY OF HAPTOGLOBIN QUANT: CPT | Performed by: NURSE PRACTITIONER

## 2022-01-01 PROCEDURE — 93005 ELECTROCARDIOGRAM TRACING: CPT

## 2022-01-01 PROCEDURE — 31500 INSERT EMERGENCY AIRWAY: CPT | Performed by: NURSE PRACTITIONER

## 2022-01-01 PROCEDURE — 83605 ASSAY OF LACTIC ACID: CPT

## 2022-01-01 PROCEDURE — 84484 ASSAY OF TROPONIN QUANT: CPT | Performed by: EMERGENCY MEDICINE

## 2022-01-01 PROCEDURE — 0BH17EZ INSERTION OF ENDOTRACHEAL AIRWAY INTO TRACHEA, VIA NATURAL OR ARTIFICIAL OPENING: ICD-10-PCS | Performed by: INTERNAL MEDICINE

## 2022-01-01 PROCEDURE — 99232 SBSQ HOSP IP/OBS MODERATE 35: CPT | Mod: FS | Performed by: NURSE PRACTITIONER

## 2022-01-01 PROCEDURE — 85730 THROMBOPLASTIN TIME PARTIAL: CPT | Performed by: EMERGENCY MEDICINE

## 2022-01-01 PROCEDURE — 85025 COMPLETE CBC W/AUTO DIFF WBC: CPT | Performed by: NURSE PRACTITIONER

## 2022-01-01 PROCEDURE — 76937 US GUIDE VASCULAR ACCESS: CPT | Performed by: NURSE PRACTITIONER

## 2022-01-01 PROCEDURE — 85014 HEMATOCRIT: CPT | Performed by: HOSPITALIST

## 2022-01-01 PROCEDURE — 02HV33Z INSERTION OF INFUSION DEVICE INTO SUPERIOR VENA CAVA, PERCUTANEOUS APPROACH: ICD-10-PCS | Performed by: INTERNAL MEDICINE

## 2022-01-01 PROCEDURE — 5A19054 RESPIRATORY VENTILATION, SINGLE, NONMECHANICAL: ICD-10-PCS | Performed by: INTERNAL MEDICINE

## 2022-01-01 PROCEDURE — 80053 COMPREHEN METABOLIC PANEL: CPT | Performed by: NURSE PRACTITIONER

## 2022-01-01 PROCEDURE — 87040 BLOOD CULTURE FOR BACTERIA: CPT | Performed by: HOSPITALIST

## 2022-01-01 PROCEDURE — 25010000002 FENTANYL CITRATE (PF) 50 MCG/ML SOLUTION

## 2022-01-01 PROCEDURE — 99223 1ST HOSP IP/OBS HIGH 75: CPT | Mod: FS | Performed by: NURSE PRACTITIONER

## 2022-01-01 PROCEDURE — 93010 ELECTROCARDIOGRAM REPORT: CPT | Performed by: INTERNAL MEDICINE

## 2022-01-01 PROCEDURE — 93296 REM INTERROG EVL PM/IDS: CPT | Performed by: INTERNAL MEDICINE

## 2022-01-01 PROCEDURE — 97162 PT EVAL MOD COMPLEX 30 MIN: CPT

## 2022-01-01 PROCEDURE — 82746 ASSAY OF FOLIC ACID SERUM: CPT | Performed by: HOSPITALIST

## 2022-01-01 PROCEDURE — 85018 HEMOGLOBIN: CPT | Performed by: HOSPITALIST

## 2022-01-01 PROCEDURE — 99223 1ST HOSP IP/OBS HIGH 75: CPT | Performed by: HOSPITALIST

## 2022-01-01 PROCEDURE — 80051 ELECTROLYTE PANEL: CPT

## 2022-01-01 PROCEDURE — 74018 RADEX ABDOMEN 1 VIEW: CPT

## 2022-01-01 PROCEDURE — 85025 COMPLETE CBC W/AUTO DIFF WBC: CPT | Performed by: EMERGENCY MEDICINE

## 2022-01-01 PROCEDURE — 99284 EMERGENCY DEPT VISIT MOD MDM: CPT

## 2022-01-01 PROCEDURE — 25010000002 EPINEPHRINE 1 MG/10ML SOLUTION PREFILLED SYRINGE: Performed by: NURSE PRACTITIONER

## 2022-01-01 PROCEDURE — 99233 SBSQ HOSP IP/OBS HIGH 50: CPT | Performed by: HOSPITALIST

## 2022-01-01 PROCEDURE — 36556 INSERT NON-TUNNEL CV CATH: CPT | Performed by: NURSE PRACTITIONER

## 2022-01-01 PROCEDURE — 0 MORPHINE SULFATE 4 MG/ML SOLUTION

## 2022-01-01 PROCEDURE — 25010000002 CEFEPIME PER 500 MG: Performed by: HOSPITALIST

## 2022-01-01 PROCEDURE — 83880 ASSAY OF NATRIURETIC PEPTIDE: CPT | Performed by: EMERGENCY MEDICINE

## 2022-01-01 PROCEDURE — 71045 X-RAY EXAM CHEST 1 VIEW: CPT

## 2022-01-01 PROCEDURE — 99232 SBSQ HOSP IP/OBS MODERATE 35: CPT | Performed by: INTERNAL MEDICINE

## 2022-01-01 PROCEDURE — 84484 ASSAY OF TROPONIN QUANT: CPT | Performed by: HOSPITALIST

## 2022-01-01 PROCEDURE — 85025 COMPLETE CBC W/AUTO DIFF WBC: CPT | Performed by: HOSPITALIST

## 2022-01-01 PROCEDURE — 5A1935Z RESPIRATORY VENTILATION, LESS THAN 24 CONSECUTIVE HOURS: ICD-10-PCS | Performed by: INTERNAL MEDICINE

## 2022-01-01 PROCEDURE — 82962 GLUCOSE BLOOD TEST: CPT

## 2022-01-01 PROCEDURE — 94002 VENT MGMT INPAT INIT DAY: CPT

## 2022-01-01 PROCEDURE — 87635 SARS-COV-2 COVID-19 AMP PRB: CPT | Performed by: EMERGENCY MEDICINE

## 2022-01-01 PROCEDURE — 25010000002 NA FERRIC GLUC CPLX PER 12.5 MG: Performed by: INTERNAL MEDICINE

## 2022-01-01 PROCEDURE — 82728 ASSAY OF FERRITIN: CPT | Performed by: HOSPITALIST

## 2022-01-01 PROCEDURE — 85610 PROTHROMBIN TIME: CPT | Performed by: EMERGENCY MEDICINE

## 2022-01-01 PROCEDURE — 83540 ASSAY OF IRON: CPT | Performed by: HOSPITALIST

## 2022-01-01 PROCEDURE — 93005 ELECTROCARDIOGRAM TRACING: CPT | Performed by: INTERNAL MEDICINE

## 2022-01-01 PROCEDURE — 94640 AIRWAY INHALATION TREATMENT: CPT

## 2022-01-01 PROCEDURE — 84466 ASSAY OF TRANSFERRIN: CPT | Performed by: HOSPITALIST

## 2022-01-01 PROCEDURE — 80053 COMPREHEN METABOLIC PANEL: CPT | Performed by: EMERGENCY MEDICINE

## 2022-01-01 PROCEDURE — 25010000002 CALCIUM GLUCONATE-NACL 1-0.675 GM/50ML-% SOLUTION: Performed by: NURSE PRACTITIONER

## 2022-01-01 PROCEDURE — 82330 ASSAY OF CALCIUM: CPT

## 2022-01-01 PROCEDURE — 25010000002 NA FERRIC GLUC CPLX PER 12.5 MG: Performed by: NURSE PRACTITIONER

## 2022-01-01 PROCEDURE — 92950 HEART/LUNG RESUSCITATION CPR: CPT

## 2022-01-01 PROCEDURE — 25010000002 FUROSEMIDE PER 20 MG: Performed by: EMERGENCY MEDICINE

## 2022-01-01 PROCEDURE — 85007 BL SMEAR W/DIFF WBC COUNT: CPT | Performed by: NURSE PRACTITIONER

## 2022-01-01 PROCEDURE — 36620 INSERTION CATHETER ARTERY: CPT | Performed by: NURSE PRACTITIONER

## 2022-01-01 PROCEDURE — 94664 DEMO&/EVAL PT USE INHALER: CPT

## 2022-01-01 PROCEDURE — 97535 SELF CARE MNGMENT TRAINING: CPT

## 2022-01-01 PROCEDURE — 99232 SBSQ HOSP IP/OBS MODERATE 35: CPT | Performed by: HOSPITALIST

## 2022-01-01 PROCEDURE — 83615 LACTATE (LD) (LDH) ENZYME: CPT | Performed by: NURSE PRACTITIONER

## 2022-01-01 PROCEDURE — 94761 N-INVAS EAR/PLS OXIMETRY MLT: CPT

## 2022-01-01 PROCEDURE — 85045 AUTOMATED RETICULOCYTE COUNT: CPT | Performed by: NURSE PRACTITIONER

## 2022-01-01 PROCEDURE — 99222 1ST HOSP IP/OBS MODERATE 55: CPT | Performed by: INTERNAL MEDICINE

## 2022-01-01 PROCEDURE — 80048 BASIC METABOLIC PNL TOTAL CA: CPT | Performed by: HOSPITALIST

## 2022-01-01 PROCEDURE — 99223 1ST HOSP IP/OBS HIGH 75: CPT | Performed by: NURSE PRACTITIONER

## 2022-01-01 PROCEDURE — 85018 HEMOGLOBIN: CPT

## 2022-01-01 PROCEDURE — 85610 PROTHROMBIN TIME: CPT | Performed by: NURSE PRACTITIONER

## 2022-01-01 RX ORDER — SODIUM, POTASSIUM,MAG SULFATES 17.5-3.13G
1 SOLUTION, RECONSTITUTED, ORAL ORAL 2 TIMES DAILY
Status: COMPLETED | OUTPATIENT
Start: 2022-01-01 | End: 2022-01-01

## 2022-01-01 RX ORDER — ATORVASTATIN CALCIUM 20 MG/1
20 TABLET, FILM COATED ORAL NIGHTLY
Status: DISCONTINUED | OUTPATIENT
Start: 2022-01-01 | End: 2022-01-01

## 2022-01-01 RX ORDER — SODIUM CHLORIDE 0.9 % (FLUSH) 0.9 %
10 SYRINGE (ML) INJECTION AS NEEDED
Status: DISCONTINUED | OUTPATIENT
Start: 2022-01-01 | End: 2022-01-01

## 2022-01-01 RX ORDER — MORPHINE SULFATE 4 MG/ML
4 INJECTION, SOLUTION INTRAMUSCULAR; INTRAVENOUS
Status: DISCONTINUED | OUTPATIENT
Start: 2022-01-01 | End: 2022-01-01 | Stop reason: HOSPADM

## 2022-01-01 RX ORDER — FENTANYL CITRATE 50 UG/ML
INJECTION, SOLUTION INTRAMUSCULAR; INTRAVENOUS
Status: COMPLETED
Start: 2022-01-01 | End: 2022-01-01

## 2022-01-01 RX ORDER — GUAIFENESIN/DEXTROMETHORPHAN 100-10MG/5
10 SYRUP ORAL EVERY 4 HOURS PRN
Status: DISCONTINUED | OUTPATIENT
Start: 2022-01-01 | End: 2022-01-01

## 2022-01-01 RX ORDER — ACETAMINOPHEN 160 MG/5ML
650 SOLUTION ORAL EVERY 4 HOURS PRN
Status: DISCONTINUED | OUTPATIENT
Start: 2022-01-01 | End: 2022-01-01 | Stop reason: HOSPADM

## 2022-01-01 RX ORDER — SODIUM CHLORIDE 0.9 % (FLUSH) 0.9 %
10 SYRINGE (ML) INJECTION EVERY 12 HOURS SCHEDULED
Status: DISCONTINUED | OUTPATIENT
Start: 2022-01-01 | End: 2022-01-01

## 2022-01-01 RX ORDER — BENZONATATE 100 MG/1
100 CAPSULE ORAL 3 TIMES DAILY PRN
Status: DISCONTINUED | OUTPATIENT
Start: 2022-01-01 | End: 2022-01-01

## 2022-01-01 RX ORDER — PROPRANOLOL HYDROCHLORIDE 20 MG/1
10 TABLET ORAL 2 TIMES DAILY
Status: DISCONTINUED | OUTPATIENT
Start: 2022-01-01 | End: 2022-01-01

## 2022-01-01 RX ORDER — ONDANSETRON 2 MG/ML
4 INJECTION INTRAMUSCULAR; INTRAVENOUS EVERY 6 HOURS PRN
Status: DISCONTINUED | OUTPATIENT
Start: 2022-01-01 | End: 2022-01-01

## 2022-01-01 RX ORDER — CHLORHEXIDINE GLUCONATE 0.12 MG/ML
15 RINSE ORAL EVERY 12 HOURS SCHEDULED
Status: DISCONTINUED | OUTPATIENT
Start: 2022-01-01 | End: 2022-01-01

## 2022-01-01 RX ORDER — INSULIN LISPRO 100 [IU]/ML
0-7 INJECTION, SOLUTION INTRAVENOUS; SUBCUTANEOUS AS NEEDED
Status: DISCONTINUED | OUTPATIENT
Start: 2022-01-01 | End: 2022-01-01

## 2022-01-01 RX ORDER — FUROSEMIDE 10 MG/ML
60 INJECTION INTRAMUSCULAR; INTRAVENOUS ONCE
Status: COMPLETED | OUTPATIENT
Start: 2022-01-01 | End: 2022-01-01

## 2022-01-01 RX ORDER — AMIODARONE HYDROCHLORIDE 200 MG/1
200 TABLET ORAL
Status: DISCONTINUED | OUTPATIENT
Start: 2022-01-01 | End: 2022-01-01

## 2022-01-01 RX ORDER — CLOPIDOGREL BISULFATE 75 MG/1
75 TABLET ORAL DAILY
Status: DISCONTINUED | OUTPATIENT
Start: 2022-01-01 | End: 2022-01-01

## 2022-01-01 RX ORDER — FUROSEMIDE 40 MG/1
20 TABLET ORAL DAILY PRN
COMMUNITY

## 2022-01-01 RX ORDER — SODIUM CHLORIDE 9 MG/ML
50 INJECTION, SOLUTION INTRAVENOUS CONTINUOUS
Status: DISCONTINUED | OUTPATIENT
Start: 2022-01-01 | End: 2022-01-01

## 2022-01-01 RX ORDER — BUDESONIDE 0.5 MG/2ML
0.5 INHALANT ORAL
Status: DISCONTINUED | OUTPATIENT
Start: 2022-01-01 | End: 2022-01-01

## 2022-01-01 RX ORDER — ACETAMINOPHEN 325 MG/1
650 TABLET ORAL EVERY 8 HOURS PRN
Status: DISCONTINUED | OUTPATIENT
Start: 2022-01-01 | End: 2022-01-01 | Stop reason: SDUPTHER

## 2022-01-01 RX ORDER — ACETAMINOPHEN 325 MG/1
650 TABLET ORAL EVERY 4 HOURS PRN
Status: DISCONTINUED | OUTPATIENT
Start: 2022-01-01 | End: 2022-01-01

## 2022-01-01 RX ORDER — NOREPINEPHRINE BIT/0.9 % NACL 8 MG/250ML
.02-.3 INFUSION BOTTLE (ML) INTRAVENOUS
Status: DISCONTINUED | OUTPATIENT
Start: 2022-01-01 | End: 2022-01-01

## 2022-01-01 RX ORDER — ACETAMINOPHEN 650 MG/1
650 SUPPOSITORY RECTAL EVERY 4 HOURS PRN
Status: DISCONTINUED | OUTPATIENT
Start: 2022-01-01 | End: 2022-01-01

## 2022-01-01 RX ORDER — DILTIAZEM HYDROCHLORIDE 120 MG/1
240 CAPSULE, COATED, EXTENDED RELEASE ORAL
Status: DISCONTINUED | OUTPATIENT
Start: 2022-01-01 | End: 2022-01-01

## 2022-01-01 RX ORDER — INSULIN LISPRO 100 [IU]/ML
0-7 INJECTION, SOLUTION INTRAVENOUS; SUBCUTANEOUS EVERY 6 HOURS SCHEDULED
Status: DISCONTINUED | OUTPATIENT
Start: 2022-01-01 | End: 2022-01-01

## 2022-01-01 RX ORDER — ATROPINE SULFATE 10 MG/ML
2 SOLUTION/ DROPS OPHTHALMIC 2 TIMES DAILY PRN
Status: DISCONTINUED | OUTPATIENT
Start: 2022-01-01 | End: 2022-01-01 | Stop reason: HOSPADM

## 2022-01-01 RX ORDER — LORAZEPAM 2 MG/ML
2 INJECTION INTRAMUSCULAR
Status: DISCONTINUED | OUTPATIENT
Start: 2022-01-01 | End: 2022-01-01 | Stop reason: HOSPADM

## 2022-01-01 RX ORDER — NOREPINEPHRINE BIT/0.9 % NACL 8 MG/250ML
INFUSION BOTTLE (ML) INTRAVENOUS
Status: COMPLETED
Start: 2022-01-01 | End: 2022-01-01

## 2022-01-01 RX ORDER — GLYCOPYRROLATE 0.2 MG/ML
0.4 INJECTION INTRAMUSCULAR; INTRAVENOUS
Status: DISCONTINUED | OUTPATIENT
Start: 2022-01-01 | End: 2022-01-01 | Stop reason: HOSPADM

## 2022-01-01 RX ORDER — DILTIAZEM HYDROCHLORIDE 60 MG/1
60 TABLET, FILM COATED ORAL EVERY 8 HOURS SCHEDULED
Status: DISCONTINUED | OUTPATIENT
Start: 2022-01-01 | End: 2022-01-01

## 2022-01-01 RX ORDER — FENTANYL CITRATE-0.9 % NACL/PF 10 MCG/ML
50-300 PLASTIC BAG, INJECTION (ML) INTRAVENOUS
Status: DISCONTINUED | OUTPATIENT
Start: 2022-01-01 | End: 2022-01-01

## 2022-01-01 RX ORDER — FENTANYL CITRATE 50 UG/ML
25 INJECTION, SOLUTION INTRAMUSCULAR; INTRAVENOUS
Status: DISCONTINUED | OUTPATIENT
Start: 2022-01-01 | End: 2022-01-01

## 2022-01-01 RX ORDER — NITROGLYCERIN 0.4 MG/1
0.4 TABLET SUBLINGUAL
Status: DISCONTINUED | OUTPATIENT
Start: 2022-01-01 | End: 2022-01-01

## 2022-01-01 RX ORDER — PANTOPRAZOLE SODIUM 40 MG/1
40 TABLET, DELAYED RELEASE ORAL
Status: DISCONTINUED | OUTPATIENT
Start: 2022-01-01 | End: 2022-01-01

## 2022-01-01 RX ORDER — ALLOPURINOL 300 MG/1
300 TABLET ORAL DAILY
Status: DISCONTINUED | OUTPATIENT
Start: 2022-01-01 | End: 2022-01-01

## 2022-01-01 RX ORDER — EPINEPHRINE 0.1 MG/ML
SYRINGE (ML) INJECTION
Status: COMPLETED | OUTPATIENT
Start: 2022-01-01 | End: 2022-01-01

## 2022-01-01 RX ORDER — DILTIAZEM HYDROCHLORIDE 5 MG/ML
20 INJECTION INTRAVENOUS ONCE
Status: COMPLETED | OUTPATIENT
Start: 2022-01-01 | End: 2022-01-01

## 2022-01-01 RX ORDER — CISATRACURIUM BESYLATE 2 MG/ML
10 INJECTION, SOLUTION INTRAVENOUS ONCE
Status: DISCONTINUED | OUTPATIENT
Start: 2022-01-01 | End: 2022-01-01

## 2022-01-01 RX ORDER — CARBOXYMETHYLCELLULOSE SODIUM 10 MG/ML
1 GEL OPHTHALMIC
Status: DISCONTINUED | OUTPATIENT
Start: 2022-01-01 | End: 2022-01-01 | Stop reason: HOSPADM

## 2022-01-01 RX ORDER — DIPHENOXYLATE HYDROCHLORIDE AND ATROPINE SULFATE 2.5; .025 MG/1; MG/1
1 TABLET ORAL
Status: DISCONTINUED | OUTPATIENT
Start: 2022-01-01 | End: 2022-01-01 | Stop reason: HOSPADM

## 2022-01-01 RX ORDER — IPRATROPIUM BROMIDE AND ALBUTEROL SULFATE 2.5; .5 MG/3ML; MG/3ML
3 SOLUTION RESPIRATORY (INHALATION) EVERY 4 HOURS PRN
Status: DISCONTINUED | OUTPATIENT
Start: 2022-01-01 | End: 2022-01-01

## 2022-01-01 RX ORDER — OSELTAMIVIR PHOSPHATE 75 MG/1
75 CAPSULE ORAL EVERY 12 HOURS SCHEDULED
Status: DISCONTINUED | OUTPATIENT
Start: 2022-01-01 | End: 2022-01-01

## 2022-01-01 RX ORDER — SCOLOPAMINE TRANSDERMAL SYSTEM 1 MG/1
1 PATCH, EXTENDED RELEASE TRANSDERMAL
Status: DISCONTINUED | OUTPATIENT
Start: 2022-01-01 | End: 2022-01-01 | Stop reason: HOSPADM

## 2022-01-01 RX ORDER — ONDANSETRON 4 MG/1
4 TABLET, FILM COATED ORAL EVERY 6 HOURS PRN
Status: DISCONTINUED | OUTPATIENT
Start: 2022-01-01 | End: 2022-01-01

## 2022-01-01 RX ORDER — FUROSEMIDE 20 MG/1
20 TABLET ORAL DAILY
Status: DISCONTINUED | OUTPATIENT
Start: 2022-01-01 | End: 2022-01-01

## 2022-01-01 RX ORDER — MIDAZOLAM IN NACL,ISO-OSMOT/PF 50 MG/50ML
1-10 INFUSION BOTTLE (ML) INTRAVENOUS
Status: DISCONTINUED | OUTPATIENT
Start: 2022-01-01 | End: 2022-01-01

## 2022-01-01 RX ORDER — ACETAMINOPHEN 325 MG/1
650 TABLET ORAL EVERY 4 HOURS PRN
Status: DISCONTINUED | OUTPATIENT
Start: 2022-01-01 | End: 2022-01-01 | Stop reason: HOSPADM

## 2022-01-01 RX ORDER — FUROSEMIDE 40 MG/1
40 TABLET ORAL DAILY
Status: DISCONTINUED | OUTPATIENT
Start: 2022-01-01 | End: 2022-01-01

## 2022-01-01 RX ORDER — IPRATROPIUM BROMIDE AND ALBUTEROL SULFATE 2.5; .5 MG/3ML; MG/3ML
3 SOLUTION RESPIRATORY (INHALATION)
Status: DISCONTINUED | OUTPATIENT
Start: 2022-01-01 | End: 2022-01-01

## 2022-01-01 RX ORDER — FAMOTIDINE 10 MG/ML
20 INJECTION, SOLUTION INTRAVENOUS 2 TIMES DAILY
Status: DISCONTINUED | OUTPATIENT
Start: 2022-01-01 | End: 2022-01-01

## 2022-01-01 RX ORDER — CALCIUM GLUCONATE 20 MG/ML
1 INJECTION, SOLUTION INTRAVENOUS ONCE
Status: COMPLETED | OUTPATIENT
Start: 2022-01-01 | End: 2022-01-01

## 2022-01-01 RX ORDER — ACETAMINOPHEN 650 MG/1
650 SUPPOSITORY RECTAL EVERY 4 HOURS PRN
Status: DISCONTINUED | OUTPATIENT
Start: 2022-01-01 | End: 2022-01-01 | Stop reason: HOSPADM

## 2022-01-01 RX ADMIN — Medication 10 ML: at 13:00

## 2022-01-01 RX ADMIN — CLOPIDOGREL BISULFATE 75 MG: 75 TABLET ORAL at 12:59

## 2022-01-01 RX ADMIN — DILTIAZEM HYDROCHLORIDE 240 MG: 240 CAPSULE, COATED, EXTENDED RELEASE ORAL at 12:59

## 2022-01-01 RX ADMIN — IPRATROPIUM BROMIDE AND ALBUTEROL SULFATE 3 ML: .5; 3 SOLUTION RESPIRATORY (INHALATION) at 06:15

## 2022-01-01 RX ADMIN — DILTIAZEM HYDROCHLORIDE 20 MG: 5 INJECTION INTRAVENOUS at 16:36

## 2022-01-01 RX ADMIN — ALLOPURINOL 300 MG: 300 TABLET ORAL at 09:21

## 2022-01-01 RX ADMIN — Medication 10 ML: at 20:25

## 2022-01-01 RX ADMIN — Medication 100 MCG/HR: at 01:58

## 2022-01-01 RX ADMIN — Medication 10 ML: at 16:30

## 2022-01-01 RX ADMIN — Medication 10 ML: at 08:43

## 2022-01-01 RX ADMIN — EPINEPHRINE 1 MG: 0.1 INJECTION, SOLUTION ENDOTRACHEAL; INTRACARDIAC; INTRAVENOUS at 23:36

## 2022-01-01 RX ADMIN — PANTOPRAZOLE SODIUM 40 MG: 40 TABLET, DELAYED RELEASE ORAL at 21:09

## 2022-01-01 RX ADMIN — BENZONATATE 100 MG: 100 CAPSULE ORAL at 22:00

## 2022-01-01 RX ADMIN — SODIUM CHLORIDE 50 ML/HR: 9 INJECTION, SOLUTION INTRAVENOUS at 04:40

## 2022-01-01 RX ADMIN — ALLOPURINOL 300 MG: 300 TABLET ORAL at 15:24

## 2022-01-01 RX ADMIN — PROPRANOLOL HYDROCHLORIDE 10 MG: 10 TABLET ORAL at 21:09

## 2022-01-01 RX ADMIN — ATORVASTATIN CALCIUM 20 MG: 20 TABLET, FILM COATED ORAL at 21:07

## 2022-01-01 RX ADMIN — DILTIAZEM HYDROCHLORIDE 240 MG: 240 CAPSULE, COATED, EXTENDED RELEASE ORAL at 09:21

## 2022-01-01 RX ADMIN — GLYCOPYRROLATE 0.4 MG: 0.2 INJECTION INTRAMUSCULAR; INTRAVENOUS at 09:03

## 2022-01-01 RX ADMIN — OSELTAMIVIR PHOSPHATE 75 MG: 75 CAPSULE ORAL at 21:08

## 2022-01-01 RX ADMIN — PANTOPRAZOLE SODIUM 40 MG: 40 TABLET, DELAYED RELEASE ORAL at 09:21

## 2022-01-01 RX ADMIN — FENTANYL CITRATE 25 MCG: 50 INJECTION, SOLUTION INTRAMUSCULAR; INTRAVENOUS at 01:31

## 2022-01-01 RX ADMIN — APIXABAN 5 MG: 5 TABLET, FILM COATED ORAL at 20:32

## 2022-01-01 RX ADMIN — GUAIFENESIN SYRUP AND DEXTROMETHORPHAN 10 ML: 100; 10 SYRUP ORAL at 21:23

## 2022-01-01 RX ADMIN — CEFEPIME HYDROCHLORIDE 2 G: 2 INJECTION, POWDER, FOR SOLUTION INTRAVENOUS at 03:31

## 2022-01-01 RX ADMIN — SODIUM SULFATE, POTASSIUM SULFATE, MAGNESIUM SULFATE 1 BOTTLE: 17.5; 3.13; 1.6 SOLUTION, CONCENTRATE ORAL at 11:30

## 2022-01-01 RX ADMIN — IPRATROPIUM BROMIDE AND ALBUTEROL SULFATE 3 ML: .5; 3 SOLUTION RESPIRATORY (INHALATION) at 07:18

## 2022-01-01 RX ADMIN — Medication 10 ML: at 14:27

## 2022-01-01 RX ADMIN — BENZONATATE 100 MG: 100 CAPSULE ORAL at 14:26

## 2022-01-01 RX ADMIN — BUDESONIDE 0.5 MG: 0.5 SUSPENSION RESPIRATORY (INHALATION) at 07:22

## 2022-01-01 RX ADMIN — GUAIFENESIN SYRUP AND DEXTROMETHORPHAN 10 ML: 100; 10 SYRUP ORAL at 01:53

## 2022-01-01 RX ADMIN — CALCIUM GLUCONATE 1 G: 20 INJECTION, SOLUTION INTRAVENOUS at 04:40

## 2022-01-01 RX ADMIN — OSELTAMIVIR PHOSPHATE 75 MG: 75 CAPSULE ORAL at 14:26

## 2022-01-01 RX ADMIN — IPRATROPIUM BROMIDE AND ALBUTEROL SULFATE 3 ML: .5; 3 SOLUTION RESPIRATORY (INHALATION) at 08:37

## 2022-01-01 RX ADMIN — SODIUM CHLORIDE 125 MG: 9 INJECTION, SOLUTION INTRAVENOUS at 18:49

## 2022-01-01 RX ADMIN — ATORVASTATIN CALCIUM 20 MG: 20 TABLET, FILM COATED ORAL at 21:23

## 2022-01-01 RX ADMIN — Medication 10 ML: at 09:22

## 2022-01-01 RX ADMIN — PANTOPRAZOLE SODIUM 40 MG: 40 TABLET, DELAYED RELEASE ORAL at 17:10

## 2022-01-01 RX ADMIN — Medication 0.1 MCG/KG/MIN: at 01:04

## 2022-01-01 RX ADMIN — Medication 10 ML: at 21:25

## 2022-01-01 RX ADMIN — SODIUM CHLORIDE 1000 ML: 9 INJECTION, SOLUTION INTRAVENOUS at 01:07

## 2022-01-01 RX ADMIN — CEFEPIME HYDROCHLORIDE 2 G: 2 INJECTION, POWDER, FOR SOLUTION INTRAVENOUS at 14:27

## 2022-01-01 RX ADMIN — IPRATROPIUM BROMIDE AND ALBUTEROL SULFATE 3 ML: .5; 3 SOLUTION RESPIRATORY (INHALATION) at 16:07

## 2022-01-01 RX ADMIN — MIDAZOLAM HYDROCHLORIDE 3 MG/HR: 1 INJECTION, SOLUTION INTRAVENOUS at 01:19

## 2022-01-01 RX ADMIN — SODIUM SULFATE, POTASSIUM SULFATE, MAGNESIUM SULFATE 1 BOTTLE: 17.5; 3.13; 1.6 SOLUTION, CONCENTRATE ORAL at 04:15

## 2022-01-01 RX ADMIN — FUROSEMIDE 20 MG: 20 TABLET ORAL at 09:21

## 2022-01-01 RX ADMIN — DILTIAZEM HYDROCHLORIDE 240 MG: 240 CAPSULE, COATED, EXTENDED RELEASE ORAL at 08:33

## 2022-01-01 RX ADMIN — IPRATROPIUM BROMIDE AND ALBUTEROL SULFATE 3 ML: .5; 3 SOLUTION RESPIRATORY (INHALATION) at 14:35

## 2022-01-01 RX ADMIN — IPRATROPIUM BROMIDE AND ALBUTEROL SULFATE 3 ML: .5; 3 SOLUTION RESPIRATORY (INHALATION) at 14:16

## 2022-01-01 RX ADMIN — PROPRANOLOL HYDROCHLORIDE 10 MG: 10 TABLET ORAL at 09:21

## 2022-01-01 RX ADMIN — SODIUM BICARBONATE 50 MEQ: 84 INJECTION, SOLUTION INTRAVENOUS at 23:37

## 2022-01-01 RX ADMIN — ATORVASTATIN CALCIUM 20 MG: 20 TABLET, FILM COATED ORAL at 20:32

## 2022-01-01 RX ADMIN — CEFEPIME HYDROCHLORIDE 2 G: 2 INJECTION, POWDER, FOR SOLUTION INTRAVENOUS at 21:12

## 2022-01-01 RX ADMIN — ALLOPURINOL 300 MG: 300 TABLET ORAL at 08:33

## 2022-01-01 RX ADMIN — IPRATROPIUM BROMIDE AND ALBUTEROL SULFATE 3 ML: .5; 3 SOLUTION RESPIRATORY (INHALATION) at 21:36

## 2022-01-01 RX ADMIN — FUROSEMIDE 20 MG: 20 TABLET ORAL at 08:32

## 2022-01-01 RX ADMIN — SODIUM CHLORIDE 125 MG: 9 INJECTION, SOLUTION INTRAVENOUS at 17:10

## 2022-01-01 RX ADMIN — IPRATROPIUM BROMIDE AND ALBUTEROL SULFATE 3 ML: .5; 3 SOLUTION RESPIRATORY (INHALATION) at 21:38

## 2022-01-01 RX ADMIN — SODIUM CHLORIDE 5 MG/HR: 900 INJECTION, SOLUTION INTRAVENOUS at 16:44

## 2022-01-01 RX ADMIN — PANTOPRAZOLE SODIUM 40 MG: 40 TABLET, DELAYED RELEASE ORAL at 11:30

## 2022-01-01 RX ADMIN — PROPRANOLOL HYDROCHLORIDE 10 MG: 10 TABLET ORAL at 21:23

## 2022-01-01 RX ADMIN — GUAIFENESIN SYRUP AND DEXTROMETHORPHAN 10 ML: 100; 10 SYRUP ORAL at 14:26

## 2022-01-01 RX ADMIN — PROPRANOLOL HYDROCHLORIDE 10 MG: 10 TABLET ORAL at 12:59

## 2022-01-01 RX ADMIN — FUROSEMIDE 20 MG: 20 TABLET ORAL at 13:00

## 2022-01-01 RX ADMIN — ACETAMINOPHEN 650 MG: 325 TABLET ORAL at 18:28

## 2022-01-01 RX ADMIN — PROPRANOLOL HYDROCHLORIDE 10 MG: 10 TABLET ORAL at 08:33

## 2022-01-01 RX ADMIN — ACETAMINOPHEN 650 MG: 325 TABLET ORAL at 04:46

## 2022-01-01 RX ADMIN — MORPHINE SULFATE 4 MG: 4 INJECTION INTRAVENOUS at 09:03

## 2022-01-01 RX ADMIN — FUROSEMIDE 60 MG: 10 INJECTION, SOLUTION INTRAMUSCULAR; INTRAVENOUS at 17:37

## 2022-01-01 RX ADMIN — BENZONATATE 100 MG: 100 CAPSULE ORAL at 21:23

## 2022-01-01 RX ADMIN — BENZONATATE 100 MG: 100 CAPSULE ORAL at 08:33

## 2022-01-01 RX ADMIN — Medication 10 ML: at 21:06

## 2022-03-22 PROBLEM — I48.91 ATRIAL FIBRILLATION WITH RVR (HCC): Status: ACTIVE | Noted: 2022-01-01

## 2022-03-22 PROBLEM — R06.03 ACUTE RESPIRATORY DISTRESS: Status: ACTIVE | Noted: 2022-01-01

## 2022-03-22 NOTE — TELEPHONE ENCOUNTER
Michak with PT's daughter. I adv her of Dr. Talley's response. She stated they were already at [facility] and in a room.

## 2022-03-22 NOTE — TELEPHONE ENCOUNTER
Patient was put on the schedule by the [facility] for a video visit for 03/23/2022 . Heart racing oxygen level low and pt does not feel well. After talking with patient's daughter she stated his oxygen level was 85%. Pt was told to go to ER immediately for further treatment. Pt also has CHF. Dr Shah advised s

## 2022-03-22 NOTE — TELEPHONE ENCOUNTER
PATIENT DAUGHTER CALLED AND WANTED TO LET US KNOW THAT SHE IS TAKING HER DAD TO [facility] WITH POSSIBLE RESPIRATORY FAILURE AND WANTED TO KNOW IF DR TURK COULD GET A DIRECT ADMIT. , O2 IS 84.PLEASE ADVISE.

## 2022-03-24 PROBLEM — D50.9 IRON DEFICIENCY ANEMIA: Status: ACTIVE | Noted: 2022-01-01

## 2022-03-26 PROBLEM — J44.9 COPD (CHRONIC OBSTRUCTIVE PULMONARY DISEASE) (HCC): Status: ACTIVE | Noted: 2022-01-01

## 2022-03-26 PROBLEM — I46.9 CARDIOPULMONARY ARREST WITH SUCCESSFUL RESUSCITATION (HCC): Status: ACTIVE | Noted: 2022-01-01

## 2022-03-30 LAB
BACTERIA SPEC AEROBE CULT: NORMAL
BACTERIA SPEC AEROBE CULT: NORMAL

## 2022-04-16 PROCEDURE — 93296 REM INTERROG EVL PM/IDS: CPT | Performed by: INTERNAL MEDICINE

## 2022-04-16 PROCEDURE — 93295 DEV INTERROG REMOTE 1/2/MLT: CPT | Performed by: INTERNAL MEDICINE

## 2025-06-25 NOTE — OUTREACH NOTE
Physical Therapy Daily Treatment Note    Date: 2025  Patient Name: Shaheed Clayton  : 1957   MRN: [id number]  DOInjury:   DOSx: 2025   Referring Provider: [place] [address]     Medical Diagnosis:      Diagnosis Orders   1. S/P total knee arthroplasty, right            Outcome Measure:      X = TO BE PERFORMED NEXT VISIT  > = PROGRESS TO THIS FIRST  >> = PROGRESS TO THIS SECOND  >>> = PROGRESS TO THIS THIRD    S: Pt reports hurting even more today then yesterday  .   O: Discussed anatomy, physiology, body mechanics, principles of loading, and progressive loading/activity.  Reviewed home exercise program extensively along with instructions for ice and elevation; written copy provided.     Access Code: [email or web address]  URL: [email or web address]  Date: 2025  Prepared by: Aldair Leavitt    Exercises  - Supine Heel Slides  - 2 x daily - 7 x weekly - 2 sets - 20 reps  - Small Range Straight Leg Raise  - 2 x daily - 7 x weekly - 2 sets - 10 reps - 2 hold  - Supine Knee Extension Strengthening  - 2 x daily - 7 x weekly - 2 sets - 10 reps - 3 sec hold  - Supine Knee Extension Stretch on Towel Roll  - 2 x daily - 7 x weekly - 6 minutes hold  - Seated Long Arc Quad  - 2 x daily - 7 x weekly - 2 sets - 10 reps - 3 sec hold    Time  8732-4883      Repeat outcome measure at mid point and end.     Visit               -18 visits        2025   Knee:  Right:   AROM:  85° Flexion, -15  ° Extension  PROM: 95° Flexion,  -10° Extension  Left:   AROM: 138° Flexion,  0° Extension  PROM: 140° Flexion,  0° Extension Knee:  Right:   AROM: 75° Flex  -18° Ext  PROM: 77° Flex,  -10° Ext      Pain    Pain 5/10     ROM       Modalities       Ice and compression X 15 min 75mmHg  MO   Manual       X 10 min new      Stretching knee flexion 3 x 30 sec with 10 LAQ in between  MT   Stretching       Patella mobs X 20 ea dir through out session  TE   Prone hangs   TE   Heel props   Sepsis Week 1 Survey      Responses   Facility patient discharged from?  [facility]   Does the patient have one of the following disease processes/diagnoses(primary or secondary)?  Sepsis   Is there a successful TCM telephone encounter documented?  No   Week 1 attempt successful?  Yes   Call start time  1140   Call end time  1141   Week 1 call completed?  Yes   Wrap up additional comments  PATIENT VOICES THAT HE IS IN HIS PCP OFFICE AND COULD NOT ANSWER SURVEY QUESTIONS.           Chelsey Omalley LPN

## (undated) DEVICE — TBG INJ CONTRL PVCCLR RIGD RA 1200PSI 10

## (undated) DEVICE — 3M™ BAIR HUGGER® UNDERBODY BLANKET, FULL ACCESS, 10 PER CASE 63500: Brand: BAIR HUGGER™

## (undated) DEVICE — CATH F4 INF AR I MOD 100CM: Brand: INFINITI

## (undated) DEVICE — INTRO SHEATH ART/FEM ENGAGE .035 6F12CM

## (undated) DEVICE — KT MANIFLD CARDIAC

## (undated) DEVICE — ANGIO-SEAL VIP VASCULAR CLOSURE DEVICE: Brand: ANGIO-SEAL

## (undated) DEVICE — HI-TORQUE BALANCE MIDDLEWEIGHT UNIVERSAL II GUIDE WIRE STRAIGHT TIP PAK  190 CM: Brand: HI-TORQUE BALANCE MIDDLEWEIGHT UNIVERSAL II

## (undated) DEVICE — ST ACC MICROPUNCTURE STFF/CANN PLAT/TP 4F 21G 40CM

## (undated) DEVICE — BALN NC/EUPHORA RX 3.00X12MM

## (undated) DEVICE — GW DIAG EMERALD HEPCOAT MOVE JTIP STD .035 3MM 150CM

## (undated) DEVICE — ELECTRD DEFIB M/FUNC PROPADZ RADIOL 2PK

## (undated) DEVICE — INTRO SHEATH ART/FEM ENGAGE .035 5F12CM

## (undated) DEVICE — Device: Brand: DEFENDO AIR/WATER/SUCTION AND BIOPSY VALVE

## (undated) DEVICE — Device

## (undated) DEVICE — UNDYED BRAIDED (POLYGLACTIN 910), SYNTHETIC ABSORBABLE SUTURE: Brand: COATED VICRYL

## (undated) DEVICE — SINGLE-USE BIOPSY FORCEPS: Brand: RADIAL JAW 4

## (undated) DEVICE — PACEMAKER CDS: Brand: MEDLINE INDUSTRIES, INC.

## (undated) DEVICE — SOL ISO/ALC RUB 70PCT 4OZ

## (undated) DEVICE — CATH4F INF PIG 145Â° MOD 110CM: Brand: INFINITI

## (undated) DEVICE — SOL NACL 0.9PCT 1000ML

## (undated) DEVICE — CATH DIAG IMPULSE FR4 6F 100CM

## (undated) DEVICE — BIOPATCH™ ANTIMICROBIAL DRESSING WITH CHLORHEXIDINE GLUCONATE IS A HYDROPHILLIC POLYURETHANE ABSORPTIVE FOAM WITH CHLORHEXIDINE GLUCONATE (CHG) WHICH INHIBITS BACTERIAL GROWTH UNDER THE DRESSING. THE DRESSING IS INTENDED TO BE USED TO ABSORB EXUDATE, COVER A WOUND CAUSED BY VASCULAR AND NONVASCULAR PERCUTANEOUS MEDICAL DEVICES DURING SURGERY, AS WELL AS REDUCE LOCAL INFECTION AND COLONIZATION OF MICROORGANISMS.: Brand: BIOPATCH

## (undated) DEVICE — TRY INTRO PERC 6F

## (undated) DEVICE — CATH DIAG IMPULSE AL1 6F 100CM

## (undated) DEVICE — TAVR: Brand: MEDLINE INDUSTRIES, INC.

## (undated) DEVICE — PK TRY HEART CATH 50

## (undated) DEVICE — RADIFOCUS OBTURATOR: Brand: RADIFOCUS

## (undated) DEVICE — CVR PROB 96IN LF STRL

## (undated) DEVICE — HI-TORQUE SUPRA CORE .035 PERIPHERAL GUIDE WIRE .035 X 300 CM: Brand: HI-TORQUE SUPRA CORE

## (undated) DEVICE — GW HITORQUE/BAL MID/WT J W/HCOAT .014 3X190CM

## (undated) DEVICE — GW XCHG AMPLTZ XSTIF PTFE CRV .035 3X260

## (undated) DEVICE — CATH DIAG IMPULSE PIG 5F 100CM

## (undated) DEVICE — PK CATH CARD 40

## (undated) DEVICE — DEV INFL COMPAK W/ACCESSPLUS IN4530

## (undated) DEVICE — CABL BIPOL W/ALLGTR CLIP/SM 12FT

## (undated) DEVICE — BALN EUPHORA 3X20MM

## (undated) DEVICE — 3M™ STERI-STRIP™ REINFORCED ADHESIVE SKIN CLOSURES, R1547, 1/2 IN X 4 IN (12 MM X 100 MM), 6 STRIPS/ENVELOPE: Brand: 3M™ STERI-STRIP™

## (undated) DEVICE — SENSR CERBRL O2 PK/2

## (undated) DEVICE — CATH DIAG IMPULSE FL3.5 5F 100CM

## (undated) DEVICE — BALN PRESS WEDGE 5F 110CM

## (undated) DEVICE — CATH DIAG IMPULSE FL4 5F 100CM

## (undated) DEVICE — TUBING, SUCTION, 1/4" X 10', STRAIGHT: Brand: MEDLINE

## (undated) DEVICE — SUT SILK 0 CT1 CR8 18IN C021D

## (undated) DEVICE — GW EMR FIX EXCHG J STD .035 3MM 260CM

## (undated) DEVICE — TOWEL,OR,DSP,ST,BLUE,STD,4/PK,20PK/CS: Brand: MEDLINE

## (undated) DEVICE — SPK CONTRST MISER

## (undated) DEVICE — RADIFOCUS GLIDEWIRE: Brand: GLIDEWIRE

## (undated) DEVICE — GLIDESHEATH BASIC HYDROPHILIC COATED INTRODUCER SHEATH: Brand: GLIDESHEATH

## (undated) DEVICE — ZINC CALCIUM ALGINATE DRESSING: Brand: KENDALL

## (undated) DEVICE — TRANSD PRESS STOPCOCK1WAY CABL48IN

## (undated) DEVICE — CATH ELECTRD PACE TEMP BI NONHEP 5F110CM

## (undated) DEVICE — GLV SURG BIOGEL LTX PF 8

## (undated) DEVICE — INTRO SHEATH PRELUDE SNAP .038 8.5F 13CM W/SDPRT LT/BLU

## (undated) DEVICE — PENCL HND ROCKRSWTCH HOLSTR EZ CLEAN TP CRD 10FT

## (undated) DEVICE — DECANT BG O JET

## (undated) DEVICE — HI-TORQUE SUPRA CORE .035 PERIPHERAL GUIDE WIRE .035 X 190 CM: Brand: HI-TORQUE SUPRA CORE

## (undated) DEVICE — TBG PRESS EXCITE INJ HPF1200 CLR 100IN

## (undated) DEVICE — CATH DIAG IMPULSE FR4 5F 100CM

## (undated) DEVICE — PERCLOSE PROGLIDE™ SUTURE-MEDIATED CLOSURE SYSTEM: Brand: PERCLOSE PROGLIDE™

## (undated) DEVICE — 6F .070 XB 3.5 100CM: Brand: VISTA BRITE TIP

## (undated) DEVICE — BALN EUPHORA 3X12MM

## (undated) DEVICE — VIOLET BRAIDED (POLYGLACTIN 910), SYNTHETIC ABSORBABLE SUTURE: Brand: COATED VICRYL

## (undated) DEVICE — SOL IRR H2O BTL 1000ML STRL

## (undated) DEVICE — PINNACLE INTRODUCER SHEATH: Brand: PINNACLE

## (undated) DEVICE — CATH DIAG IMPULSE MPA2 SH 5F 100CM

## (undated) DEVICE — SUTURE REMOVAL TRAY: Brand: MEDLINE INDUSTRIES, INC.

## (undated) DEVICE — THE TORRENT IRRIGATION SCOPE CONNECTOR IS USED WITH THE TORRENT IRRIGATION TUBING TO PROVIDE IRRIGATION FLUIDS SUCH AS STERILE WATER DURING GASTROINTESTINAL ENDOSCOPIC PROCEDURES WHEN USED IN CONJUNCTION WITH AN IRRIGATION PUMP (OR ELECTROSURGICAL UNIT).: Brand: TORRENT

## (undated) DEVICE — STPCK 1WY STD/PRESS SWIVELNUT

## (undated) DEVICE — TBG ART PRESS 24 IN

## (undated) DEVICE — DRSNG SURESITE WNDW 2.38X2.75

## (undated) DEVICE — CANN NASL CO2 TRULINK W/O2 A/

## (undated) DEVICE — CATH GUIDE ZUMA2 EBU 6F 3.75X100CM

## (undated) DEVICE — FR5 INFINITI MULTIPAC: Brand: INFINITI

## (undated) DEVICE — ANTIBACTERIAL UNDYED BRAIDED (POLYGLACTIN 910), SYNTHETIC ABSORBABLE SUTURE: Brand: COATED VICRYL

## (undated) DEVICE — GW INQWIRE FC PTFE STR .035IN 150

## (undated) DEVICE — 3M™ PATIENT PLATE, CORDED, SPLIT, LARGE, 40 PER CASE, 1179: Brand: 3M™

## (undated) DEVICE — SUT SILK 2/0 SH CR8 18IN CR8 C012D

## (undated) DEVICE — INTRO SHEATH ART/FEM ENGAGE .035 8F12CM

## (undated) DEVICE — BOWL PLSTC MD 16OZ BLU STRL

## (undated) DEVICE — SUT ETHIB 0/0 MO6 I8IN CX45D

## (undated) DEVICE — 3M™ IOBAN™ 2 ANTIMICROBIAL INCISE DRAPE 6650EZ: Brand: IOBAN™ 2

## (undated) DEVICE — CVR TABL BACK STND

## (undated) DEVICE — SUT SILK 2 SUTUPAK TIE 60IN SA8H 2STRAND